# Patient Record
Sex: MALE | Race: ASIAN | Employment: OTHER | URBAN - METROPOLITAN AREA
[De-identification: names, ages, dates, MRNs, and addresses within clinical notes are randomized per-mention and may not be internally consistent; named-entity substitution may affect disease eponyms.]

---

## 2018-01-09 ENCOUNTER — TRANSCRIBE ORDERS (OUTPATIENT)
Dept: ADMINISTRATIVE | Facility: HOSPITAL | Age: 68
End: 2018-01-09

## 2018-01-09 DIAGNOSIS — E13.42 DIABETIC POLYNEUROPATHY ASSOCIATED WITH OTHER SPECIFIED DIABETES MELLITUS (HCC): Primary | ICD-10-CM

## 2018-01-11 NOTE — RESULT NOTES
Message   #1  Please call the patient with the results of his lower extremity arterial Doppler test       #2  It shows a narrowing of the artery behind his right knee  #3  I recommend that he see Dr Beatrice Gomez, of the vascular service, in consultation for further evaluation and management  #4  Please give him Dr Shmuel Salinas contact information  I put the consult in his chart  Verified Results  VAS LOWER LIMB ARTERIAL DUPLEX, COMPLETE BILATERAL/GRAFTS [id number] 01:12PM Sabrina Harvey     Test Name Result Flag Reference   VAS LOWER LIMB ARTERIAL DUPLEX, COMPLETE BILATERAL/GRAFTS (Report)     [facility] REPORT   CLINICAL:   Indications: PVD, Unspecified [I73 9]  Pt  complaining of plantar numbness and tingling  Denies claudication  Operative History   B/L CATARACT SURGERY   Risk Factors: The patient has history of HTN and diabetes (diabetes)  Clinical   Right Pressure: 195/ mm Hg, Left Pressure: 187/ mm Hg  FINDINGS:      Segment        Right         Left                        Impression PSV EDV PSV EDV    Common Femoral Artery       138  9 138  14    Prox Profunda           110  0 101  0    Prox SFA              104  0  96  10    Mid SFA               94  0  89  0    Dist SFA              68  0  81  0    Proximal Pop      >75%    318  10 105  0    Distal Pop             59  0  83  0    Dist Post Tibial          54  0  99  0    Dist  Ant  Tibial          37  0  56  0             CONCLUSION:   Impression:   RIGHT LOWER LIMB:   >75% stenosis identified in the proximal popliteal artery  Ankle/Brachial index: 1 13 (Normal Range)   Metatarsal pressure of 197mmHg   Great toe pressure of 153mmHg, within the healing range   LEFT LOWER LIMB:   Diffuse disease throughout the femoral and popliteal arteries without evidence   of a focal stenosis     Ankle/Brachial index: 1 15 (Normal Range)   Metatarsal pressure of 201mmHg   Great toe pressure of 168mmHg, within the healing range SIGNATURE:   Electronically Signed by: Denys Pham DO RPVI on 2016-01-15 01:12:09 PM       Plan  PAD (peripheral artery disease)    · 1 - Bryan RIOS, Maggie Valladares  (Vascular Surgery) Physician Referral  Consult  Status: Active   Requested for: 51TLM2268  Care Summary provided  : Yes

## 2018-01-11 NOTE — MISCELLANEOUS
Message   Recorded as Task   Date: 08/15/2016 09:22 AM, Created By: Holger Gill   Task Name: Follow Up   Assigned To: [facility] henrik clinical,Team   Regarding Patient: Timothy Killian, Status: Active   Comment:    Holger Gill - 15 Aug 2016 9:22 AM     TASK CREATED  Caller: Self; General Medical Question; [phone] (Home)  TC from pt today at 9:14 asking for Dr Reagan Cook to call him back b/c he would like to s/w him and it's very important! Pt stated he sent a message to Dr Reagan Cook last wk and again yest via the pt portal and and he hasn't gotten a reply back  He would like us to know that he has tried using this pt portal, he has s/w IT dept and said there must be something wrong on the receiving end b/c he hasn't gotten a reply back  Pt request Dr Reagan Cook call him at [phone]  [facility] 15 Aug 2016 10:05 AM     TASK REPLIED TO: Previously Assigned To ALDO fischer,Team  pt was instructed not to use the portal and to contact the nurse's line for any issues  please find out what is going on with him   Prisca Mobley - 15 Aug 2016 10:28 AM     TASK EDITED  Already being addressed in another task  Active Problems    1  Atherosclerosis of aorta (440 0) (I70 0)   2  Benign essential hypertension (401 1) (I10)   3  Chronic pain (338 29) (G89 29)   4  Degenerative lumbar spinal stenosis (724 02) (M48 06)   5  Diabetes mellitus with mild nonproliferative diabetic retinopathy (250 50,362 04)   (E11 329)   6  Diabetes mellitus with neurological manifestation (250 60) (E11 49)   7  Diabetes mellitus with neurological manifestations, uncontrolled (250 62)   (E11 49,E11 65)   8  Diabetic neuropathy associated with type 2 diabetes mellitus (250 60,357 2) (E11 40)   9  Diabetic retinopathy (250 50,362 01) (E11 319)   10  Hyperlipidemia (272 4) (E78 5)   11  Laboratory examination ordered as part of a routine general medical examination    (V72 62) (Z00 00)   12   Long term use of drug (V58 69) (Z79 899)   13  Lumbar disc herniation with radiculopathy (722 10) (M51 16)   14  Lumbar radiculopathy (724 4) (M54 16)   15  Microalbuminuria (791 0) (R80 9)   16  Need for prophylactic vaccination and inoculation against influenza (V04 81) (Z23)   17  Nocturia (788 43) (R35 1)   18  Numbness (782 0) (R20 0)   19  PAD (peripheral artery disease) (443 9) (I73 9)   20  Palpitations (785 1) (R00 2)   21  Peripheral neuropathy (356 9) (G62 9)   22  Screening for genitourinary condition (V81 6) (Z13 89)   23  Screening for lipoid disorders (V77 91) (Z13 220)   24  Special screening examination for neoplasm of prostate (V76 44) (Z12 5)   25  Special screening examination for neoplasm of prostate (V76 44) (Z12 5)   26  Staggering gait (781 2) (R26 0)   27  Subcutaneous nodule (782 2) (R22 9)   28  Thoracic Aortic Ectasia (447 71)   29  Uncontrolled type 2 diabetes mellitus (250 02) (E11 65)   30  Vitamin D deficiency (268 9) (E55 9)    Current Meds   1  Alpha-Lipoic Acid 600 MG Oral Capsule; TAKE 1 CAPSULE DAILY; Therapy: 54GJW8529 to Recorded   2  Aspirin 81 MG TABS; TAKE 1 TABLET DAILY; Therapy: 75DNT1643 to  Requested for: 35Aaj5309 Recorded   3  Cholest Off Complete TABS; TAKE AS DIRECTED PER PACKAGE INSTRUCTIONS; Therapy: (Edwige Peters) to Recorded   4  Gabapentin 300 MG Oral Capsule; TAKE 1 CAPSULE AT BEDTIME NIGHTLY; Therapy: 65GPB3993 to (Evaluate:44Col5660)  Requested for: 47BZI0746; Last   YW:83KHQ7482 Ordered   5  GlipiZIDE-MetFORMIN HCl - 5-500 MG Oral Tablet; TAKE 2 TABLETS BY MOUTH TWICE   A DAY WITH MEALS  HOLD IF NOT EATING; Therapy: 11BGH4691 to (Evaluate:03Ucs6888)  Requested for: 70YPD6286; Last   Rx:10Yup9732 Ordered   6  Losartan Potassium-HCTZ 100-25 MG Oral Tablet (Hyzaar); TAKE ONE TABLET BY   MOUTH ONCE DAILY; Therapy: 13YLG7758 to (Candis Stover)  Requested for: 06TGD1875; Last   Rx:13Nov2015 Ordered   7   Metanx 3-90 314-2-35 MG Oral Capsule; 1 CAPSULE TWICE DAILY; Therapy: 17JSZ9727 to (Evaluate:48Uul6400); Last Rx:34Zcj1634 Ordered   8  Metanx 3-90 314-2-35 MG Oral Capsule; TAKE 1 CAPSULE TWICE DAILY; Therapy: (Recorded:07Jun2015) to Recorded   9  Metoprolol Tartrate 25 MG Oral Tablet; take one tablet by mouth with a 50 mg tablet twice   daily; Therapy: 63XNE8305 to ([employer])  Requested for: 48ENB0349; Last   Rx:25Mzz4382; Status: ACTIVE - Renewal Denied Ordered   10  Metoprolol Tartrate 50 MG Oral Tablet; TAKE ONE TABLET BY MOUTH TWICE    DAILY-TAKE WITH 25 MG TABLET FOR A TOTAL OF 75 MG; Therapy: 43JYF7954 to ([employer])  Requested for: 82NGJ6419; Last    Rx:99Exq2498; Status: ACTIVE - Renewal Denied Ordered   11  OneTouch Delica Lancets 85H Miscellaneous; Therapy: 20EJO3182 to Recorded   12  OneTouch Ultra Mini w/Device Kit; Therapy: 87VRK5171 to Recorded    Allergies    1  Penicillins    2  No Known Environmental Allergies   3   No Known Food Allergies    Signatures   Electronically signed by : Tania Philip, ; Aug 15 2016 10:28AM EST                       (Author)

## 2018-01-12 NOTE — MISCELLANEOUS
Message   Recorded as Task   Date: 08/16/2016 04:07 PM, Created By: Valentina Butterfield   Task Name: Miscellaneous   Assigned To: Pily Moss   Regarding Patient: Mary Powers, Status: Active   CommentRolanda Peres - 16 Aug 2016 4:07 PM     TASK CREATED  Certified letter mailed to patient today to notify patient if any further abusive calls made to anyone in the network, this will result in discharge from entire network  Any further abusive contact from patient, please contact Sydney Darwin Pagan - 96 Aug 2016 8:53 PM     TASK REPLIED TO: Previously Assigned To Jozef Pagan md aware        Active Problems    1  Atherosclerosis of aorta (440 0) (I70 0)   2  Benign essential hypertension (401 1) (I10)   3  Chronic pain (338 29) (G89 29)   4  Degenerative lumbar spinal stenosis (724 02) (M48 06)   5  Diabetes mellitus with mild nonproliferative diabetic retinopathy (250 50,362 04)   (E11 329)   6  Diabetes mellitus with neurological manifestation (250 60) (E11 49)   7  Diabetes mellitus with neurological manifestations, uncontrolled (250 62)   (E11 49,E11 65)   8  Diabetic neuropathy associated with type 2 diabetes mellitus (250 60,357 2) (E11 40)   9  Diabetic retinopathy (250 50,362 01) (E11 319)   10  Hyperlipidemia (272 4) (E78 5)   11  Laboratory examination ordered as part of a routine general medical examination    (V72 62) (Z00 00)   12  Long term use of drug (V58 69) (Z79 899)   13  Lumbar disc herniation with radiculopathy (722 10) (M51 16)   14  Lumbar radiculopathy (724 4) (M54 16)   15  Microalbuminuria (791 0) (R80 9)   16  Need for prophylactic vaccination and inoculation against influenza (V04 81) (Z23)   17  Nocturia (788 43) (R35 1)   18  Numbness (782 0) (R20 0)   19  PAD (peripheral artery disease) (443 9) (I73 9)   20  Palpitations (785 1) (R00 2)   21  Peripheral neuropathy (356 9) (G62 9)   22  Screening for genitourinary condition (V81 6) (Z13 89)   23   Screening for lipoid disorders (V77 91) (Z13 220)   24  Special screening examination for neoplasm of prostate (V76 44) (Z12 5)   25  Special screening examination for neoplasm of prostate (V76 44) (Z12 5)   26  Staggering gait (781 2) (R26 0)   27  Subcutaneous nodule (782 2) (R22 9)   28  Thoracic Aortic Ectasia (447 71)   29  Uncontrolled type 2 diabetes mellitus (250 02) (E11 65)   30  Vitamin D deficiency (268 9) (E55 9)    Current Meds   1  Alpha-Lipoic Acid 600 MG Oral Capsule; TAKE 1 CAPSULE DAILY; Therapy: 91KWI1219 to Recorded   2  Aspirin 81 MG TABS; TAKE 1 TABLET DAILY; Therapy: 38MIM7306 to  Requested for: 74Jkv3119 Recorded   3  Cholest Off Complete TABS; TAKE AS DIRECTED PER PACKAGE INSTRUCTIONS; Therapy: (Brian Robin) to Recorded   4  Gabapentin 300 MG Oral Capsule; TAKE 1 CAPSULE AT BEDTIME NIGHTLY; Therapy: 70IZH9251 to (Evaluate:42Ita5833)  Requested for: 26DAR9377; Last   XS:39VVW0425 Ordered   5  Losartan Potassium-HCTZ 100-25 MG Oral Tablet (Hyzaar); TAKE ONE TABLET BY   MOUTH ONCE DAILY; Therapy: 92HXD3386 to (Bradford Arvizu)  Requested for: 45KCA3905; Last   Rx:13Nov2015 Ordered   6  Metanx 3-90 314-2-35 MG Oral Capsule; 1 CAPSULE TWICE DAILY; Therapy: 48LRX5329 to (Evaluate:55Kqi6040); Last Rx:69Wil6315 Ordered   7  Metanx 3-90 314-2-35 MG Oral Capsule; TAKE 1 CAPSULE TWICE DAILY; Therapy: (Recorded:07Jun2015) to Recorded   8  Metoprolol Tartrate 25 MG Oral Tablet; take one tablet by mouth with a 50 mg tablet twice   daily; Therapy: 10NIK3167 to (Nina Stevens)  Requested for: 20LRK0664; Last   Rx:25Lwx8474; Status: ACTIVE - Renewal Denied Ordered   9  Metoprolol Tartrate 50 MG Oral Tablet; TAKE ONE TABLET BY MOUTH TWICE   DAILY-TAKE WITH 25 MG TABLET FOR A TOTAL OF 75 MG; Therapy: 02PTS5406 to (Nina Stevens)  Requested for: 93TNX6690; Last   Rx:87Orx9092; Status: ACTIVE - Renewal Denied Ordered   10  OneTouch Delica Lancets 46S Miscellaneous;     Therapy: 32GER0028 to Recorded   11  OneTouch Ultra Mini w/Device Kit; Therapy: 20CWT4932 to Recorded    Allergies    1  Penicillins    2  No Known Environmental Allergies   3  No Known Food Allergies    Signatures   Electronically signed by :  Dessa Leventhal, ; Aug 17 2016  8:09AM EST                       (Author)

## 2018-01-12 NOTE — MISCELLANEOUS
Message   Recorded as Task   Date: 07/19/2016 10:46 AM, Created By: Wilfrido Pires   Task Name: Follow Up   Assigned To: [facility] clerical,Team   Regarding Patient: Ubaldo Carlson, Status: In Progress   Joe Saldaña - 19 Jul 2016 10:46 AM     TASK CREATED  pt is s/p B/L L5 TFESI on 07/14/16 by Augustine Reyna - 21 Jul 2016 7:44 AM     TASK EDITED  No f/u scheduled   Parris Boudreaux - 21 Jul 2016 11:39 AM     TASK EDITED  Spoke with pt's wife who reports the pt has not noticed any relief from pain but feels stronger in legs  Advised wife to tell pt to give steroid more time and that I would f/u with him again next week  Wilfrid Sherwood - 21 Jul 2016 11:40 AM     TASK REPLIED TO: Previously Assigned To Wilfrid Sherwood md aware    please have him schedule POVS with me in 4 weeks   Parris Boudreaux - 21 Jul 2016 11:51 AM     TASK REASSIGNED: Previously Assigned To ALDO Soto - 21 Jul 2016 2:29 PM     TASK IN Melisa - 21 Jul 2016 2:33 PM     TASK EDITED  Pt is scheduled for 8/10/16 at 3:15        Active Problems    1  Atherosclerosis of aorta (440 0) (I70 0)   2  Benign essential hypertension (401 1) (I10)   3  Chronic pain (338 29) (G89 29)   4  Degenerative lumbar spinal stenosis (724 02) (M48 06)   5  Diabetes mellitus with mild nonproliferative diabetic retinopathy (250 50,362 04)   (E11 329)   6  Diabetes mellitus with neurological manifestation (250 60) (E11 49)   7  Diabetes mellitus with neurological manifestations, uncontrolled (250 62)   (E11 49,E11 65)   8  Diabetic neuropathy associated with type 2 diabetes mellitus (250 60,357 2) (E11 40)   9  Diabetic retinopathy (250 50,362 01) (E11 319)   10  Hyperlipidemia (272 4) (E78 5)   11  Laboratory examination ordered as part of a routine general medical examination    (V72 62) (Z00 00)   12  Long term use of drug (V58 69) (Z79 899)   13   Lumbar disc herniation with radiculopathy (722 10) (M51 16) 14  Lumbar radiculopathy (724 4) (M54 16)   15  Microalbuminuria (791 0) (R80 9)   16  Need for prophylactic vaccination and inoculation against influenza (V04 81) (Z23)   17  Nocturia (788 43) (R35 1)   18  Numbness (782 0) (R20 0)   19  PAD (peripheral artery disease) (443 9) (I73 9)   20  Palpitations (785 1) (R00 2)   21  Peripheral neuropathy (356 9) (G62 9)   22  Screening for genitourinary condition (V81 6) (Z13 89)   23  Screening for lipoid disorders (V77 91) (Z13 220)   24  Special screening examination for neoplasm of prostate (V76 44) (Z12 5)   25  Special screening examination for neoplasm of prostate (V76 44) (Z12 5)   26  Staggering gait (781 2) (R26 0)   27  Subcutaneous nodule (782 2) (R22 9)   28  Thoracic Aortic Ectasia (447 71)   29  Uncontrolled type 2 diabetes mellitus (250 02) (E11 65)   30  Vitamin D deficiency (268 9) (E55 9)    Current Meds   1  Alpha-Lipoic Acid 600 MG Oral Capsule; TAKE 1 CAPSULE DAILY; Therapy: 59NFQ0065 to Recorded   2  Aspirin 81 MG TABS; TAKE 1 TABLET DAILY; Therapy: 00LGJ8599 to  Requested for: 04Lxb3469 Recorded   3  Cholest Off Complete TABS; TAKE AS DIRECTED PER PACKAGE INSTRUCTIONS; Therapy: ([facility]) to Recorded   4  Gabapentin 300 MG Oral Capsule; TAKE 1 CAPSULE AT BEDTIME NIGHTLY; Therapy: 23HOT2838 to (Evaluate:89Gkt2213)  Requested for: 80VSF1133; Last   GA:69HXE5428 Ordered   5  GlipiZIDE-MetFORMIN HCl - 5-500 MG Oral Tablet; TAKE 2 TABLETS BY MOUTH TWICE   A DAY WITH MEALS  HOLD IF NOT EATING; Therapy: 13KBL4705 to (Evaluate:31Llg4650)  Requested for: 07DKU2212; Last   Rx:41Ouj0183 Ordered   6  Losartan Potassium-HCTZ 100-25 MG Oral Tablet (Hyzaar); TAKE ONE TABLET BY   MOUTH ONCE DAILY; Therapy: 13ALW9715 to (Dima Garcia)  Requested for: 91WBV8150; Last   Rx:13Nov2015 Ordered   7  Metanx 3-90 314-2-35 MG Oral Capsule; 1 CAPSULE TWICE DAILY; Therapy: 93YXC1152 to (Evaluate:42Pve5873); Last Rx:02Yrc0585 Ordered   8   Metanx 3-90 314-2-35 MG Oral Capsule; TAKE 1 CAPSULE TWICE DAILY; Therapy: (Recorded:37Uio0776) to Recorded   9  Metoprolol Tartrate 25 MG Oral Tablet; take one tablet by mouth with a 50 mg tablet twice   daily; Therapy: 06JKB2750 to (Para Allis)  Requested for: 11BTQ9430; Last   Rx:90Fwd5467; Status: ACTIVE - Renewal Denied Ordered   10  Metoprolol Tartrate 50 MG Oral Tablet; TAKE ONE TABLET BY MOUTH TWICE    DAILY-TAKE WITH 25 MG TABLET FOR A TOTAL OF 75 MG; Therapy: 23TOX2058 to (Para Allis)  Requested for: 44LRH2557; Last    Rx:18Dtb3660; Status: ACTIVE - Renewal Denied Ordered   11  OneTouch Delica Lancets 67V Miscellaneous; Therapy: 36YTL4026 to Recorded   12  OneTouch Ultra Mini w/Device Kit; Therapy: 38NCN1667 to Recorded    Allergies    1  Penicillins    2  No Known Environmental Allergies   3  No Known Food Allergies    Signatures   Electronically signed by :  Elza Farah, ; Jul 21 2016  2:33PM EST                       (Author)

## 2018-01-13 NOTE — MISCELLANEOUS
Message   Recorded as Task   Date: 08/23/2016 01:04 PM, Created By: RAYSHAWN GONSALEZXPGGGX   Task Name: Follow Up   Assigned To: Renard Gaviria   Regarding Patient: Asael Miguel, Status: Active   CommentDaivd Hark - 23 Aug 2016 1:04 PM     TASK CREATED  Pt aware that you will be transitioning out of office effective October 1, 2016  Pt declined to schedule one final appt with you prior to your departure  Pt states "I will use my endocrinologist as my PCP "  Pt requesting to have records transferred to endocrinologist   Pt will be in to sign authorization form by the end of the week  Renard Gaviria - 19 Aug 2016 1:11 PM     TASK EDITED  CTN        Signatures   Electronically signed by :  Fabien Nayak MD; Aug 23 2016  1:11PM EST                       (Author)

## 2018-01-14 NOTE — MISCELLANEOUS
Message   Recorded as Task   Date: 08/16/2016 12:08 AM, Created By: Bernadine Nye   Task Name: Go to Med   Assigned To: Bernadine Nye   Regarding Patient: Elisa Trejo, Status: Complete   CommentErenest Calico - 16 Aug 2016 12:08 AM     TASK CREATED  Alison,    #1  Please call the patient  #2  As he is following with an endocrinologist, Dr Nicolette Lindsey, I recommend that he call Dr Julissa Fairbanks office to refill this medication  Kelsie Tatum - 16 Aug 2016 5:11 PM     TASK EDITED                 Per E21 request, called patient to discuss prior message, patient did acknowledge that he see's Dr Nicolette Lindsey then hung up during the conversation  Stephanie Tu - 16 Aug 2016 5:11 PM     TASK COMPLETED        Active Problems    1  Atherosclerosis of aorta (440 0) (I70 0)   2  Benign essential hypertension (401 1) (I10)   3  Chronic pain (338 29) (G89 29)   4  Degenerative lumbar spinal stenosis (724 02) (M48 06)   5  Diabetes mellitus with mild nonproliferative diabetic retinopathy (250 50,362 04)   (E11 329)   6  Diabetes mellitus with neurological manifestation (250 60) (E11 49)   7  Diabetes mellitus with neurological manifestations, uncontrolled (250 62)   (E11 49,E11 65)   8  Diabetic neuropathy associated with type 2 diabetes mellitus (250 60,357 2) (E11 40)   9  Diabetic retinopathy (250 50,362 01) (E11 319)   10  Hyperlipidemia (272 4) (E78 5)   11  Laboratory examination ordered as part of a routine general medical examination    (V72 62) (Z00 00)   12  Long term use of drug (V58 69) (Z79 899)   13  Lumbar disc herniation with radiculopathy (722 10) (M51 16)   14  Lumbar radiculopathy (724 4) (M54 16)   15  Microalbuminuria (791 0) (R80 9)   16  Need for prophylactic vaccination and inoculation against influenza (V04 81) (Z23)   17  Nocturia (788 43) (R35 1)   18  Numbness (782 0) (R20 0)   19  PAD (peripheral artery disease) (443 9) (I73 9)   20  Palpitations (785 1) (R00 2)   21   Peripheral neuropathy (356 9) (G62 9)   22  Screening for genitourinary condition (V81 6) (Z13 89)   23  Screening for lipoid disorders (V77 91) (Z13 220)   24  Special screening examination for neoplasm of prostate (V76 44) (Z12 5)   25  Special screening examination for neoplasm of prostate (V76 44) (Z12 5)   26  Staggering gait (781 2) (R26 0)   27  Subcutaneous nodule (782 2) (R22 9)   28  Thoracic Aortic Ectasia (447 71)   29  Uncontrolled type 2 diabetes mellitus (250 02) (E11 65)   30  Vitamin D deficiency (268 9) (E55 9)    Current Meds   1  Alpha-Lipoic Acid 600 MG Oral Capsule; TAKE 1 CAPSULE DAILY; Therapy: 45YGE0778 to Recorded   2  Aspirin 81 MG TABS; TAKE 1 TABLET DAILY; Therapy: 04TRT3917 to  Requested for: 40Ybt0109 Recorded   3  Cholest Off Complete TABS; TAKE AS DIRECTED PER PACKAGE INSTRUCTIONS; Therapy: (Radha Rodriguez to Recorded   4  Gabapentin 300 MG Oral Capsule; TAKE 1 CAPSULE AT BEDTIME NIGHTLY; Therapy: 95IJZ3241 to (Evaluate:66Quz0275)  Requested for: 49DLE8402; Last   BB:36MZQ8626 Ordered   5  Losartan Potassium-HCTZ 100-25 MG Oral Tablet (Hyzaar); TAKE ONE TABLET BY   MOUTH ONCE DAILY; Therapy: 26XBB0334 to (Marilyn Kp)  Requested for: 13BMV6833; Last   Rx:13Nov2015 Ordered   6  Metanx 3-90 314-2-35 MG Oral Capsule; 1 CAPSULE TWICE DAILY; Therapy: 28HWN4371 to (Evaluate:00Hbu2060); Last Rx:79Smv2610 Ordered   7  Metanx 3-90 314-2-35 MG Oral Capsule; TAKE 1 CAPSULE TWICE DAILY; Therapy: (Recorded:07Jun2015) to Recorded   8  Metoprolol Tartrate 25 MG Oral Tablet; take one tablet by mouth with a 50 mg tablet twice   daily; Therapy: 36PLX5987 to (Marilyn Kp)  Requested for: 01ACK1371; Last   Rx:31Gyy7883; Status: ACTIVE - Renewal Denied Ordered   9  Metoprolol Tartrate 50 MG Oral Tablet; TAKE ONE TABLET BY MOUTH TWICE   DAILY-TAKE WITH 25 MG TABLET FOR A TOTAL OF 75 MG;    Therapy: 62EUJ4144 to (Marilyn Kp)  Requested for: 67WBV1014; Last   Rx:41Men7973; Status: ACTIVE - Renewal Denied Ordered   10  OneTouch Delica Lancets 21Y Miscellaneous; Therapy: 28SNB1810 to Recorded   11  OneTouch Ultra Mini w/Device Kit; Therapy: 65AHO9032 to Recorded    Allergies    1  Penicillins    2  No Known Environmental Allergies   3  No Known Food Allergies    Signatures   Electronically signed by :  Fareed Grijalva MD; Aug 17 2016  9:52AM EST

## 2018-01-15 NOTE — MISCELLANEOUS
Message   Date: 10 Aug 2016 4:35 PM EST, Recorded By: Iva Castillo   Pt had called IT multiple times trying to send a message to Dr Maximus Stewart via the pt portal  Pt upset with IT that they were not able to help him after multiple phone calls  Pt did call back today and IT was able to assist pt to get message to us today via the pt portal    I contacted pt to advise him that we did receive his message that he needed to cancel his appt today and asked him if he would like to reschedule at this time? Pt advised me that he would not be able to reschedule at this time  I advised him to call our office to reschedule  Pt states "I have no interest in calling your office, I would like to make the appt on the pt portal  [facility] has an excellent portal " Pt apologized for using our competition  Pt asking why we can't help him? I advised that I am not very familiar with the system and will have to look into it  Pt asking why I would need to look into it, stating that he will be awaiting a response via the pt portal and when he gets it he will make the appt via the portal at that time  I did discuss with Cy Barnes (), she advised me that due to the nature of our practice pts are not able to make appts via the pt portal    I did respond back to the pt via the pt portal, as per the conversation I had with Cy Barnes  Active Problems    1  Atherosclerosis of aorta (440 0) (I70 0)   2  Benign essential hypertension (401 1) (I10)   3  Chronic pain (338 29) (G89 29)   4  Degenerative lumbar spinal stenosis (724 02) (M48 06)   5  Diabetes mellitus with mild nonproliferative diabetic retinopathy (250 50,362 04)   (E11 329)   6  Diabetes mellitus with neurological manifestation (250 60) (E11 49)   7  Diabetes mellitus with neurological manifestations, uncontrolled (250 62)   (E11 49,E11 65)   8  Diabetic neuropathy associated with type 2 diabetes mellitus (250 60,357 2) (E11 40)   9   Diabetic retinopathy (250 50,362 01) (E11 319)   10  Hyperlipidemia (272 4) (E78 5)   11  Laboratory examination ordered as part of a routine general medical examination    (V72 62) (Z00 00)   12  Long term use of drug (V58 69) (Z79 899)   13  Lumbar disc herniation with radiculopathy (722 10) (M51 16)   14  Lumbar radiculopathy (724 4) (M54 16)   15  Microalbuminuria (791 0) (R80 9)   16  Need for prophylactic vaccination and inoculation against influenza (V04 81) (Z23)   17  Nocturia (788 43) (R35 1)   18  Numbness (782 0) (R20 0)   19  PAD (peripheral artery disease) (443 9) (I73 9)   20  Palpitations (785 1) (R00 2)   21  Peripheral neuropathy (356 9) (G62 9)   22  Screening for genitourinary condition (V81 6) (Z13 89)   23  Screening for lipoid disorders (V77 91) (Z13 220)   24  Special screening examination for neoplasm of prostate (V76 44) (Z12 5)   25  Special screening examination for neoplasm of prostate (V76 44) (Z12 5)   26  Staggering gait (781 2) (R26 0)   27  Subcutaneous nodule (782 2) (R22 9)   28  Thoracic Aortic Ectasia (447 71)   29  Uncontrolled type 2 diabetes mellitus (250 02) (E11 65)   30  Vitamin D deficiency (268 9) (E55 9)    Current Meds   1  Alpha-Lipoic Acid 600 MG Oral Capsule; TAKE 1 CAPSULE DAILY; Therapy: 49QFL4912 to Recorded   2  Aspirin 81 MG TABS; TAKE 1 TABLET DAILY; Therapy: 10UWU6358 to  Requested for: 73Ayd6380 Recorded   3  Cholest Off Complete TABS; TAKE AS DIRECTED PER PACKAGE INSTRUCTIONS; Therapy: (Loni Horta) to Recorded   4  Gabapentin 300 MG Oral Capsule; TAKE 1 CAPSULE AT BEDTIME NIGHTLY; Therapy: 52MWL6549 to (Evaluate:64Iic5317)  Requested for: 61UWN2659; Last   EA:32RST9208 Ordered   5  GlipiZIDE-MetFORMIN HCl - 5-500 MG Oral Tablet; TAKE 2 TABLETS BY MOUTH TWICE   A DAY WITH MEALS  HOLD IF NOT EATING; Therapy: 33BBL4494 to (Evaluate:33Rri6941)  Requested for: 83MZS3471; Last   Rx:01Rda3652 Ordered   6   Losartan Potassium-HCTZ 100-25 MG Oral Tablet (Hyzaar); TAKE ONE TABLET BY   MOUTH ONCE DAILY; Therapy: 99QKZ7046 to (Sona Kraus)  Requested for: 71ZTL0286; Last   Rx:13Nov2015 Ordered   7  Metanx 3-90 314-2-35 MG Oral Capsule; 1 CAPSULE TWICE DAILY; Therapy: 37GTZ5052 to (Evaluate:78Xuk6018); Last Rx:58Dps4314 Ordered   8  Metanx 3-90 314-2-35 MG Oral Capsule; TAKE 1 CAPSULE TWICE DAILY; Therapy: (Recorded:07Jun2015) to Recorded   9  Metoprolol Tartrate 25 MG Oral Tablet; take one tablet by mouth with a 50 mg tablet twice   daily; Therapy: 23ICB7275 to (Jose Morillomsted)  Requested for: 20LPS8962; Last   Rx:31Sps7265; Status: ACTIVE - Renewal Denied Ordered   10  Metoprolol Tartrate 50 MG Oral Tablet; TAKE ONE TABLET BY MOUTH TWICE    DAILY-TAKE WITH 25 MG TABLET FOR A TOTAL OF 75 MG; Therapy: 64ICY7001 to (Sona Kraus)  Requested for: 59QEM8842; Last    Rx:53Xmb0879; Status: ACTIVE - Renewal Denied Ordered   11  OneTouch Delica Lancets 06C Miscellaneous; Therapy: 34XXK9595 to Recorded   12  OneTouch Ultra Mini w/Device Kit; Therapy: 11JQJ3801 to Recorded    Allergies    1  Penicillins    2  No Known Environmental Allergies   3  No Known Food Allergies    Signatures   Electronically signed by :  Valarie Madera, ; Aug 10 2016  4:49PM EST                       (Author)

## 2018-01-16 NOTE — MISCELLANEOUS
Message   Recorded as Task   Date: 08/14/2016 06:42 PM, Created By: Yadira Beltre   Task Name: Patient Secure Message   Assigned To: Giovanan Baez   Regarding Patient: Mary Powers, Status: Active   Comment:    Portal,Patient - 14 Aug 2016 6:42 PM     communication  Dr Muna Morin,    I do not have any relief from epedorel shot neither in legs nor in feet  Please let me know what is the uses of shot and how to proceed  Please tell your staff to add your message id in the portal, so that we can communicate using portal     DILIP Del Rosario Prow - 15 Aug 2016 10:27 AM     TASK EDITED  Left vm on pt's home number asking to c/b and provide details of his issues to the nurse as Dr Muna Morin has advised him not to use the pt portal but to call and s/w the nurse directly with any issues  C/B number provided  Prisca Mobley - 15 Aug 2016 10:57 AM     TASK IN PROGRESS   Prisca Mobley - 17 Aug 2016 7:36 AM     TASK EDITED   Anjelica Limon - 17 Aug 2016 12:11 PM     TASK EDITED  Per Dr Muna Morin I do not need to call pt back a 2nd time  Active Problems    1  Atherosclerosis of aorta (440 0) (I70 0)   2  Benign essential hypertension (401 1) (I10)   3  Chronic pain (338 29) (G89 29)   4  Degenerative lumbar spinal stenosis (724 02) (M48 06)   5  Diabetes mellitus with mild nonproliferative diabetic retinopathy (250 50,362 04)   (E11 329)   6  Diabetes mellitus with neurological manifestation (250 60) (E11 49)   7  Diabetes mellitus with neurological manifestations, uncontrolled (250 62)   (E11 49,E11 65)   8  Diabetic neuropathy associated with type 2 diabetes mellitus (250 60,357 2) (E11 40)   9  Diabetic retinopathy (250 50,362 01) (E11 319)   10  Hyperlipidemia (272 4) (E78 5)   11  Laboratory examination ordered as part of a routine general medical examination    (V72 62) (Z00 00)   12  Long term use of drug (V58 69) (Z79 899)   13   Lumbar disc herniation with radiculopathy (722 10) (M51 16)   14  Lumbar radiculopathy (724 4) (M54 16)   15  Microalbuminuria (791 0) (R80 9)   16  Need for prophylactic vaccination and inoculation against influenza (V04 81) (Z23)   17  Nocturia (788 43) (R35 1)   18  Numbness (782 0) (R20 0)   19  PAD (peripheral artery disease) (443 9) (I73 9)   20  Palpitations (785 1) (R00 2)   21  Peripheral neuropathy (356 9) (G62 9)   22  Screening for genitourinary condition (V81 6) (Z13 89)   23  Screening for lipoid disorders (V77 91) (Z13 220)   24  Special screening examination for neoplasm of prostate (V76 44) (Z12 5)   25  Special screening examination for neoplasm of prostate (V76 44) (Z12 5)   26  Staggering gait (781 2) (R26 0)   27  Subcutaneous nodule (782 2) (R22 9)   28  Thoracic Aortic Ectasia (447 71)   29  Uncontrolled type 2 diabetes mellitus (250 02) (E11 65)   30  Vitamin D deficiency (268 9) (E55 9)    Current Meds   1  Alpha-Lipoic Acid 600 MG Oral Capsule; TAKE 1 CAPSULE DAILY; Therapy: 97ETO1439 to Recorded   2  Aspirin 81 MG TABS; TAKE 1 TABLET DAILY; Therapy: 95BVH9403 to  Requested for: 99Ash1517 Recorded   3  Cholest Off Complete TABS; TAKE AS DIRECTED PER PACKAGE INSTRUCTIONS; Therapy: (Rochelle Tong) to Recorded   4  Gabapentin 300 MG Oral Capsule; TAKE 1 CAPSULE AT BEDTIME NIGHTLY; Therapy: 80BLD2482 to (Evaluate:79Aly6235)  Requested for: 85LML4340; Last   IX:61QFQ9392 Ordered   5  GlipiZIDE-MetFORMIN HCl - 5-500 MG Oral Tablet; TAKE 2 TABLETS BY MOUTH TWICE   A DAY WITH MEALS  HOLD IF NOT EATING; Therapy: 66NQF3362 to (Evaluate:60Kte2278)  Requested for: 47Lcr2684; Last   Rx:64Hea7255; Status: ACTIVE - Renewal Voided Ordered   6  Losartan Potassium-HCTZ 100-25 MG Oral Tablet (Hyzaar); TAKE ONE TABLET BY   MOUTH ONCE DAILY; Therapy: 22PRZ0557 to (Jenniffer Goodpasture)  Requested for: 95GTR3189; Last   Rx:13Nov2015 Ordered   7  Metanx 3-90 314-2-35 MG Oral Capsule; 1 CAPSULE TWICE DAILY;    Therapy: 64Jrr0875 to (Evaluate:94Fmq3534); Last Rx:45Pai3054 Ordered   8  Metanx 3-90 314-2-35 MG Oral Capsule; TAKE 1 CAPSULE TWICE DAILY; Therapy: (Recorded:07Jun2015) to Recorded   9  Metoprolol Tartrate 25 MG Oral Tablet; take one tablet by mouth with a 50 mg tablet twice   daily; Therapy: 15NHO0074 to (Rose Marie Stage)  Requested for: 40XGQ7080; Last   Rx:61Svn8622; Status: ACTIVE - Renewal Denied Ordered   10  Metoprolol Tartrate 50 MG Oral Tablet; TAKE ONE TABLET BY MOUTH TWICE    DAILY-TAKE WITH 25 MG TABLET FOR A TOTAL OF 75 MG; Therapy: 67YYC9163 to (Rose Marie Stage)  Requested for: 26TII1628; Last    Rx:66Dbc1549; Status: ACTIVE - Renewal Denied Ordered   11  OneTouch Delica Lancets 90Z Miscellaneous; Therapy: 11IOY1620 to Recorded   12  OneTouch Ultra Mini w/Device Kit; Therapy: 01QLG8768 to Recorded    Allergies    1  Penicillins    2  No Known Environmental Allergies   3   No Known Food Allergies    Signatures   Electronically signed by : Jordan Chavez, ; Aug 17 2016 12:12PM EST                       (Author)

## 2018-01-17 ENCOUNTER — HOSPITAL ENCOUNTER (OUTPATIENT)
Dept: NON INVASIVE DIAGNOSTICS | Facility: CLINIC | Age: 68
Discharge: HOME | End: 2018-01-17
Payer: COMMERCIAL

## 2018-01-17 ENCOUNTER — GENERIC CONVERSION - ENCOUNTER (OUTPATIENT)
Dept: OTHER | Facility: OTHER | Age: 68
End: 2018-01-17

## 2018-01-17 DIAGNOSIS — E13.42 DIABETIC POLYNEUROPATHY ASSOCIATED WITH OTHER SPECIFIED DIABETES MELLITUS (HCC): ICD-10-CM

## 2018-01-17 PROCEDURE — 93923 UPR/LXTR ART STDY 3+ LVLS: CPT

## 2018-01-17 PROCEDURE — 93925 LOWER EXTREMITY STUDY: CPT

## 2018-01-17 NOTE — RESULT NOTES
Message   Recorded as Task   Date: 07/08/2016 12:31 PM, Created By: Narinder Dias   Task Name: Miscellaneous   Assigned To: Phillip Poe clinical,Team   Regarding Patient: Shree Holman, Status: Active   Comment:    Pretty Bennett - 08 Jul 2016 12:31 PM     TASK CREATED  Caller: Self; General Medical Question; [phone] (Home)  Received a transfered call from  Rue Shay Al Isha and s/w the pt  The pt was the verbally irrate  the pt  states he has attempted to call us numerous times  I did clarify with him that I did receive his message this AM, but d/t an emergent situation was unable to call him back immediately  He stated that was no excuse  He had results that FQ had to be notified of  I clarified that it was lab results that his pcp had ordered  The patient was frustrated that he could not email the results to [address]  He requested FQ's personal email, which I stated I did not have  He stated he tried to access the portal and that He could not get access  He became verbally abusive at this point ranting about how he was going to seek another provider, I then referred him to my manager Gilberto and then transferred the call to her  Muriel spoke with pt and informed him that the lab results must be sent over to [address] by mail or fax from his PCP's office  Pt Thea Dempsey, pt states that he wiill no longer be utilizing DR VELAZQUEZ as his pain management pcp     Fortino Jean - 10 Jul 2016 9:45 PM     TASK REPLIED TO: Previously Assigned To ALDO Kim md aware        Signatures   Electronically signed by : Bay Narvaez, ; Jul 11 2016 11:37AM EST                       (Author)

## 2018-01-25 ENCOUNTER — OFFICE VISIT (OUTPATIENT)
Dept: VASCULAR SURGERY | Facility: CLINIC | Age: 68
End: 2018-01-25
Payer: COMMERCIAL

## 2018-01-25 VITALS
SYSTOLIC BLOOD PRESSURE: 136 MMHG | DIASTOLIC BLOOD PRESSURE: 64 MMHG | HEIGHT: 68 IN | BODY MASS INDEX: 20.31 KG/M2 | WEIGHT: 134 LBS | RESPIRATION RATE: 16 BRPM | TEMPERATURE: 98.5 F | HEART RATE: 74 BPM

## 2018-01-25 DIAGNOSIS — E11.49 DIABETES MELLITUS TYPE 2 WITH NEUROLOGICAL MANIFESTATIONS (HCC): ICD-10-CM

## 2018-01-25 DIAGNOSIS — I70.238 ATHEROSCLEROSIS OF NATIVE ARTERIES OF RIGHT LEG WITH ULCERATION OF OTHER PART OF LOWER RIGHT LEG: Primary | ICD-10-CM

## 2018-01-25 PROCEDURE — 99213 OFFICE O/P EST LOW 20 MIN: CPT | Performed by: NURSE PRACTITIONER

## 2018-01-25 RX ORDER — LINAGLIPTIN 5 MG/1
TABLET, FILM COATED ORAL
COMMUNITY
Start: 2018-01-24 | End: 2018-02-21 | Stop reason: HOSPADM

## 2018-01-25 RX ORDER — PREGABALIN 75 MG/1
75 CAPSULE ORAL
COMMUNITY
Start: 2016-09-27

## 2018-01-25 RX ORDER — PERPHENAZINE 16 MG
TABLET ORAL
COMMUNITY
Start: 2013-08-13

## 2018-01-25 RX ORDER — GABAPENTIN 300 MG/1
600 CAPSULE ORAL
COMMUNITY
Start: 2017-09-05 | End: 2018-02-21 | Stop reason: HOSPADM

## 2018-01-25 RX ORDER — BLOOD-GLUCOSE METER
KIT MISCELLANEOUS
COMMUNITY
Start: 2015-05-15

## 2018-01-25 RX ORDER — GLIPIZIDE AND METFORMIN HCL 5; 500 MG/1; MG/1
2 TABLET, FILM COATED ORAL
COMMUNITY
Start: 2018-01-05 | End: 2018-02-21 | Stop reason: HOSPADM

## 2018-01-25 RX ORDER — METOPROLOL TARTRATE 50 MG/1
50 TABLET, FILM COATED ORAL EVERY 12 HOURS SCHEDULED
COMMUNITY
Start: 2017-07-12 | End: 2018-02-21 | Stop reason: HOSPADM

## 2018-01-25 RX ORDER — LOSARTAN POTASSIUM AND HYDROCHLOROTHIAZIDE 25; 100 MG/1; MG/1
1 TABLET ORAL DAILY
COMMUNITY
Start: 2012-05-17 | End: 2018-02-21 | Stop reason: HOSPADM

## 2018-01-25 NOTE — PROGRESS NOTES
Assessment/Plan:  74 y/o M with DM and HTN:    1  Atherosclerosis right lower extremity  Dry gangrene right hallux and ulcer to fourth toe, nonhealing x4-5 weeks  LEAD reviewed- greater than 75% popliteal stenosis, calcified tibial disease, GTP below healing at 40 mmHg  Discussed treatment with possible arteriogram, risks vs benefits discussed at length  Patient would like to discuss further with Dr Merlyn Larry prior to proceeding  Subjective:      Patient ID: Nettie Spencer is a 75 y o  male with diabetes and HTN  74 y/o M with diabetes and HTN, seen for evaluation of right foot wound  Patient is seen in office with wife  He has dry ulcer/gangrene to the right hallux and fourth toe  Ulcers present x4-5 weeks  Denies any claudication symptoms, no rest pain  Reports occasional burning to plantar aspect left foot  He takes Lyrica for neuropathy  He had LEAD done 1/17/18  He is in the process of completing a course of antibiotics, by podiatry, for infection  Denies fever/chills  The following portions of the patient's history were reviewed and updated as appropriate: allergies, current medications, past family history, past medical history, past social history, past surgical history and problem list     Review of Systems   Constitutional: Negative  HENT: Negative  Eyes: Negative  Respiratory: Negative  Cardiovascular: Negative  Gastrointestinal: Negative  Endocrine: Negative  Genitourinary: Negative  Musculoskeletal: Negative  Skin: Positive for wound  Allergic/Immunologic: Negative  Neurological:        Burning to bottom of left foot   Hematological: Negative  Psychiatric/Behavioral: Negative  Objective:     Physical Exam   Constitutional: He is oriented to person, place, and time  He appears well-developed  HENT:   Head: Normocephalic and atraumatic  Eyes: EOM are normal    Neck: Neck supple     Cardiovascular: Normal rate, regular rhythm and normal heart sounds  Distal Pulse Exam:  Femoral:  Right: 2+  Left 2+  Popliteal:  Right: nonpalpable  Left: 2+  DP: Right: nonpalpable Left:  1+  PT:  Right: nonpalpable  Left:  2+   Pulmonary/Chest: Effort normal    Abdominal: Soft  He exhibits no distension  There is no tenderness  Musculoskeletal: Normal range of motion  Neurological: He is alert and oriented to person, place, and time  Skin:   Dry gangrene tip of right hallux with discoloration to toe, ulceration to right 4th toe, no drainage, malodor, or signs of infection

## 2018-01-26 ENCOUNTER — TELEPHONE (OUTPATIENT)
Dept: VASCULAR SURGERY | Facility: CLINIC | Age: 68
End: 2018-01-26

## 2018-01-26 NOTE — TELEPHONE ENCOUNTER
Patient asked to speak to dr Ana Lilia Lane and I said he was not available, I said I was a nurse and he asked how long surgery would be and I said these questions dr Ana Lilia Lane would answer at the appointment  He wanted to know when date of surgery would be and I said I could not answer that and he told me I was useless and why did I call   He then hung up on me

## 2018-02-01 ENCOUNTER — PREP FOR PROCEDURE (OUTPATIENT)
Dept: VASCULAR SURGERY | Facility: CLINIC | Age: 68
End: 2018-02-01

## 2018-02-01 DIAGNOSIS — I70.235 ATHEROSCLEROSIS OF NATIVE ARTERIES OF RIGHT LEG WITH ULCERATION OF OTHER PART OF FOOT (HCC): Primary | ICD-10-CM

## 2018-02-01 DIAGNOSIS — L08.9 FOOT INFECTION: Primary | ICD-10-CM

## 2018-02-01 RX ORDER — CEPHALEXIN 500 MG/1
500 CAPSULE ORAL EVERY 12 HOURS SCHEDULED
Qty: 28 CAPSULE | Refills: 0 | Status: SHIPPED | OUTPATIENT
Start: 2018-02-01 | End: 2018-02-21 | Stop reason: HOSPADM

## 2018-02-02 PROBLEM — I70.235 ATHEROSCLEROSIS OF NATIVE ARTERIES OF RIGHT LEG WITH ULCERATION OF OTHER PART OF FOOT (HCC): Status: ACTIVE | Noted: 2018-02-02

## 2018-02-05 ENCOUNTER — APPOINTMENT (EMERGENCY)
Dept: RADIOLOGY | Facility: HOSPITAL | Age: 68
DRG: 853 | End: 2018-02-05
Payer: COMMERCIAL

## 2018-02-05 ENCOUNTER — TELEPHONE (OUTPATIENT)
Dept: VASCULAR SURGERY | Facility: CLINIC | Age: 68
End: 2018-02-05

## 2018-02-05 ENCOUNTER — HOSPITAL ENCOUNTER (INPATIENT)
Facility: HOSPITAL | Age: 68
LOS: 16 days | Discharge: NURSING, REHAB OR LONG-TERM CARE | DRG: 853 | End: 2018-02-21
Attending: EMERGENCY MEDICINE | Admitting: EMERGENCY MEDICINE
Payer: COMMERCIAL

## 2018-02-05 DIAGNOSIS — I96 GANGRENE OF TOE OF RIGHT FOOT (HCC): ICD-10-CM

## 2018-02-05 DIAGNOSIS — I70.238 ATHEROSCLEROSIS OF NATIVE ARTERIES OF RIGHT LEG WITH ULCERATION OF OTHER PART OF LOWER RIGHT LEG: ICD-10-CM

## 2018-02-05 DIAGNOSIS — I25.10 CORONARY ARTERY DISEASE INVOLVING NATIVE CORONARY ARTERY OF NATIVE HEART WITHOUT ANGINA PECTORIS: ICD-10-CM

## 2018-02-05 DIAGNOSIS — I73.9 PVD (PERIPHERAL VASCULAR DISEASE) (HCC): ICD-10-CM

## 2018-02-05 DIAGNOSIS — I70.209 DIABETES TYPE 2 WITH ATHEROSCLEROSIS OF ARTERIES OF EXTREMITIES (HCC): Chronic | ICD-10-CM

## 2018-02-05 DIAGNOSIS — R79.89 ELEVATED BRAIN NATRIURETIC PEPTIDE (BNP) LEVEL: ICD-10-CM

## 2018-02-05 DIAGNOSIS — L03.115 CELLULITIS OF RIGHT FOOT: ICD-10-CM

## 2018-02-05 DIAGNOSIS — E11.51 DIABETES TYPE 2 WITH ATHEROSCLEROSIS OF ARTERIES OF EXTREMITIES (HCC): Chronic | ICD-10-CM

## 2018-02-05 DIAGNOSIS — E87.1 HYPONATREMIA: ICD-10-CM

## 2018-02-05 DIAGNOSIS — I96 GANGRENOUS TOE (HCC): Primary | ICD-10-CM

## 2018-02-05 DIAGNOSIS — I70.235 ATHEROSCLEROSIS OF NATIVE ARTERIES OF RIGHT LEG WITH ULCERATION OF OTHER PART OF FOOT (HCC): ICD-10-CM

## 2018-02-05 DIAGNOSIS — R41.0 DELIRIUM: ICD-10-CM

## 2018-02-05 DIAGNOSIS — R93.0 ABNORMAL CT OF THE HEAD: ICD-10-CM

## 2018-02-05 LAB
ALBUMIN SERPL BCP-MCNC: 2.8 G/DL (ref 3.5–5)
ALP SERPL-CCNC: 131 U/L (ref 46–116)
ALT SERPL W P-5'-P-CCNC: 31 U/L (ref 12–78)
ANION GAP SERPL CALCULATED.3IONS-SCNC: 7 MMOL/L (ref 4–13)
APTT PPP: 38 SECONDS (ref 23–35)
AST SERPL W P-5'-P-CCNC: 76 U/L (ref 5–45)
BASOPHILS # BLD AUTO: 0.02 THOUSANDS/ΜL (ref 0–0.1)
BASOPHILS NFR BLD AUTO: 0 % (ref 0–1)
BILIRUB SERPL-MCNC: 0.82 MG/DL (ref 0.2–1)
BUN SERPL-MCNC: 27 MG/DL (ref 5–25)
CALCIUM SERPL-MCNC: 9.2 MG/DL (ref 8.3–10.1)
CHLORIDE SERPL-SCNC: 90 MMOL/L (ref 100–108)
CO2 SERPL-SCNC: 28 MMOL/L (ref 21–32)
CREAT SERPL-MCNC: 1.01 MG/DL (ref 0.6–1.3)
CRP SERPL HS-MCNC: >90 MG/L
EOSINOPHIL # BLD AUTO: 0 THOUSAND/ΜL (ref 0–0.61)
EOSINOPHIL NFR BLD AUTO: 0 % (ref 0–6)
ERYTHROCYTE [DISTWIDTH] IN BLOOD BY AUTOMATED COUNT: 12.4 % (ref 11.6–15.1)
ERYTHROCYTE [SEDIMENTATION RATE] IN BLOOD: 100 MM/HOUR (ref 0–10)
EST. AVERAGE GLUCOSE BLD GHB EST-MCNC: 232 MG/DL
GFR SERPL CREATININE-BSD FRML MDRD: 76 ML/MIN/1.73SQ M
GLUCOSE SERPL-MCNC: 252 MG/DL (ref 65–140)
GLUCOSE SERPL-MCNC: 282 MG/DL (ref 65–140)
HBA1C MFR BLD: 9.7 % (ref 4.2–6.3)
HCT VFR BLD AUTO: 34.8 % (ref 36.5–49.3)
HGB BLD-MCNC: 12.2 G/DL (ref 12–17)
INR PPP: 1.3 (ref 0.86–1.16)
LACTATE SERPL-SCNC: 1.7 MMOL/L (ref 0.5–2)
LYMPHOCYTES # BLD AUTO: 1.51 THOUSANDS/ΜL (ref 0.6–4.47)
LYMPHOCYTES NFR BLD AUTO: 7 % (ref 14–44)
MCH RBC QN AUTO: 31.2 PG (ref 26.8–34.3)
MCHC RBC AUTO-ENTMCNC: 35.1 G/DL (ref 31.4–37.4)
MCV RBC AUTO: 89 FL (ref 82–98)
MONOCYTES # BLD AUTO: 1.41 THOUSAND/ΜL (ref 0.17–1.22)
MONOCYTES NFR BLD AUTO: 6 % (ref 4–12)
NEUTROPHILS # BLD AUTO: 19.96 THOUSANDS/ΜL (ref 1.85–7.62)
NEUTS SEG NFR BLD AUTO: 87 % (ref 43–75)
NRBC BLD AUTO-RTO: 0 /100 WBCS
OSMOLALITY UR/SERPL-RTO: 288 MMOL/KG (ref 282–298)
PLATELET # BLD AUTO: 293 THOUSANDS/UL (ref 149–390)
PMV BLD AUTO: 10.5 FL (ref 8.9–12.7)
POTASSIUM SERPL-SCNC: 3.5 MMOL/L (ref 3.5–5.3)
PROT SERPL-MCNC: 8.1 G/DL (ref 6.4–8.2)
PROTHROMBIN TIME: 16.3 SECONDS (ref 12.1–14.4)
RBC # BLD AUTO: 3.91 MILLION/UL (ref 3.88–5.62)
SODIUM SERPL-SCNC: 125 MMOL/L (ref 136–145)
WBC # BLD AUTO: 23.06 THOUSAND/UL (ref 4.31–10.16)

## 2018-02-05 PROCEDURE — 82948 REAGENT STRIP/BLOOD GLUCOSE: CPT

## 2018-02-05 PROCEDURE — 80053 COMPREHEN METABOLIC PANEL: CPT | Performed by: EMERGENCY MEDICINE

## 2018-02-05 PROCEDURE — 85730 THROMBOPLASTIN TIME PARTIAL: CPT | Performed by: EMERGENCY MEDICINE

## 2018-02-05 PROCEDURE — 90471 IMMUNIZATION ADMIN: CPT

## 2018-02-05 PROCEDURE — 96361 HYDRATE IV INFUSION ADD-ON: CPT

## 2018-02-05 PROCEDURE — 83605 ASSAY OF LACTIC ACID: CPT | Performed by: EMERGENCY MEDICINE

## 2018-02-05 PROCEDURE — 90715 TDAP VACCINE 7 YRS/> IM: CPT | Performed by: EMERGENCY MEDICINE

## 2018-02-05 PROCEDURE — 36415 COLL VENOUS BLD VENIPUNCTURE: CPT | Performed by: EMERGENCY MEDICINE

## 2018-02-05 PROCEDURE — 87185 SC STD ENZYME DETCJ PER NZM: CPT | Performed by: STUDENT IN AN ORGANIZED HEALTH CARE EDUCATION/TRAINING PROGRAM

## 2018-02-05 PROCEDURE — 85025 COMPLETE CBC W/AUTO DIFF WBC: CPT | Performed by: EMERGENCY MEDICINE

## 2018-02-05 PROCEDURE — 85652 RBC SED RATE AUTOMATED: CPT | Performed by: EMERGENCY MEDICINE

## 2018-02-05 PROCEDURE — 86141 C-REACTIVE PROTEIN HS: CPT | Performed by: EMERGENCY MEDICINE

## 2018-02-05 PROCEDURE — 99223 1ST HOSP IP/OBS HIGH 75: CPT | Performed by: INTERNAL MEDICINE

## 2018-02-05 PROCEDURE — 99285 EMERGENCY DEPT VISIT HI MDM: CPT

## 2018-02-05 PROCEDURE — 96365 THER/PROPH/DIAG IV INF INIT: CPT

## 2018-02-05 PROCEDURE — 87147 CULTURE TYPE IMMUNOLOGIC: CPT | Performed by: NURSE PRACTITIONER

## 2018-02-05 PROCEDURE — 87075 CULTR BACTERIA EXCEPT BLOOD: CPT | Performed by: STUDENT IN AN ORGANIZED HEALTH CARE EDUCATION/TRAINING PROGRAM

## 2018-02-05 PROCEDURE — 83930 ASSAY OF BLOOD OSMOLALITY: CPT | Performed by: INTERNAL MEDICINE

## 2018-02-05 PROCEDURE — 73630 X-RAY EXAM OF FOOT: CPT

## 2018-02-05 PROCEDURE — 85610 PROTHROMBIN TIME: CPT | Performed by: EMERGENCY MEDICINE

## 2018-02-05 PROCEDURE — 87186 SC STD MICRODIL/AGAR DIL: CPT | Performed by: NURSE PRACTITIONER

## 2018-02-05 PROCEDURE — 83036 HEMOGLOBIN GLYCOSYLATED A1C: CPT | Performed by: INTERNAL MEDICINE

## 2018-02-05 PROCEDURE — 87205 SMEAR GRAM STAIN: CPT | Performed by: NURSE PRACTITIONER

## 2018-02-05 PROCEDURE — 87040 BLOOD CULTURE FOR BACTERIA: CPT | Performed by: EMERGENCY MEDICINE

## 2018-02-05 PROCEDURE — 87070 CULTURE OTHR SPECIMN AEROBIC: CPT | Performed by: NURSE PRACTITIONER

## 2018-02-05 RX ORDER — IBUPROFEN 400 MG/1
400 TABLET ORAL ONCE
Status: DISCONTINUED | OUTPATIENT
Start: 2018-02-05 | End: 2018-02-06

## 2018-02-05 RX ORDER — METRONIDAZOLE 500 MG/1
500 TABLET ORAL ONCE
Status: COMPLETED | OUTPATIENT
Start: 2018-02-05 | End: 2018-02-05

## 2018-02-05 RX ORDER — GABAPENTIN 300 MG/1
600 CAPSULE ORAL
Status: DISCONTINUED | OUTPATIENT
Start: 2018-02-05 | End: 2018-02-13

## 2018-02-05 RX ORDER — VANCOMYCIN HYDROCHLORIDE 1 G/200ML
15 INJECTION, SOLUTION INTRAVENOUS EVERY 12 HOURS
Status: DISCONTINUED | OUTPATIENT
Start: 2018-02-05 | End: 2018-02-05

## 2018-02-05 RX ORDER — PREGABALIN 75 MG/1
75 CAPSULE ORAL
Status: DISCONTINUED | OUTPATIENT
Start: 2018-02-05 | End: 2018-02-13

## 2018-02-05 RX ORDER — ASPIRIN 81 MG/1
81 TABLET, CHEWABLE ORAL DAILY
Status: DISCONTINUED | OUTPATIENT
Start: 2018-02-06 | End: 2018-02-08

## 2018-02-05 RX ORDER — SODIUM CHLORIDE 9 MG/ML
125 INJECTION, SOLUTION INTRAVENOUS CONTINUOUS
Status: DISCONTINUED | OUTPATIENT
Start: 2018-02-05 | End: 2018-02-07

## 2018-02-05 RX ADMIN — SODIUM CHLORIDE 1000 ML: 0.9 INJECTION, SOLUTION INTRAVENOUS at 17:28

## 2018-02-05 RX ADMIN — METRONIDAZOLE 500 MG: 500 TABLET ORAL at 17:49

## 2018-02-05 RX ADMIN — TETANUS TOXOID, REDUCED DIPHTHERIA TOXOID AND ACELLULAR PERTUSSIS VACCINE, ADSORBED 0.5 ML: 5; 2.5; 8; 8; 2.5 SUSPENSION INTRAMUSCULAR at 19:12

## 2018-02-05 RX ADMIN — CEFAZOLIN SODIUM 2000 MG: 2 SOLUTION INTRAVENOUS at 18:00

## 2018-02-05 NOTE — TELEPHONE ENCOUNTER
Pt's wife called and said that pt's foot is worsening  It is red, swollen, the skin is started to break open and she can see some drainage  They wanted to see Dr Marlee Cerrato today  Dr Marlee Cerrato does not have office hours today  I did s/w Dr Marlee Cerrato and he said the pt should go to [facility] to be evaluated  Pt's wife aware  They will go today

## 2018-02-05 NOTE — ASSESSMENT & PLAN NOTE
· Pt's wife called PMD today stating pt's foot is worsening, erythema and swelling, with skin breakdown and drainage  Advised to go to ED  · Follows with Dr Elizabeth Nieves, vascular surgery  Seen 1/25/18 for RLE atherosclerosis, dry gangrene R hallux and ulcer to fourth toe, nonhealing x4-5 weeks  LEAD done 1/17/18; greater than 75% popliteal stenosis, calcified tibial disease,  GTP below healing at 40mmHg  · Scheduled for arteriogram RLE and possible PTA/stent 02/09/2018    · IV antibiotics  · Given tetanus vaccine, Ancef and Flagyl in ED  · Vascular surgery consult  · Podiatry consult  · Local wound care

## 2018-02-05 NOTE — ED PROVIDER NOTES
History  Chief Complaint   Patient presents with    Foot Ulcer     Pt presents with diabetic ulcers on right foot and increased pain and swelling     HPI  76 yo male with hx of DM presenting to ER for evaluation of foot pain  Patient states for the past 3-4 weeks, patient has been seeing podiatry about his gangrenous right great toe  Over the past 3 days, patient states he has had increased pain and swelling of his foot as well as his toes  He states he does have numbness of his feet and toes, which is not new  He recently had a arterial duplex which did not show significant lower extremity arterial occlusive disease of the RLE, however the WICHO was 0 78, he has calcified tibial arteries, and pressure of the metatarsal was 77 and Great toe was 40 mmHg, which is below healing range  He denies any fevers and chills, no pain above his ankle, no abdominal pain, no chest pain, no SOB  He has history of DM which he says is under control and HTN  Patient and wife are clearly upset about the podiatry service, and are waiting for the Ateriogram from vascular surgery to confirm if his toe will survive  Patent is currently on keflex, which he has been on for a few weeks  Prior to Admission Medications   Prescriptions Last Dose Informant Patient Reported? Taking?    Alpha-Lipoic Acid 600 MG CAPS 2/5/2018 at Unknown time  Yes Yes   Sig: Take by mouth   Blood Glucose Monitoring Suppl (ONE TOUCH ULTRA MINI) w/Device KIT 2/5/2018 at Unknown time  Yes Yes   Sig: by Does not apply route   TRADJENTA 5 MG TABS 2/5/2018 at Unknown time  Yes Yes   aspirin 81 MG tablet 2/5/2018 at Unknown time  Yes Yes   Sig: Take 81 mg by mouth   cephalexin (KEFLEX) 500 mg capsule 2/5/2018 at Unknown time  No Yes   Sig: Take 1 capsule (500 mg total) by mouth every 12 (twelve) hours for 14 days   gabapentin (NEURONTIN) 300 mg capsule 2/5/2018 at Unknown time  Yes Yes   Sig: Take 600 mg by mouth daily at bedtime     glipiZIDE-metFORMIN (METAGLIP) 5-500 MG per tablet 2/5/2018 at Unknown time  Yes Yes   Sig: Take 2 tablets by mouth 2 (two) times a day before meals     glucose blood (ACCU-CHEK ACTIVE STRIPS) test strip 2/5/2018 at Unknown time  Yes Yes   Sig: Testing twice daily   E11 65   losartan-hydrochlorothiazide (HYZAAR) 100-25 MG per tablet 2/5/2018 at Unknown time  Yes Yes   Sig: Take 1 tablet by mouth daily   metoprolol tartrate (LOPRESSOR) 25 mg tablet 2/5/2018 at Unknown time  Yes Yes   Sig: Take 25 mg by mouth every 12 (twelve) hours     metoprolol tartrate (LOPRESSOR) 50 mg tablet 2/5/2018 at Unknown time  Yes Yes   Sig: Take 50 mg by mouth every 12 (twelve) hours     pregabalin (LYRICA) 75 mg capsule 2/5/2018 at Unknown time  Yes Yes   Sig: Take 75 mg by mouth daily at bedtime        Facility-Administered Medications: None       Past Medical History:   Diagnosis Date    Diabetes mellitus ([facility] )     Diabetic neuropathy associated with type 2 diabetes mellitus ([place] [address] )     DM (diabetes mellitus), type 2 with peripheral vascular complications (HCC)     Gangrene of toe of right foot ([facility] )     Hypertension     PVD (peripheral vascular disease) ([facility] )        Past Surgical History:   Procedure Laterality Date    EYE SURGERY      cataract- bilateral    NO PAST SURGERIES         Family History   Problem Relation Age of Onset    Diabetes Mother     No Known Problems Father      I have reviewed and agree with the history as documented  Social History   Substance Use Topics    Smoking status: Never Smoker    Smokeless tobacco: Never Used    Alcohol use No        Review of Systems    Constitutional: Negative for appetite change, chills and fever  HENT: Negative for congestion, rhinorrhea and sore throat  Eyes: Negative for photophobia, pain and visual disturbance  Respiratory: Negative for cough, chest tightness and shortness of breath  Cardiovascular: Negative for chest pain, palpitations and leg swelling     Gastrointestinal: Negative for abdominal pain, diarrhea, nausea and vomiting  Genitourinary: Negative for dysuria, flank pain and hematuria  Musculoskeletal: Negative for back pain, neck pain and neck stiffness  Skin: Negative for color change, rash  Positive for wound  Neurological: Negative for dizziness, numbness and headaches  All other systems reviewed and are negative  Physical Exam  ED Triage Vitals [02/05/18 1346]   Temperature Pulse Respirations Blood Pressure SpO2   98 5 °F (36 9 °C) (!) 106 18 150/80 99 %      Temp Source Heart Rate Source Patient Position - Orthostatic VS BP Location FiO2 (%)   Oral Monitor Sitting Right arm --      Pain Score       9           Orthostatic Vital Signs  Vitals:    02/05/18 1346 02/05/18 1801 02/05/18 1942   BP: 150/80 159/70 156/71   Pulse: (!) 106 103 102   Patient Position - Orthostatic VS: Sitting Lying        Physical Exam  /71   Pulse 102   Temp 98 9 °F (37 2 °C) (Oral)   Resp 18   Ht 5' 8" (1 727 m)   Wt 60 5 kg (133 lb 4 8 oz)   SpO2 99%   BMI 20 27 kg/m²     General Appearance:  Alert, cooperative, no distress   Head:  Normocephalic, without obvious abnormality, atraumatic   Eyes:  PERRL, conjunctiva/corneas clear, EOM's intact   Ears:  Normal TM's and external ear canals, both ears   Nose: Nares normal, septum midline, mucosa normal, no drainage or sinus tenderness   Throat: Lips, mucosa, and tongue normal; teeth and gums normal   Neck: Supple, symmetrical, trachea midline, no adenopathy   Back:   Symmetric, no curvature, ROM normal, no CVA tenderness   Lungs:   Clear to auscultation bilaterally, respirations unlabored   Chest Wall:  No tenderness or deformity   Heart:  Regular rate and rhythm, S1, S2 normal, no murmur, rub or gallop   Abdomen:   Soft, non-tender, bowel sounds active all four quadrants           Extremities: Right forefoot with erythema, blanchable, swelling and pain with palpation    Great toe with dry appearing gangrene, some open sores at the base of the great toe  No ulcers seen on plantar aspect of the foot  Unable to palpate pulses  Sensation intact to pin prick to all toes except for great toe       Pulses: 2+ and symmetric   Skin: Skin color, texture, turgor normal, no rashes or lesions       Neurologic:      Psychiatric:  Moves all extremities, sensation and strength in tact in all extremities    Normal mood and affect       ED Medications  Medications   ibuprofen (MOTRIN) tablet 400 mg (400 mg Oral Not Given 2/5/18 2106)   gabapentin (NEURONTIN) capsule 600 mg (600 mg Oral Not Given 2/5/18 2113)   losartan potassium-hydrochlorothiazide (HYZAAR 100/25) combo dose (not administered)   metoprolol tartrate (LOPRESSOR) tablet 75 mg (75 mg Oral Not Given 2/5/18 2248)   pregabalin (LYRICA) capsule 75 mg (75 mg Oral Not Given 2/5/18 2130)   aspirin chewable tablet 81 mg (not administered)   insulin lispro (HumaLOG) 100 units/mL subcutaneous injection 1-5 Units (not administered)   ceFAZolin (ANCEF) IVPB (premix) 2,000 mg (not administered)   metroNIDAZOLE (FLAGYL) IVPB (premix) 500 mg (not administered)   sodium chloride 0 9 % infusion (not administered)   sodium chloride 0 9 % bolus 1,000 mL (0 mL Intravenous Stopped 2/5/18 1835)   ceFAZolin (ANCEF) IVPB (premix) 2,000 mg (0 mg Intravenous Stopped 2/5/18 1835)   metroNIDAZOLE (FLAGYL) tablet 500 mg (500 mg Oral Given 2/5/18 1749)   tetanus-diphtheria-acellular pertussis (BOOSTRIX) IM injection 0 5 mL (0 5 mL Intramuscular Given 2/5/18 1912)       Diagnostic Studies  Results Reviewed     Procedure Component Value Units Date/Time    Sedimentation rate, automated [[id number]]  (Abnormal) Collected:  02/05/18 1714    Lab Status:  Final result Specimen:  Blood from Arm, Right Updated:  02/05/18 1835     Sed Rate 100 (H) mm/hour     Osmolality, urine [[id number]]     Lab Status:  No result Specimen:  Urine     Sodium, urine, random [[id number]]     Lab Status:  No result Specimen:  Urine     POCT urinalysis dipstick [[id number]]     Lab Status:  No result Specimen:  Urine     Lactic acid, plasma [[id number]]  (Normal) Collected:  02/05/18 1753    Lab Status:  Final result Specimen:  Blood from Arm, Left Updated:  02/05/18 1828     LACTIC ACID 1 7 mmol/L     Narrative:         Result may be elevated if tourniquet was used during collection  Comprehensive metabolic panel [[id number]]  (Abnormal) Collected:  02/05/18 1714    Lab Status:  Final result Specimen:  Blood from Arm, Right Updated:  02/05/18 1809     Sodium 125 (L) mmol/L      Potassium 3 5 mmol/L      Chloride 90 (L) mmol/L      CO2 28 mmol/L      Anion Gap 7 mmol/L      BUN 27 (H) mg/dL      Creatinine 1 01 mg/dL      Glucose 282 (H) mg/dL      Calcium 9 2 mg/dL      AST 76 (H) U/L      ALT 31 U/L      Alkaline Phosphatase 131 (H) U/L      Total Protein 8 1 g/dL      Albumin 2 8 (L) g/dL      Total Bilirubin 0 82 mg/dL      eGFR 76 ml/min/1 73sq m     Narrative:         National Kidney Disease Education Program recommendations are as follows:  GFR calculation is accurate only with a steady state creatinine  Chronic Kidney disease less than 60 ml/min/1 73 sq  meters  Kidney failure less than 15 ml/min/1 73 sq  meters  High sensitivity CRP [[id number]]  (Normal) Collected:  02/05/18 1714    Lab Status:  Final result Specimen:  Blood from Arm, Right Updated:  02/05/18 1809     CRP, High Sensitivity >90 00 mg/L     Narrative:               HsCRP Level       Relative Risk           <1 0 mg/L          Low           1 0 to 3 0 mg/L    Average           >3 0 mg/L          High      Protime-INR [[id number]]  (Abnormal) Collected:  02/05/18 1714    Lab Status:  Final result Specimen:  Blood from Arm, Right Updated:  02/05/18 1744     Protime 16 3 (H) seconds      INR 1 30 (H)    APTT [[id number]]  (Abnormal) Collected:  02/05/18 1714    Lab Status:  Final result Specimen:  Blood from Arm, Right Updated:  02/05/18 1744     PTT 38 (H) seconds     Narrative:          Therapeutic Heparin Range = 60-90 seconds    Blood culture #1 [[id number]] Collected:  02/05/18 1721    Lab Status: In process Specimen:  Blood from Arm, Right Updated:  02/05/18 1731    CBC and differential [[id number]]  (Abnormal) Collected:  02/05/18 1714    Lab Status:  Final result Specimen:  Blood from Arm, Right Updated:  02/05/18 1724     WBC 23 06 (H) Thousand/uL      RBC 3 91 Million/uL      Hemoglobin 12 2 g/dL      Hematocrit 34 8 (L) %      MCV 89 fL      MCH 31 2 pg      MCHC 35 1 g/dL      RDW 12 4 %      MPV 10 5 fL      Platelets 783 Thousands/uL      nRBC 0 /100 WBCs      Neutrophils Relative 87 (H) %      Lymphocytes Relative 7 (L) %      Monocytes Relative 6 %      Eosinophils Relative 0 %      Basophils Relative 0 %      Neutrophils Absolute 19 96 (H) Thousands/µL      Lymphocytes Absolute 1 51 Thousands/µL      Monocytes Absolute 1 41 (H) Thousand/µL      Eosinophils Absolute 0 00 Thousand/µL      Basophils Absolute 0 02 Thousands/µL     Blood culture #2 [[id number]] Collected:  02/05/18 1714    Lab Status: In process Specimen:  Blood from Arm, Right Updated:  02/05/18 1720                 XR foot 3+ views RIGHT   Final Result by Jazz Gibbs MD (02/05 2012)      There is permeation noted within the 1st distal phalanx with associated erosive changes and foci of air in the soft tissue compatible with osteomyelitis      The study was marked in EPIC for immediate notification  Workstation performed: [id number]         IR consult    (Results Pending)         Procedures  Procedures      Phone Consults  ED Phone Contact    ED Course  ED Course as of Feb 05 2340   Mon Feb 05, 2018 1719 Patient and wife were very upset  I had told the patient and the wife that there is a possibility that he may lose his toe  The toe was obvious gangrenous and does not appear salvageable    They would upset about this statement because they were told that the vascular surgeon would be able to fix his blood flow and salvage his toe  I apologize to the patient and his wife for making that statement, however I told him I did never made the conclusion that he was going to lose his toe  64 I spoke with Podiatry, they stated that they will see patient, but recommended admission to medicine  They suggested Ancef IV and flagyl PO, instead of Vanc and zosyn  [address] resident was at bedside and tried to evaluate patient  The patient did not allow the podiatry resident to examine the foot  He then stated that he did not want podiatry involved in his care  He refused any evaluation by podiatrist   I will try to find SLIM for update on this  Identification of Seniors at [address] [place] [address] Most Recent Value   (ISAR) Identification of Seniors at Risk   Before the illness or injury that brought you to the Emergency, did you need someone to help you on a regular basis? 0 Filed at: 02/05/2018 1348   In the last 24 hours, have you needed more help than usual?  0 Filed at: 02/05/2018 1348   Have you been hospitalized for one or more nights during the past 6 months? 0 Filed at: 02/05/2018 1348   In general, do you see well?  0 Filed at: 02/05/2018 1348   In general, do you have serious problems with your memory? 0 Filed at: 02/05/2018 1348   Do you take more than three different medications every day? 1 Filed at: 02/05/2018 1348   ISAR Score  1 Filed at: 02/05/2018 1348            MDM   Patient with infection of his right leg, appears to be cellulitis, cannot rule out osteomyeleitis  Patient also with gangrenous great toe  Will check CBC, ESR, CRP, CMP, blood cultures  Will check Xray of right foot  Will start IV antibiotics, fluids and consult podiatry  Patient will need admission for IV antibitoics, as well as podiatry and vascular consult      CritCare Time    Disposition  Final diagnoses:   Gangrenous toe ([address] )   Cellulitis of right foot   Hyponatremia     Time reflects when diagnosis was documented in both MDM as applicable and the Disposition within this note     Time User Action Codes Description Comment    2/5/2018  6:23 PM Donnia Poster Add [I96] Gangrenous toe ([facility] )     2/5/2018  6:23 PM Donnia Poster Add [K06 154] Cellulitis of right foot     2/5/2018  6:23 PM Donnia Poster Add [E87 1] Hyponatremia     2/5/2018  7:35 PM Martin Plum Modify [Q67 083] Cellulitis of right foot     2/5/2018  7:35 PM Martin Lyon Add [I70 238] 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 ([facility] )     2/5/2018  7:35 PM Martin Plum Modify [K60 415] Cellulitis of right foot     2/5/2018  7:35 PM Martin Lyon Modify [I70 238] 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 ([facility] )     2/5/2018  7:35 PM Martin Plum Modify [F15 947] Cellulitis of right foot     2/5/2018  7:36 PM Martin Plum Modify [V13 326] Cellulitis of right foot       ED Disposition     ED Disposition Condition Comment    Admit  Case was discussed with CLIFFORD and the patient's admission status was agreed to be Admission Status: inpatient status to the service of Dr Natalee Foley    Follow-up Information    None       Current Discharge Medication List      CONTINUE these medications which have NOT CHANGED    Details   Alpha-Lipoic Acid 600 MG CAPS Take by mouth      aspirin 81 MG tablet Take 81 mg by mouth      Blood Glucose Monitoring Suppl (ONE TOUCH ULTRA MINI) w/Device KIT by Does not apply route      cephalexin (KEFLEX) 500 mg capsule Take 1 capsule (500 mg total) by mouth every 12 (twelve) hours for 14 days  Qty: 28 capsule, Refills: 0    Associated Diagnoses:  Foot infection      gabapentin (NEURONTIN) 300 mg capsule Take 600 mg by mouth daily at bedtime        glipiZIDE-metFORMIN (METAGLIP) 5-500 MG per tablet Take 2 tablets by mouth 2 (two) times a day before meals        glucose blood (ACCU-CHEK ACTIVE STRIPS) test strip Testing twice daily   E11 65      losartan-hydrochlorothiazide (HYZAAR) 100-25 MG per tablet Take 1 tablet by mouth daily      !! metoprolol tartrate (LOPRESSOR) 25 mg tablet Take 25 mg by mouth every 12 (twelve) hours        !! metoprolol tartrate (LOPRESSOR) 50 mg tablet Take 50 mg by mouth every 12 (twelve) hours        pregabalin (LYRICA) 75 mg capsule Take 75 mg by mouth daily at bedtime        TRADJENTA 5 MG TABS        ! ! - Potential duplicate medications found  Please discuss with provider  No discharge procedures on file  ED Provider  Attending physically available and evaluated [place]  I managed the patient along with the ED Attending      Electronically Signed by         Zainab Becerra MD  02/05/18 8215

## 2018-02-05 NOTE — ED ATTENDING ATTESTATION
Jonathan Giraldo MD, saw and evaluated the patient  I have discussed the patient with the resident/non-physician practitioner and agree with the resident's/non-physician practitioner's findings, Plan of Care, and MDM as documented in the resident's/non-physician practitioner's note, except where noted  All available labs and Radiology studies were reviewed  At this point I agree with the current assessment done in the Emergency Department  I have conducted an independent evaluation of this patient a history and physical is as follows:  69-year-old male here with gangrenous toe and foot pain  Patient has been having problems with the great toe on his right foot for the last 4 weeks  He has been seeing Podiatry, and has been on antibiotics  The patient has had necrotic changes to the toe for 4 weeks, but over the last 3 days, has developed redness to the whole foot as well as pain  The patient has neuropathy in his feet, and this is not new  The patient denies fevers, chills, nausea, vomiting, or general malaise  He has been compliant with his Keflex  The patient has had vascular studies in his legs and is awaiting an arteriogram with vascular surgery  His review of systems otherwise negative in 12 systems were reviewed  On exam the patient has a necrotic right great toe with erythema and swelling to the remainder of his toes as well as his forefoot  The patient has edema and tenderness  There is no subcutaneous air  The remainder of his exam is unremarkable  Impression:  Gangrenous toe/foot  Will plan to give IV antibiotics, x-ray, admit to the hospital, anticipate need for amputation        Critical Care Time  CritCare Time    Procedures

## 2018-02-06 ENCOUNTER — ANESTHESIA (INPATIENT)
Dept: RADIOLOGY | Facility: HOSPITAL | Age: 68
DRG: 853 | End: 2018-02-06
Payer: COMMERCIAL

## 2018-02-06 ENCOUNTER — ANESTHESIA EVENT (INPATIENT)
Dept: PERIOP | Facility: HOSPITAL | Age: 68
DRG: 853 | End: 2018-02-06
Payer: COMMERCIAL

## 2018-02-06 ENCOUNTER — APPOINTMENT (INPATIENT)
Dept: RADIOLOGY | Facility: HOSPITAL | Age: 68
DRG: 853 | End: 2018-02-06
Payer: COMMERCIAL

## 2018-02-06 ENCOUNTER — ANESTHESIA (INPATIENT)
Dept: PERIOP | Facility: HOSPITAL | Age: 68
DRG: 853 | End: 2018-02-06
Payer: COMMERCIAL

## 2018-02-06 ENCOUNTER — ANESTHESIA EVENT (INPATIENT)
Dept: RADIOLOGY | Facility: HOSPITAL | Age: 68
DRG: 853 | End: 2018-02-06
Payer: COMMERCIAL

## 2018-02-06 PROBLEM — I96 GANGRENOUS TOE (HCC): Status: ACTIVE | Noted: 2018-02-05

## 2018-02-06 LAB
ALBUMIN SERPL BCP-MCNC: 2.2 G/DL (ref 3.5–5)
ALP SERPL-CCNC: 110 U/L (ref 46–116)
ALT SERPL W P-5'-P-CCNC: 27 U/L (ref 12–78)
ANION GAP SERPL CALCULATED.3IONS-SCNC: 6 MMOL/L (ref 4–13)
ANION GAP SERPL CALCULATED.3IONS-SCNC: 8 MMOL/L (ref 4–13)
AST SERPL W P-5'-P-CCNC: 76 U/L (ref 5–45)
BASOPHILS # BLD AUTO: 0.02 THOUSANDS/ΜL (ref 0–0.1)
BASOPHILS # BLD AUTO: 0.03 THOUSANDS/ΜL (ref 0–0.1)
BASOPHILS NFR BLD AUTO: 0 % (ref 0–1)
BASOPHILS NFR BLD AUTO: 0 % (ref 0–1)
BILIRUB SERPL-MCNC: 0.79 MG/DL (ref 0.2–1)
BUN SERPL-MCNC: 19 MG/DL (ref 5–25)
BUN SERPL-MCNC: 19 MG/DL (ref 5–25)
CALCIUM SERPL-MCNC: 8.1 MG/DL (ref 8.3–10.1)
CALCIUM SERPL-MCNC: 8.4 MG/DL (ref 8.3–10.1)
CHLORIDE SERPL-SCNC: 101 MMOL/L (ref 100–108)
CHLORIDE SERPL-SCNC: 95 MMOL/L (ref 100–108)
CO2 SERPL-SCNC: 28 MMOL/L (ref 21–32)
CO2 SERPL-SCNC: 28 MMOL/L (ref 21–32)
CREAT SERPL-MCNC: 0.84 MG/DL (ref 0.6–1.3)
CREAT SERPL-MCNC: 0.9 MG/DL (ref 0.6–1.3)
EOSINOPHIL # BLD AUTO: 0 THOUSAND/ΜL (ref 0–0.61)
EOSINOPHIL # BLD AUTO: 0.01 THOUSAND/ΜL (ref 0–0.61)
EOSINOPHIL NFR BLD AUTO: 0 % (ref 0–6)
EOSINOPHIL NFR BLD AUTO: 0 % (ref 0–6)
ERYTHROCYTE [DISTWIDTH] IN BLOOD BY AUTOMATED COUNT: 12.2 % (ref 11.6–15.1)
ERYTHROCYTE [DISTWIDTH] IN BLOOD BY AUTOMATED COUNT: 12.6 % (ref 11.6–15.1)
GFR SERPL CREATININE-BSD FRML MDRD: 87 ML/MIN/1.73SQ M
GFR SERPL CREATININE-BSD FRML MDRD: 90 ML/MIN/1.73SQ M
GLUCOSE SERPL-MCNC: 163 MG/DL (ref 65–140)
GLUCOSE SERPL-MCNC: 174 MG/DL (ref 65–140)
GLUCOSE SERPL-MCNC: 196 MG/DL (ref 65–140)
GLUCOSE SERPL-MCNC: 204 MG/DL (ref 65–140)
GLUCOSE SERPL-MCNC: 207 MG/DL (ref 65–140)
GLUCOSE SERPL-MCNC: 218 MG/DL (ref 65–140)
HCT VFR BLD AUTO: 31.1 % (ref 36.5–49.3)
HCT VFR BLD AUTO: 32.3 % (ref 36.5–49.3)
HGB BLD-MCNC: 10.9 G/DL (ref 12–17)
HGB BLD-MCNC: 11.1 G/DL (ref 12–17)
LACTATE SERPL-SCNC: 1.6 MMOL/L (ref 0.5–2)
LYMPHOCYTES # BLD AUTO: 1.3 THOUSANDS/ΜL (ref 0.6–4.47)
LYMPHOCYTES # BLD AUTO: 1.33 THOUSANDS/ΜL (ref 0.6–4.47)
LYMPHOCYTES NFR BLD AUTO: 7 % (ref 14–44)
LYMPHOCYTES NFR BLD AUTO: 7 % (ref 14–44)
MCH RBC QN AUTO: 30.6 PG (ref 26.8–34.3)
MCH RBC QN AUTO: 31.2 PG (ref 26.8–34.3)
MCHC RBC AUTO-ENTMCNC: 34.4 G/DL (ref 31.4–37.4)
MCHC RBC AUTO-ENTMCNC: 35 G/DL (ref 31.4–37.4)
MCV RBC AUTO: 87 FL (ref 82–98)
MCV RBC AUTO: 91 FL (ref 82–98)
MONOCYTES # BLD AUTO: 0.91 THOUSAND/ΜL (ref 0.17–1.22)
MONOCYTES # BLD AUTO: 1.09 THOUSAND/ΜL (ref 0.17–1.22)
MONOCYTES NFR BLD AUTO: 5 % (ref 4–12)
MONOCYTES NFR BLD AUTO: 5 % (ref 4–12)
NEUTROPHILS # BLD AUTO: 16.51 THOUSANDS/ΜL (ref 1.85–7.62)
NEUTROPHILS # BLD AUTO: 18.04 THOUSANDS/ΜL (ref 1.85–7.62)
NEUTS SEG NFR BLD AUTO: 88 % (ref 43–75)
NEUTS SEG NFR BLD AUTO: 88 % (ref 43–75)
NRBC BLD AUTO-RTO: 0 /100 WBCS
NRBC BLD AUTO-RTO: 0 /100 WBCS
PLATELET # BLD AUTO: 282 THOUSANDS/UL (ref 149–390)
PLATELET # BLD AUTO: 306 THOUSANDS/UL (ref 149–390)
PMV BLD AUTO: 10.1 FL (ref 8.9–12.7)
PMV BLD AUTO: 10.6 FL (ref 8.9–12.7)
POTASSIUM SERPL-SCNC: 3.1 MMOL/L (ref 3.5–5.3)
POTASSIUM SERPL-SCNC: 3.8 MMOL/L (ref 3.5–5.3)
PROT SERPL-MCNC: 7.1 G/DL (ref 6.4–8.2)
RBC # BLD AUTO: 3.56 MILLION/UL (ref 3.88–5.62)
RBC # BLD AUTO: 3.56 MILLION/UL (ref 3.88–5.62)
SODIUM SERPL-SCNC: 131 MMOL/L (ref 136–145)
SODIUM SERPL-SCNC: 135 MMOL/L (ref 136–145)
WBC # BLD AUTO: 18.86 THOUSAND/UL (ref 4.31–10.16)
WBC # BLD AUTO: 20.61 THOUSAND/UL (ref 4.31–10.16)

## 2018-02-06 PROCEDURE — 87147 CULTURE TYPE IMMUNOLOGIC: CPT | Performed by: PODIATRIST

## 2018-02-06 PROCEDURE — C1769 GUIDE WIRE: HCPCS

## 2018-02-06 PROCEDURE — 99222 1ST HOSP IP/OBS MODERATE 55: CPT | Performed by: INTERNAL MEDICINE

## 2018-02-06 PROCEDURE — 0JDQ0ZZ EXTRACTION OF RIGHT FOOT SUBCUTANEOUS TISSUE AND FASCIA, OPEN APPROACH: ICD-10-PCS | Performed by: PODIATRIST

## 2018-02-06 PROCEDURE — 80048 BASIC METABOLIC PNL TOTAL CA: CPT | Performed by: NURSE PRACTITIONER

## 2018-02-06 PROCEDURE — 75710 ARTERY X-RAYS ARM/LEG: CPT | Performed by: RADIOLOGY

## 2018-02-06 PROCEDURE — 87102 FUNGUS ISOLATION CULTURE: CPT | Performed by: PODIATRIST

## 2018-02-06 PROCEDURE — 83605 ASSAY OF LACTIC ACID: CPT | Performed by: NURSE ANESTHETIST, CERTIFIED REGISTERED

## 2018-02-06 PROCEDURE — 99232 SBSQ HOSP IP/OBS MODERATE 35: CPT | Performed by: HOSPITALIST

## 2018-02-06 PROCEDURE — 87185 SC STD ENZYME DETCJ PER NZM: CPT | Performed by: PODIATRIST

## 2018-02-06 PROCEDURE — 87186 SC STD MICRODIL/AGAR DIL: CPT | Performed by: PODIATRIST

## 2018-02-06 PROCEDURE — 99152 MOD SED SAME PHYS/QHP 5/>YRS: CPT | Performed by: RADIOLOGY

## 2018-02-06 PROCEDURE — 0Y6P0Z0 DETACHMENT AT RIGHT 1ST TOE, COMPLETE, OPEN APPROACH: ICD-10-PCS | Performed by: PODIATRIST

## 2018-02-06 PROCEDURE — 99232 SBSQ HOSP IP/OBS MODERATE 35: CPT | Performed by: SURGERY

## 2018-02-06 PROCEDURE — 37228 PR REVASCULARIZE TIBIAL/PERON ARTERY,ANGIOPLASTY INITIAL: CPT | Performed by: RADIOLOGY

## 2018-02-06 PROCEDURE — 37225 PR REVSC OPN/PRQ FEM/POP W/ATHRC/ANGIOP SM VSL: CPT | Performed by: RADIOLOGY

## 2018-02-06 PROCEDURE — 87070 CULTURE OTHR SPECIMN AEROBIC: CPT | Performed by: PODIATRIST

## 2018-02-06 PROCEDURE — 87176 TISSUE HOMOGENIZATION CULTR: CPT | Performed by: PODIATRIST

## 2018-02-06 PROCEDURE — 82948 REAGENT STRIP/BLOOD GLUCOSE: CPT

## 2018-02-06 PROCEDURE — 85025 COMPLETE CBC W/AUTO DIFF WBC: CPT | Performed by: NURSE PRACTITIONER

## 2018-02-06 PROCEDURE — 85025 COMPLETE CBC W/AUTO DIFF WBC: CPT | Performed by: NURSE ANESTHETIST, CERTIFIED REGISTERED

## 2018-02-06 PROCEDURE — C2623 CATH, TRANSLUMIN, DRUG-COAT: HCPCS

## 2018-02-06 PROCEDURE — 88311 DECALCIFY TISSUE: CPT | Performed by: PODIATRIST

## 2018-02-06 PROCEDURE — 99153 MOD SED SAME PHYS/QHP EA: CPT

## 2018-02-06 PROCEDURE — 99152 MOD SED SAME PHYS/QHP 5/>YRS: CPT

## 2018-02-06 PROCEDURE — B41DYZZ FLUOROSCOPY OF AORTA AND BILATERAL LOWER EXTREMITY ARTERIES USING OTHER CONTRAST: ICD-10-PCS | Performed by: PODIATRIST

## 2018-02-06 PROCEDURE — 73630 X-RAY EXAM OF FOOT: CPT

## 2018-02-06 PROCEDURE — 75710 ARTERY X-RAYS ARM/LEG: CPT

## 2018-02-06 PROCEDURE — 88305 TISSUE EXAM BY PATHOLOGIST: CPT | Performed by: PODIATRIST

## 2018-02-06 PROCEDURE — C1884 EMBOLIZATION PROTECT SYST: HCPCS

## 2018-02-06 PROCEDURE — 88311 DECALCIFY TISSUE: CPT | Performed by: PATHOLOGY

## 2018-02-06 PROCEDURE — 75625 CONTRAST EXAM ABDOMINL AORTA: CPT | Performed by: RADIOLOGY

## 2018-02-06 PROCEDURE — 87206 SMEAR FLUORESCENT/ACID STAI: CPT | Performed by: PODIATRIST

## 2018-02-06 PROCEDURE — 87205 SMEAR GRAM STAIN: CPT | Performed by: PODIATRIST

## 2018-02-06 PROCEDURE — 71045 X-RAY EXAM CHEST 1 VIEW: CPT

## 2018-02-06 PROCEDURE — 88305 TISSUE EXAM BY PATHOLOGIST: CPT | Performed by: PATHOLOGY

## 2018-02-06 PROCEDURE — C1725 CATH, TRANSLUMIN NON-LASER: HCPCS

## 2018-02-06 PROCEDURE — 37225 HB FEM/POPL REVAS W/ATHER: CPT

## 2018-02-06 PROCEDURE — 87075 CULTR BACTERIA EXCEPT BLOOD: CPT | Performed by: PODIATRIST

## 2018-02-06 PROCEDURE — 75625 CONTRAST EXAM ABDOMINL AORTA: CPT

## 2018-02-06 PROCEDURE — 80053 COMPREHEN METABOLIC PANEL: CPT | Performed by: NURSE ANESTHETIST, CERTIFIED REGISTERED

## 2018-02-06 PROCEDURE — 87116 MYCOBACTERIA CULTURE: CPT | Performed by: PODIATRIST

## 2018-02-06 PROCEDURE — 87040 BLOOD CULTURE FOR BACTERIA: CPT | Performed by: PODIATRIST

## 2018-02-06 PROCEDURE — C1724 CATH, TRANS ATHEREC,ROTATION: HCPCS

## 2018-02-06 PROCEDURE — C1887 CATHETER, GUIDING: HCPCS

## 2018-02-06 RX ORDER — POTASSIUM CHLORIDE 20 MEQ/1
40 TABLET, EXTENDED RELEASE ORAL ONCE
Status: COMPLETED | OUTPATIENT
Start: 2018-02-06 | End: 2018-02-06

## 2018-02-06 RX ORDER — LIDOCAINE HYDROCHLORIDE 10 MG/ML
INJECTION, SOLUTION EPIDURAL; INFILTRATION; INTRACAUDAL; PERINEURAL AS NEEDED
Status: DISCONTINUED | OUTPATIENT
Start: 2018-02-06 | End: 2018-02-06 | Stop reason: HOSPADM

## 2018-02-06 RX ORDER — BUPIVACAINE HYDROCHLORIDE 5 MG/ML
INJECTION, SOLUTION PERINEURAL AS NEEDED
Status: DISCONTINUED | OUTPATIENT
Start: 2018-02-06 | End: 2018-02-06 | Stop reason: HOSPADM

## 2018-02-06 RX ORDER — FENTANYL CITRATE 50 UG/ML
INJECTION, SOLUTION INTRAMUSCULAR; INTRAVENOUS CODE/TRAUMA/SEDATION MEDICATION
Status: COMPLETED | OUTPATIENT
Start: 2018-02-06 | End: 2018-02-06

## 2018-02-06 RX ORDER — PROPOFOL 10 MG/ML
INJECTION, EMULSION INTRAVENOUS AS NEEDED
Status: DISCONTINUED | OUTPATIENT
Start: 2018-02-06 | End: 2018-02-06 | Stop reason: SURG

## 2018-02-06 RX ORDER — ONDANSETRON 2 MG/ML
4 INJECTION INTRAMUSCULAR; INTRAVENOUS ONCE AS NEEDED
Status: DISCONTINUED | OUTPATIENT
Start: 2018-02-06 | End: 2018-02-06 | Stop reason: HOSPADM

## 2018-02-06 RX ORDER — HEPARIN SODIUM 1000 [USP'U]/ML
INJECTION, SOLUTION INTRAVENOUS; SUBCUTANEOUS CODE/TRAUMA/SEDATION MEDICATION
Status: COMPLETED | OUTPATIENT
Start: 2018-02-06 | End: 2018-02-06

## 2018-02-06 RX ORDER — VANCOMYCIN HYDROCHLORIDE 1 G/200ML
15 INJECTION, SOLUTION INTRAVENOUS EVERY 12 HOURS
Status: DISCONTINUED | OUTPATIENT
Start: 2018-02-06 | End: 2018-02-09

## 2018-02-06 RX ORDER — LORAZEPAM 2 MG/ML
1 INJECTION INTRAMUSCULAR ONCE
Status: COMPLETED | OUTPATIENT
Start: 2018-02-06 | End: 2018-02-06

## 2018-02-06 RX ORDER — LIDOCAINE HYDROCHLORIDE 10 MG/ML
INJECTION, SOLUTION INFILTRATION; PERINEURAL AS NEEDED
Status: DISCONTINUED | OUTPATIENT
Start: 2018-02-06 | End: 2018-02-06 | Stop reason: SURG

## 2018-02-06 RX ORDER — FENTANYL CITRATE/PF 50 MCG/ML
25 SYRINGE (ML) INJECTION
Status: DISCONTINUED | OUTPATIENT
Start: 2018-02-06 | End: 2018-02-06 | Stop reason: HOSPADM

## 2018-02-06 RX ORDER — LORAZEPAM 2 MG/ML
0.5 INJECTION INTRAMUSCULAR ONCE
Status: COMPLETED | OUTPATIENT
Start: 2018-02-06 | End: 2018-02-06

## 2018-02-06 RX ORDER — ONDANSETRON 2 MG/ML
INJECTION INTRAMUSCULAR; INTRAVENOUS AS NEEDED
Status: DISCONTINUED | OUTPATIENT
Start: 2018-02-06 | End: 2018-02-06 | Stop reason: SURG

## 2018-02-06 RX ORDER — MIDAZOLAM HYDROCHLORIDE 1 MG/ML
INJECTION INTRAMUSCULAR; INTRAVENOUS CODE/TRAUMA/SEDATION MEDICATION
Status: COMPLETED | OUTPATIENT
Start: 2018-02-06 | End: 2018-02-06

## 2018-02-06 RX ORDER — SODIUM CHLORIDE 9 MG/ML
INJECTION, SOLUTION INTRAVENOUS AS NEEDED
Status: DISCONTINUED | OUTPATIENT
Start: 2018-02-06 | End: 2018-02-06 | Stop reason: HOSPADM

## 2018-02-06 RX ORDER — SODIUM CHLORIDE 9 MG/ML
75 INJECTION, SOLUTION INTRAVENOUS CONTINUOUS
Status: DISCONTINUED | OUTPATIENT
Start: 2018-02-06 | End: 2018-02-07

## 2018-02-06 RX ORDER — ACETAMINOPHEN 650 MG/1
325 SUPPOSITORY RECTAL EVERY 4 HOURS PRN
Status: DISCONTINUED | OUTPATIENT
Start: 2018-02-06 | End: 2018-02-07

## 2018-02-06 RX ORDER — METRONIDAZOLE 500 MG/1
500 TABLET ORAL EVERY 8 HOURS SCHEDULED
Status: DISCONTINUED | OUTPATIENT
Start: 2018-02-06 | End: 2018-02-07

## 2018-02-06 RX ADMIN — PROPOFOL 200 MG: 10 INJECTION, EMULSION INTRAVENOUS at 17:28

## 2018-02-06 RX ADMIN — SODIUM CHLORIDE 125 ML/HR: 0.9 INJECTION, SOLUTION INTRAVENOUS at 17:05

## 2018-02-06 RX ADMIN — SODIUM CHLORIDE: 0.9 INJECTION, SOLUTION INTRAVENOUS at 17:26

## 2018-02-06 RX ADMIN — PROPOFOL 20 MG: 10 INJECTION, EMULSION INTRAVENOUS at 16:05

## 2018-02-06 RX ADMIN — HEPARIN SODIUM 5000 UNITS: 1000 INJECTION INTRAVENOUS; SUBCUTANEOUS at 15:44

## 2018-02-06 RX ADMIN — PROPOFOL 20 MG: 10 INJECTION, EMULSION INTRAVENOUS at 16:02

## 2018-02-06 RX ADMIN — FENTANYL CITRATE 25 MCG: 50 INJECTION, SOLUTION INTRAMUSCULAR; INTRAVENOUS at 15:12

## 2018-02-06 RX ADMIN — FENTANYL CITRATE 25 MCG: 50 INJECTION, SOLUTION INTRAMUSCULAR; INTRAVENOUS at 15:04

## 2018-02-06 RX ADMIN — PROPOFOL 20 MG: 10 INJECTION, EMULSION INTRAVENOUS at 15:47

## 2018-02-06 RX ADMIN — PROPOFOL 50 MG: 10 INJECTION, EMULSION INTRAVENOUS at 17:40

## 2018-02-06 RX ADMIN — LORAZEPAM 0.5 MG: 2 INJECTION INTRAMUSCULAR; INTRAVENOUS at 16:55

## 2018-02-06 RX ADMIN — INSULIN LISPRO 1 UNITS: 100 INJECTION, SOLUTION INTRAVENOUS; SUBCUTANEOUS at 11:46

## 2018-02-06 RX ADMIN — VANCOMYCIN HYDROCHLORIDE 1000 MG: 1 INJECTION, SOLUTION INTRAVENOUS at 13:38

## 2018-02-06 RX ADMIN — SODIUM CHLORIDE: 0.9 INJECTION, SOLUTION INTRAVENOUS at 15:48

## 2018-02-06 RX ADMIN — PROPOFOL 30 MG: 10 INJECTION, EMULSION INTRAVENOUS at 15:44

## 2018-02-06 RX ADMIN — LORAZEPAM 1 MG: 2 INJECTION INTRAMUSCULAR; INTRAVENOUS at 17:20

## 2018-02-06 RX ADMIN — PROPOFOL 20 MG: 10 INJECTION, EMULSION INTRAVENOUS at 16:10

## 2018-02-06 RX ADMIN — ASPIRIN 81 MG 81 MG: 81 TABLET ORAL at 10:16

## 2018-02-06 RX ADMIN — POTASSIUM CHLORIDE 40 MEQ: 1500 TABLET, EXTENDED RELEASE ORAL at 10:16

## 2018-02-06 RX ADMIN — MIDAZOLAM 0.25 MG: 1 INJECTION INTRAMUSCULAR; INTRAVENOUS at 15:09

## 2018-02-06 RX ADMIN — MIDAZOLAM 0.5 MG: 1 INJECTION INTRAMUSCULAR; INTRAVENOUS at 15:00

## 2018-02-06 RX ADMIN — PROPOFOL 20 MG: 10 INJECTION, EMULSION INTRAVENOUS at 16:00

## 2018-02-06 RX ADMIN — FENTANYL CITRATE 25 MCG: 50 INJECTION, SOLUTION INTRAMUSCULAR; INTRAVENOUS at 15:00

## 2018-02-06 RX ADMIN — IODIXANOL 106 ML: 320 INJECTION, SOLUTION INTRAVASCULAR at 16:41

## 2018-02-06 RX ADMIN — METOPROLOL TARTRATE 75 MG: 50 TABLET ORAL at 10:16

## 2018-02-06 RX ADMIN — CEFAZOLIN SODIUM 2000 MG: 2 SOLUTION INTRAVENOUS at 01:19

## 2018-02-06 RX ADMIN — SODIUM CHLORIDE 125 ML/HR: 0.9 INJECTION, SOLUTION INTRAVENOUS at 01:20

## 2018-02-06 RX ADMIN — PROPOFOL 20 MG: 10 INJECTION, EMULSION INTRAVENOUS at 15:56

## 2018-02-06 RX ADMIN — LIDOCAINE HYDROCHLORIDE 50 MG: 10 INJECTION, SOLUTION INFILTRATION; PERINEURAL at 17:28

## 2018-02-06 RX ADMIN — CEFAZOLIN SODIUM 2000 MG: 2 SOLUTION INTRAVENOUS at 11:38

## 2018-02-06 RX ADMIN — SODIUM CHLORIDE 125 ML/HR: 0.9 INJECTION, SOLUTION INTRAVENOUS at 10:00

## 2018-02-06 RX ADMIN — METRONIDAZOLE 500 MG: 500 INJECTION, SOLUTION INTRAVENOUS at 10:16

## 2018-02-06 RX ADMIN — METRONIDAZOLE 500 MG: 500 INJECTION, SOLUTION INTRAVENOUS at 02:12

## 2018-02-06 RX ADMIN — LORAZEPAM 0.5 MG: 2 INJECTION INTRAMUSCULAR; INTRAVENOUS at 16:46

## 2018-02-06 RX ADMIN — PROPOFOL 20 MG: 10 INJECTION, EMULSION INTRAVENOUS at 16:07

## 2018-02-06 RX ADMIN — PROPOFOL 20 MG: 10 INJECTION, EMULSION INTRAVENOUS at 15:50

## 2018-02-06 RX ADMIN — ONDANSETRON 4 MG: 2 INJECTION INTRAMUSCULAR; INTRAVENOUS at 17:48

## 2018-02-06 NOTE — PROGRESS NOTES
Patient states that he does not want his insulin  States that he had a bad prior experience with insulin so he will not take it  Patient is open to take oral diabetic medications such as metformin

## 2018-02-06 NOTE — PERIOPERATIVE NURSING NOTE
1725- Anesthesia ,surgeon here to take pt aware of statis, total of 2 mg's Ativan given as per Dr Myah Martinez, pt steve vizcarra   Conscent signed earlier for surgery per surgeon with witnesses

## 2018-02-06 NOTE — ASSESSMENT & PLAN NOTE
· Secondary to diabetic toe infection  · Continue IV Ancef and Flagyl    Id consult placed by Podiatry, follow-up recommendation

## 2018-02-06 NOTE — CONSULTS
Consultation - General Surgery   Lionel Elliott 75 y o  male MRN: [id number]  Unit/Bed#: -01 Encounter: [id number]    Assessment/Plan     Assessment:  74 yo M with wet gangrene of the hallux of the right foot  WBC is 23, xray of the foot shows possible osteomyelitis in the great toe  LEADS do show >75% popliteal stenosis, but it's also very likely his microvascular disease from DM is also heavily contributing to his current condition  Plan:  - IR consult for A gram- should be able to obtain before scheduled Friday slot now that patient is inpatient  - NPO, IVF  - Patient agreed to have podiatry on board with some convincing, he will likely need some sort of debridement / amputation?  - Antibiotics per primary- currently Ancef/Flagyl, but suggest antibiotic with pseudomonas coverage given history of diabetes  - Glucose control per primary  - PRN pain control    History of Present Illness     HPI:  Lionel Elliott is a 75 y o  male who presents with great toe gangrene  He states that it was a dry gangrene for the past 4-5 weeks and started as a great toe and 4th toe ulcer  The 4th toe ulcer has nearly healed however the great toe ulceration and discoloration has spread to involve the entire toe and become wet in appearance with foul odor  The pain has been increasing in the past week but yesterday it was unbearable so he came to the ED today  He denies claudication symptoms and notes only burning pain and now throbbing to his foot  The patient reports that he had initially been seeing a podiatrist (not in [address] [place] [address]?) who had him on antibiotics (Keflex) however eventually referred him to vascular surgery  The patient is very upset with the podiatrist and feels that he caused a delay in his care  He saw vascular on 1/25 and had actually been scheduled for an A-gram on Friday 2/9   However, he felt the problem had become too bad to wait  His LEADS done 1/17 show Right >75% pop stenosis, WICHO 0 78, MTP 77, GTP 40  Left WICHO 1 11, , GTP 60  His most recent HbA1c was 9 7  Inpatient consult to Vascular Surgery     Date/Time 2/5/2018 10:26 PM     Performed by  Louisa Tejeda     Authorized by Alonso Menard              Review of Systems as per HPI    Historical Information   Past Medical History:   Diagnosis Date    Diabetes mellitus ([facility]     Diabetic neuropathy associated with type 2 diabetes mellitus ([facility]     DM (diabetes mellitus), type 2 with peripheral vascular complications ([facility] [address] [facility]     Gangrene of toe of right foot ([facility] [address] [facility]     Hypertension     PVD (peripheral vascular disease) ([facility] [address] [facility]      Past Surgical History:   Procedure Laterality Date    EYE SURGERY      cataract- bilateral    NO PAST SURGERIES       Social History   History   Alcohol Use No     History   Drug Use No     History   Smoking Status    Never Smoker   Smokeless Tobacco    Never Used     Family History:   Family History   Problem Relation Age of Onset    Diabetes Mother     No Known Problems Father        Meds/Allergies   all current active meds have been reviewed  Allergies   Allergen Reactions    Penicillins      Pt stated that he had a reaction to it a long time ago    Childhood history       Objective   First Vitals:   Blood Pressure: 150/80 (02/05/18 1346)  Pulse: (!) 106 (02/05/18 1346)  Temperature: 98 5 °F (36 9 °C) (02/05/18 1346)  Temp Source: Oral (02/05/18 1346)  Respirations: 18 (02/05/18 1346)  Height: 5' 8" (172 7 cm) (02/05/18 1942)  Weight - Scale: 59 kg (130 lb) (02/05/18 1346)  SpO2: 99 % (02/05/18 1346)    Current Vitals:   Blood Pressure: 156/71 (02/05/18 1942)  Pulse: 102 (02/05/18 1942)  Temperature: 98 9 °F (37 2 °C) (02/05/18 1942)  Temp Source: Oral (02/05/18 1942)  Respirations: 18 (02/05/18 1942)  Height: 5' 8" (172 7 cm) (02/05/18 1942)  Weight - Scale: 60 5 kg (133 lb 4 8 oz) (02/05/18 1942)  SpO2: 99 % (02/05/18 1942)      Intake/Output Summary (Last 24 hours) at 02/05/18 2223  Last data filed at 02/05/18 1835   Gross per 24 hour   Intake             1000 ml   Output                0 ml   Net             1000 ml       Invasive Devices     Peripheral Intravenous Line            Peripheral IV 02/05/18 Right Antecubital less than 1 day                Physical Exam  Physical Exam:   Gen: A&O, argumentative  Cardio: RRR  Lungs: CTAB  Abd: Soft, non distended, non tender  Ext: Right toe wet gangrene with black necrosis to great toe  Superficial ulceration to lateral 4th toe  Vasc:  R: Fem 2+  Pop non palp  DP monophasic dopp, PT biphasic dopp  L: Fem 2+, pop 2+  DP 1+, PT 2+                    Lab Results:   CBC:   Lab Results   Component Value Date    WBC 23 06 (H) 02/05/2018    HGB 12 2 02/05/2018    HCT 34 8 (L) 02/05/2018    MCV 89 02/05/2018     02/05/2018    MCH 31 2 02/05/2018    MCHC 35 1 02/05/2018    RDW 12 4 02/05/2018    MPV 10 5 02/05/2018    NRBC 0 02/05/2018   , CMP:   Lab Results   Component Value Date     (L) 02/05/2018    K 3 5 02/05/2018    CL 90 (L) 02/05/2018    CO2 28 02/05/2018    ANIONGAP 7 02/05/2018    BUN 27 (H) 02/05/2018    CREATININE 1 01 02/05/2018    GLUCOSE 282 (H) 02/05/2018    CALCIUM 9 2 02/05/2018    AST 76 (H) 02/05/2018    ALT 31 02/05/2018    ALKPHOS 131 (H) 02/05/2018    PROT 8 1 02/05/2018    BILITOT 0 82 02/05/2018    EGFR 76 02/05/2018   , Coagulation:   Lab Results   Component Value Date    INR 1 30 (H) 02/05/2018     Imaging: I have personally reviewed pertinent reports  EKG, Pathology, and Other Studies: I have personally reviewed pertinent reports  Counseling / Coordination of Care  Total floor / unit time spent today 45 minutes  Greater than 50% of total time was spent with the patient and / or family counseling and / or coordination of care

## 2018-02-06 NOTE — ANESTHESIA POSTPROCEDURE EVALUATION
Post-Op Assessment Note      CV Status:  Stable    Mental Status:  Alert    Hydration Status:  Stable    PONV Controlled:  None    Airway Patency:  Patent    Post Op Vitals Reviewed: Yes          Staff: AnesthesiologistSUNDAY           BP   120/60   Temp   98   Pulse 100   Resp   12   SpO2   98

## 2018-02-06 NOTE — ANESTHESIA PREPROCEDURE EVALUATION
Review of Systems/Medical History  Patient summary reviewed  Chart reviewed      Cardiovascular  Exercise tolerance: poor,  Hypertension controlled,    Pulmonary       GI/Hepatic       Chronic kidney disease ,        Endo/Other  Diabetes poorly controlled type 2 ,      GYN       Hematology   Musculoskeletal       Neurology    Diabetic neuropathy,    Psychology           Physical Exam    Airway    Mallampati score: I  TM Distance: >3 FB  Neck ROM: full     Dental       Cardiovascular      Pulmonary      Other Findings        Anesthesia Plan  ASA Score- 3     Anesthesia Type- general with ASA Monitors  Additional Monitors:   Airway Plan: LMA  Plan Factors-    Induction- intravenous  Postoperative Plan-     Informed Consent- Anesthetic plan and risks discussed with patient  I personally reviewed this patient with the CRNA  Discussed and agreed on the Anesthesia Plan with the CRNA  Dory Gabriel

## 2018-02-06 NOTE — BRIEF OP NOTE (RAD/CATH)
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 ([address] )    5  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)      Post-op Diagnosis:   1  Gangrenous toe ([address] )    2  Cellulitis of right foot    3  Hyponatremia    4  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 ([address] )    5  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 [facility])        Surgeon:   Christine Bedoya MD  Assistants:     No qualified resident was available, Resident is only observing    Estimated Blood Loss: Minimal  Findings: High grade right popliteal artery stenosis treated with atherectomy and STEW PTA  High grade distal right PT artery treated with PTA      Specimens: none    Complications:  none    Anesthesia: Conscious sedation, Local and MAC    Christine Bedoya MD     Date: 2018  Time: 4:25 PM

## 2018-02-06 NOTE — PERIOPERATIVE NURSING NOTE
4745- Pt arrived directly from IR, pt combative, taking O2  Off, pulling at IV, kerlix placed, floor called to assess pt's pre-proceduremental statis, informed not confused, but combative and refusing care

## 2018-02-06 NOTE — ANESTHESIA POSTPROCEDURE EVALUATION
Post-Op Assessment Note      CV Status:  Stable    Mental Status:  Alert and awake    Hydration Status:  Euvolemic    PONV Controlled:  Controlled    Airway Patency:  Patent    Post Op Vitals Reviewed: Yes          Staff: CRNA       Comments: report to RN          BP (!) 94/45 (02/06/18 1757)    Temp      Pulse 73 (02/06/18 1757)   Resp 22 (02/06/18 1757)    SpO2 98 % (02/06/18 1757)

## 2018-02-06 NOTE — ASSESSMENT & PLAN NOTE
· Significant peripheral arterial disease in the right leg, popliteal stenosis  · Vascular surgery on board, ordered IR consult for A gram

## 2018-02-06 NOTE — ASSESSMENT & PLAN NOTE
· Management as above  · Patient is very angry with Podiatry stating this all began when his toenail was cut too short  States he was treated for 3 weeks by podiatry, then given Keflex and after 5 weeks Podiatry stated there is nothing they can do and advised him to see vascular

## 2018-02-06 NOTE — ASSESSMENT & PLAN NOTE
Severe PAD with Right foot wet gangrene/OM in setting of uncontrolled DM type 2 with hyperglycemia and HA1c 9 7  Right Popliteal Stenosis     --Plan for Arteriogram today  --Pt now agreeable to Podiatry eval and has been explained that surgical intervention at a minimum of toe amputation is necessary for limb salavage  D/W podiatry and plan for OR as add-on today    --Continue antibiotics  --ID evaluation

## 2018-02-06 NOTE — CONSULTS
Podiatry paged from emergency room regarding patient who was initially said to have dry gangrene with cellulitis  Advised to start on Ancef/Flagyl and that podiatry would go see the patient  However upon trying to evaluate patient, patient refused podiatric care as he was unhappy with his previous experience with his podiatrist   Patient refused care from entire podiatry team even though offered to be seen by different attending  Provider left emergency room without evaluating patient as patient was adamant in seeing only a vascular surgeon  Podiatry was paged again later in the evening for consult placed for this patient  Upon calling nurse to see if patient is willing to be seen by Podiatry, nurse responded that he talked to wife who wanted podiatry to come in and see patient  However, when provider entered room to evaluate patient, patient was still adamant in refusing podiatric care  He firmly believes that the source of his foot problems is purely from poor blood flow  Tried to discuss with patient that although poor perfusion has a significant role in his wound/infection, source control is also extremely important at this time  Vascular resident was also present in the room who tried to convince patient  After discussing at length importance of multiple treatment teams being involved, patient states that he would like to talk to Dr Christina Boyer personally tomorrow morning and if Dr Christina Boyer recommends having podiatry on board, only then will he agree to podiatric care  Although patient is nontoxic in appearance, x-rays of right foot show changes compatible osteomyelitis as well as soft tissue emphysema and distal aspect of hallux  His white count was 23 06, CRP greater than 90,   He is also an uncontrolled diabetic with A1c of 9 7    His lab values are extremely concerning and discussed with patient that without podiatric intervention, he may get extremely sick/septic and that he is at high risk of losing his limb possibly even life  Patient did not seem convinced and is adamant that he wants to speak with Dr Julio Cesar Fox first   Even after discussing multiple times importance of treating the infection, patient continues to deny podiatric care  Patient was even reluctant in letting podiatry apply dressing  Patient refused decompression of the toe  Applied Betadine wet to dry dressing to foot and obtained an aerobic/anaerobic cultures  Will place Infectious Disease consult in meantime and try to see patient tomorrow  --  Was unable to perform full physical exam as pt did not allow it, but noted mottingling/wet gangrenous changed to right hallux and medial forefoot  There is a fissure noted in the 1st interspace with epidermolysis  Eschar noted to right 4th toe  No pathology appreciated on left foot  Nonpalpable but dopplerable pedal pulses on right foot  Palpable left PT per vasc

## 2018-02-06 NOTE — ASSESSMENT & PLAN NOTE
· Better than before, questionable SIADH    Serum was osmolality normal  · Continue to monitor with IV fluids while NPO

## 2018-02-06 NOTE — ASSESSMENT & PLAN NOTE
· Home oral hypoglycemics on hold  · Patient reluctant about you taking insulin, had some reaction 20 years ago    After discussing with the wife, if agreeable about trying insulin  · Continue ISS, endocrine consult ordered by vascular surgery

## 2018-02-06 NOTE — PERIOPERATIVE NURSING NOTE
Upon arrival to PACU, oral airway in place and pt unresponsive  Dr Jorge Espinal called to come to bedside  RR 30's and tachypneic

## 2018-02-06 NOTE — CASE MANAGEMENT
Initial Clinical Review    Admission: Date/Time/Statement: 2/5/18 @ 1837     Orders Placed This Encounter   Procedures    Inpatient Admission (expected length of stay for this patient is greater than two midnights)     Standing Status:   Standing     Number of Occurrences:   1     Order Specific Question:   Admitting Physician     Answer:   Trinidad García [[id number]]     Order Specific Question:   Level of Care     Answer:   Med Surg [[id number]]     Order Specific Question:   Bed Type     Answer:   Josr [4]     Order Specific Question:   Estimated length of stay     Answer:   More than 2 Midnights     Order Specific Question:   Certification     Answer:   I certify that inpatient services are medically necessary for this patient for a duration of greater than two midnights  See H&P and MD Progress Notes for additional information about the patient's course of treatment  ED: Date/Time/Mode of Arrival:   ED Arrival Information     Expected Arrival Acuity Means of Arrival Escorted By Service Admission Type    - 2/5/2018 13:42 Urgent Walk-In Family Member General Medicine Urgent    Arrival Complaint    FOOT PAIN          Chief Complaint:   Chief Complaint   Patient presents with    Foot Ulcer     Pt presents with diabetic ulcers on right foot and increased pain and swelling       History of Illness: 75 y o  male who presents with complaints of increased pain, gangrene, swelling, erythema, skin breakdown and bloody drainage of right foot  PMH: DM2, atherosclerosis RLE, HTN, polyneuropathy, microalbuminuria  Patient is very angry and refuses to speak to me and provide any history, refused physical examination  Information provided by spouse  She called vascular this morning complaining of above symptoms and was advised to go to ED    Right foot very edematous and erythematous, wet gangrene 1st and 2nd digit, skin maceration over 1st and 2nd digit with small amount of serosanguineous drainage, no purulent drainage, diminished pulses  Given tetanus vaccine, Ancef and Flagyl in ED  Podiatry consult requested; pt refused podiatry intervention  He is angry stating the reason this began is because Podiatry cut his toenails too short  He was given Keflex as outpatient on 2 separate occasions 1st by Podiatry then by vascular  Follows with Dr Dary Allen, vascular surgery  Seen 1/25/18 for RLE atherosclerosis, dry gangrene R hallux and ulcer to fourth toe, nonhealing x4-5 weeks  LEAD done 1/17/18; greater than 75% popliteal stenosis, calcified tibial disease,  GTP below healing at 40mmHg  Scheduled for arteriogram RLE and possible PTA/stent 02/09/2018  Vascular consult ordered    Continue IV Ancef and Flagyl        ED Vital Signs:   ED Triage Vitals [02/05/18 1346]   Temperature Pulse Respirations Blood Pressure SpO2   98 5 °F (36 9 °C) (!) 106 18 150/80 99 %      Temp Source Heart Rate Source Patient Position - Orthostatic VS BP Location FiO2 (%)   Oral Monitor Sitting Right arm --      Pain Score       9        Wt Readings from Last 1 Encounters:   02/05/18 60 5 kg (133 lb 4 8 oz)       Vital Signs (abnormal):   Date/Time  Temp  Pulse  Resp  BP  SpO2  O2 Device  Patient Position - Orthostatic VS   02/06/18 0723  98 4 °F (36 9 °C)  96  18  139/62  95 %  None (Room air)  Lying   02/06/18 0005  99 9 °F (37 7 °C)  84  20  139/67  97 %  --  --   02/05/18 1942  98 9 °F (37 2 °C)  102  18  156/71  99 %  None (Room air)  --   02/05/18 1801  --  103  18  159/70  98 %  None (Room air)  Lying       Abnormal Labs:    02/05/18 1714    Sed Rate 0 - 10 mm/hour 100       02/05/18 1714     WBC 4 31 - 10 16 Thousand/uL 23 06     RBC 3 88 - 5 62 Million/uL 3 91    Hemoglobin 12 0 - 17 0 g/dL 12 2    Hematocrit 36 5 - 49 3 % 34 8        02/06/18 0508    WBC 4 31 - 10 16 Thousand/uL 18 86     RBC 3 88 - 5 62 Million/uL 3 56     Hemoglobin 12 0 - 17 0 g/dL 10 9     Hematocrit 36 5 - 49 3 % 31 1        02/05/18 1714    Sodium 136 - 145 mmol/L 125 BUN 5 - 25 mg/dL 27       AST 5 - 45 U/L 76       Alkaline Phosphatase 46 - 116 U/L 131     Total Protein 6 4 - 8 2 g/dL 8 1    Albumin 3 5 - 5 0 g/dL 2 8        02/05/18 2059    POC Glucose 65 - 140 mg/dl 252         Diagnostic Test Results: Xray Right Foot -    There is permeation noted within the 1st distal phalanx with associated erosive changes and foci of air in the soft tissue compatible with osteomyelitis       ED Treatment:   Medication Administration from 02/05/2018 1341 to 02/05/2018 1931       Date/Time Order Dose Route Action     02/05/2018 1728 sodium chloride 0 9 % bolus 1,000 mL 1,000 mL Intravenous New Bag     02/05/2018 1800 ceFAZolin (ANCEF) IVPB (premix) 2,000 mg 2,000 mg Intravenous New Bag     02/05/2018 1749 metroNIDAZOLE (FLAGYL) tablet 500 mg 500 mg Oral Given     02/05/2018 1912 tetanus-diphtheria-acellular pertussis (BOOSTRIX) IM injection 0 5 mL 0 5 mL Intramuscular Given          Past Medical/Surgical History:    Active Ambulatory Problems     Diagnosis Date Noted    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 ([facility] ) 01/25/2018    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 of foot ([place] [address] ) 02/02/2018    PVD (peripheral vascular disease) ([place] [address] )      Resolved Ambulatory Problems     Diagnosis Date Noted    No Resolved Ambulatory Problems     Past Medical History:   Diagnosis Date    Diabetes mellitus ([place] [address] )     Diabetic neuropathy associated with type 2 diabetes mellitus ([place] [address] )     DM (diabetes mellitus), type 2 with peripheral vascular complications (HCC)     Gangrene of toe of right foot ([facility] )     Hypertension     PVD (peripheral vascular disease) ([place] [address] )        Admitting Diagnosis: Foot pain [M79 673]  Hyponatremia [E87 1]  Gangrenous toe (HCC) [I96]  Cellulitis of right foot [L03 115]    Age/Sex: 75 y o  male    Assessment/Plan:   * Cellulitis of right foot   Assessment & Plan     · Afebrile; leukocytosis WBC 23, sed rate 100, CRP >90  · Was on Keflex p o  as outpatient prescribed 2 times, first by Podiatry, then by vascular  · Continue IV Ancef and flagyl  · Right foot x-ray pending           Gangrene of toe of right foot ([facility] )   Assessment & Plan     · Management as above  · Patient is very angry with Podiatry stating this all began when his toenail was cut too short  States he was treated for 3 weeks by podiatry, then given Keflex and after 5 weeks Podiatry stated there is nothing they can do and advised him to see vascular           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 of foot ([facility] )   Assessment & Plan     · Pt's wife called PMD today stating pt's foot is worsening, erythema and swelling, with skin breakdown and drainage  Advised to go to ED  · Follows with Dr Marlee Cerrato, vascular surgery  Seen 1/25/18 for RLE atherosclerosis, dry gangrene R hallux and ulcer to fourth toe, nonhealing x4-5 weeks  LEAD done 1/17/18; greater than 75% popliteal stenosis, calcified tibial disease,  GTP below healing at 40mmHg  · Scheduled for arteriogram RLE and possible PTA/stent 02/09/2018    · IV antibiotics  · Given tetanus vaccine, Ancef and Flagyl in ED  · Vascular surgery consult  · Podiatry consult  · Local wound care          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)   Assessment & Plan     · On glipizide-metformin 5-500 mg 2 tabs b i d  and Tradjenta 5 mg daily  · Seen by Endocrinology at [facility] on 1/24/18: noted to be noncompliant, did not report results of blood glucoses as advised and stopped taking Tradjenta  · Hold oral hypoglycemic during acute illness  · Insulin sliding scale          Diabetic neuropathy associated with type 2 diabetes mellitus (HCC)   Assessment & Plan     · On Neurontin 300 mg t i d , continue          Hypertension   Assessment & Plan     · On Lopressor 75 mg b i d , continue                 Hyponatremia   Assessment & Plan     · BMP monitoring  · Received 1 L NS in ED              VTE Prophylaxis: Enoxaparin (Lovenox)  / reason for no mechanical VTE prophylaxis Low moderate risk and treat with subcu anticoagulated   Code Status:  Full code  POLST: POLST is not applicable to this patient  Discussion with family:  Spouse at bedside     Anticipated Length of Stay:  Patient will be admitted on an Inpatient basis with an anticipated length of stay of  greater than 2 midnights  Justification for Hospital Stay:  Cellulitis, gangrene and atherosclerosis of right leg requires IV antibiotics and possible arteriogram       Admission Orders:  NPO; Sips with meds  IR Abdominal Angiography  Bld culture x2  Vascular Surgery cons  Podiatry cons  Infectious Disease cons  Endocrinology cons    Scheduled Meds:   Current Facility-Administered Medications:  aspirin 81 mg Oral Daily   cefazolin 2,000 mg Intravenous Q8H   gabapentin 600 mg Oral HS   insulin lispro 1-5 Units Subcutaneous TID AC   metoprolol tartrate 75 mg Oral Q12H VIVIENNE   metroNIDAZOLE 500 mg Intravenous Q8H   pregabalin 75 mg Oral HS   sodium chloride 125 mL/hr Intravenous Continuous     Continuous Infusions:   sodium chloride 125 mL/hr Last Rate: 125 mL/hr (02/06/18 0120)     PRN Meds:     ----------------------------------------------------------------------------------------------------    2/5 General Surgery cons:  Assessment:  76 yo M with wet gangrene of the hallux of the right foot  WBC is 23, xray of the foot shows possible osteomyelitis in the great toe   LEADS do show >75% popliteal stenosis, but it's also very likely his microvascular disease from DM is also heavily contributing to his current condition      Plan:  - IR consult for A gram- should be able to obtain before scheduled Friday slot now that patient is inpatient  - NPO, IVF  - Patient agreed to have podiatry on board with some convincing, he will likely need some sort of debridement / amputation?  - Antibiotics per primary- currently Ancef/Flagyl, but suggest antibiotic with pseudomonas coverage given history of diabetes  - Glucose control per primary  - PRN pain control

## 2018-02-06 NOTE — PROGRESS NOTES
Progress Note - Billie Gentile 1950, 75 y o  male MRN: [id number]    Unit/Bed#: -01 Encounter: [id number]    Primary Care Provider: Michael Robin MD   Date and time admitted to hospital: 2/5/2018  4:18 PM        Gangrene of toe of right foot ([facility] [address] )   Assessment & Plan    · Gangrenous changes of the right hallux with adjacent 2nd toe involvement  · X-ray suspicious of osteomyelitis of the right 1st toe  · Patient been very upset with Podiatry as outpatient, has been denying to see Podiatry  Has already had discussion with vascular surgery today about the risks of not having any intervention by Podiatry  Hence at the time of my evaluation the wife did not want me to further discuss about that  · Follow-up podiatry recommendation and get to re-evaluate him        Hyponatremia   Assessment & Plan    · Better than before, questionable SIADH  Serum was osmolality normal  · Continue to monitor with IV fluids while NPO        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)   Assessment & Plan    · Home oral hypoglycemics on hold  · Patient reluctant about you taking insulin, had some reaction 20 years ago  After discussing with the wife, if agreeable about trying insulin  · Continue ISS, endocrine consult ordered by vascular surgery        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 of foot ([facility] )   Assessment & Plan    · Significant peripheral arterial disease in the right leg, popliteal stenosis  · Vascular surgery on board, ordered IR consult for A gram        * Cellulitis of right foot   Assessment & Plan    · Secondary to diabetic toe infection  · Continue IV Ancef and Flagyl    Id consult placed by Podiatry, follow-up recommendation        Diabetic neuropathy associated with type 2 diabetes mellitus ([facility] )   Assessment & Plan    · Continue Lyrica        Hypertension   Assessment & Plan    · Continue metoprolol 75 Twice a day          [facility] Internal Medicine Progress Note  Patient: Nettie Spencer 75 y o  male   MRN: [id number]  PCP: Melanie Figueredo MD  Unit/Bed#: MS Rivera1-78 Encounter: [id number]  Date Of Visit: 18    Assessment:    Principal Problem:    Cellulitis of right foot  Active Problems:    Gangrene of toe of right foot ([facility] )    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 of foot ([facility] )    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)    Hyponatremia    Hypertension    Diabetic neuropathy associated with type 2 diabetes mellitus ([facility] )        VTE Pharmacologic Prophylaxis:   Pharmacologic: Pharmacologic VTE Prophylaxis contraindicated due to On hold for any intervention  Mechanical VTE Prophylaxis in Place: Yes    Patient Centered Rounds: I have performed bedside rounds with nursing staff today  Discussions with Specialists or Other Care Team Provider:  Vascular surgery    Education and Discussions with Family / Patient:  Patient and wife    Time Spent for Care: 45 minutes  More than 50% of total time spent on counseling and coordination of care as described above  Current Length of Stay: 1 day(s)    Current Patient Status: Inpatient   Certification Statement: The patient will continue to require additional inpatient hospital stay due to for above    Discharge Plan / Estimated Discharge Date: based on course    Code Status: Level 1 - Full Code      Subjective:   Patient appears upset, initially did not want me to be examined him  He is just upset about different people coming and seeing him  Wife did not want me to further discuss about podiatry evaluation as they just did that with vascular surgery    Objective:     Vitals:   Temp (24hrs), Av 9 °F (37 2 °C), Min:98 4 °F (36 9 °C), Max:99 9 °F (37 7 °C)    HR:  [] 96  Resp:  [18-20] 18  BP: (139-159)/(62-80) 139/62  SpO2:  [95 %-99 %] 95 %  Body mass index is 20 27 kg/m²       Input and Output Summary (last 24 hours): Intake/Output Summary (Last 24 hours) at 02/06/18 1038  Last data filed at 02/06/18 0723   Gross per 24 hour   Intake             1000 ml   Output                0 ml   Net             1000 ml       Physical Exam:     Physical Exam   Constitutional: He is oriented to person, place, and time  He appears well-developed and well-nourished  HENT:   Head: Normocephalic and atraumatic  Eyes: Conjunctivae are normal  No scleral icterus  Cardiovascular: Normal rate, regular rhythm and normal heart sounds  Exam reveals no gallop and no friction rub  No murmur heard  Pulmonary/Chest: Effort normal and breath sounds normal  No respiratory distress  He has no wheezes  He has no rales  Abdominal: Soft  He exhibits no distension  There is no tenderness  Musculoskeletal:   Right foot dressing intact   Neurological: He is alert and oriented to person, place, and time  Additional Data:     Labs:      Results from last 7 days  Lab Units 02/06/18  0508   WBC Thousand/uL 18 86*   HEMOGLOBIN g/dL 10 9*   HEMATOCRIT % 31 1*   PLATELETS Thousands/uL 306   NEUTROS PCT % 88*   LYMPHS PCT % 7*   MONOS PCT % 5   EOS PCT % 0       Results from last 7 days  Lab Units 02/06/18  0508 02/05/18  1714   SODIUM mmol/L 131* 125*   POTASSIUM mmol/L 3 1* 3 5   CHLORIDE mmol/L 95* 90*   CO2 mmol/L 28 28   BUN mg/dL 19 27*   CREATININE mg/dL 0 90 1 01   CALCIUM mg/dL 8 4 9 2   TOTAL PROTEIN g/dL  --  8 1   BILIRUBIN TOTAL mg/dL  --  0 82   ALK PHOS U/L  --  131*   ALT U/L  --  31   AST U/L  --  76*   GLUCOSE RANDOM mg/dL 218* 282*       Results from last 7 days  Lab Units 02/05/18  1714   INR  1 30*       * I Have Reviewed All Lab Data Listed Above  * Additional Pertinent Lab Tests Reviewed:  All Labs Within Last 24 Hours Reviewed    Imaging:    Imaging Reports Reviewed Today Include:   Imaging Personally Reviewed by Myself Includes:      Recent Cultures (last 7 days):           Last 24 Hours Medication List:     Current Facility-Administered Medications:  aspirin 81 mg Oral Daily Janell Enriquez MD    cefazolin 2,000 mg Intravenous Q8H Janell Enriquez MD Last Rate: 2,000 mg (02/06/18 0119)   gabapentin 600 mg Oral HS Janell Enriquez MD    insulin lispro 1-5 Units Subcutaneous TID AC Janell Enriquez MD    metoprolol tartrate 75 mg Oral Q12H [address] Janell Enriquez MD    metroNIDAZOLE 500 mg Intravenous Bre Washington MD Last Rate: 500 mg (02/06/18 1016)   pregabalin 75 mg Oral HS Janell Enriquez MD    sodium chloride 125 mL/hr Intravenous Continuous Vanessa Rubin MD Last Rate: 125 mL/hr (02/06/18 0120)        Today, Patient Was Seen By: Fozia Alarcon MD    ** Please Note: This note has been constructed using a voice recognition system   **

## 2018-02-06 NOTE — CONSULTS
Addendum:  Discussed with Dr Davis Mandel from [address] [place] [address]  Patient is scheduled to undergo open right 1st toe amputation later today, after arteriogram       Consultation - Infectious Disease   Dawn Breaux 75 y o  male MRN: [id number]  Unit/Bed#: -01 Encounter: [id number]      IMPRESSION & RECOMMENDATIONS:   Impression/Recommendations: This is a 75 y o  male, with poorly controlled DM and PVD, with chronic right foot ulceration, admitted yesterday with gangrene of right 1st toe and distal foot  Patient has low-grade fever and leukocytosis but is clinically and systemically well, without sepsis or systemic toxicity  1   Sepsis, POA, presented with fever and leukocytosis  Source of sepsis is most likely right foot infection  Fortunately, patient remains nontoxic and hemodynamically stable, without hypotension  Admission blood cultures are negative so far  Antibiotic plan as in below  Monitor hemodynamics  Follow-up on pending blood cultures  2  Right 1st toe in distal foot gangrene  This had been mostly dry but may be progressed into wet gangrene  Given lack of recent hospitalization, patient probably has low risk of resistant pathogens  However, given progression to wet gangrene, I will broaden his antibiotic regimen for now, pending culture results  Patient will need at least toe amputation, once vascular status is optimized  Change antibiotic to vancomycin/cefepime/Flagyl  Serial foot exams  Follow-up on pending wound culture  Recommend at least right 1st toe amputation, once vascular status is optimized    3  Right 1st toe osteomyelitis  This is quite definitive foot x-ray  Clinical picture is also suggestive  As stated above, patient will most likely need toe amputation, perhaps even more proximal amputation  Antibiotic as in above  Recommend right 1st toe amputation at minimum, as in above  4   PVD    Plan for arteriogram with possible intervention noted   Follow-up on arteriogram resolved  5   DM with hyperglycemia on admission  Patient appears noncompliant with diabetic medication  This clearly contributes to infection development  Blood sugar management per Medicine Service  6   PCN allergy on allergy list   Patient does not recall reaction  He states that this was when he was a young child  He is tolerating cephalosporin well  Discussed with patient and his wife in detail regarding the above plan  Thank you for this consultation  We will follow along with you  HISTORY OF PRESENT ILLNESS:  Reason for Consult:  Right foot gangrene  HPI: Chuy Funk is a 75 y o  male, with underlying DM and PVD, apparently has had chronic ulceration of his right foot, followed by Podiatry and vascular surgery as an outpatient  Patient has an arteriogram scheduled for later this week  He came to the ER yesterday with worsening swelling, redness and gangrenous changes over the ulcer  On presentation, patient had leukocytosis and low-grade fever  He was admitted  Cefazolin/Flagyl was started  We are asked to evaluate the patient  Patient's noncompliance with care and evaluation noted  He is now agreeable to podiatry evaluation  At present, he has minimal pain in his toe  He denies any chills at home  REVIEW OF SYSTEMS:  A complete 12 point system-based review of systems is otherwise negative  PAST MEDICAL HISTORY:  Past Medical History:   Diagnosis Date    Diabetes mellitus ([facility] [address] )     Diabetic neuropathy associated with type 2 diabetes mellitus ([address] )     DM (diabetes mellitus), type 2 with peripheral vascular complications (HCC)     Gangrene of toe of right foot ([address] )     Hypertension     PVD (peripheral vascular disease) ([address] )      Past Surgical History:   Procedure Laterality Date    EYE SURGERY      cataract- bilateral    NO PAST SURGERIES       Problem list reviewed      FAMILY HISTORY:  Non-contributory    SOCIAL HISTORY:  History   Alcohol Use No     History   Drug Use No     History   Smoking Status    Never Smoker   Smokeless Tobacco    Never Used       ALLERGIES:  Allergies   Allergen Reactions    Penicillins      Pt stated that he had a reaction to it a long time ago  Childhood history       MEDICATIONS:  All current active medications have been reviewed  Patient is currently on cefazolin/Flagyl  PHYSICAL EXAM:  Vitals:  HR:  [] 96  Resp:  [18-20] 18  BP: (139-159)/(62-80) 139/62  SpO2:  [95 %-99 %] 95 %  Temp (24hrs), Av 9 °F (37 2 °C), Min:98 4 °F (36 9 °C), Max:99 9 °F (37 7 °C)  Current: Temperature: 98 4 °F (36 9 °C)     Physical Exam:  General:  Well-nourished, well-developed, in no acute distress  Awake, alert and oriented x 3  Eyes:  Conjunctive clear with no hemorrhages or effusions  Oropharynx:  No ulcers, no lesions, pharynx benign, no tonsillitis  Neck:  Supple, no lymphadenopathy, no mass, nontender  Lungs:  Expansion symmetric, no rales, no wheezing, no accessory muscle use  Cardiac:  Mildly tachycardic with regular rhythm, normal S1, normal S2, no murmurs  Abdomen:  Soft, nondistended, non-tender, no HSM  Extremities:  No leg edema  Right foot with dressing in place  Right 1st toe gangrene visible through dressing  Photo of foot from Podiatry evaluation reviewed  No erythema/tenderness of lower leg  No palpable pulse in right foot  Skin:  No rashes, no ulcers  Neurological:  Moves all four extremities spontaneously, sensation grossly intact    LABS, IMAGING, & OTHER STUDIES:  Lab Results:  I have personally reviewed pertinent labs      Results from last 7 days  Lab Units 18  0508 18  1714   SODIUM mmol/L 131* 125*   POTASSIUM mmol/L 3 1* 3 5   CHLORIDE mmol/L 95* 90*   CO2 mmol/L 28 28   ANION GAP mmol/L 8 7   BUN mg/dL 19 27*   CREATININE mg/dL 0 90 1 01   EGFR ml/min/1 73sq m 87 76   GLUCOSE RANDOM mg/dL 218* 282*   CALCIUM mg/dL 8 4 9 2   AST U/L  --  76*   ALT U/L --  31   ALK PHOS U/L  --  131*   TOTAL PROTEIN g/dL  --  8 1   BILIRUBIN TOTAL mg/dL  --  0 82       Results from last 7 days  Lab Units 02/06/18  0508 02/05/18  1714   WBC Thousand/uL 18 86* 23 06*   HEMOGLOBIN g/dL 10 9* 12 2   PLATELETS Thousands/uL 306 293           Imaging Studies:   I have personally reviewed pertinent imaging study reports and images in PACS  Right foot x-ray reviewed personally  There are erosive changes of the right 1st distal phalanx, consistent with osteomyelitis  EKG, Pathology, and Other Studies:   I have personally reviewed pertinent reports

## 2018-02-06 NOTE — ASSESSMENT & PLAN NOTE
· Gangrenous changes of the right hallux with adjacent 2nd toe involvement  · X-ray suspicious of osteomyelitis of the right 1st toe  · Patient been very upset with Podiatry as outpatient, has been denying to see Podiatry  Has already had discussion with vascular surgery today about the risks of not having any intervention by Podiatry    Hence at the time of my evaluation the wife did not want me to further discuss about that  · Follow-up podiatry recommendation and get to re-evaluate him

## 2018-02-06 NOTE — PROGRESS NOTES
Vascular Surgery    Progress Note - Dmitry Pounds 1950, 75 y o  male MRN: [id number]    Unit/Bed#: -Pia Encounter: [id number]    Primary Care Provider: Sheldon Mcgregor MD   Date and time admitted to hospital: 2/5/2018  4:18 PM        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 of foot ([address] )   Assessment & Plan    Severe PAD with Right foot wet gangrene/OM in setting of uncontrolled DM type 2 with hyperglycemia and HA1c 9 7  Right Popliteal Stenosis     --Plan for Arteriogram today  --Pt now agreeable to Podiatry eval and has been explained that surgical intervention at a minimum of toe amputation is necessary for limb salavage  D/W podiatry and plan for OR as add-on today  --Continue antibiotics  --ID evaluation                Subjective:  Pt C/O Right foot pain and chills overnight  Denies fever  Vitals:  /62 (BP Location: Right arm)   Pulse 96   Temp 98 4 °F (36 9 °C) (Oral)   Resp 18   Ht 5' 8" (1 727 m)   Wt 60 5 kg (133 lb 4 8 oz)   SpO2 95%   BMI 20 27 kg/m²     I/Os:  I/O last 3 completed shifts: In: 1000 [IV Piggyback:1000]  Out: -   No intake/output data recorded  Lab Results and Cultures:   Lab Results   Component Value Date    WBC 18 86 (H) 02/06/2018    HGB 10 9 (L) 02/06/2018    HCT 31 1 (L) 02/06/2018    MCV 87 02/06/2018     02/06/2018     Lab Results   Component Value Date    GLUCOSE 218 (H) 02/06/2018    CALCIUM 8 4 02/06/2018     (L) 02/06/2018    K 3 1 (L) 02/06/2018    CO2 28 02/06/2018    CL 95 (L) 02/06/2018    BUN 19 02/06/2018    CREATININE 0 90 02/06/2018     Lab Results   Component Value Date    INR 1 30 (H) 02/05/2018    PROTIME 16 3 (H) 02/05/2018        Blood Culture: No results found for: BLOODCX,   Urinalysis: No results found for: Nickola Pulliam, SPECGRAV, PHUR, LEUKOCYTESUR, NITRITE, PROTEINUA, GLUCOSEU, KETONESU, BILIRUBINUR, BLOODU,   Urine Culture: No results found for: URINECX,   Wound Culure:  No results found for: WOUNDCULT    Medications:  Current Facility-Administered Medications   Medication Dose Route Frequency    aspirin chewable tablet 81 mg  81 mg Oral Daily    ceFAZolin (ANCEF) IVPB (premix) 2,000 mg  2,000 mg Intravenous Q8H    gabapentin (NEURONTIN) capsule 600 mg  600 mg Oral HS    insulin lispro (HumaLOG) 100 units/mL subcutaneous injection 1-5 Units  1-5 Units Subcutaneous TID AC    metoprolol tartrate (LOPRESSOR) tablet 75 mg  75 mg Oral Q12H VIVIENNE    metroNIDAZOLE (FLAGYL) IVPB (premix) 500 mg  500 mg Intravenous Q8H    potassium chloride (K-DUR,KLOR-CON) CR tablet 40 mEq  40 mEq Oral Once    pregabalin (LYRICA) capsule 75 mg  75 mg Oral HS    sodium chloride 0 9 % infusion  125 mL/hr Intravenous Continuous       Physical Exam:    General appearance: alert and oriented, in no acute distress  Lungs: clear to auscultation bilaterally  Heart: regular rate and rhythm, S1, S2 normal, no murmur, click, rub or gallop  Abdomen: soft, non-tender; bowel sounds normal; no masses,  no organomegaly  Extremities: Right hallux gangrene and edematous extending to dorsal and plantar foot also involving 2nd toe  +Erythema  M/S intact      Pulse exam:  DP: Right: doppler signal Left: doppler signal  PT: Right: doppler signal Left: 1+      Saurabh Finn PA-C  2/6/2018

## 2018-02-06 NOTE — ASSESSMENT & PLAN NOTE
· Afebrile; leukocytosis WBC 23, sed rate 100, CRP >90  · Was on Keflex p o  as outpatient prescribed 2 times, first by Podiatry, then by vascular  · Continue IV Ancef and flagyl  · Right foot x-ray pending

## 2018-02-06 NOTE — PERIOPERATIVE NURSING NOTE
Anesthesia at bedside   Dr Gerry Nelson here to examine pt and consulting critical care for post op management

## 2018-02-06 NOTE — ASSESSMENT & PLAN NOTE
· On glipizide-metformin 5-500 mg 2 tabs b i d  and Tradjenta 5 mg daily  · Seen by Endocrinology at [facility] on 1/24/18: noted to be noncompliant, did not report results of blood glucoses as advised and stopped taking Tradjenta  · Hold oral hypoglycemic during acute illness  · Insulin sliding scale

## 2018-02-06 NOTE — PLAN OF CARE
Problem: Potential for Falls  Goal: Patient will remain free of falls  INTERVENTIONS:  - Assess patient frequently for physical needs  -  Identify cognitive and physical deficits and behaviors that affect risk of falls  -  [place] fall precautions as indicated by assessment   - Educate patient/family on patient safety including physical limitations  - Instruct patient to call for assistance with activity based on assessment  - Modify environment to reduce risk of injury  - Consider OT/PT consult to assist with strengthening/mobility   Outcome: Progressing      Problem: Nutrition/Hydration-ADULT  Goal: Nutrient/Hydration intake appropriate for improving, restoring or maintaining nutritional needs  Monitor and assess patient's nutrition/hydration status for malnutrition (ex- brittle hair, bruises, dry skin, pale skin and conjunctiva, muscle wasting, smooth red tongue, and disorientation)  Collaborate with interdisciplinary team and initiate plan and interventions as ordered  Monitor patient's weight and dietary intake as ordered or per policy  Utilize nutrition screening tool and intervene per policy  Determine patient's food preferences and provide high-protein, high-caloric foods as appropriate       INTERVENTIONS:  - Monitor oral intake, urinary output, labs, and treatment plans  - Assess nutrition and hydration status and recommend course of action  - Evaluate amount of meals eaten  - Assist patient with eating if necessary   - Allow adequate time for meals  - Recommend/ encourage appropriate diets, oral nutritional supplements, and vitamin/mineral supplements  - Order, calculate, and assess calorie counts as needed  - Recommend, monitor, and adjust tube feedings and TPN/PPN based on assessed needs  - Assess need for intravenous fluids  - Provide specific nutrition/hydration education as appropriate  - Include patient/family/caregiver in decisions related to nutrition   Outcome: Progressing

## 2018-02-06 NOTE — OP NOTE
OPERATIVE REPORT  PATIENT NAME: Audrey Rodriguez    :  1950  MRN: [id number]  Pt Location: BE OR ROOM 07    SURGERY DATE: 2018    Surgeon(s) and Role:     * Marcelo Mcbride DPM - Primary    Preop Diagnosis:  Gangrenous toe ([facility]) Shayy Nieto    Post-Op Diagnosis Codes:     * Gangrenous toe ([facility]) Shayy Nieto    Procedure(s) (LRB):  INCISION AND DRAINAGE (I&D) EXTREMITY, right great toe amputation (Right)    Specimen(s):  * No specimens in log *    Estimated Blood Loss:   Minimal    Drains: none       Anesthesia Type:   General/LMA    Operative Indications:  Gangrenous toe ([facility]) [I96]  Gas gangrene    Operative Findings:  Extensive myonecrosis of the right great toe extending into the dorsal aspect of the foot  The 1st interspace was also affected  There was purulence tracking to the proximal half of the EHL tendon  There was purulence tracking over to the 3rd ray dorsally  The 2nd digit has severe necrosis and is unlikely to survive  No tracking proximal beyond the central metatarsal area into the rearfoot  Complications:   None    Procedure and Technique:  Under mild sedation the patient was brought in the operative room placed on his hospital bed in a supine position  Following IV sedation and LMA was placed  Time-out was then performed  Local anesthetic was infiltrated in a [place] block fashion about the right foot  The foot was then scrubbed prepped and draped in the usual aseptic manner  Attention was taken directed to the right forefoot where a foul-smelling gangrenous right great toe was noted  The entire toe and metatarsophalangeal joint area was boggy with crepitus  A 10 blade was used to make a circumferential guillotine amputation of the great toe at the metatarsophalangeal joint  The toe was passed from the operating field  Inspection toe found it severely necrosed  The necrosis involve the entire digit  The foot was then evaluated    There was heavily mild necrosis and purulence tracking dorsally along the extensor tendon as well as laterally over the 2nd and 3rd interspace  Further debridement continued in order to decompress these areas of purulence  The tracking any extensor tendon seem to only go half way proximal up the metatarsal shaft  It did not appear to enter into the rearfoot  The lateral abscess tracked into the 1st interspace and dorsally to the 3rd distal metatarsal area  All of this area was debrided  The final wound measured 9 x 7 x 2 cm  There is exposed 1st and 2nd metatarsal   There are exposed extensor tendons  There was no proximal purulence able to be expressed from the foot following this debridement  The foot was then rinse with 3000 mL of normal sterile saline using pulse lavage  The foot was then packed with 1 inch Betadine soaked packing  The entire 15 feet was used  It was then dressed with ABD pads Kerlix and an Ace bandage  There is extensive soft tissue and osseous damage to the forefoot  Although the acute infection appears to be decompressed the patient will need a secondary procedure  This will of surely involve a transmetatarsal amputation at minimum  The foot will be monitored closely over the next 2-3 days for planned surgical procedure later this week  As with many limb salvage procedures, we contemplate the possibility of performing further stages to this procedure  Procedures may include debridements, delayed closure, plastic surgery techniques, or more proximal amputations  This procedure may be considered part of a multi-staged limb salvage treatment plan        I was present for the entire procedure    Patient Disposition:  PACU  and hemodynamically stable    SIGNATURE: Marisela Werner DPM  DATE: February 6, 2018  TIME: 5:53 PM

## 2018-02-06 NOTE — PROGRESS NOTES
Consult - Podiatry   Adriana Shah 75 y o  male MRN: [id number]  Unit/Bed#: -01 Encounter: [id number]    Assessment/Plan     Assessment:  1  Wet gangrene of right hallux and medial forefoot with cellulitis  2  Eschars noted on right 4th toe, and lateral midfoot  3  PAD, per vasc  4  DM with neuropathy, A1c 9 7, per endo    Plan:  - After lengthy discussion, patient consented to right hallux amputation and I&D  He will need at most a second procedure later this week once infection is decompressed  I will take deep cutlures in the OR as he would not allow me to open the toe at bedside  The toe is necrotic, boggy and infected  - discussed at length with pt, pt's family (daughter, dr Rosendo Gordon) over the phone for 45 minutes  -discuss that this will be a part of a multi-staged limb salvage treatment plan, which will entail at least 2 surgeries with one being today's surgery with hallux amputation and washout, and another entailing hopeful closure  Discussed the possibility of performing further stages to this procedure which may include but not limited to further debridements, delayed closure, plastic surgery techniques, or more proximal amputations  - discussed at length goals of surgery, where our final goal is to have infection source control and a functional foot ultimately  - pt to go for agram today  - podiatry to take pt to OR for right foot hallux amputation and washout and removal of nonviable tissue and bone  - consent was signed by attending and patient and placed in chart after benefits, risks and alternatives were discussed with patient  No guarantees given  - LEADs from 1/17 reviewed: R wicho 0 78, MTP 77, GTP 40  Left WICHO 1 11 , GTP60    History of Present Illness     HPI:  Adriana Shah is a 75 y o  male who presents with wet gangrene of right foot  Pt had refused treatment by podiatry yesterday but now is agreeable   Pt states the wound started approximately 5 weeks ago when a podiatrist cut his nails too short  Pt states that the wound has gotten worse the last 2 weeks with it rapidly deteriorating the last 3 days  Pt is also followed by Dr Manjula Meneses and was scheduled for       Denies n/v/f/c/sob  Consults  Review of Systems   Constitutional: Negative  HENT: Negative  Eyes: Negative  Respiratory: Negative  Cardiovascular: Negative  Gastrointestinal: Negative  Musculoskeletal: Negative  Skin: wound   Neurological: Negative  Psych: negative         Historical Information   Past Medical History:   Diagnosis Date    Diabetes mellitus ([facility] )     Diabetic neuropathy associated with type 2 diabetes mellitus ([place] [address] )     DM (diabetes mellitus), type 2 with peripheral vascular complications ([facility])     Gangrene of toe of right foot ([facility] )     Hypertension     PVD (peripheral vascular disease) ([facility])      Past Surgical History:   Procedure Laterality Date    EYE SURGERY      cataract- bilateral    NO PAST SURGERIES       Social History   History   Alcohol Use No     History   Drug Use No     History   Smoking Status    Never Smoker   Smokeless Tobacco    Never Used     Family History:   Family History   Problem Relation Age of Onset    Diabetes Mother     No Known Problems Father        Meds/Allergies   Prescriptions Prior to Admission   Medication    Alpha-Lipoic Acid 600 MG CAPS    aspirin 81 MG tablet    Blood Glucose Monitoring Suppl (ONE TOUCH ULTRA MINI) w/Device KIT    cephalexin (KEFLEX) 500 mg capsule    gabapentin (NEURONTIN) 300 mg capsule    glipiZIDE-metFORMIN (METAGLIP) 5-500 MG per tablet    glucose blood (ACCU-CHEK ACTIVE STRIPS) test strip    losartan-hydrochlorothiazide (HYZAAR) 100-25 MG per tablet    metoprolol tartrate (LOPRESSOR) 25 mg tablet    metoprolol tartrate (LOPRESSOR) 50 mg tablet    pregabalin (LYRICA) 75 mg capsule    TRADJENTA 5 MG TABS     Allergies   Allergen Reactions    Penicillins      Pt stated that he had a reaction to it a long time ago  Childhood history       Objective   First Vitals:   Blood Pressure: 150/80 (02/05/18 1346)  Pulse: (!) 106 (02/05/18 1346)  Temperature: 98 5 °F (36 9 °C) (02/05/18 1346)  Temp Source: Oral (02/05/18 1346)  Respirations: 18 (02/05/18 1346)  Height: 5' 8" (172 7 cm) (02/05/18 1942)  Weight - Scale: 59 kg (130 lb) (02/05/18 1346)  SpO2: 99 % (02/05/18 1346)    Current Vitals:   Blood Pressure: 139/62 (02/06/18 0723)  Pulse: 96 (02/06/18 0723)  Temperature: 98 4 °F (36 9 °C) (02/06/18 0723)  Temp Source: Oral (02/06/18 0723)  Respirations: 18 (02/06/18 0723)  Height: 5' 8" (172 7 cm) (02/05/18 1942)  Weight - Scale: 60 5 kg (133 lb 4 8 oz) (02/05/18 1942)  SpO2: 95 % (02/06/18 0723)        /62 (BP Location: Right arm)   Pulse 96   Temp 98 4 °F (36 9 °C) (Oral)   Resp 18   Ht 5' 8" (1 727 m)   Wt 60 5 kg (133 lb 4 8 oz)   SpO2 95%   BMI 20 27 kg/m²      General Appearance:    Alert, cooperative, no distress   Head:    Normocephalic, without obvious abnormality, atraumatic   Eyes:    PERRL, conjunctiva/corneas clear, EOM's intact        Nose:   Moist mucous membranes   Neck:   Supple, symmetrical, trachea midline   Back:     Symmetric   Lungs:     Respirations unlabored   Heart:    Regular rate and rhythm, S1 and S2 normal, no murmur, rub   or gallop   Abdomen:     Soft, non-tender   Extremities:   Moderate pain to right foot  Gross MSK function intact as able to lift legs freely during dressing change and able to wiggle toes  Pulses:   Nonpalpable but dopplerable pedal pulses on right foot  Palpable left Pt, nonpalpable left DP  Skin:  mottingling/wet gangrenous changes to right hallux and medial forefoot  There is a fissure noted in the 1st interspace with epidermolysis  Eschar noted to right 4th toe and lateral midfoot  No pathology appreciated on left foot  Neurologic:   Gross sensation is intact  Protective sensation is diminished             Lab Results:   Admission on 02/05/2018 Component Date Value    WBC 02/05/2018 23 06*    RBC 02/05/2018 3 91     Hemoglobin 02/05/2018 12 2     Hematocrit 02/05/2018 34 8*    MCV 02/05/2018 89     MCH 02/05/2018 31 2     MCHC 02/05/2018 35 1     RDW 02/05/2018 12 4     MPV 02/05/2018 10 5     Platelets 27/71/4428 293     nRBC 02/05/2018 0     Neutrophils Relative 02/05/2018 87*    Lymphocytes Relative 02/05/2018 7*    Monocytes Relative 02/05/2018 6     Eosinophils Relative 02/05/2018 0     Basophils Relative 02/05/2018 0     Neutrophils Absolute 02/05/2018 19 96*    Lymphocytes Absolute 02/05/2018 1 51     Monocytes Absolute 02/05/2018 1 41*    Eosinophils Absolute 02/05/2018 0 00     Basophils Absolute 02/05/2018 0 02     Sodium 02/05/2018 125*    Potassium 02/05/2018 3 5     Chloride 02/05/2018 90*    CO2 02/05/2018 28     Anion Gap 02/05/2018 7     BUN 02/05/2018 27*    Creatinine 02/05/2018 1 01     Glucose 02/05/2018 282*    Calcium 02/05/2018 9 2     AST 02/05/2018 76*    ALT 02/05/2018 31     Alkaline Phosphatase 02/05/2018 131*    Total Protein 02/05/2018 8 1     Albumin 02/05/2018 2 8*    Total Bilirubin 02/05/2018 0 82     eGFR 02/05/2018 76     Sed Rate 02/05/2018 100*    CRP, High Sensitivity 02/05/2018 >90 00     Protime 02/05/2018 16 3*    INR 02/05/2018 1 30*    PTT 02/05/2018 38*    LACTIC ACID 02/05/2018 1 7     Osmolality Serum 02/05/2018 288     Hemoglobin A1C 02/05/2018 9 7*    EAG 02/05/2018 232     POC Glucose 02/05/2018 252*    Sodium 02/06/2018 131*    Potassium 02/06/2018 3 1*    Chloride 02/06/2018 95*    CO2 02/06/2018 28     Anion Gap 02/06/2018 8     BUN 02/06/2018 19     Creatinine 02/06/2018 0 90     Glucose 02/06/2018 218*    Calcium 02/06/2018 8 4     eGFR 02/06/2018 87     WBC 02/06/2018 18 86*    RBC 02/06/2018 3 56*    Hemoglobin 02/06/2018 10 9*    Hematocrit 02/06/2018 31 1*    MCV 02/06/2018 87     MCH 02/06/2018 30 6     MCHC 02/06/2018 35 0     RDW 02/06/2018 12 2     MPV 02/06/2018 10 6     Platelets 08/99/8938 306     nRBC 02/06/2018 0     Neutrophils Relative 02/06/2018 88*    Lymphocytes Relative 02/06/2018 7*    Monocytes Relative 02/06/2018 5     Eosinophils Relative 02/06/2018 0     Basophils Relative 02/06/2018 0     Neutrophils Absolute 02/06/2018 16 51*    Lymphocytes Absolute 02/06/2018 1 30     Monocytes Absolute 02/06/2018 0 91     Eosinophils Absolute 02/06/2018 0 01     Basophils Absolute 02/06/2018 0 03     POC Glucose 02/06/2018 207*                   Invalid input(s): LABAEARO            Imaging: I have personally reviewed pertinent films in PACS  EKG, Pathology, and Other Studies: I have personally reviewed pertinent reports        Code Status: Level 1 - Full Code  Advance Directive and Living Will:      Power of :    POLST:

## 2018-02-06 NOTE — H&P
H&P- Narciso Franky 1950, 75 y o  male MRN: [id number]    Unit/Bed#: -Pia Encounter: [id number]    Primary Care Provider: Fran Mullen MD   Date and time admitted to hospital: 2/5/2018  4:18 PM        * Cellulitis of right foot   Assessment & Plan    · Afebrile; leukocytosis WBC 23, sed rate 100, CRP >90  · Was on Keflex p o  as outpatient prescribed 2 times, first by Podiatry, then by vascular  · Continue IV Ancef and flagyl  · Right foot x-ray pending         G[facility] of toe of right foot ([facility] )   Assessment & Plan    · Management as above  · Patient is very angry with Podiatry stating this all began when his toenail was cut too short  States he was treated for 3 weeks by podiatry, then given Keflex and after 5 weeks Podiatry stated there is nothing they can do and advised him to see vascular  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 of foot ([facility] )   Assessment & Plan    · Pt's wife called PMD today stating pt's foot is worsening, erythema and swelling, with skin breakdown and drainage  Advised to go to ED  · Follows with Dr Yo Barajas, vascular surgery  Seen 1/25/18 for RLE atherosclerosis, dry gangrene R hallux and ulcer to fourth toe, nonhealing x4-5 weeks  LEAD done 1/17/18; greater than 75% popliteal stenosis, calcified tibial disease,  GTP below healing at 40mmHg  · Scheduled for arteriogram RLE and possible PTA/stent 02/09/2018    · IV antibiotics  · Given tetanus vaccine, Ancef and Flagyl in ED  · Vascular surgery consult  · Podiatry consult  · Local wound care        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)   Assessment & Plan    · On glipizide-metformin 5-500 mg 2 tabs b i d  and Tradjenta 5 mg daily  · Seen by Endocrinology at [facility] on 1/24/18: noted to be noncompliant, did not report results of blood glucoses as advised and stopped taking Tradjenta  · Hold oral hypoglycemic during acute illness  · Insulin sliding scale        Diabetic neuropathy associated with type 2 diabetes mellitus (HCC)   Assessment & Plan    · On Neurontin 300 mg t i d , continue        Hypertension   Assessment & Plan    · On Lopressor 75 mg b i d , continue          Hyponatremia   Assessment & Plan    · BMP monitoring  · Received 1 L NS in ED              VTE Prophylaxis: Enoxaparin (Lovenox)  / reason for no mechanical VTE prophylaxis Low moderate risk and treat with subcu anticoagulated   Code Status:  Full code  POLST: POLST is not applicable to this patient  Discussion with family:  Spouse at bedside    Anticipated Length of Stay:  Patient will be admitted on an Inpatient basis with an anticipated length of stay of  greater than 2 midnights  Justification for Hospital Stay:  Cellulitis, gangrene and atherosclerosis of right leg requires IV antibiotics and possible arteriogram    Total Time for Visit, including Counseling / Coordination of Care: 1 hour  Greater than 50% of this total time spent on direct patient counseling and coordination of care  Chief Complaint:   Worsening pain, gangrene, swelling, erythema, skin breakdown and bloody drainage right foot    History of Present Illness:    Lionel Elliott is a 75 y o  male who presents with complaints of increased pain, gangrene, swelling, erythema, skin breakdown and bloody drainage of right foot  PMH: DM2, atherosclerosis RLE, HTN, polyneuropathy, microalbuminuria  Patient is very angry and refuses to speak to me and provide any history, refused physical examination  Information provided by spouse  She called vascular this morning complaining of above symptoms and was advised to go to ED  Right foot very edematous and erythematous, wet gangrene 1st and 2nd digit, skin maceration over 1st and 2nd digit with small amount of serosanguineous drainage, no purulent drainage, diminished pulses  Given tetanus vaccine, Ancef and Flagyl in ED   Podiatry consult requested; pt refused podiatry intervention  He is angry stating the reason this began is because Podiatry cut his toenails too short  He was given Keflex as outpatient on 2 separate occasions 1st by Podiatry then by vascular  Follows with Dr Adams, vascular surgery  Seen 1/25/18 for RLE atherosclerosis, dry gangrene R hallux and ulcer to fourth toe, nonhealing x4-5 weeks  LEAD done 1/17/18; greater than 75% popliteal stenosis, calcified tibial disease,  GTP below healing at 40mmHg  Scheduled for arteriogram RLE and possible PTA/stent 02/09/2018  Vascular consult ordered  Continue IV Ancef and Flagyl  Review of Systems   Reason unable to perform ROS: Patient irate and provided little history, history obtained from spouse  Constitutional: Negative  HENT: Negative  Eyes: Negative  Respiratory: Negative  Cardiovascular: Negative  Gastrointestinal: Negative  Musculoskeletal: Negative  Skin:        Worsening Foot pain, erythema, swelling, bloody drainage, and gangrene   Neurological: Negative  Psychiatric/Behavioral: Positive for agitation  Past Medical and Surgical History:     Past Medical History:   Diagnosis Date    [facility] )     Diabetic neuropathy associated with type 2 diabetes mellitus ([facility] )     DM (diabetes mellitus), type 2 with peripheral vascular complications (HCC)     Gangrene of toe of right foot ([facility] )     Hypertension     PVD (peripheral vascular disease) ([facility] )        Past Surgical History:   Procedure Laterality Date    EYE SURGERY      cataract- bilateral    NO PAST SURGERIES         Meds/Allergies:    Prior to Admission medications    Medication Sig Start Date End Date Taking?  Authorizing Provider   Alpha-Lipoic Acid 600 MG CAPS Take by mouth 8/13/13  Yes Historical Provider, MD   aspirin 81 MG tablet Take 81 mg by mouth   Yes Historical Provider, MD   Blood Glucose Monitoring Suppl (ONE TOUCH ULTRA MINI) w/Device KIT by Does not apply route 5/15/15  Yes Historical Provider, MD   cephalexin (KEFLEX) 500 mg capsule Take 1 capsule (500 mg total) by mouth every 12 (twelve) hours for 14 days 2/1/18 2/15/18 Yes Levy Gandara MD   gabapentin (NEURONTIN) 300 mg capsule Take 600 mg by mouth daily at bedtime   9/5/17 9/5/18 Yes Historical Provider, MD   glipiZIDE-metFORMIN (METAGLIP) 5-500 MG per tablet Take 2 tablets by mouth 2 (two) times a day before meals   1/5/18  Yes Historical Provider, MD   glucose blood (ACCU-CHEK ACTIVE STRIPS) test strip Testing twice daily   E11 65 11/13/17  Yes Historical Provider, MD   losartan-hydrochlorothiazide (HYZAAR) 100-25 MG per tablet Take 1 tablet by mouth daily 5/17/12  Yes Historical Provider, MD   metoprolol tartrate (LOPRESSOR) 25 mg tablet Take 25 mg by mouth every 12 (twelve) hours   7/12/17 7/12/18 Yes Historical Provider, MD   metoprolol tartrate (LOPRESSOR) 50 mg tablet Take 50 mg by mouth every 12 (twelve) hours   7/12/17  Yes Historical Provider, MD   pregabalin (LYRICA) 75 mg capsule Take 75 mg by mouth daily at bedtime   9/27/16  Yes Historical Provider, MD   TRADJENTA 5 MG TABS  1/24/18  Yes Historical Provider, MD     I have reviewed home medications with patient personally  Allergies: Allergies   Allergen Reactions    Penicillins      Pt stated that he had a reaction to it a long time ago    Childhood history       Social History:     Marital Status: /Civil Union   Occupation:  Unknown  Patient Pre-hospital Living Situation:  Lives with spouse  Patient Pre-hospital Level of Mobility:  Ambulates  Patient Pre-hospital Diet Restrictions:  Does not follow a diet  Substance Use History:   History   Alcohol Use No     History   Smoking Status    Never Smoker   Smokeless Tobacco    Never Used     History   Drug Use No       Family History:    Family History   Problem Relation Age of Onset    Diabetes Mother     No Known Problems Father        Physical Exam:     Vitals:   Blood Pressure: 156/71 (02/05/18 1942)  Pulse: 102 (02/05/18 1942)  Temperature: 98 9 °F (37 2 °C) (02/05/18 1942)  Temp Source: Oral (02/05/18 1942)  Respirations: 18 (02/05/18 1942)  Height: 5' 8" (172 7 cm) (02/05/18 1942)  Weight - Scale: 60 5 kg (133 lb 4 8 oz) (02/05/18 1942)  SpO2: 99 % (02/05/18 1942)    Physical Exam   Constitutional: He appears well-developed and well-nourished  No distress  HENT:   Head: Normocephalic and atraumatic  Eyes: Conjunctivae and EOM are normal  Right eye exhibits no discharge  Left eye exhibits no discharge  Neck: Neck supple  No JVD present  Cardiovascular:   Refused physical exam   Pulmonary/Chest:   Refused   Abdominal:   Refused   Skin: Skin is warm  No rash noted  There is erythema  No pallor  Right foot very edematous and erythematous, wet gangrene 1st and 2nd digit, skin maceration over 1st and 2nd digit with small amount of serosanguineous drainage, no purulent drainage, diminished pulses  Psychiatric:   Irritable and angry         Additional Data:     Lab Results: I have personally reviewed pertinent reports  Results from last 7 days  Lab Units 02/05/18  1714   WBC Thousand/uL 23 06*   HEMOGLOBIN g/dL 12 2   HEMATOCRIT % 34 8*   PLATELETS Thousands/uL 293   NEUTROS PCT % 87*   LYMPHS PCT % 7*   MONOS PCT % 6   EOS PCT % 0       Results from last 7 days  Lab Units 02/05/18  1714   SODIUM mmol/L 125*   POTASSIUM mmol/L 3 5   CHLORIDE mmol/L 90*   CO2 mmol/L 28   BUN mg/dL 27*   CREATININE mg/dL 1 01   CALCIUM mg/dL 9 2   TOTAL PROTEIN g/dL 8 1   BILIRUBIN TOTAL mg/dL 0 82   ALK PHOS U/L 131*   ALT U/L 31   AST U/L 76*   GLUCOSE RANDOM mg/dL 282*       Results from last 7 days  Lab Units 02/05/18  1714   INR  1 30*       Imaging: I have personally reviewed pertinent reports        XR foot 3+ views RIGHT    (Results Pending)       EKG, Pathology, and Other Studies Reviewed on Admission:   · EKG: N/A; right foot x-ray results pending    Allscripts / Epic Records Reviewed: Yes     ** Please Note: This note has been constructed using a voice recognition system   **

## 2018-02-06 NOTE — PROGRESS NOTES
Vascular sx and podiatry residents at bedside  Patient refusing care from podiatry resident  Podiatry educated patient on infection and risks of not receiving care  Patient demonstrates verbal understanding  Patient states he will only take advice from Dr Marlee Cerrato  Attempted to explain to patient that it was necessary for both teams to be involved in his care  Patient stated he would see podiatry if advised by Dr Marlee Cerrato  Vascular sx resident attempted to reassure patient that podiatry team necessary for his care  Patient still adamant that he would not see podiatry unless advised by Dr Marlee Cerrato  Patient did allow podiatry to dress wound on foot with betadine and gauze

## 2018-02-07 PROBLEM — E87.2 METABOLIC ACIDOSIS: Status: ACTIVE | Noted: 2018-02-07

## 2018-02-07 PROBLEM — A41.9 SEPSIS (HCC): Status: ACTIVE | Noted: 2018-02-07

## 2018-02-07 PROBLEM — G93.41 ACUTE METABOLIC ENCEPHALOPATHY: Status: ACTIVE | Noted: 2018-02-07

## 2018-02-07 PROBLEM — I96 GANGRENOUS TOE (HCC): Status: RESOLVED | Noted: 2018-02-05 | Resolved: 2018-02-07

## 2018-02-07 LAB
ACETONE SERPL-MCNC: ABNORMAL MG/DL
ALBUMIN SERPL BCP-MCNC: 2.2 G/DL (ref 3.5–5)
ALP SERPL-CCNC: 122 U/L (ref 46–116)
ALT SERPL W P-5'-P-CCNC: 26 U/L (ref 12–78)
ANION GAP SERPL CALCULATED.3IONS-SCNC: 12 MMOL/L (ref 4–13)
ANION GAP SERPL CALCULATED.3IONS-SCNC: 14 MMOL/L (ref 4–13)
ANION GAP SERPL CALCULATED.3IONS-SCNC: 16 MMOL/L (ref 4–13)
ANISOCYTOSIS BLD QL SMEAR: PRESENT
AST SERPL W P-5'-P-CCNC: 79 U/L (ref 5–45)
BASE EXCESS BLDA CALC-SCNC: -14 MMOL/L (ref -2–3)
BASOPHILS # BLD MANUAL: 0 THOUSAND/UL (ref 0–0.1)
BASOPHILS NFR MAR MANUAL: 0 % (ref 0–1)
BILIRUB DIRECT SERPL-MCNC: 0.19 MG/DL (ref 0–0.2)
BILIRUB SERPL-MCNC: 0.84 MG/DL (ref 0.2–1)
BUN SERPL-MCNC: 22 MG/DL (ref 5–25)
BUN SERPL-MCNC: 25 MG/DL (ref 5–25)
BUN SERPL-MCNC: 27 MG/DL (ref 5–25)
BURR CELLS BLD QL SMEAR: PRESENT
CA-I BLD-SCNC: 1.15 MMOL/L (ref 1.12–1.32)
CALCIUM SERPL-MCNC: 7.4 MG/DL (ref 8.3–10.1)
CALCIUM SERPL-MCNC: 8.1 MG/DL (ref 8.3–10.1)
CALCIUM SERPL-MCNC: 8.1 MG/DL (ref 8.3–10.1)
CHLORIDE SERPL-SCNC: 102 MMOL/L (ref 100–108)
CHLORIDE SERPL-SCNC: 103 MMOL/L (ref 100–108)
CHLORIDE SERPL-SCNC: 108 MMOL/L (ref 100–108)
CO2 SERPL-SCNC: 18 MMOL/L (ref 21–32)
CO2 SERPL-SCNC: 18 MMOL/L (ref 21–32)
CO2 SERPL-SCNC: 21 MMOL/L (ref 21–32)
CREAT SERPL-MCNC: 0.81 MG/DL (ref 0.6–1.3)
CREAT SERPL-MCNC: 0.95 MG/DL (ref 0.6–1.3)
CREAT SERPL-MCNC: 0.96 MG/DL (ref 0.6–1.3)
DS:DELIVERY SYSTEM: 1
EOSINOPHIL # BLD MANUAL: 0 THOUSAND/UL (ref 0–0.4)
EOSINOPHIL NFR BLD MANUAL: 0 % (ref 0–6)
ERYTHROCYTE [DISTWIDTH] IN BLOOD BY AUTOMATED COUNT: 12.8 % (ref 11.6–15.1)
ERYTHROCYTE [DISTWIDTH] IN BLOOD BY AUTOMATED COUNT: 13 % (ref 11.6–15.1)
FIO2 GAS DIL.REBREATH: 100 L
GFR SERPL CREATININE-BSD FRML MDRD: 81 ML/MIN/1.73SQ M
GFR SERPL CREATININE-BSD FRML MDRD: 82 ML/MIN/1.73SQ M
GFR SERPL CREATININE-BSD FRML MDRD: 91 ML/MIN/1.73SQ M
GLUCOSE SERPL-MCNC: 193 MG/DL (ref 65–140)
GLUCOSE SERPL-MCNC: 198 MG/DL (ref 65–140)
GLUCOSE SERPL-MCNC: 201 MG/DL (ref 65–140)
GLUCOSE SERPL-MCNC: 202 MG/DL (ref 65–140)
GLUCOSE SERPL-MCNC: 222 MG/DL (ref 65–140)
GLUCOSE SERPL-MCNC: 232 MG/DL (ref 65–140)
GLUCOSE SERPL-MCNC: 262 MG/DL (ref 65–140)
GLUCOSE SERPL-MCNC: 281 MG/DL (ref 65–140)
GLUCOSE SERPL-MCNC: 291 MG/DL (ref 65–140)
GLUCOSE SERPL-MCNC: 326 MG/DL (ref 65–140)
GLUCOSE SERPL-MCNC: 329 MG/DL (ref 65–140)
GLUCOSE SERPL-MCNC: 341 MG/DL (ref 65–140)
GLUCOSE SERPL-MCNC: 354 MG/DL (ref 65–140)
HCO3 BLDA-SCNC: 13.9 MMOL/L (ref 22–28)
HCT VFR BLD AUTO: 34 % (ref 36.5–49.3)
HCT VFR BLD AUTO: 34.2 % (ref 36.5–49.3)
HCT VFR BLD CALC: 34 % (ref 36.5–49.3)
HGB BLD-MCNC: 11.2 G/DL (ref 12–17)
HGB BLD-MCNC: 11.6 G/DL (ref 12–17)
HGB BLDA-MCNC: 11.6 G/DL (ref 12–17)
LACTATE SERPL-SCNC: 3 MMOL/L (ref 0.5–2)
LACTATE SERPL-SCNC: 3.3 MMOL/L (ref 0.5–2)
LACTATE SERPL-SCNC: 3.3 MMOL/L (ref 0.5–2)
LACTATE SERPL-SCNC: 5.1 MMOL/L (ref 0.5–2)
LYMPHOCYTES # BLD AUTO: 1.37 THOUSAND/UL (ref 0.6–4.47)
LYMPHOCYTES # BLD AUTO: 5 % (ref 14–44)
MAGNESIUM SERPL-MCNC: 2.4 MG/DL (ref 1.6–2.6)
MAGNESIUM SERPL-MCNC: 2.5 MG/DL (ref 1.6–2.6)
MCH RBC QN AUTO: 30.4 PG (ref 26.8–34.3)
MCH RBC QN AUTO: 30.8 PG (ref 26.8–34.3)
MCHC RBC AUTO-ENTMCNC: 32.7 G/DL (ref 31.4–37.4)
MCHC RBC AUTO-ENTMCNC: 34.1 G/DL (ref 31.4–37.4)
MCV RBC AUTO: 90 FL (ref 82–98)
MCV RBC AUTO: 93 FL (ref 82–98)
MONOCYTES # BLD AUTO: 0.82 THOUSAND/UL (ref 0–1.22)
MONOCYTES NFR BLD: 3 % (ref 4–12)
NEUTROPHILS # BLD MANUAL: 24.92 THOUSAND/UL (ref 1.85–7.62)
NEUTS BAND NFR BLD MANUAL: 1 % (ref 0–8)
NEUTS SEG NFR BLD AUTO: 90 % (ref 43–75)
NRBC BLD AUTO-RTO: 0 /100 WBCS
OSMOLALITY UR: 631 MMOL/KG
PCO2 BLD: 15 MMOL/L (ref 21–32)
PCO2 BLD: 33.7 MM HG (ref 36–44)
PH BLD: 7.22 [PH] (ref 7.35–7.45)
PHOSPHATE SERPL-MCNC: 1.8 MG/DL (ref 2.3–4.1)
PHOSPHATE SERPL-MCNC: 4 MG/DL (ref 2.3–4.1)
PLATELET # BLD AUTO: 299 THOUSANDS/UL (ref 149–390)
PLATELET # BLD AUTO: 364 THOUSANDS/UL (ref 149–390)
PLATELET BLD QL SMEAR: ADEQUATE
PMV BLD AUTO: 10.3 FL (ref 8.9–12.7)
PMV BLD AUTO: 10.8 FL (ref 8.9–12.7)
PO2 BLD: 84 MM HG (ref 75–129)
POIKILOCYTOSIS BLD QL SMEAR: PRESENT
POTASSIUM BLD-SCNC: 4.8 MMOL/L (ref 3.5–5.3)
POTASSIUM SERPL-SCNC: 3.2 MMOL/L (ref 3.5–5.3)
POTASSIUM SERPL-SCNC: 3.7 MMOL/L (ref 3.5–5.3)
POTASSIUM SERPL-SCNC: 4 MMOL/L (ref 3.5–5.3)
PROT SERPL-MCNC: 7.6 G/DL (ref 6.4–8.2)
RBC # BLD AUTO: 3.69 MILLION/UL (ref 3.88–5.62)
RBC # BLD AUTO: 3.77 MILLION/UL (ref 3.88–5.62)
RBC MORPH BLD: PRESENT
SAO2 % BLD FROM PO2: 94 % (ref 95–98)
SODIUM 24H UR-SCNC: 26 MOL/L
SODIUM BLD-SCNC: 133 MMOL/L (ref 136–145)
SODIUM SERPL-SCNC: 136 MMOL/L (ref 136–145)
SODIUM SERPL-SCNC: 138 MMOL/L (ref 136–145)
SODIUM SERPL-SCNC: 138 MMOL/L (ref 136–145)
SPECIMEN SOURCE: ABNORMAL
VARIANT LYMPHS # BLD AUTO: 1 %
WBC # BLD AUTO: 23.74 THOUSAND/UL (ref 4.31–10.16)
WBC # BLD AUTO: 27.38 THOUSAND/UL (ref 4.31–10.16)
WBC TOXIC VACUOLES BLD QL SMEAR: PRESENT

## 2018-02-07 PROCEDURE — 82009 KETONE BODYS QUAL: CPT | Performed by: INTERNAL MEDICINE

## 2018-02-07 PROCEDURE — 92610 EVALUATE SWALLOWING FUNCTION: CPT

## 2018-02-07 PROCEDURE — 83605 ASSAY OF LACTIC ACID: CPT | Performed by: INTERNAL MEDICINE

## 2018-02-07 PROCEDURE — 94640 AIRWAY INHALATION TREATMENT: CPT

## 2018-02-07 PROCEDURE — 84295 ASSAY OF SERUM SODIUM: CPT

## 2018-02-07 PROCEDURE — 80048 BASIC METABOLIC PNL TOTAL CA: CPT | Performed by: PHYSICIAN ASSISTANT

## 2018-02-07 PROCEDURE — 85027 COMPLETE CBC AUTOMATED: CPT | Performed by: INTERNAL MEDICINE

## 2018-02-07 PROCEDURE — 80048 BASIC METABOLIC PNL TOTAL CA: CPT | Performed by: INTERNAL MEDICINE

## 2018-02-07 PROCEDURE — 85027 COMPLETE CBC AUTOMATED: CPT | Performed by: PHYSICIAN ASSISTANT

## 2018-02-07 PROCEDURE — 82947 ASSAY GLUCOSE BLOOD QUANT: CPT

## 2018-02-07 PROCEDURE — 99232 SBSQ HOSP IP/OBS MODERATE 35: CPT | Performed by: INTERNAL MEDICINE

## 2018-02-07 PROCEDURE — 80076 HEPATIC FUNCTION PANEL: CPT | Performed by: INTERNAL MEDICINE

## 2018-02-07 PROCEDURE — 82803 BLOOD GASES ANY COMBINATION: CPT

## 2018-02-07 PROCEDURE — 82330 ASSAY OF CALCIUM: CPT

## 2018-02-07 PROCEDURE — 82948 REAGENT STRIP/BLOOD GLUCOSE: CPT

## 2018-02-07 PROCEDURE — 83735 ASSAY OF MAGNESIUM: CPT | Performed by: INTERNAL MEDICINE

## 2018-02-07 PROCEDURE — 99233 SBSQ HOSP IP/OBS HIGH 50: CPT | Performed by: INTERNAL MEDICINE

## 2018-02-07 PROCEDURE — 84100 ASSAY OF PHOSPHORUS: CPT | Performed by: INTERNAL MEDICINE

## 2018-02-07 PROCEDURE — 85007 BL SMEAR W/DIFF WBC COUNT: CPT | Performed by: INTERNAL MEDICINE

## 2018-02-07 PROCEDURE — 84132 ASSAY OF SERUM POTASSIUM: CPT

## 2018-02-07 PROCEDURE — 84300 ASSAY OF URINE SODIUM: CPT | Performed by: EMERGENCY MEDICINE

## 2018-02-07 PROCEDURE — 83935 ASSAY OF URINE OSMOLALITY: CPT | Performed by: EMERGENCY MEDICINE

## 2018-02-07 PROCEDURE — 85014 HEMATOCRIT: CPT

## 2018-02-07 PROCEDURE — 99223 1ST HOSP IP/OBS HIGH 75: CPT | Performed by: INTERNAL MEDICINE

## 2018-02-07 PROCEDURE — 99232 SBSQ HOSP IP/OBS MODERATE 35: CPT | Performed by: SURGERY

## 2018-02-07 RX ORDER — SODIUM CHLORIDE 9 MG/ML
500 INJECTION, SOLUTION INTRAVENOUS CONTINUOUS
Status: DISCONTINUED | OUTPATIENT
Start: 2018-02-07 | End: 2018-02-08

## 2018-02-07 RX ORDER — LEVALBUTEROL 1.25 MG/.5ML
1.25 SOLUTION, CONCENTRATE RESPIRATORY (INHALATION)
Status: DISCONTINUED | OUTPATIENT
Start: 2018-02-07 | End: 2018-02-08

## 2018-02-07 RX ORDER — SODIUM CHLORIDE FOR INHALATION 0.9 %
3 VIAL, NEBULIZER (ML) INHALATION EVERY 4 HOURS PRN
Status: DISCONTINUED | OUTPATIENT
Start: 2018-02-07 | End: 2018-02-08

## 2018-02-07 RX ORDER — LEVALBUTEROL 1.25 MG/.5ML
1.25 SOLUTION, CONCENTRATE RESPIRATORY (INHALATION) EVERY 4 HOURS PRN
Status: DISCONTINUED | OUTPATIENT
Start: 2018-02-07 | End: 2018-02-08

## 2018-02-07 RX ORDER — SODIUM CHLORIDE 9 MG/ML
1000 INJECTION, SOLUTION INTRAVENOUS CONTINUOUS
Status: DISPENSED | OUTPATIENT
Start: 2018-02-07 | End: 2018-02-07

## 2018-02-07 RX ORDER — OLANZAPINE 5 MG/1
2.5 TABLET, ORALLY DISINTEGRATING ORAL EVERY 6 HOURS PRN
Status: DISCONTINUED | OUTPATIENT
Start: 2018-02-07 | End: 2018-02-08

## 2018-02-07 RX ORDER — DEXTROSE AND SODIUM CHLORIDE 5; .9 G/100ML; G/100ML
250 INJECTION, SOLUTION INTRAVENOUS CONTINUOUS
Status: DISCONTINUED | OUTPATIENT
Start: 2018-02-07 | End: 2018-02-08

## 2018-02-07 RX ORDER — SODIUM CHLORIDE 9 MG/ML
250 INJECTION, SOLUTION INTRAVENOUS CONTINUOUS
Status: DISCONTINUED | OUTPATIENT
Start: 2018-02-07 | End: 2018-02-08

## 2018-02-07 RX ORDER — SODIUM CHLORIDE FOR INHALATION 0.9 %
3 VIAL, NEBULIZER (ML) INHALATION
Status: DISCONTINUED | OUTPATIENT
Start: 2018-02-07 | End: 2018-02-08

## 2018-02-07 RX ORDER — ACETAMINOPHEN 325 MG/1
325 TABLET ORAL EVERY 6 HOURS PRN
Status: DISCONTINUED | OUTPATIENT
Start: 2018-02-07 | End: 2018-02-08

## 2018-02-07 RX ORDER — FLUMAZENIL 0.1 MG/ML
0.5 INJECTION, SOLUTION INTRAVENOUS ONCE
Status: DISCONTINUED | OUTPATIENT
Start: 2018-02-07 | End: 2018-02-07

## 2018-02-07 RX ORDER — POTASSIUM CHLORIDE 14.9 MG/ML
20 INJECTION INTRAVENOUS
Status: COMPLETED | OUTPATIENT
Start: 2018-02-07 | End: 2018-02-08

## 2018-02-07 RX ORDER — HEPARIN SODIUM 5000 [USP'U]/ML
5000 INJECTION, SOLUTION INTRAVENOUS; SUBCUTANEOUS EVERY 8 HOURS SCHEDULED
Status: DISCONTINUED | OUTPATIENT
Start: 2018-02-07 | End: 2018-02-08

## 2018-02-07 RX ADMIN — SODIUM CHLORIDE 7 UNITS/HR: 9 INJECTION, SOLUTION INTRAVENOUS at 11:40

## 2018-02-07 RX ADMIN — INSULIN LISPRO 3 UNITS: 100 INJECTION, SOLUTION INTRAVENOUS; SUBCUTANEOUS at 08:42

## 2018-02-07 RX ADMIN — DEXTROSE AND SODIUM CHLORIDE 250 ML/HR: 5; .9 INJECTION, SOLUTION INTRAVENOUS at 17:25

## 2018-02-07 RX ADMIN — SODIUM CHLORIDE 125 ML/HR: 0.9 INJECTION, SOLUTION INTRAVENOUS at 05:20

## 2018-02-07 RX ADMIN — LEVALBUTEROL HYDROCHLORIDE 1.25 MG: 1.25 SOLUTION, CONCENTRATE RESPIRATORY (INHALATION) at 21:28

## 2018-02-07 RX ADMIN — CEFEPIME HYDROCHLORIDE 2000 MG: 2 INJECTION, POWDER, FOR SOLUTION INTRAVENOUS at 01:51

## 2018-02-07 RX ADMIN — ASPIRIN 81 MG 81 MG: 81 TABLET ORAL at 08:42

## 2018-02-07 RX ADMIN — GABAPENTIN 600 MG: 300 CAPSULE ORAL at 21:43

## 2018-02-07 RX ADMIN — PREGABALIN 75 MG: 75 CAPSULE ORAL at 21:43

## 2018-02-07 RX ADMIN — ISODIUM CHLORIDE 3 ML: 0.03 SOLUTION RESPIRATORY (INHALATION) at 21:28

## 2018-02-07 RX ADMIN — POTASSIUM CHLORIDE 20 MEQ: 200 INJECTION, SOLUTION INTRAVENOUS at 19:55

## 2018-02-07 RX ADMIN — SODIUM CHLORIDE 1000 ML/HR: 0.9 INJECTION, SOLUTION INTRAVENOUS at 11:40

## 2018-02-07 RX ADMIN — CEFEPIME HYDROCHLORIDE 2000 MG: 2 INJECTION, POWDER, FOR SOLUTION INTRAVENOUS at 14:11

## 2018-02-07 RX ADMIN — SODIUM CHLORIDE 500 ML/HR: 0.9 INJECTION, SOLUTION INTRAVENOUS at 14:11

## 2018-02-07 RX ADMIN — METRONIDAZOLE 500 MG: 500 INJECTION, SOLUTION INTRAVENOUS at 17:26

## 2018-02-07 RX ADMIN — METRONIDAZOLE 500 MG: 500 INJECTION, SOLUTION INTRAVENOUS at 12:27

## 2018-02-07 RX ADMIN — METOPROLOL TARTRATE 75 MG: 50 TABLET ORAL at 08:42

## 2018-02-07 RX ADMIN — POTASSIUM CHLORIDE 20 MEQ: 200 INJECTION, SOLUTION INTRAVENOUS at 22:36

## 2018-02-07 RX ADMIN — VANCOMYCIN HYDROCHLORIDE 1000 MG: 1 INJECTION, SOLUTION INTRAVENOUS at 15:19

## 2018-02-07 RX ADMIN — DEXTROSE AND SODIUM CHLORIDE 250 ML/HR: 5; .9 INJECTION, SOLUTION INTRAVENOUS at 21:54

## 2018-02-07 RX ADMIN — HEPARIN SODIUM 5000 UNITS: 5000 INJECTION, SOLUTION INTRAVENOUS; SUBCUTANEOUS at 21:43

## 2018-02-07 RX ADMIN — HEPARIN SODIUM 5000 UNITS: 5000 INJECTION, SOLUTION INTRAVENOUS; SUBCUTANEOUS at 16:02

## 2018-02-07 RX ADMIN — VANCOMYCIN HYDROCHLORIDE 1000 MG: 1 INJECTION, SOLUTION INTRAVENOUS at 01:51

## 2018-02-07 NOTE — PERIOPERATIVE NURSING NOTE
Condition stable now  Pt more alert and responsive  Pt OK to be D/C from the PACU and go to Level 2 stepdown unit

## 2018-02-07 NOTE — ASSESSMENT & PLAN NOTE
- patient had a rapid response early this morning and secondary to altered mental status and hypotension  - this was thought to be secondary to patient severe sepsis as well as metabolic acidosis as a combination of hypoperfusion as well as possible developing DKA also, patient was found to respond well to flumazenil 0 5 mg total as he received Versed as well as Ativan yesterday during the angiographic procedure  - at this time, I would suggest to avoid benzodiazepines in this patient used Zyprexa p r n  if severely agitated and combative with care  - consult geriatrician  - as per wife, patient does have some degree of underlying dementia and today he seems more restless than usual  - for now will keep the patient on 4 point restraint as he has been disruptive to medical treatment

## 2018-02-07 NOTE — ANESTHESIA PREPROCEDURE EVALUATION
Review of Systems/Medical History  Patient summary reviewed  Chart reviewed      Cardiovascular  Hypertension poorly controlled, Cardiac stents     Pulmonary       GI/Hepatic            Endo/Other  Diabetes well controlled ,      GYN       Hematology   Musculoskeletal       Neurology   Psychology         Anesthesia team called urgently to IR because patient was combative and trying to get off the IR table in spite of heavy sedation  We were asked to sedate the patient  Physical Exam    Airway    Mallampati score: I  TM Distance: <3 FB  Neck ROM: limited     Dental       Cardiovascular      Pulmonary      Other Findings      Anesthesia Plan  ASA Score- 4     Anesthesia Type- IV sedation with anesthesia with ASA Monitors  Additional Monitors:   Airway Plan:         Plan Factors-    Induction- intravenous  Postoperative Plan-     Informed Consent-   I personally reviewed this patient with the CRNA  Discussed and agreed on the Anesthesia Plan with the CRNA  Jewel Grey

## 2018-02-07 NOTE — PLAN OF CARE
Problem: SLP ADULT - SWALLOWING, IMPAIRED  Goal: Initial SLP swallow eval performed  Outcome: Completed Date Met: 02/07/18    Goal: Advance to least restrictive diet without signs or symptoms of aspiration for planned discharge setting  See evaluation for individualized goals    Patient will tolerate the safest and least restrictive diet without overt s/sx aspiration x100%      Outcome: Progressing

## 2018-02-07 NOTE — PROGRESS NOTES
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    Plan:  1  The acute infection was decompressed last night however the patient will need a secondary procedure on his foot  At minimum he will require a transmetatarsal amputation  I was in discussion with this with the patient's wife when the patient became unresponsive and the code blue was called  After approximately 20-30 minutes of monitoring with oxygen the patient awoke  He is somewhat disoriented  Patient received 2 doses of flumazenil and awoke  There were no noted changes on his EKG during the incident  2   I am planning a transmetatarsal amputation on Friday  Patient and his wife are in agreement  I did not sign the consent as with the rapid response I felt we could discuss the surgery again at a later point before Friday  They did both verbally state that they would be agreeable to the procedure  There is further soft tissue loss on the dorsal aspect of his foot from the infection  This may need a split-thickness skin graft which can be done at the same time as the amputation  3   I discussed this case both with Dr Katharina Elaine of vascular and Dr Merlin Almanzar of Infectious Disease  Continue broad-spectrum antibiotics  Deep cultures were taken in the OR yesterday  We will continue to follow closely  Subjective/Objective   Chief Complaint:   Chief Complaint   Patient presents with    Foot Ulcer     Pt presents with diabetic ulcers on right foot and increased pain and swelling       Subjective: 75 y o  y/o male seen and evaluated at bedside  No acute events overnight  Patient did have some unresponsiveness post surgery however that resolved in PACU with no further intervention necessary  He is awake and alert and speaking clearly with me at bedside    His wife is at bedside as well       Blood pressure 109/60, pulse 82, temperature 98 9 °F (37 2 °C), temperature source Oral, resp  rate 22, height 5' 8" (1 727 m), weight 60 5 kg (133 lb 4 8 oz), SpO2 95 %  ,Body mass index is 20 27 kg/m²  Lab Results   Component Value Date    WBC 23 74 (H) 02/07/2018    HGB 11 6 (L) 02/07/2018    HCT 34 0 (L) 02/07/2018    MCV 90 02/07/2018     02/07/2018     Lab Results   Component Value Date    GLUCOSE 341 (H) 02/07/2018    CALCIUM 8 1 (L) 02/07/2018     02/07/2018    K 3 7 02/07/2018    CO2 21 02/07/2018     02/07/2018    BUN 22 02/07/2018    CREATININE 0 81 02/07/2018         Invasive Devices     Peripheral Intravenous Line            Peripheral IV 02/05/18 Right Antecubital 1 day    Peripheral IV 02/07/18 Left Antecubital less than 1 day                Physical Exam:   General: alert, cooperative and no distress  Lungs: Normal respiratory effort, LCTABL  Heart: regular rate and rhythm   Abdomen: soft, non-tender  Extremity:  Stable postop changes to the right foot  There is some continued demarcation of the dorsal soft tissue however no active drainage or malodor  The acute infection appears decompressed  The 2nd toe is dusky  Lab, Imaging and other studies:     Imaging: I have personally reviewed pertinent films in PACS  EKG, Pathology, and Other Studies: I have personally reviewed pertinent reports  Portions of the record may have been created with voice recognition software  Occasional wrong word or "sound a like" substitutions may have occurred due to the inherent limitations of voice recognition software  Read the chart carefully and recognize, using context, where substitutions have occurred

## 2018-02-07 NOTE — ASSESSMENT & PLAN NOTE
· Patient is status post right lower extremity arteriogram as follow:  · High-grade right above-knee popliteal artery stenosis and moderate right below knee popliteal artery stenosis treated with atherectomy and drug-eluting balloon angioplasty  · High-grade right posterior tibial artery stenosis treated with balloon angioplasty  · No other interventions planned

## 2018-02-07 NOTE — SOCIAL WORK
CM met with patient and his wife this afternoon to discuss DC planning needs  Patient and his wife live together in a 2 story home with 2 ERIC via the garage  IPTA  Wife transports  No DME  Preferred pharmacy is [facility] on [address]  No hx of VNA or STR  No living will or POA  No hx of MH or D+A treatment  Patient educated on the importance of understanding their medical course and encouraged to ask questions of the medical team  [address] discussed  MEDS program introduced  Patient's spouse available to assist around the home  Emergency Contact: Wale Waite (Spouse) [phone]    CM continues to follow for anticipated TMA on Friday and any DC needs that may arise

## 2018-02-07 NOTE — PROGRESS NOTES
Progress Note - Infectious Disease   Lionel Elliott 75 y o  male MRN: [id number]  Unit/Bed#: [facility] 603-01 Encounter: [id number]      Impression/Recommendations:  1  Sepsis, POA, presented with fever and leukocytosis  Source of sepsis is most likely right foot infection  Fortunately, patient remains nontoxic and hemodynamically stable, without hypotension  Admission blood cultures are negative so far  Patient is clinically stable  Temperature is still low-grade  WBC still elevated  Antibiotic plan as in below  Monitor hemodynamics  Follow-up on pending blood cultures      2  Right 1st toe in distal foot gangrene  This had been mostly dry but may be progressed into wet gangrene  Patient is status post decompressive open 1st toe amputation  This is helpful in controlling active infection  However, given persistent distal foot necrosis, patient will most likely need TMA  Continue vancomycin/cefepime/Flagyl  Serial foot exams  Follow-up on pending wound culture  Podiatry plan for TMA later this week noted      3  Right 1st toe osteomyelitis  This is quite definitive foot x-ray  Clinical picture is also suggestive  Patient is status post open right 1st toe amputation  Antibiotic as in above      4  PVD  Patient is status post arteriogram with successful intervention  Circulatory status is now optimized  Vascular surgery following      5   DM with hyperglycemia on admission  Patient appears noncompliant with diabetic medication  This clearly contributes to infection development  Blood sugar management per Medicine Service      6  PCN allergy on allergy list   Patient does not recall reaction  He states that this was when he was a young child  He is tolerating cephalosporin well      Discussed with patient and his wife in detail regarding the above plan  Patient is somewhat reluctant for further amputation but understands the necessity of this and agrees    Discussed with   Rock Taveras from [facility]  Antibiotics:  Vancomycin/cefepime/Flagyl  POD # 1    Subjective:  Patient was seen earlier this morning  Patient is status post arteriogram with atherectomy and PTA yesterday  He is status post right 1st toe open amputation last night  He has some pain at surgical site, controlled  Temperature low-grade  No chills  He is tolerating antibiotics well  No nausea, vomiting or diarrhea  No dizziness or hearing loss  Objective:  Vitals:  HR:  [] 82  Resp:  [16-41] 22  BP: ()/(45-86) 109/60  SpO2:  [77 %-99 %] 95 %  Temp (24hrs), Av 2 °F (37 3 °C), Min:97 8 °F (36 6 °C), Max:100 6 °F (38 1 °C)  Current: Temperature: 98 9 °F (37 2 °C)    Physical Exam:     General: Awake, alert, cooperative, no distress  Lungs: Expansion symmetric, no rales, no wheezing, respirations unlabored  Heart[de-identified]  Regular rate and rhythm, S1 and S2 normal, no murmur  Abdomen: Soft, nondistended, non-tender, bowel sounds active all four quadrants,        no masses, no organomegaly  Extremities: Right foot with dressing in place  Dressing is dry  Mild tenderness  No erythema in right ankle or lower leg      Skin:  No rash  Invasive Devices     Peripheral Intravenous Line            Peripheral IV 18 Right Antecubital 1 day    Peripheral IV 18 Left Antecubital less than 1 day                Labs studies:   I have personally reviewed pertinent labs      Results from last 7 days  Lab Units 18  1002 18  0949 18  0656 18  1829  18  1714   SODIUM mmol/L 136  --  138 135*  < > 125*   POTASSIUM mmol/L 4 0  --  3 7 3 8  < > 3 5   CHLORIDE mmol/L 102  --  103 101  < > 90*   CO2 mmol/L 18*  --  21 28  < > 28   ANION GAP mmol/L 16*  --  14* 6  < > 7   BUN mg/dL 25  --  22 19  < > 27*   CREATININE mg/dL 0 95  --  0 81 0 84  < > 1 01   EGFR ml/min/1 73sq m 82  --  91 90  < > 76   GLUCOSE RANDOM mg/dL 326*  --  262* 163*  < > 282*   GLUCOSE, ISTAT mg/dl  --  341*  --   --   --   --    CALCIUM mg/dL 8 1*  --  8 1* 8 1*  < > 9 2   AST U/L 79*  --   --  76*  --  76*   ALT U/L 26  --   --  27  --  31   ALK PHOS U/L 122*  --   --  110  --  131*   TOTAL PROTEIN g/dL 7 6  --   --  7 1  --  8 1   BILIRUBIN TOTAL mg/dL 0 84  --   --  0 79  --  0 82   < > = values in this interval not displayed  Results from last 7 days  Lab Units 02/07/18  1002 02/07/18  0949 02/07/18  0656 02/06/18  1829   WBC Thousand/uL 27 38*  --  23 74* 20 61*   HEMOGLOBIN g/dL 11 2*  --  11 6* 11 1*   I STAT HEMOGLOBIN g/dl  --  11 6*  --   --    PLATELETS Thousands/uL 364  --  299 282       Results from last 7 days  Lab Units 02/06/18  1756 02/05/18  2314 02/05/18  1721 02/05/18  1714   BLOOD CULTURE   --   --  No Growth at 24 hrs  No Growth at 24 hrs  GRAM STAIN RESULT  No polys seen  1+ Gram positive cocci in clusters No polys seen  3+ Gram positive cocci in pairs  2+ Gram positive cocci in clusters  2+ Gram negative rods  --   --        Imaging Studies:   I have personally reviewed pertinent imaging study reports and images in PACS  EKG, Pathology, and Other Studies:   I have personally reviewed pertinent reports

## 2018-02-07 NOTE — PROGRESS NOTES
Progress Note - Rapid Response   Shane Fry 75 y o  male MRN: [id number]  Unit/Bed#: [facility] 603-01 Encounter: [id number]      Time Called: 0930  Room #: 136  OXTCVDV Time: 0935  Event End: 4685  Primary reason for call: Acute change in mental status    CHIEF COMPLAINT (Situation):   Patient was found to be unresponsive, approximately 30min downtime since last known to be AAOx4    ASSESSMENT  Severe Sepsis  Metabolic Acidosis   Hyperglycemia  HTN  Dm2  PAD  Dry gangrene of right foot     PLAN:    0 4mg Flumazenil, became more alert   Fluid resuscitation, continue broad spectrum antibiotics  Continuous pulse ox, Q1hr vitals, supplemental O2 by nasal cannula   Continue monitoring on med surg       Interventions:  Airway/Breathing:  O2 Mask/Nasal  Circulation: IV Fluid Bolus  Other Treatments: 0 4mg Flumazenil      HPI Statement (Background):   Shane Fry is a 75y o  year old male w/ PMHx Dm2, hypertension, PVD, and diabetic neuropathy who presented yesterday with non-healing foot wound  Initially diagnosed with right foot cellulitis and started on Keflex as outpatient, had progressive foot pain, erythema, and edema  Admitted to medicine for arteriogram and further management  Had arteriogram with artherectomy and STEW PTA on 2/6 and right great toe amputation with general anesthesia  There was noted to be purulent tracking to the central metatarsal in the rearfoot  The patient was AAOx4 and interacting with Podiatry at 0900 this morning and was found to be unresponsive for nursing staff at 0930  The patient was placed on non-rebreather and given a total of 0 4mg Flumazenil  ABG revealed pH 7 22 and bicarb of 14  Patient's mental status improved and his vital signs were stable  Patient with likely severe sepsis with lactic acidosis   Patient is being treated with broad spectrum abx, adequate fluid resuscitation, he will remain on the medical service on the floor for further management with probable TMA for source control with podiatry on Friday       Historical Information   Past Medical History:   Diagnosis Date    Diabetes mellitus ([facility] )     Diabetic neuropathy associated with type 2 diabetes mellitus ([place] [address] )     DM (diabetes mellitus), type 2 with peripheral vascular complications (HCC)     Gangrene of toe of right foot ([facility] )     Hypertension     PVD (peripheral vascular disease) (HCC)      Past Surgical History:   Procedure Laterality Date    EYE SURGERY      cataract- bilateral    INCISION AND DRAINAGE OF WOUND Right 2/6/2018    Procedure: INCISION AND DRAINAGE (I&D) EXTREMITY, right great toe amputation;  Surgeon: Gwen Soares DPM;  Location: [facility];  Service: Podiatry    NO PAST SURGERIES       Social History   History   Alcohol Use No     History   Drug Use No     History   Smoking Status    Never Smoker   Smokeless Tobacco    Never Used     Family History:   Family History   Problem Relation Age of Onset    Diabetes Mother     No Known Problems Father        Meds/Allergies   all current active meds have been reviewed, current meds:   Current Facility-Administered Medications   Medication Dose Route Frequency    acetaminophen (TYLENOL) rectal suppository 325 mg  325 mg Rectal Q4H PRN    aspirin chewable tablet 81 mg  81 mg Oral Daily    cefepime (MAXIPIME) 2,000 mg in dextrose 5 % 50 mL IVPB  2,000 mg Intravenous Q12H    dextrose 5 % and sodium chloride 0 9 % infusion  250 mL/hr Intravenous Continuous    gabapentin (NEURONTIN) capsule 600 mg  600 mg Oral HS    insulin regular (HumuLIN R,NovoLIN R) 1 Units/mL in sodium chloride 0 9 % 100 mL infusion  0 1-30 Units/hr Intravenous Continuous    metoprolol tartrate (LOPRESSOR) tablet 75 mg  75 mg Oral Q12H [address]    metroNIDAZOLE (FLAGYL) IVPB (premix) 500 mg  500 mg Intravenous Q8H    pregabalin (LYRICA) capsule 75 mg  75 mg Oral HS    sodium chloride 0 9 % infusion  1,000 mL/hr Intravenous Continuous    Followed by    sodium chloride 0 9 % infusion 500 mL/hr Intravenous Continuous    Followed by   Gianfranco Meng sodium chloride 0 9 % infusion  250 mL/hr Intravenous Continuous    vancomycin (VANCOCIN) IVPB (premix) 1,000 mg  15 mg/kg Intravenous Q12H    and PTA meds:   Prior to Admission Medications   Prescriptions Last Dose Informant Patient Reported? Taking? Alpha-Lipoic Acid 600 MG CAPS 2/5/2018 at Unknown time  Yes Yes   Sig: Take by mouth   Blood Glucose Monitoring Suppl (ONE TOUCH ULTRA MINI) w/Device KIT 2/5/2018 at Unknown time  Yes Yes   Sig: by Does not apply route   TRADJENTA 5 MG TABS 2/5/2018 at Unknown time  Yes Yes   aspirin 81 MG tablet 2/5/2018 at Unknown time  Yes Yes   Sig: Take 81 mg by mouth   cephalexin (KEFLEX) 500 mg capsule 2/5/2018 at Unknown time  No Yes   Sig: Take 1 capsule (500 mg total) by mouth every 12 (twelve) hours for 14 days   gabapentin (NEURONTIN) 300 mg capsule 2/5/2018 at Unknown time  Yes Yes   Sig: Take 600 mg by mouth daily at bedtime     glipiZIDE-metFORMIN (METAGLIP) 5-500 MG per tablet 2/5/2018 at Unknown time  Yes Yes   Sig: Take 2 tablets by mouth 2 (two) times a day before meals     glucose blood (ACCU-CHEK ACTIVE STRIPS) test strip 2/5/2018 at Unknown time  Yes Yes   Sig: Testing twice daily   E11 65   losartan-hydrochlorothiazide (HYZAAR) 100-25 MG per tablet 2/5/2018 at Unknown time  Yes Yes   Sig: Take 1 tablet by mouth daily   metoprolol tartrate (LOPRESSOR) 25 mg tablet 2/5/2018 at Unknown time  Yes Yes   Sig: Take 25 mg by mouth every 12 (twelve) hours     metoprolol tartrate (LOPRESSOR) 50 mg tablet 2/5/2018 at Unknown time  Yes Yes   Sig: Take 50 mg by mouth every 12 (twelve) hours     pregabalin (LYRICA) 75 mg capsule 2/5/2018 at Unknown time  Yes Yes   Sig: Take 75 mg by mouth daily at bedtime        Facility-Administered Medications: None     Allergies   Allergen Reactions    Penicillins      Pt stated that he had a reaction to it a long time ago    Childhood history       Review of Systems:  Unassessable 2/2 change in mental status     Vital Signs:  Vitals:    02/07/18 0945 02/07/18 0948 02/07/18 0953 02/07/18 1001   BP: (!) 65/48 136/78 119/76 109/60   BP Location:       Pulse: (!) 116 89 85 82   Resp:       Temp:       TempSrc:       SpO2:   97% 95%   Weight:       Height:             Physical Exam:  Constitutional: Thin, appears older than stated age   Neuro/Psych: Awake, alert, and oriented to person, place, and time  GCS 15  Moves all four extremities  Strength 5/5 globally  No focal deficits  Cooperative  Mood and affect appropriate to the situation  HENT: Normocephalic, atraumatic  No nasal drainage  Moist mucous membranes  No extudates  Eyes: PEERL, EOM-intact, sclera antiicteric, conjunctiva pink  Neck: Normal range of motion  Supple  No JVD  No tracheal deviation  Cardiovascular: S1, S2  Regular rate and rhythm  No murrmurs, rubs, or gallops  +2 dorsalis pedis, posterior tibial, and radial pulses  Pulmonary: Lungs are clear to auscultation bilaterally  No wheezing, ronchi, or crackles  Strong cough  No secretions  Gastrointestinal: Soft, non-distended, non-tender  No guarding, rebound, or peritoneal signs  Positive bowel sounds in all four quadrants  Genitourinary: Normal external inspection  No CVA tenderness  Clear, yellow urine  Piedra: no  Renal Replacement: no  Type:Not on dialysis  MSK/Extremities: Full active range of motion x4  No peripheral edema, weak DP/PT pulses bilaterally, right great toe amputated  Right foot erythematous and edematous  Skin: Warm, clean, dry, intact  No jaundice  No rashes, erythema, or ecchymosis  No breakdown  I/O       02/05 0701 - 02/06 0700 02/06 0701 - 02/07 0700 02/07 0701 - 02/08 0700    P  O   0 100    I V  (mL/kg)  3222 9 (53 3)     IV Piggyback 1000 250     Total Intake(mL/kg) 1000 (16 5) 3472 9 (57 4) 100 (1 7)    Urine (mL/kg/hr)   600 (2 4)    Total Output     600    Net +1000 +3472 9 -500           Unmeasured Urine Occurrence  1 x 1 x Invasive Devices     Peripheral Intravenous Line            Peripheral IV 02/05/18 Right Antecubital 1 day    Peripheral IV 02/07/18 Left Antecubital less than 1 day                DIAGNOSTIC DATA:    Lab Results:   I have personally reviewed pertinent lab results  , CBC:   Lab Results   Component Value Date    WBC 27 38 (H) 02/07/2018    HGB 11 2 (L) 02/07/2018    HCT 34 2 (L) 02/07/2018    MCV 93 02/07/2018     02/07/2018    MCH 30 4 02/07/2018    MCHC 32 7 02/07/2018    RDW 13 0 02/07/2018    MPV 10 8 02/07/2018    NRBC 0 02/07/2018   , CMP:   Lab Results   Component Value Date     02/07/2018    K 4 0 02/07/2018     02/07/2018    CO2 18 (L) 02/07/2018    ANIONGAP 16 (H) 02/07/2018    BUN 25 02/07/2018    CREATININE 0 95 02/07/2018    GLUCOSE 326 (H) 02/07/2018    GLUCOSE 341 (H) 02/07/2018    CALCIUM 8 1 (L) 02/07/2018    AST 79 (H) 02/07/2018    ALT 26 02/07/2018    ALKPHOS 122 (H) 02/07/2018    PROT 7 6 02/07/2018    BILITOT 0 84 02/07/2018    EGFR 82 02/07/2018   , ABG: No results found for: PHART, LXF7DGX, PO2ART, GQR5RJC, D3NUFDQA, BEART, SOURCE    Imaging Studies: I have personally reviewed pertinent reports  and I have personally reviewed pertinent films in PACS    EKG, Pathology, and Other Studies: I have personally reviewed pertinent reports  and I have personally reviewed pertinent films in PACS        OUTCOME:   Stayed in room   Code Status: Level 1 - Full Code  Advance Directive and Living Will:     Power of :    Counseling / Coordination of Care  Total Critical Care time spent 30 minutes excluding procedures, teaching and family updates

## 2018-02-07 NOTE — SOCIAL WORK
MCG Guide Used for Initial Round: Cellulitis RRG  Optimal GLOS: 2   Hospital Day: 2 days  DC Readiness:   Discharge Readiness  Return to top of Cellulitis RRG - ISC  · Discharge readiness is indicated by patient meeting Recovery Milestones, including ALL of the following:  ? Hemodynamic stability  ? Skin exam stable or improved  ? Mental status at baseline  ? Fever absent or improved  ? Antibiotic treatment needs appropriate for next level of care  ? Pain absent or manageable at next level of care  ? Ambulatory  ? Oral hydration, medications,[J] and diet  ?  Discharge plans and education understood    Identified Barriers:   Discussion Date (Time): 02/07/18 with

## 2018-02-07 NOTE — ASSESSMENT & PLAN NOTE
- make likely related to lactic acidosis with component of acute anemia possibly related to development of DKA during of acute illness  - anion gap elevated at 14  - bicarbonates reduced at 16  - at this point, started patient on insulin drip and IV fluids accordingly as per protocol  - IV fluid provided to try to improve perfusion and lactate clearance  - BMP, magnesium and phosphorus every 4 hours while on insulin drips at this time not planning to start patient on bicarbonate supplement as acidosis might correct with IV fluids, reduction of lactate, correction of the glycemia  - discussed with Endocrinology  - monitor closely

## 2018-02-07 NOTE — ASSESSMENT & PLAN NOTE
· Patient is status 1st toe amputation with Podiatry with current open wound and clinical osteomyelitis    For TMA amputation later this week

## 2018-02-07 NOTE — SPEECH THERAPY NOTE
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 (HCC)    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 of foot ([address]    PVD (peripheral vascular disease) ([address] )    Hypertension    Gangrene of toe of right foot ([address] )    Diabetic neuropathy associated with type 2 diabetes mellitus ([address] )    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 ([address] )    Cellulitis of right foot    Hyponatremia    Sepsis ([address] )    Metabolic acidosis    Acute metabolic encephalopathy       Past Medical History  Past Medical History:   Diagnosis Date    Diabetes mellitus ([address] )     Diabetic neuropathy associated with type 2 diabetes mellitus ([address] )     DM (diabetes mellitus), type 2 with peripheral vascular complications (HCC)     Gangrene of toe of right foot ([address] )     Hypertension     PVD (peripheral vascular disease) ([address] )        Past Surgical History  Past Surgical History:   Procedure Laterality Date    EYE SURGERY      cataract- bilateral    INCISION AND DRAINAGE OF WOUND Right 2/6/2018    Procedure: INCISION AND DRAINAGE (I&D) EXTREMITY, right great toe amputation;  Surgeon: Tiffanie Mishra DPM;  Location: [facility];  Service: Podiatry    NO PAST SURGERIES           Current Medical Status  Pt is a 75 y o  male who presented to [facility] with gangrenous R foot, sepsis, and toxic metabolic encephalopathy  ST was consulted 2/2 nursing observation of coughing with liquid intake (d/w RN Patito Rivera)  Pt's wife Lissette Lares present for session and provided history as pt was initially refusing to interact with SLP   Lissette Lares reports that the pt normally eats soft food (lightly toasted bread or meat cut into small pieces representing the firmest textures he can eat) and drinks thin liquids, although he also frequently coughs with thin liquids at home, particularly in the morning when eating his cold cereal for breakfast     At the time of this evaluation, pt was A+A, denied acute complaints but only intermittently cooperative with session otherwise, frequently stating "That's it" or "That's enough" and gesturing for the SLP to leave  Imaging Studies:  CXR 2/6 Multifocal airspace disease suggestive of multifocal pneumonia  Swallow Information   Current Risks for Dysphagia & Aspiration: AMS and reported hx s/sx dysphagia per wife     Current Symptoms/Concerns: coughing with po    Current Diet: regular diet and thin liquids      Baseline Diet: soft foods (roughly equivalent to Dysphagia 2) and thin liquids      Baseline Assessment   Behavior/Cognition: alert, refused to respond to orientation questions    Speech/Language Status: able to participate in conversation and able to follow commands inconsistently (frequently refuses commands/questions)    Patient Positioning: upright in bed       Swallow Mechanism Exam   Facial: symmetrical  Labial: mildly decreased strength  Lingual: ROM WFL, pt refused to allow assessment of strength  Velum: unable to visualize  Mandible: unable to test 2/2 limited command following  Dentition: adequate  Vocal quality:clear/adequate   Volitional Cough: refused to perform  Swallow Mechanics: unable to elicit dry swallow 2/2 pt refusal      Consistencies Assessed and Performance   Consistencies Administered: thin liquids, nectar thick and puree  Specific materials administered included ice water via straw, nectar-thick apple juice via straw, and vanilla pudding  Pt refused offered soft solids and regular solids  Oral Stage: mild  Mildly prolonged oral manipulation with puree  Pharyngeal Stage: severe  Markedly delayed hyolaryngeal elevation with all textures  +immediate wet cough with thin liquids   Multiple swallows with nectar-thick liquids and puree  No s/sx aspiration with puree  Esophageal Concerns: none reported    Strategies and Efficacy: SLP fed all trials  D/w pt's wife who is interested in further evaluation and in strategies for safer PO intake for when pt returns home  Summary   Limited evaluation 2/2 pt refusal  However, pt p/w mild oral and severe pharyngeal dysphagia chiefly characterized by prolonged bolus manipulation with puree and by delayed hyolaryngeal elevation with all assessed textures  +s/sx aspiration observed with thin liquids  Risk for Aspiration: Moderate to High    Recommendations: puree/level 1 diet and nectar thick liquids, 1:1 feed with all PO    Recommended Form of Meds: crushed with puree     Aspiration precautions and compensatory swallowing strategies: upright posture, only feed when fully alert, slow rate of feeding and small bites/sips    Results Reviewed with: patient, RN and family     Dysphagia Goals: Patient will tolerate the safest and least restrictive diet without overt s/sx aspiration x100%    Frequency of treatment:  3-4x/wk    Speech Therapy Prognosis   Prognosis: fair    Prognosis Considerations: cognitive status, medically fragile status and therapeutic potential      Plan  Recommend initiation Dysphagia 1/Puree diet and Terry-Thick Liquids  Meds crushed  1:1 feed with all PO  ST will continue to follow for dysphagia tx and pt/family education

## 2018-02-07 NOTE — ADDENDUM NOTE
Addendum  created 02/06/18 1935 by Ralf Armijo MD    Anesthesia Review and Sign - Ready for Procedure

## 2018-02-07 NOTE — PERIOPERATIVE NURSING NOTE
Dr Audi Ladd and [facility] resident at bedside to see pt  Spoke with Dr Opal Echevarria regarding plan for pt  Will observe pt for a little and re-evaluate  Dr Vo Back here to see pt as well and spoke with Dr Opal Echevarria  Pt remains on FM 6L and O2 sats adequate  Pt still lethargic and not following commands

## 2018-02-07 NOTE — PLAN OF CARE
Problem: DISCHARGE PLANNING - CARE MANAGEMENT  Goal: Discharge to post-acute care or home with appropriate resources  INTERVENTIONS:  - Conduct assessment to determine patient/family and health care team treatment goals, and need for post-acute services based on payer coverage, community resources, and patient preferences, and barriers to discharge  - Address psychosocial, clinical, and financial barriers to discharge as identified in assessment in conjunction with the patient/family and health care team  - Arrange appropriate level of post-acute services according to patient's   needs and preference and payer coverage in collaboration with the physician and health care team  - Communicate with and update the patient/family, physician, and health care team regarding progress on the discharge plan  - Arrange appropriate transportation to post-acute venues  - Anticipated TMA on Friday  CM continues to follow for DC needs post procedure     Outcome: Progressing

## 2018-02-07 NOTE — PROGRESS NOTES
Progress Note - Adriana Shah 1950, 75 y o  male MRN: [id number]    Unit/Bed#: [facility] 603-01 Encounter: [id number]    Primary Care Provider: Maile Babinski, MD   Date and time admitted to hospital: 2/5/2018  4:18 PM        Acute metabolic encephalopathy   Assessment & Plan    - patient had a rapid response early this morning and secondary to altered mental status and hypotension  - this was thought to be secondary to patient severe sepsis as well as metabolic acidosis as a combination of hypoperfusion as well as possible developing DKA also, patient was found to respond well to flumazenil 0 5 mg total as he received Versed as well as Ativan yesterday during the angiographic procedure  - at this time, I would suggest to avoid benzodiazepines in this patient used Zyprexa p r n  if severely agitated and combative with care  - consult geriatrician  - as per wife, patient does have some degree of underlying dementia and today he seems more restless than usual  - for now will keep the patient on 4 point restraint as he has been disruptive to medical treatment        Metabolic acidosis   Assessment & Plan    - make likely related to lactic acidosis with component of acute anemia possibly related to development of DKA during of acute illness  - anion gap elevated at 14  - bicarbonates reduced at 16  - at this point, started patient on insulin drip and IV fluids accordingly as per protocol  - IV fluid provided to try to improve perfusion and lactate clearance  - BMP, magnesium and phosphorus every 4 hours while on insulin drips at this time not planning to start patient on bicarbonate supplement as acidosis might correct with IV fluids, reduction of lactate, correction of the glycemia  - discussed with Endocrinology  - monitor closely        Hyponatremia   Assessment & Plan    · Resolved with IV fluids        Cellulitis of right foot   Assessment & Plan    · Antibiotics as below        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 ([facility] )   Assessment & Plan    · Please refer to plan for metabolic acidosis        Diabetic neuropathy associated with type 2 diabetes mellitus ([facility] )   Assessment & Plan    · Chronically on gabapentin and Lyrica        Gangrene of toe of right foot ([facility] )   Assessment & Plan    · Patient is status 1st toe amputation with Podiatry with current open wound and clinical osteomyelitis    For TMA amputation later this week        Hypertension   Assessment & Plan    · Discontinue Lopressor for today given patient borderline hypotension this morning        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 of foot ([facility] )   Assessment & Plan    · Patient is status post right lower extremity arteriogram as follow:  · High-grade right above-knee popliteal artery stenosis and moderate right below knee popliteal artery stenosis treated with atherectomy and drug-eluting balloon angioplasty  · High-grade right posterior tibial artery stenosis treated with balloon angioplasty  · No other interventions planned        * Sepsis ([facility] )   Assessment & Plan    - severe sepsis likely evolving since the admission presenting this morning with hypotension, lactate above 5 acute encephalopathy  - patient found to have severe metabolic acidosis  - blood pressure systolic in the low 51O with map below 65 improved with 2 in-house L bolus of normal saline  - at this time, patient has refused for lactate to be repeated, nurse to try after giving patient Zyprexa  - monitor clinically  - discussed with infectious disease, continue with vancomycin, cefepime, Flagyl and expedite for TMA amputation later on this week now that the vascular status is optimized  - continue with level of care as step-down level 2 today  - WBC remains severely elevated above 27  - discussed with his wife, given the idea of severe tear of acute illness and poor prognosis if surgery is not accomplished          VTE Pharmacologic Prophylaxis:  Yes  Pharmacologic: Heparin  Mechanical VTE Prophylaxis in Place: Yes    Patient Centered Rounds: I have performed bedside rounds with nursing staff today  Discussions with Specialists or Other Care Team Provider:  Endocrinology, Infectious Disease, Critical Care, Podiatry    Education and Discussions with Family / Patient:  Patient's wife    Time Spent for Care: 45 minutes  More than 50% of total time spent on counseling and coordination of care as described above  Current Length of Stay: 2 day(s)    Current Patient Status: Inpatient   Certification Statement: The patient will continue to require additional inpatient hospital stay due to Refer to above    Discharge Plan: To be determined    Code Status: Level 1 - Full Code      Subjective:   Patient is pleasantly confused  Although he denies any clear complains at this time    Objective:     Vitals:   Temp (24hrs), Av 2 °F (37 3 °C), Min:97 8 °F (36 6 °C), Max:100 6 °F (38 1 °C)    HR:  [] 82  Resp:  [16-41] 22  BP: ()/(45-86) 109/60  SpO2:  [77 %-99 %] 95 %  Body mass index is 20 27 kg/m²  Input and Output Summary (last 24 hours): Intake/Output Summary (Last 24 hours) at 18 1533  Last data filed at 18 1413   Gross per 24 hour   Intake          3322 91 ml   Output              700 ml   Net          2622 91 ml       Physical Exam:     Physical Exam   Constitutional: He appears well-developed  Cardiovascular: Normal rate, regular rhythm and normal heart sounds  Exam reveals no friction rub  No murmur heard  Pulmonary/Chest: Effort normal  No respiratory distress  He has no wheezes  He has no rales  Abdominal: Soft  He exhibits no distension  There is no tenderness  There is no rebound  Musculoskeletal: He exhibits no edema  Right foot dressed and clean   Neurological: He is alert  Confused   Skin: Skin is warm  Psychiatric: He has a normal mood and affect         Additional Data:     Labs:      Results from last 7 days  Lab Units 02/07/18  1002  02/06/18  1829   WBC Thousand/uL 27 38*  < > 20 61*   HEMOGLOBIN g/dL 11 2*  < > 11 1*   I STAT HEMOGLOBIN   --   < >  --    HEMATOCRIT % 34 2*  < > 32 3*   PLATELETS Thousands/uL 364  < > 282   NEUTROS PCT %  --   --  88*   LYMPHS PCT %  --   --  7*   LYMPHO PCT % 5*  --   --    MONOS PCT %  --   --  5   MONO PCT MAN % 3*  --   --    EOS PCT %  --   --  0   EOSINO PCT MANUAL % 0  --   --    < > = values in this interval not displayed  Results from last 7 days  Lab Units 02/07/18  1002   SODIUM mmol/L 136   POTASSIUM mmol/L 4 0   CHLORIDE mmol/L 102   CO2 mmol/L 18*   BUN mg/dL 25   CREATININE mg/dL 0 95   CALCIUM mg/dL 8 1*   TOTAL PROTEIN g/dL 7 6   BILIRUBIN TOTAL mg/dL 0 84   ALK PHOS U/L 122*   ALT U/L 26   AST U/L 79*   GLUCOSE RANDOM mg/dL 326*       Results from last 7 days  Lab Units 02/05/18  1714   INR  1 30*       * I Have Reviewed All Lab Data Listed Above  * Additional Pertinent Lab Tests Reviewed: All Labs Within Last 24 Hours Reviewed    Imaging:    Imaging Reports Reviewed Today Include:  None  Imaging Personally Reviewed by Myself Includes:  None    Recent Cultures (last 7 days):       Results from last 7 days  Lab Units 02/06/18  1756 02/05/18  2314 02/05/18  1721 02/05/18  1714   BLOOD CULTURE   --   --  No Growth at 24 hrs  No Growth at 24 hrs     GRAM STAIN RESULT  No polys seen  1+ Gram positive cocci in clusters No polys seen  3+ Gram positive cocci in pairs  2+ Gram positive cocci in clusters  2+ Gram negative rods  --   --    WOUND CULTURE   --  4+ Growth of Staphylococcus aureus*  --   --        Last 24 Hours Medication List:     Current Facility-Administered Medications:  acetaminophen 325 mg Rectal Q4H PRN Roman Mai PA-C    aspirin 81 mg Oral Daily Rock Crane MD    cefepime 2,000 mg Intravenous Q12H Lynette Means MD Last Rate: 2,000 mg (02/07/18 1411)   dextrose 5 % and sodium chloride 0 9 % 250 mL/hr Intravenous Continuous Roger Holbrook MD    gabapentin 600 mg Oral HS Handy Maxwell MD    heparin (porcine) 5,000 Units Subcutaneous formerly [facility] Roger Holbrook MD    insulin regular (HumuLIN R,NovoLIN R) infusion 0 1-30 Units/hr Intravenous Continuous Roger Holbrook MD Last Rate: 5 Units/hr (02/07/18 1523)   metroNIDAZOLE 500 mg Intravenous Q8H Roger Holbrook MD Last Rate: 500 mg (02/07/18 1227)   OLANZapine 2 5 mg Oral Q6H PRN Roger Holbrook MD    pregabalin 75 mg Oral HS Handy Maxwell MD    sodium chloride 500 mL/hr Intravenous Continuous Roger Holbrook MD Last Rate: 500 mL/hr (02/07/18 1411)   Followed by        sodium chloride 250 mL/hr Intravenous Continuous Roger Holbrook MD    vancomycin 15 mg/kg Intravenous Q12H Tiburcio Virk MD Last Rate: 1,000 mg (02/07/18 1519)        Today, Patient Was Seen By: Roger Holbrook MD    ** Please Note: Dragon 360 Dictation voice to text software may have been used in the creation of this document   **

## 2018-02-07 NOTE — ASSESSMENT & PLAN NOTE
- severe sepsis likely evolving since the admission presenting this morning with hypotension, lactate above 5 acute encephalopathy  - patient found to have severe metabolic acidosis  - blood pressure systolic in the low 97D with map below 65 improved with 2 in-house L bolus of normal saline  - at this time, patient has refused for lactate to be repeated, nurse to try after giving patient Zyprexa  - monitor clinically  - discussed with infectious disease, continue with vancomycin, cefepime, Flagyl and expedite for TMA amputation later on this week now that the vascular status is optimized  - continue with level of care as step-down level 2 today  - WBC remains severely elevated above 27  - discussed with his wife, given the idea of severe tear of acute illness and poor prognosis if surgery is not accomplished

## 2018-02-07 NOTE — PROGRESS NOTES
Progress Note - Vascular Surgery   Zita Dey 75 y o  male MRN: [id number]  Unit/Bed#: [facility] 603-01 Encounter: [id number]    Assessment:  75 M with PAD and right great toe wet gangrene s/p RLE angiogram, atherectomy, and PTA of popliteal stenosis and distal PT, s/p right great toe guillotine amputation, I+D of right foot    Plan:  Check repeat LEADs post intervention  Local wound care per podiatry, possible TMA  Groin site c/d/i    Subjective/Objective     Subjective: No acute events  Sleepy this morning  Otherwise not complaining of pain  States he is urinating normally  Objective:    Blood pressure 165/69, pulse 105, temperature 98 9 °F (37 2 °C), temperature source Oral, resp  rate 22, height 5' 8" (1 727 m), weight 60 5 kg (133 lb 4 8 oz), SpO2 97 %  ,Body mass index is 20 27 kg/m²  Intake/Output Summary (Last 24 hours) at 02/07/18 0809  Last data filed at 02/07/18 7155   Gross per 24 hour   Intake             1850 ml   Output              300 ml   Net             1550 ml       Invasive Devices     Peripheral Intravenous Line            Peripheral IV 02/05/18 Right Antecubital 1 day                Physical Exam:   General: NAD, AAOx3  CV: RRR +S1/S2  Chest: breath sounds bilaterally  Abdomen: soft, NT ND  Extremities: RLE dressing c/d/i, left groin puncture site soft, c/d/i          Results from last 7 days  Lab Units 02/07/18  0656 02/06/18  1829 02/06/18  0508   WBC Thousand/uL 23 74* 20 61* 18 86*   HEMOGLOBIN g/dL 11 6* 11 1* 10 9*   HEMATOCRIT % 34 0* 32 3* 31 1*   PLATELETS Thousands/uL 299 282 306       Results from last 7 days  Lab Units 02/07/18  0656 02/06/18  1829 02/06/18  0508   SODIUM mmol/L 138 135* 131*   POTASSIUM mmol/L 3 7 3 8 3 1*   CHLORIDE mmol/L 103 101 95*   CO2 mmol/L 21 28 28   BUN mg/dL 22 19 19   CREATININE mg/dL 0 81 0 84 0 90   GLUCOSE RANDOM mg/dL 262* 163* 218*   CALCIUM mg/dL 8 1* 8 1* 8 4       Results from last 7 days  Lab Units 02/05/18  1714   INR  1 30*   PTT seconds 38*

## 2018-02-08 ENCOUNTER — ANESTHESIA EVENT (OUTPATIENT)
Dept: PERIOP | Facility: HOSPITAL | Age: 68
End: 2018-02-08

## 2018-02-08 ENCOUNTER — APPOINTMENT (INPATIENT)
Dept: RADIOLOGY | Facility: HOSPITAL | Age: 68
DRG: 853 | End: 2018-02-08
Payer: COMMERCIAL

## 2018-02-08 ENCOUNTER — APPOINTMENT (INPATIENT)
Dept: NON INVASIVE DIAGNOSTICS | Facility: HOSPITAL | Age: 68
DRG: 853 | End: 2018-02-08
Payer: COMMERCIAL

## 2018-02-08 PROBLEM — R79.89 ELEVATED BRAIN NATRIURETIC PEPTIDE (BNP) LEVEL: Status: ACTIVE | Noted: 2018-02-08

## 2018-02-08 PROBLEM — E87.1 HYPONATREMIA: Status: RESOLVED | Noted: 2018-02-05 | Resolved: 2018-02-08

## 2018-02-08 PROBLEM — R74.01 TRANSAMINITIS: Status: ACTIVE | Noted: 2018-02-08

## 2018-02-08 PROBLEM — I70.201 POPLITEAL ARTERY STENOSIS, RIGHT (HCC): Status: ACTIVE | Noted: 2018-02-08

## 2018-02-08 PROBLEM — J96.00 ACUTE RESPIRATORY FAILURE (HCC): Status: ACTIVE | Noted: 2018-02-08

## 2018-02-08 PROBLEM — D68.9 COAGULOPATHY (HCC): Status: ACTIVE | Noted: 2018-02-08

## 2018-02-08 PROBLEM — R93.0 ABNORMAL CT OF THE HEAD: Status: ACTIVE | Noted: 2018-02-08

## 2018-02-08 PROBLEM — E83.39 HYPOPHOSPHATEMIA: Status: ACTIVE | Noted: 2018-02-08

## 2018-02-08 PROBLEM — IMO0002 GREAT TOE AMPUTATION STATUS, RIGHT: Status: ACTIVE | Noted: 2018-02-08

## 2018-02-08 LAB
ALBUMIN SERPL BCP-MCNC: 2.1 G/DL (ref 3.5–5)
ALP SERPL-CCNC: 134 U/L (ref 46–116)
ALT SERPL W P-5'-P-CCNC: 160 U/L (ref 12–78)
AMMONIA PLAS-SCNC: 27 UMOL/L (ref 11–35)
ANION GAP SERPL CALCULATED.3IONS-SCNC: 7 MMOL/L (ref 4–13)
ANISOCYTOSIS BLD QL SMEAR: PRESENT
APTT PPP: 37 SECONDS (ref 23–35)
AST SERPL W P-5'-P-CCNC: 438 U/L (ref 5–45)
BASE EXCESS BLDA CALC-SCNC: -6 MMOL/L (ref -2–3)
BASOPHILS # BLD MANUAL: 0 THOUSAND/UL (ref 0–0.1)
BASOPHILS NFR MAR MANUAL: 0 % (ref 0–1)
BILIRUB SERPL-MCNC: 0.5 MG/DL (ref 0.2–1)
BUN SERPL-MCNC: 27 MG/DL (ref 5–25)
BURR CELLS BLD QL SMEAR: PRESENT
CA-I BLD-SCNC: 1.14 MMOL/L (ref 1.12–1.32)
CALCIUM SERPL-MCNC: 7.7 MG/DL (ref 8.3–10.1)
CHLORIDE SERPL-SCNC: 110 MMOL/L (ref 100–108)
CO2 SERPL-SCNC: 23 MMOL/L (ref 21–32)
CREAT SERPL-MCNC: 0.78 MG/DL (ref 0.6–1.3)
EOSINOPHIL # BLD MANUAL: 0 THOUSAND/UL (ref 0–0.4)
EOSINOPHIL NFR BLD MANUAL: 0 % (ref 0–6)
ERYTHROCYTE [DISTWIDTH] IN BLOOD BY AUTOMATED COUNT: 13 % (ref 11.6–15.1)
GFR SERPL CREATININE-BSD FRML MDRD: 93 ML/MIN/1.73SQ M
GLUCOSE SERPL-MCNC: 121 MG/DL (ref 65–140)
GLUCOSE SERPL-MCNC: 126 MG/DL (ref 65–140)
GLUCOSE SERPL-MCNC: 139 MG/DL (ref 65–140)
GLUCOSE SERPL-MCNC: 178 MG/DL (ref 65–140)
GLUCOSE SERPL-MCNC: 199 MG/DL (ref 65–140)
GLUCOSE SERPL-MCNC: 200 MG/DL (ref 65–140)
GLUCOSE SERPL-MCNC: 203 MG/DL (ref 65–140)
GLUCOSE SERPL-MCNC: 205 MG/DL (ref 65–140)
GLUCOSE SERPL-MCNC: 213 MG/DL (ref 65–140)
GLUCOSE SERPL-MCNC: 213 MG/DL (ref 65–140)
GLUCOSE SERPL-MCNC: 218 MG/DL (ref 65–140)
GLUCOSE SERPL-MCNC: 229 MG/DL (ref 65–140)
GLUCOSE SERPL-MCNC: 235 MG/DL (ref 65–140)
GLUCOSE SERPL-MCNC: 80 MG/DL (ref 65–140)
GLUCOSE SERPL-MCNC: 94 MG/DL (ref 65–140)
HAV IGM SER QL: NORMAL
HBV CORE IGM SER QL: NORMAL
HBV SURFACE AG SER QL: NORMAL
HCO3 BLDA-SCNC: 19.9 MMOL/L (ref 24–30)
HCT VFR BLD AUTO: 31.9 % (ref 36.5–49.3)
HCT VFR BLD CALC: 27 % (ref 36.5–49.3)
HCV AB SER QL: NORMAL
HGB BLD-MCNC: 10.9 G/DL (ref 12–17)
HGB BLDA-MCNC: 9.2 G/DL (ref 12–17)
INR PPP: 1.64 (ref 0.86–1.16)
LACTATE SERPL-SCNC: 2.2 MMOL/L (ref 0.5–2)
LACTATE SERPL-SCNC: 2.3 MMOL/L (ref 0.5–2)
LACTATE SERPL-SCNC: 2.8 MMOL/L (ref 0.5–2)
LACTATE SERPL-SCNC: 3.1 MMOL/L (ref 0.5–2)
LYMPHOCYTES # BLD AUTO: 0.52 THOUSAND/UL (ref 0.6–4.47)
LYMPHOCYTES # BLD AUTO: 2 % (ref 14–44)
MAGNESIUM SERPL-MCNC: 2.2 MG/DL (ref 1.6–2.6)
MCH RBC QN AUTO: 31.1 PG (ref 26.8–34.3)
MCHC RBC AUTO-ENTMCNC: 34.2 G/DL (ref 31.4–37.4)
MCV RBC AUTO: 91 FL (ref 82–98)
MONOCYTES # BLD AUTO: 0.77 THOUSAND/UL (ref 0–1.22)
MONOCYTES NFR BLD: 3 % (ref 4–12)
NEUTROPHILS # BLD MANUAL: 24.5 THOUSAND/UL (ref 1.85–7.62)
NEUTS SEG NFR BLD AUTO: 95 % (ref 43–75)
NRBC BLD AUTO-RTO: 0 /100 WBCS
NT-PROBNP SERPL-MCNC: ABNORMAL PG/ML
PCO2 BLD: 21 MMOL/L (ref 21–32)
PCO2 BLD: 35.5 MM HG (ref 42–50)
PH BLD: 7.36 [PH] (ref 7.3–7.4)
PHOSPHATE SERPL-MCNC: 1.1 MG/DL (ref 2.3–4.1)
PLATELET # BLD AUTO: 298 THOUSANDS/UL (ref 149–390)
PLATELET BLD QL SMEAR: ADEQUATE
PMV BLD AUTO: 10.4 FL (ref 8.9–12.7)
PO2 BLD: 79 MM HG (ref 35–45)
POIKILOCYTOSIS BLD QL SMEAR: PRESENT
POTASSIUM BLD-SCNC: 3.3 MMOL/L (ref 3.5–5.3)
POTASSIUM SERPL-SCNC: 3.7 MMOL/L (ref 3.5–5.3)
PROT SERPL-MCNC: 7.2 G/DL (ref 6.4–8.2)
PROTHROMBIN TIME: 19.5 SECONDS (ref 12.1–14.4)
RBC # BLD AUTO: 3.51 MILLION/UL (ref 3.88–5.62)
RBC MORPH BLD: PRESENT
SAO2 % BLD FROM PO2: 95 % (ref 95–98)
SODIUM BLD-SCNC: 142 MMOL/L (ref 136–145)
SODIUM SERPL-SCNC: 140 MMOL/L (ref 136–145)
SPECIMEN SOURCE: ABNORMAL
TOTAL CELLS COUNTED SPEC: 100
TROPONIN I SERPL-MCNC: 5.15 NG/ML
TROPONIN I SERPL-MCNC: 5.45 NG/ML
TROPONIN I SERPL-MCNC: 5.47 NG/ML
TROPONIN I SERPL-MCNC: 5.55 NG/ML
VANCOMYCIN TROUGH SERPL-MCNC: 19.1 UG/ML (ref 10–20)
WBC # BLD AUTO: 25.79 THOUSAND/UL (ref 4.31–10.16)

## 2018-02-08 PROCEDURE — 94660 CPAP INITIATION&MGMT: CPT

## 2018-02-08 PROCEDURE — 84484 ASSAY OF TROPONIN QUANT: CPT | Performed by: NURSE PRACTITIONER

## 2018-02-08 PROCEDURE — 82140 ASSAY OF AMMONIA: CPT | Performed by: PHYSICIAN ASSISTANT

## 2018-02-08 PROCEDURE — 85014 HEMATOCRIT: CPT

## 2018-02-08 PROCEDURE — 83605 ASSAY OF LACTIC ACID: CPT | Performed by: NURSE PRACTITIONER

## 2018-02-08 PROCEDURE — 80202 ASSAY OF VANCOMYCIN: CPT | Performed by: NURSE PRACTITIONER

## 2018-02-08 PROCEDURE — 85007 BL SMEAR W/DIFF WBC COUNT: CPT | Performed by: PHYSICIAN ASSISTANT

## 2018-02-08 PROCEDURE — 99233 SBSQ HOSP IP/OBS HIGH 50: CPT | Performed by: INTERNAL MEDICINE

## 2018-02-08 PROCEDURE — 85730 THROMBOPLASTIN TIME PARTIAL: CPT | Performed by: NURSE PRACTITIONER

## 2018-02-08 PROCEDURE — 85610 PROTHROMBIN TIME: CPT | Performed by: NURSE PRACTITIONER

## 2018-02-08 PROCEDURE — 80074 ACUTE HEPATITIS PANEL: CPT | Performed by: EMERGENCY MEDICINE

## 2018-02-08 PROCEDURE — 83605 ASSAY OF LACTIC ACID: CPT | Performed by: PHYSICIAN ASSISTANT

## 2018-02-08 PROCEDURE — 82947 ASSAY GLUCOSE BLOOD QUANT: CPT

## 2018-02-08 PROCEDURE — 83735 ASSAY OF MAGNESIUM: CPT | Performed by: EMERGENCY MEDICINE

## 2018-02-08 PROCEDURE — 82948 REAGENT STRIP/BLOOD GLUCOSE: CPT

## 2018-02-08 PROCEDURE — 80053 COMPREHEN METABOLIC PANEL: CPT | Performed by: PHYSICIAN ASSISTANT

## 2018-02-08 PROCEDURE — 82803 BLOOD GASES ANY COMBINATION: CPT

## 2018-02-08 PROCEDURE — 84100 ASSAY OF PHOSPHORUS: CPT | Performed by: EMERGENCY MEDICINE

## 2018-02-08 PROCEDURE — 84132 ASSAY OF SERUM POTASSIUM: CPT

## 2018-02-08 PROCEDURE — 70450 CT HEAD/BRAIN W/O DYE: CPT

## 2018-02-08 PROCEDURE — 36600 WITHDRAWAL OF ARTERIAL BLOOD: CPT

## 2018-02-08 PROCEDURE — 93005 ELECTROCARDIOGRAM TRACING: CPT | Performed by: NURSE PRACTITIONER

## 2018-02-08 PROCEDURE — 82330 ASSAY OF CALCIUM: CPT

## 2018-02-08 PROCEDURE — 84295 ASSAY OF SERUM SODIUM: CPT

## 2018-02-08 PROCEDURE — 99222 1ST HOSP IP/OBS MODERATE 55: CPT | Performed by: PHYSICIAN ASSISTANT

## 2018-02-08 PROCEDURE — 93306 TTE W/DOPPLER COMPLETE: CPT | Performed by: INTERNAL MEDICINE

## 2018-02-08 PROCEDURE — 94760 N-INVAS EAR/PLS OXIMETRY 1: CPT

## 2018-02-08 PROCEDURE — 94640 AIRWAY INHALATION TREATMENT: CPT

## 2018-02-08 PROCEDURE — 83880 ASSAY OF NATRIURETIC PEPTIDE: CPT | Performed by: EMERGENCY MEDICINE

## 2018-02-08 PROCEDURE — 93306 TTE W/DOPPLER COMPLETE: CPT

## 2018-02-08 PROCEDURE — 99291 CRITICAL CARE FIRST HOUR: CPT | Performed by: EMERGENCY MEDICINE

## 2018-02-08 PROCEDURE — 85027 COMPLETE CBC AUTOMATED: CPT | Performed by: PHYSICIAN ASSISTANT

## 2018-02-08 PROCEDURE — 71045 X-RAY EXAM CHEST 1 VIEW: CPT

## 2018-02-08 RX ORDER — FENTANYL CITRATE 50 UG/ML
25 INJECTION, SOLUTION INTRAMUSCULAR; INTRAVENOUS EVERY 2 HOUR PRN
Status: DISCONTINUED | OUTPATIENT
Start: 2018-02-08 | End: 2018-02-13

## 2018-02-08 RX ORDER — HYDRALAZINE HYDROCHLORIDE 20 MG/ML
10 INJECTION INTRAMUSCULAR; INTRAVENOUS EVERY 6 HOURS PRN
Status: DISCONTINUED | OUTPATIENT
Start: 2018-02-08 | End: 2018-02-21 | Stop reason: HOSPADM

## 2018-02-08 RX ORDER — HEPARIN SODIUM 5000 [USP'U]/ML
5000 INJECTION, SOLUTION INTRAVENOUS; SUBCUTANEOUS EVERY 8 HOURS SCHEDULED
Status: COMPLETED | OUTPATIENT
Start: 2018-02-08 | End: 2018-02-08

## 2018-02-08 RX ORDER — CHLORHEXIDINE GLUCONATE 0.12 MG/ML
15 RINSE ORAL EVERY 12 HOURS SCHEDULED
Status: DISCONTINUED | OUTPATIENT
Start: 2018-02-08 | End: 2018-02-08

## 2018-02-08 RX ORDER — NALOXONE HYDROCHLORIDE 0.4 MG/ML
INJECTION, SOLUTION INTRAMUSCULAR; INTRAVENOUS; SUBCUTANEOUS
Status: COMPLETED
Start: 2018-02-08 | End: 2018-02-08

## 2018-02-08 RX ORDER — ALBUTEROL SULFATE 2.5 MG/3ML
2.5 SOLUTION RESPIRATORY (INHALATION) EVERY 4 HOURS PRN
Status: DISCONTINUED | OUTPATIENT
Start: 2018-02-08 | End: 2018-02-10

## 2018-02-08 RX ORDER — FUROSEMIDE 10 MG/ML
20 INJECTION INTRAMUSCULAR; INTRAVENOUS ONCE
Status: COMPLETED | OUTPATIENT
Start: 2018-02-08 | End: 2018-02-08

## 2018-02-08 RX ORDER — ACETAMINOPHEN 325 MG/1
650 TABLET ORAL EVERY 6 HOURS PRN
Status: DISCONTINUED | OUTPATIENT
Start: 2018-02-08 | End: 2018-02-13

## 2018-02-08 RX ORDER — HEPARIN SODIUM 5000 [USP'U]/ML
5000 INJECTION, SOLUTION INTRAVENOUS; SUBCUTANEOUS EVERY 8 HOURS SCHEDULED
Status: DISCONTINUED | OUTPATIENT
Start: 2018-02-09 | End: 2018-02-09

## 2018-02-08 RX ORDER — OXYCODONE HYDROCHLORIDE 5 MG/1
5 TABLET ORAL EVERY 4 HOURS PRN
Status: DISCONTINUED | OUTPATIENT
Start: 2018-02-08 | End: 2018-02-21 | Stop reason: HOSPADM

## 2018-02-08 RX ORDER — SODIUM CHLORIDE FOR INHALATION 0.9 %
3 VIAL, NEBULIZER (ML) INHALATION
Status: DISCONTINUED | OUTPATIENT
Start: 2018-02-08 | End: 2018-02-08

## 2018-02-08 RX ORDER — HEPARIN SODIUM 5000 [USP'U]/ML
5000 INJECTION, SOLUTION INTRAVENOUS; SUBCUTANEOUS EVERY 8 HOURS SCHEDULED
Status: DISCONTINUED | OUTPATIENT
Start: 2018-02-08 | End: 2018-02-08

## 2018-02-08 RX ADMIN — ACETAMINOPHEN 650 MG: 325 TABLET, FILM COATED ORAL at 19:17

## 2018-02-08 RX ADMIN — METRONIDAZOLE 500 MG: 500 INJECTION, SOLUTION INTRAVENOUS at 14:24

## 2018-02-08 RX ADMIN — POTASSIUM PHOSPHATE, MONOBASIC AND POTASSIUM PHOSPHATE, DIBASIC 30 MMOL: 224; 236 INJECTION, SOLUTION INTRAVENOUS at 05:32

## 2018-02-08 RX ADMIN — METRONIDAZOLE 500 MG: 500 INJECTION, SOLUTION INTRAVENOUS at 05:39

## 2018-02-08 RX ADMIN — VANCOMYCIN HYDROCHLORIDE 1000 MG: 1 INJECTION, SOLUTION INTRAVENOUS at 01:15

## 2018-02-08 RX ADMIN — LEVALBUTEROL HYDROCHLORIDE 1.25 MG: 1.25 SOLUTION, CONCENTRATE RESPIRATORY (INHALATION) at 01:53

## 2018-02-08 RX ADMIN — CEFEPIME HYDROCHLORIDE 2000 MG: 2 INJECTION, POWDER, FOR SOLUTION INTRAVENOUS at 01:15

## 2018-02-08 RX ADMIN — HEPARIN SODIUM 5000 UNITS: 5000 INJECTION, SOLUTION INTRAVENOUS; SUBCUTANEOUS at 18:37

## 2018-02-08 RX ADMIN — GABAPENTIN 600 MG: 300 CAPSULE ORAL at 22:12

## 2018-02-08 RX ADMIN — LEVALBUTEROL HYDROCHLORIDE 1.25 MG: 1.25 SOLUTION, CONCENTRATE RESPIRATORY (INHALATION) at 20:47

## 2018-02-08 RX ADMIN — PREGABALIN 75 MG: 75 CAPSULE ORAL at 22:13

## 2018-02-08 RX ADMIN — ISODIUM CHLORIDE 3 ML: 0.03 SOLUTION RESPIRATORY (INHALATION) at 01:53

## 2018-02-08 RX ADMIN — FUROSEMIDE 20 MG: 10 INJECTION, SOLUTION INTRAMUSCULAR; INTRAVENOUS at 06:31

## 2018-02-08 RX ADMIN — CEFEPIME HYDROCHLORIDE 2000 MG: 2 INJECTION, POWDER, FOR SOLUTION INTRAVENOUS at 13:34

## 2018-02-08 RX ADMIN — NALOXONE HYDROCHLORIDE 0.4 MG: 0.4 INJECTION, SOLUTION INTRAMUSCULAR; INTRAVENOUS; SUBCUTANEOUS at 02:46

## 2018-02-08 RX ADMIN — HEPARIN SODIUM 5000 UNITS: 5000 INJECTION, SOLUTION INTRAVENOUS; SUBCUTANEOUS at 22:13

## 2018-02-08 RX ADMIN — VANCOMYCIN HYDROCHLORIDE 1000 MG: 1 INJECTION, SOLUTION INTRAVENOUS at 15:19

## 2018-02-08 RX ADMIN — METRONIDAZOLE 500 MG: 500 INJECTION, SOLUTION INTRAVENOUS at 22:13

## 2018-02-08 RX ADMIN — ISODIUM CHLORIDE 3 ML: 0.03 SOLUTION RESPIRATORY (INHALATION) at 20:48

## 2018-02-08 NOTE — PROGRESS NOTES
Patient coughed while taking oral medications with water  Wife states he always does this  Dr Sherrill Starr ordered a speech/swallow evaluation for him  General Keyes with speech was notified and I discussed with her the reason for requesting the evaluation

## 2018-02-08 NOTE — SPEECH THERAPY NOTE
Speech Language/Pathology    Speech/Language Pathology Progress Note    Patient Name: Lucio Calloway  [id number] Date: 2/8/2018       Chart reviewed  Pt s/p RR and transfer to MICU  Now NPO and on BiPAP  Please re-order speech therapy when appropriate

## 2018-02-08 NOTE — RESPIRATORY THERAPY NOTE
RT Ventilator Management Note  Nettie Spencer 75 y o  male MRN: [id number]  [facility]: [id number]      Daily Screen     No data found  Physical Exam:   Assessment Type: During-treatment  General Appearance: Alert, Awake  Respiratory Pattern: Tachypneic  Chest Assessment: Chest expansion symmetrical  Bilateral Breath Sounds: Expiratory wheezes      Resp Comments: (P) Patient arrived to micu on bipap  Currently awake/alert and tolerating well  Slightly tachypneic at this time while RN placing IV  Will continue to monitor

## 2018-02-08 NOTE — PLAN OF CARE
Nutrition/Hydration-ADULT     Nutrient/Hydration intake appropriate for improving, restoring or maintaining nutritional needs Not Progressing          DISCHARGE PLANNING - CARE MANAGEMENT     Discharge to post-acute care or home with appropriate resources Progressing        Potential for Falls     Patient will remain free of falls Progressing        Prexisting or High Potential for Compromised Skin Integrity     Skin integrity is maintained or improved Progressing

## 2018-02-08 NOTE — ANESTHESIA PREPROCEDURE EVALUATION
Review of Systems/Medical History  Patient summary reviewed  Chart reviewed      Cardiovascular  Hypertension ,   Comment: PVD/Right Popliteal artery stenosis,  Pulmonary       GI/Hepatic            Endo/Other  Diabetes (with neuropathy and peripheral vascular complications) type 2 ,      GYN       Hematology   Musculoskeletal    Comment: Cellulitis right foot/Gangrene toe right foot      Neurology      Comment: Hx dementia Psychology                Anesthesia Plan  ASA Score- 3     Anesthesia Type- general with ASA Monitors  Additional Monitors:   Airway Plan: LMA  Plan Factors-    Induction- intravenous  Postoperative Plan-     Informed Consent-   I personally reviewed this patient with the CRNA  Discussed and agreed on the Anesthesia Plan with the CRNA  Rossy Niño

## 2018-02-08 NOTE — RAPID RESPONSE
Progress Note - Rapid Response   Wanda Craig 75 y o  male MRN: [id number]    Time Called ( Time): 3:00  Room#: 36  Arrival Time ( Time): 2:35  Event End Time ( Time): 3:00    Primary reason for call: Acute change in mental status  Interventions:Given Narcan  Airway/Breathing:  No intervention (on BiPaP)  Circulation: N/A  Other Treatments: N/A       Assessment:   Pt is a 75y o  year old male with a PMH of dementia who presents to [facility] with cellulitis of right Foot and osteomyelitis/gangrene of right first toe and ahd a RR called 2/2 to altered mental status  Pt was given Narcan and responded quiickly, which is strange given the fact that he no narcotics in his system since 02/06 at 15:00  Normal Kidney/Liver function  At this time, no clear etiology but seizures also can't be ruled out  Plan:   · S/p Narcan  · CMP, LA, Mg/Phos, Ammonia  · CT Head w/o contrast  · CXR  · Transfer to MICU  · Family Called and Informed  · [facility] attending informed  · Consider neurology consult with EEG in the AM       HPI/Chief Complaint (Background/Situation):   Wanda Craig is a 75y o  year old male with a PMH of dementia who presents to [facility] with cellulitis of right Foot and osteomyelitis/gangrene of right first toe  Of note, patient had a rapid response early yesterday morning secondary to altered mental status and hypotension that was thought to be secondary to severe  Sepsis and metabolic acidosis  He responded well to flumazenil 0 5 mg, so altered mental status yesterday could have been exacerbated by Ativan/versed during previous angiogram procedure  When we arrived, patient had BiPaP 14/5 on and was unable to communicate  BG at the time measured in the 200's  RR was 40 but he was satting well  VBG was unremarkable showed a PCO2 of 35's and PO2 in the 70's  No recent narcotic given, per chart review and per nurse  He was given Narcan and very quickly (and strangely) became more responsive   Transferred to MICU, per Dr Bell Flores      Historical Information   Past Medical History:   Diagnosis Date    Diabetes mellitus ([facility] )     Diabetic neuropathy associated with type 2 diabetes mellitus ([place] [address] )     DM (diabetes mellitus), type 2 with peripheral vascular complications (HCC)     Gangrene of toe of right foot ([facility] )     Hypertension     PVD (peripheral vascular disease) ([place] [address] )      Past Surgical History:   Procedure Laterality Date    EYE SURGERY      cataract- bilateral    INCISION AND DRAINAGE OF WOUND Right 2/6/2018    Procedure: INCISION AND DRAINAGE (I&D) EXTREMITY, right great toe amputation;  Surgeon: Artie Stephen DPM;  Location:  MAIN OR;  Service: Podiatry    NO PAST SURGERIES       Social History   History   Alcohol Use No     History   Drug Use No     History   Smoking Status    Never Smoker   Smokeless Tobacco    Never Used     Family History: non-contributory    Meds/Allergies     Current Facility-Administered Medications:  acetaminophen 325 mg Oral Q6H PRN Waleska Bucio PA-C    aspirin 81 mg Oral Daily Lucius Henry MD    cefepime 2,000 mg Intravenous Q12H Nikunj Wilson MD Last Rate: 2,000 mg (02/08/18 0115)   dextrose 5 % and sodium chloride 0 9 % 250 mL/hr Intravenous Continuous Carlie Lux MD Last Rate: 250 mL/hr (02/07/18 2154)   gabapentin 600 mg Oral HS Lucius Henry MD    heparin (porcine) 5,000 Units Subcutaneous Q8H [address] Carlie Lux MD    insulin regular (HumuLIN R,NovoLIN R) infusion 0 3-21 Units/hr Intravenous Titrated Bertram Dickerson MD Last Rate: 5 Units/hr (02/08/18 0244)   levalbuterol 1 25 mg Nebulization TID Taylor Johns MD    levalbuterol 1 25 mg Nebulization Q4H PRN Taylor Johns MD    metroNIDAZOLE 500 mg Intravenous Q8H Carlie Lux MD Last Rate: 500 mg (02/07/18 1726)   OLANZapine 2 5 mg Oral Q6H PRN Carlie Lux MD    pregabalin 75 mg Oral HS Lucius Henry MD    sodium chloride 3 mL Nebulization TID Taylor MCCLAIN MD Nat    sodium chloride 3 mL Nebulization Q4H PRN Taylor Johns MD    vancomycin 15 mg/kg Intravenous Q12H Satnam Moyer MD Last Rate: 1,000 mg (02/08/18 0115)         dextrose 5 % and sodium chloride 0 9 % 250 mL/hr Last Rate: 250 mL/hr (02/07/18 2154)   insulin regular (HumuLIN R,NovoLIN R) infusion 0 3-21 Units/hr Last Rate: 5 Units/hr (02/08/18 0244)       Allergies   Allergen Reactions    Penicillins      Pt stated that he had a reaction to it a long time ago  Childhood history       ROS: Negative except see HPI    Physical Exam:  Cor:RRR, no m/g/r  Abd:soft, NT/ND, normal BS  Neuro:Not following commands  Somnolent  However, normal neuro exam (no motor deficits and followed commands) after Narcan given  Intake/Output Summary (Last 24 hours) at 02/08/18 0308  Last data filed at 02/07/18 2154   Gross per 24 hour   Intake          3555 41 ml   Output             1150 ml   Net          2405 41 ml       Respiratory    Lab Data (Last 4 hours)    None         O2/Vent Data (Last 4 hours)      02/08 0215          Non-Invasive Ventilation Mode BiPAP                 Invasive Devices     Peripheral Intravenous Line            Peripheral IV 02/07/18 Left Antecubital less than 1 day    Peripheral IV 02/08/18 Right Arm less than 1 day                DIAGNOSTIC DATA:    Lab: I have personally reviewed pertinent lab results     CBC:     Results from last 7 days  Lab Units 02/07/18  1002   WBC Thousand/uL 27 38*   HEMOGLOBIN g/dL 11 2*   HEMATOCRIT % 34 2*   PLATELETS Thousands/uL 364     CMP:     Results from last 7 days  Lab Units 02/07/18  1501 02/07/18  1002 02/07/18  0949 02/07/18  0656 02/06/18  1829  02/05/18  1714   SODIUM mmol/L 138 136  --  138 135*  < > 125*   POTASSIUM mmol/L 3 2* 4 0  --  3 7 3 8  < > 3 5   CHLORIDE mmol/L 108 102  --  103 101  < > 90*   CO2 mmol/L 18* 18*  --  21 28  < > 28   BUN mg/dL 27* 25  --  22 19  < > 27*   CREATININE mg/dL 0 96 0 95  --  0 81 0 84  < > 1 01   CALCIUM mg/dL 7 4* 8 1*  --  8 1* 8 1*  < > 9 2   TOTAL PROTEIN g/dL  --  7 6  --   --  7 1  --  8 1   BILIRUBIN TOTAL mg/dL  --  0 84  --   --  0 79  --  0 82   ALK PHOS U/L  --  122*  --   --  110  --  131*   ALT U/L  --  26  --   --  27  --  31   AST U/L  --  79*  --   --  76*  --  76*   GLUCOSE RANDOM mg/dL 201* 326*  --  262* 163*  < > 282*   GLUCOSE, ISTAT mg/dl  --   --  341*  --   --   --   --    < > = values in this interval not displayed    PT/INR:   No results found for: PT, INR,   Magnesium: No results found for: MAG,   Phosphorous:   Lab Results   Component Value Date    PHOS 1 8 (L) 02/07/2018       Microbiology:  Lab Results   Component Value Date    BLOODCX No Growth at 24 hrs  02/06/2018    BLOODCX No Growth at 48 hrs  02/05/2018    BLOODCX No Growth at 48 hrs  02/05/2018    WOUNDCULT 4+ Growth of Staphylococcus aureus (A) 02/05/2018         OUTCOME:   Transferred to Critical Care Unit  Family notified of transfer: yes  Code Status: Level 1 - Full Code

## 2018-02-08 NOTE — PROGRESS NOTES
02/08/18 1100   Plan of Care   Comments   consult team in progress   Assessment Completed by: Unit visit

## 2018-02-08 NOTE — PROGRESS NOTES
Progress Note - Critical Care   Boneta Au 75 y o  male MRN: [id number]  [facility] En[facility]: [id number]    Attending Physician: Nicol Li, DO      ______________________________________________________________________  Assessment and Plan:   Principal Problem:    Sepsis [facility])  Active Problems:    Gangrene of toe of right foot ([address] )    Great toe amputation status, right ([facility] )    Popliteal artery stenosis, right (HCC)    Elevated brain natriuretic peptide (BNP) level    Acute congestive heart failure (HCC)    Moderate mitral regurgitation    Acute respiratory failure (HCC)    Abnormal CT of the head    Acute metabolic encephalopathy    Meningioma (HCC)    Transaminitis    Hypokalemia    Hypophosphatemia    Hypertension    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)    Coagulopathy (HCC)  Resolved Problems:    Hyponatremia    Gangrenous toe (HCC)    Metabolic acidosis      Neuro:  Encephalopathy toxic metabolic improved  Ammonia 27  CTH menigioma-continue serial neuro checks, avoid sedative medications/benzos    Abnormal CT of the head/meningioma per neurosurgery-outpt MRI, ok for anticoagulation    Neuropathy-home Lyrica/neurontin, prn fentanyl/oxy (0doses)  Mild dementia-baseline agitation    CV:  Acute valvular CHF/[place]levated [place] of 523[address]  ECHO EF 55% no RWMA 2+ MR- chest x-ray remains volume overloaded, Lasix 20 mg IV BID, goal neg 1-2L, monitor I/O, daily weights, resume bb/antihtn     NSTEMI type 2 in setting of sepsis/CHF--ekg no acute changes, echo no RWMA, resume BASA, f/u cards consult    Moderate MR    History of hypertension-resume home Lopressor/hyzaar post OR, p r n  hydralazine for SBP greater than 180    Pulm:   Acute respiratory failure secondary to CHF-Bipap PRN, diurese, respiratory protocol, oxygen for saturations greater than 92%,    GI:   Transaminitis likely 2/2 sepsis   Acute hep panel neg-abdominal exam benign, CMP for the a m , normal RV fx    : No issues    F/E/N:  Hypokalemia/phos-replete    Persistent lactic acidosis likely 2/2 transaminitis--f/u am lactate    ID:   Sepsis present on admission secondary to right great toe osteo/gangrene  Blood cultures no growth, wound/tissue culture Staph aureus, fungal anaerobic/AFB pending-id following, continue cefepime/Flagyl/Vanco D4, narrow per ID is a recs, plan for TMA with Podiatry 1230 for source control    Heme:  Coagulopathy 2/2 transamintis--monitor    Endo:   DM 2 a1c9 7 blood sugars 121-154 -insulin drip 30U in last 24 hrs     Msk/Skin:   Postop day 3 from a right great toe amputation-podiatry following, antibiotics as above, will speak to Podiatry this morning given 2nd toe dusky/necrosis plan was for right TMA tomorrow, neurovascular checks q 1 hour posterior tib by Doppler    Postop day 3 right popliteal artery/posterior tibial artery angioplasty/stent-vascular surgery following, neurovascular checks q 1 hour, positive Dopplers bilateral    Disposition:   Cont diurese, or today with podiatry for TMA pending cards cx,     Code Status: Level 1 - Full Code    Counseling / Coordination of Care  Total time spent today 33 minutes  Greater than 50% of total time was spent with the patient and / or family counseling and / or coordination of care  A description of the counseling / coordination of care: plan of care  ______________________________________________________________________    Chief Complaint:   Doesn't understand why he is here  Stating he needs his stent  Asking for water and off monitors    24 Hour Events:   Echo ef 55% 2+MR given lasix 20mg IV with 2 5L out neg 1 350  Trop peak 5 55, cards consult pending   bipap 4 hrs overnight for tachypnea    ______________________________________________________________________    Physical Exam:   Physical Exam   Constitutional: He is oriented to person, place, and time  No distress  HENT:   Head: Normocephalic and atraumatic     Mouth/Throat: Oropharynx is clear and moist    Eyes: Pupils are equal, round, and reactive to light  No scleral icterus  Neck: Neck supple  No JVD present  Cardiovascular: Normal rate, regular rhythm, normal heart sounds and intact distal pulses  Exam reveals no gallop, no distant heart sounds and no friction rub  No murmur heard  Pulses:       Radial pulses are 2+ on the right side, and 2+ on the left side  Right posterior tip by Doppler, left DP by Doppler   Pulmonary/Chest: Effort normal  No accessory muscle usage  No tachypnea  No respiratory distress  He has no decreased breath sounds  He has no wheezes  He has rales in the right middle field, the right lower field, the left middle field and the left lower field  Abdominal: Soft  Bowel sounds are normal  He exhibits no distension  There is no tenderness  Musculoskeletal: Normal range of motion  He exhibits no edema  Neurological: He is alert and oriented to person, place, and time  No cranial nerve deficit  Skin: Skin is warm and dry  No rash noted  No erythema  No pallor  Right foot with Ace wrap  Second metatarsal black          ______________________________________________________________________  Vitals:    18 2100 18 0000 18 0400 18 0600   BP:  143/75 126/74    BP Location:  Right arm Right arm    Pulse: 88 90 96    Resp: (!) 24 (!) 31 (!) 33    Temp:  98 °F (36 7 °C) 98 1 °F (36 7 °C)    TempSrc:  Oral Oral    SpO2: 100% 100% 98%    Weight:    62 9 kg (138 lb 10 7 oz)   Height:           Temperature:   Temp (24hrs), Av 9 °F (36 6 °C), Min:97 4 °F (36 3 °C), Max:98 2 °F (36 8 °C)    Current Temperature: 98 1 °F (36 7 °C)  Weights:   IBW: 68 4 kg    Body mass index is 21 08 kg/m²    Weight (last 2 days)     Date/Time   Weight    18 0600  62 9 (138 67)    18 0600  65 6 (144 62)            Hemodynamic Monitoring:  N/A     Non-Invasive/Invasive Ventilation Settings:  Respiratory    Lab Data (Last 4 hours)    None O2/Vent Data (Last 4 hours)    None              No results found for: PHART, YMQ8PPG, PO2ART, JQT8GNQ, B9ONMSTP, BEART, SOURCE  SpO2: SpO2: 98 %  Intake and Outputs:  I/O       02/06 0701 - 02/07 0700 02/07 0701 - 02/08 0700    P  O  0 220    I V  (mL/kg) 3222 9 (53 3) 2120 8 (35 1)    IV Piggyback 250     Total Intake(mL/kg) 3472 9 (57 4) 2340 8 (38 7)    Urine (mL/kg/hr)  1150 (0 8)    Stool  0 (0)    Total Output   1150    Net +3472 9 +1190 8          Unmeasured Urine Occurrence 1 x 1 x    Unmeasured Stool Occurrence  3 x          Nutrition:        Diet Orders            Start     Ordered    02/09/18 0001  Diet NPO; Sips with meds  Diet effective midnight     Comments:  Pt will be going to OR on 2/9/18 @ 1230PM    Question Answer Comment   Diet Type NPO    NPO Except: Sips with meds    RD to adjust diet per protocol? Yes        02/08/18 1345          Labs:     Results from last 7 days  Lab Units 02/09/18  0614 02/08/18  0418 02/08/18  0246 02/07/18  1002  02/06/18  1829 02/06/18  0508   WBC Thousand/uL 12 71* 25 79*  --  27 38*  < > 20 61* 18 86*   HEMOGLOBIN g/dL 10 2* 10 9*  --  11 2*  < > 11 1* 10 9*   I STAT HEMOGLOBIN g/dl  --   --  9 2*  --   < >  --   --    HEMATOCRIT % 29 6* 31 9*  --  34 2*  < > 32 3* 31 1*   PLATELETS Thousands/uL 323 298  --  364  < > 282 306   NEUTROS PCT % 89*  --   --   --   --  88* 88*   MONOS PCT % 2*  --   --   --   --  5 5   MONO PCT MAN %  --  3*  --  3*  --   --   --    < > = values in this interval not displayed      Results from last 7 days  Lab Units 02/09/18  0614 02/08/18  0418 02/08/18  0246 02/07/18  1501 02/07/18  1002   SODIUM mmol/L 141 140  --  138 136   POTASSIUM mmol/L 3 4* 3 7  --  3 2* 4 0   CHLORIDE mmol/L 107 110*  --  108 102   CO2 mmol/L 25 23  --  18* 18*   BUN mg/dL 22 27*  --  27* 25   CREATININE mg/dL 0 66 0 78  --  0 96 0 95   CALCIUM mg/dL 7 8* 7 7*  --  7 4* 8 1*   TOTAL PROTEIN g/dL 6 4 7 2  --   --  7 6   BILIRUBIN TOTAL mg/dL 0 82 0 50  --   -- 0  84   ALK PHOS U/L 107 134*  --   --  122*   ALT U/L 125* 160*  --   --  26   AST U/L 183* 438*  --   --  79*   GLUCOSE RANDOM mg/dL 126 178*  --  201* 326*   GLUCOSE, ISTAT mg/dl  --   --  203*  --   --        Results from last 7 days  Lab Units 02/09/18  0614 02/08/18  0418 02/07/18  1501   MAGNESIUM mg/dL 2 1 2 2 2 4     Lab Results   Component Value Date    PHOS 1 5 (L) 02/09/2018    PHOS 1 1 (L) 02/08/2018    PHOS 1 8 (L) 02/07/2018        Results from last 7 days  Lab Units 02/09/18  0614 02/08/18  0755 02/05/18  1714   INR  1 85* 1 64* 1 30*   PTT seconds  --  37* 38*       0  Lab Value Date/Time   TROPONINI 4 94 (H) 02/09/2018 0614   TROPONINI 5 55 (H) 02/08/2018 1649   TROPONINI 5 47 (H) 02/08/2018 1333   TROPONINI 5 15 (H) 02/08/2018 1048   TROPONINI 5 45 (H) 02/08/2018 0755       Results from last 7 days  Lab Units 02/08/18  1333 02/08/18  1048 02/08/18  0756   LACTIC ACID mmol/L 2 8* 2 3* 2 2*     ABG:No results found for: PHART, RQO9QJN, PO2ART, UZG2QSU, Y8NGOWXM, BEART, SOURCE  Imaging:  Chest x-ray with moderate [facility] have personally reviewed pertinent reports  and I have personally reviewed pertinent films in PACS  EKG:  Tele reviewed  Micro:  Lab Results   Component Value Date    BLOODCX No Growth at 48 hrs  02/06/2018    BLOODCX No Growth at 72 hrs  02/05/2018    BLOODCX No Growth at 72 hrs  02/05/2018    WOUNDCULT 4+ Growth of Staphylococcus aureus (A) 02/05/2018     Allergies: Allergies   Allergen Reactions    Penicillins      Pt stated that he had a reaction to it a long time ago    Childhood history     Medications:   Scheduled Meds:    Current Facility-Administered Medications:  acetaminophen 650 mg Oral Q6H PRN ARMANDO Arevalo    albuterol 2 5 mg Nebulization Q4H PRN Ethelda Ridgeley Trager, DO    aspirin 81 mg Oral Daily ARMANDO Schmidt    cefepime 2,000 mg Intravenous Q12H Gretta Bennett MD Last Rate: Stopped (02/09/18 0217)   fentanyl citrate (PF) 25 mcg Intravenous Q2H PRN Vonna Mallet, CRNP    furosemide 20 mg Intravenous BID (diuretic) Vonna Mallet, CRNP    gabapentin 600 mg Oral HS Wanda Danielson MD    heparin (porcine) 5,000 Units Subcutaneous [facility] ARMANDO Schmidt    hydrALAZINE 10 mg Intravenous Q6H PRN Vonna Mallet, CRNP    insulin regular (HumuLIN R,NovoLIN R) infusion 0 3-21 Units/hr Intravenous Titrated Rhoda Evans MD Last Rate: 1 Units/hr (02/09/18 0400)   metroNIDAZOLE 500 mg Intravenous Q8H Albert Hooper MD Last Rate: 500 mg (02/09/18 5001)   oxyCODONE 5 mg Oral Q4H PRN Vonna Mallet, CRNP    potassium chloride 20 mEq Intravenous Once Vonna Mallet, CRNP Last Rate: 20 mEq (02/09/18 0742)   potassium chloride 20 mEq Intravenous Once Vonna Mallet, CRNP    potassium chloride 20 mEq Intravenous Once Vonna Mallet, CRNP    potassium phosphate 30 mmol Intravenous Once Vonna Mallet, CRNP    pregabalin 75 mg Oral HS Wanda Danielson MD    vancomycin 15 mg/kg Intravenous Q12H Sven Story MD Last Rate: Stopped (02/09/18 0400)     Continuous Infusions:    insulin regular (HumuLIN R,NovoLIN R) infusion 0 3-21 Units/hr Last Rate: 1 Units/hr (02/09/18 0400)     PRN Meds:    acetaminophen 650 mg Q6H PRN   albuterol 2 5 mg Q4H PRN   fentanyl citrate (PF) 25 mcg Q2H PRN   hydrALAZINE 10 mg Q6H PRN   oxyCODONE 5 mg Q4H PRN     VTE Pharmacologic Prophylaxis: Heparin  VTE Mechanical Prophylaxis: sequential compression device  Invasive lines and devices: Invasive Devices     Peripheral Intravenous Line            Peripheral IV 02/07/18 Left Antecubital 1 day    Peripheral IV 02/08/18 Left Forearm 1 day    Peripheral IV 02/08/18 Right Arm 1 day                     Portions of the record may have been created with voice recognition software  Occasional wrong word or "sound a like" substitutions may have occurred due to the inherent limitations of voice recognition software    Read the chart carefully and recognize, using context, where substitutions have occurred      ARMANDO Chopra

## 2018-02-08 NOTE — PROGRESS NOTES
Progress Note - Podiatry  Genevieve Alert 75 y o  male MRN: [id number]  [facility]: [id number]    Assessment:  1  Wet gangrene of right hallux and medial forefoot with cellulitis  2  Eschars noted on right 4th toe, and lateral midfoot  3  PAD, per vasc  4  DM with neuropathy, A1c 9 7, per endo  5  Altered mental status    Plan:  - patient seen and evaluated at bedside, while echo was being performed  Daughter at bedside  -patient transferred to ICU as patient coded again this morning  - podiatry planning for transmetatarsal amputation tomorrow 02/09/2018 however will follow up with clearance per medical team  -dressing change today; 2nd is completely dusky and 3rd and 4th digits are showing progressive ischemic changes  Proximal eschar is also oted be worsened compared to yesterday's clinical picture, however foot looks stable  Would still recommend amputation sooner than later for source control  -flushed wound with saline, dressed toes with Betadine wet-to-dry dressing, applied Adaptic over wound and dressed entire foot with DSD and Ace  -case discussed w attending    Subjective/Objective   Chief Complaint:   Chief Complaint   Patient presents with    Foot Ulcer     Pt presents with diabetic ulcers on right foot and increased pain and swelling       Subjective: 75 y o  male was seen and evaluated at bedside  Patient in ICU, getting echo  Daughter at bedside  Blood pressure 165/84, pulse 95, temperature 97 9 °F (36 6 °C), temperature source Axillary, resp  rate (!) 35, height 5' 8" (1 727 m), weight 65 6 kg (144 lb 10 oz), SpO2 98 %  ,Body mass index is 21 99 kg/m²      Invasive Devices     Peripheral Intravenous Line            Peripheral IV 02/07/18 Left Antecubital 1 day    Peripheral IV 02/08/18 Left Forearm less than 1 day    Peripheral IV 02/08/18 Right Arm less than 1 day                Physical Exam:   General: Alert, cooperative and no distress  Lungs: Non labored breathing  Heart: Positive S1, S2  Abdomen: Soft, non-tender  Extremity:  Gross motor function and neurovascular status at baseline  No calf tenderness bilaterally  The right 2nd is completely dusky and 3rd and 4th digits are showing progressive ischemic changes  Proximal eschar is also noted be worsened compared to yesterday's clinical picture  Bone, tendon, muscle exposed  Lab, Imaging and other studies:   I have personally reviewed pertinent lab results  , CBC:   Lab Results   Component Value Date    WBC 25 79 (H) 02/08/2018    HGB 10 9 (L) 02/08/2018    HCT 31 9 (L) 02/08/2018    MCV 91 02/08/2018     02/08/2018    MCH 31 1 02/08/2018    MCHC 34 2 02/08/2018    RDW 13 0 02/08/2018    MPV 10 4 02/08/2018    NRBC 0 02/08/2018   , CMP:   Lab Results   Component Value Date     02/08/2018    K 3 7 02/08/2018     (H) 02/08/2018    CO2 23 02/08/2018    ANIONGAP 7 02/08/2018    BUN 27 (H) 02/08/2018    CREATININE 0 78 02/08/2018    GLUCOSE 178 (H) 02/08/2018    GLUCOSE 203 (H) 02/08/2018    CALCIUM 7 7 (L) 02/08/2018     (H) 02/08/2018     (H) 02/08/2018    ALKPHOS 134 (H) 02/08/2018    PROT 7 2 02/08/2018    BILITOT 0 50 02/08/2018    EGFR 93 02/08/2018       Imaging: I have personally reviewed pertinent films in PACS  EKG, Pathology, and Other Studies: I have personally reviewed pertinent reports    VTE Pharmacologic Prophylaxis: on hold

## 2018-02-08 NOTE — PROGRESS NOTES
At approxamately 0150am found pt with increased respirations of 40 and audible wheeze noted  Called respiratory and they gave a breathing treatment and applied bi-pap  Called to [facility] and aware and will evaluate pt  Returned to room and found pt unresponsive  Rapid response called

## 2018-02-08 NOTE — PROGRESS NOTES
Paged [facility],  Order set for insulin gtt does not specify parameters for change that are accurate to this patient and do not include algorithms to follow  Order for Q2 blood sugar checks are different than insulin gtt orders which are to change the gtt Q1  Waiting for call back from [facility]

## 2018-02-08 NOTE — CONSULTS
Consultation - Neurosurgery   Laura Patrick 75 y o  male MRN: [id number]  [facility]: [id number]  Consult completed on 2/8/18 at       Consults    Assessment/Plan     Assessment:  1  Abnormality on CT head - likely meningioma  2  Sepsis  3  Acute metabolic encephalopathy  4  Acute respiratory failure   5  Gangrene of right foot  6  Diabetic neuropathy associated with type 2 diabetes  7  Cellulitis of right foot  8  Lactic acidosis    Plan:  · Exam: patient resting comfortably on bipap, easily woken but patient agitated  Follows commands briskly, oriented x3  SUERO with limited testing in RLE secondary to infection  Able to add  No PD, casillas or left clonus  Finger to nose intact  · During examination bipap was removed and patient placed on nasal cannula without signs of respiratory distress during examination  · Imaging: reviewed personally and with attendings on 2/8:  · CT head wo: demonstrating hyperdensity right parafalcine in frontal lobe, likely represents meningioma  · STAT CT head if decline in GCS >2 pt/1h  · Recommend non-urgent MRI  · Discussed with patient and family - patient states he refuses an MRI  · PT/OT from a neurosurgical standpoint may mobilize  · Recommend decreasing neuro checks to Q2h to allow for sleep  · Medical management per primary team  · Intermittent low grade temps, tachycardia, tachypnea  · Lactic 2 8  · Troponin 5 47  · WBC 25 79  · Podiatry following - plan to take to OR tomorrow around 12:30 for TMA  · Infectious disease following - continue on vancomycin, cefepime and flagyl, continue to trend vital signs and WBC  · Blood cultures on 2/5 and 2/6- no growth at 48h and 24h  · Wound cultures on 2/5 and 2/6 +Staph Aureus   · No urgent neurosurgical intervention at this time  Follow up with the neurosurgical group outpatient after getting MRI       History of Present Illness   HPI: Laura Patrick is a 75y o  year old male with PMH including diabetes with associated neuropathy, hypertension, peripheral vascular disease who presents with complaint of worsening right foot pain with change in coloring  Daughter present at bed side who gave history  She states that patient had a history of ulcers present for the past few months which he put off  She states a few weeks ago the foot turned a dusky color  He was seen at a podiatrist office at the time  She state the pain increased, he presented to the ED for evaluation  He was found to have sepsis and gangrene to the big toe which was then amputated approximately 2 days ago  Daughter states over the past few days it has been crazy, he's had codes called on him for not responding  Neurosurgery was consulted for an hyperdensity found on a CT scan  Review of Systems   Constitutional: Positive for activity change  Negative for fever  HENT: Negative for tinnitus and trouble swallowing  Eyes: Negative for photophobia and visual disturbance  Respiratory: Negative for shortness of breath  Cardiovascular: Negative for chest pain  Gastrointestinal: Negative for abdominal pain, constipation, diarrhea, nausea and vomiting  Genitourinary: Negative for dysuria, frequency and urgency  Musculoskeletal: Positive for arthralgias (right foot) and joint swelling  Negative for back pain and neck pain  Skin: Positive for color change and wound  Negative for rash  Allergic/Immunologic: Negative for environmental allergies and food allergies  Neurological: Negative for dizziness, weakness, numbness and headaches  Hematological: Does not bruise/bleed easily  Psychiatric/Behavioral: Positive for confusion         Historical Information   Past Medical History:   Diagnosis Date    Diabetes mellitus ([facility] )     Diabetic neuropathy associated with type 2 diabetes mellitus ([facility] )     DM (diabetes mellitus), type 2 with peripheral vascular complications (HCC)     [facility]     Hypertension     PVD (peripheral vascular disease) ([facility] )      Past Surgical History:   Procedure Laterality Date    EYE SURGERY      cataract- bilateral    INCISION AND DRAINAGE OF WOUND Right 2/6/2018    Procedure: INCISION AND DRAINAGE (I&D) EXTREMITY, right great toe amputation;  Surgeon: Verl Soulier, DPM;  Location: [facility];  Service: Podiatry    NO PAST SURGERIES       History   Alcohol Use No     History   Drug Use No     History   Smoking Status    Never Smoker   Smokeless Tobacco    Never Used     Family History   Problem Relation Age of Onset    Diabetes Mother     No Known Problems Father        Meds/Allergies   all current active meds have been reviewed and current meds:   Current Facility-Administered Medications   Medication Dose Route Frequency    cefepime (MAXIPIME) 2,000 mg in dextrose 5 % 50 mL IVPB  2,000 mg Intravenous Q12H    gabapentin (NEURONTIN) capsule 600 mg  600 mg Oral HS    hydrALAZINE (APRESOLINE) injection 10 mg  10 mg Intravenous Q6H PRN    insulin regular (HumuLIN R,NovoLIN R) 1 Units/mL in sodium chloride 0 9 % 100 mL infusion  0 3-21 Units/hr Intravenous Titrated    levalbuterol (XOPENEX) inhalation solution 1 25 mg  1 25 mg Nebulization Q4H PRN    metroNIDAZOLE (FLAGYL) IVPB (premix) 500 mg  500 mg Intravenous Q8H    pregabalin (LYRICA) capsule 75 mg  75 mg Oral HS    vancomycin (VANCOCIN) IVPB (premix) 1,000 mg  15 mg/kg Intravenous Q12H     Allergies   Allergen Reactions    Penicillins      Pt stated that he had a reaction to it a long time ago  Childhood history       Objective   I/O       02/06 0701 - 02/07 0700 02/07 0701 - 02/08 0700 02/08 0701 - 02/09 0700    P  O  0 220     I V  (mL/kg) 3222 9 (53 3) 2120 8 (32 3) 16 (0 2)    IV Piggyback 250  410    Total Intake(mL/kg) 3472 9 (57 4) 2340 8 (35 7) 426 (6 5)    Urine (mL/kg/hr)  1600 (1) 1864 (3)    Stool  0 (0)     Total Output   1600 1864    Net +3472 9 +740 8 -1438           Unmeasured Urine Occurrence 1 x 1 x Unmeasured Stool Occurrence  3 x           Physical Exam   Constitutional: He is oriented to person, place, and time  He is sleeping  He is easily aroused  He has a sickly appearance  HENT:   Head: Normocephalic and atraumatic  Eyes: EOM are normal  Pupils are equal, round, and reactive to light  Cardiovascular: Normal rate  Pulmonary/Chest: Effort normal  No respiratory distress  Abdominal: Soft  There is no tenderness  There is no rebound  Neurological: He is oriented to person, place, and time and easily aroused  He has a normal Finger-Nose-Finger Test    Reflex Scores:       Bicep reflexes are 1+ on the right side and 1+ on the left side  Brachioradialis reflexes are 1+ on the right side and 1+ on the left side  Patellar reflexes are 1+ on the right side and 1+ on the left side  Achilles reflexes are Right achilles reflex grade: not tested  and 1+ on the left side  Skin: Skin is warm  +right lower extremity in dressing  +right anterior shin skin is shiny, slightly warm to the touch, slightly erythematous  Psychiatric: His speech is normal and behavior is normal  Thought content normal      Neurologic Exam     Mental Status   Oriented to person, place, and time  Attention: decreased  Concentration: decreased  Speech: speech is normal   Level of consciousness: alert  Knowledge: good  Able to perform simple calculations  Able to repeat  Normal comprehension  Cranial Nerves     CN II   Visual fields full to confrontation  CN III, IV, VI   Pupils are equal, round, and reactive to light  Extraocular motions are normal    Right pupil: Size: 3 mm  Shape: regular  Reactivity: brisk  Left pupil: Size: 3 mm  Shape: regular  Reactivity: brisk  CN III: no CN III palsy  CN VI: no CN VI palsy  Nystagmus: none   Diplopia: none  Ophthalmoparesis: none  Upgaze: normal  Downgaze: normal  Conjugate gaze: present    CN V   Facial sensation intact       CN VII   Facial expression full, symmetric  CN VIII   Hearing: intact    CN XII   Tongue deviation: none    Motor Exam   Muscle bulk: decreased  Overall muscle tone: normal  Right arm pronator drift: absent  Left arm pronator drift: absent  BUE: 5/5 throughout    RLE:  HF 3/5  KF/KE: 5/5  DF/PF - not tested secondary to infection    LLE:   HF: 3/5  KF/KE: 5/5  DF/PF: 5/5      Sensory Exam   Light touch normal      Gait, Coordination, and Reflexes     Coordination   Finger to nose coordination: normal    Tremor   Resting tremor: absent    Reflexes   Right brachioradialis: 1+  Left brachioradialis: 1+  Right biceps: 1+  Left biceps: 1+  Right patellar: 1+  Left patellar: 1+  Right achilles reflex grade: not tested  Left achilles: 1+  Right Rojas: absent  Left Rojas: absent  Right ankle clonus present: not tested  Left ankle clonus: absent      Vitals:Blood pressure 157/80, pulse (!) 106, temperature (!) 97 4 °F (36 3 °C), temperature source Oral, resp  rate (!) 42, height 5' 8" (1 727 m), weight 65 6 kg (144 lb 10 oz), SpO2 99 %  ,Body mass index is 21 99 kg/m²  Lab Results:     Results from last 7 days  Lab Units 02/08/18  0418 02/08/18  0246 02/07/18  1002  02/07/18  0656 02/06/18  1829 02/06/18  0508 02/05/18  1714   WBC Thousand/uL 25 79*  --  27 38*  --  23 74* 20 61* 18 86* 23 06*   HEMOGLOBIN g/dL 10 9*  --  11 2*  --  11 6* 11 1* 10 9* 12 2   I STAT HEMOGLOBIN g/dl  --  9 2*  --   < >  --   --   --   --    HEMATOCRIT % 31 9*  --  34 2*  --  34 0* 32 3* 31 1* 34 8*   PLATELETS Thousands/uL 298  --  364  --  299 282 306 293   NEUTROS PCT %  --   --   --   --   --  88* 88* 87*   MONOS PCT %  --   --   --   --   --  5 5 6   MONO PCT MAN % 3*  --  3*  --   --   --   --   --    < > = values in this interval not displayed      Results from last 7 days  Lab Units 02/08/18  0418 02/08/18  0246 02/07/18  1501 02/07/18  1002  02/06/18  1829   SODIUM mmol/L 140  --  138 136  < > 135*   POTASSIUM mmol/L 3 7  --  3 2* 4 0  < > 3 8 CHLORIDE mmol/L 110*  --  108 102  < > 101   CO2 mmol/L 23  --  18* 18*  < > 28   BUN mg/dL 27*  --  27* 25  < > 19   CREATININE mg/dL 0 78  --  0 96 0 95  < > 0 84   CALCIUM mg/dL 7 7*  --  7 4* 8 1*  < > 8 1*   TOTAL PROTEIN g/dL 7 2  --   --  7 6  --  7 1   BILIRUBIN TOTAL mg/dL 0 50  --   --  0 84  --  0 79   ALK PHOS U/L 134*  --   --  122*  --  110   ALT U/L 160*  --   --  26  --  27   AST U/L 438*  --   --  79*  --  76*   GLUCOSE RANDOM mg/dL 178*  --  201* 326*  < > 163*   GLUCOSE, ISTAT mg/dl  --  203*  --   --   < >  --    < > = values in this interval not displayed  Results from last 7 days  Lab Units 02/08/18  0418 02/07/18  1501 02/07/18  1002   MAGNESIUM mg/dL 2 2 2 4 2 5       Results from last 7 days  Lab Units 02/08/18  0418 02/07/18  1501 02/07/18  1002   PHOSPHORUS mg/dL 1 1* 1 8* 4 0       Results from last 7 days  Lab Units 02/08/18  0755 02/05/18  1714   INR  1 64* 1 30*   PTT seconds 37* 38*     No results found for: TROPONINT  ABG:No results found for: PHART, PNV1WSV, PO2ART, ZDI9IVV, F0RIKKUS, BEART, SOURCE    Imaging Studies: I have personally reviewed pertinent reports  and I have personally reviewed pertinent films in PACS     CT head wo contrast   Final Result      Small hyperdensity in the parafalcine right frontal lobe with surrounding mild serpiginous hyperdensity which likely reflects parenchymal contusion and associated subarachnoid hemorrhage  No significant mass effect or midline shift  If there is no    history of trauma, further evaluation with MRI may be obtained to evaluate for underlying lesion  I personally discussed this study with Dr Monie Kumar on 2/8/2018 at 3:50 AM with read back verification  Workstation performed: [id number]         XR chest portable   Final Result      Increased opacity in the mid to lower lung fields bilaterally which may represent pulmonary edema versus pneumonia    Obscuration of the left hemidiaphragm may be due to pleural effusion and/or consolidation  Workstation performed: [id number]         XR foot right 3+ views   Final Result      Expected postoperative change status post 1st digit amputation  Workstation performed: [id number]         XR chest portable   Final Result      Multifocal airspace disease suggestive of multifocal pneumonia  The study was marked in [facility] for immediate notification  Workstation performed: [id number]         IR abdominal angiography   Final Result   Impression:       High-grade right above-knee popliteal artery stenosis and moderate right below knee popliteal artery stenosis treated with atherectomy and drug-eluting balloon angioplasty      High-grade right posterior tibial artery stenosis treated with balloon angioplasty  Workstation performed: [id number]         XR foot 3+ views RIGHT   Final Result      There is permeation noted within the 1st distal phalanx with associated erosive changes and foci of air in the soft tissue compatible with osteomyelitis      The study was marked in [employer] for immediate notification  Workstation performed: [id number]         XR chest portable ICU    (Results Pending)   MRI brain w wo contrast    (Results Pending)       EKG, Pathology, and Other Studies: I have personally reviewed pertinent reports  and I have personally reviewed pertinent films in PACS    VTE Prophylaxis: Sequential compression device (Venodyne)     Code Status: Level 1 - Full Code  Advance Directive and Living Will:      Power of :    POLST:      Counseling / Coordination of Care  I spent 30 minutes with the patient

## 2018-02-08 NOTE — CONSULTS
Consultation - Geriatrics   Nettie Spencer 75 y o  male MRN: [id number]  Unit/Bed#: [facility] 603-01 Encounter: [id number]    Patient transferred to MICU; consult cancelled as MICU managing; reviewed with resident and AP  Medications reviewed; patient on both Lyrica and gabapentin, would recommend choosing one or the other  Please call with any questions or if an official consult is again requested on this patient; we would be happy to evaluate       Consults  Review of Systems                Physical Exam

## 2018-02-08 NOTE — PROGRESS NOTES
Spoke with Dr Hafsa Stratton regarding plan of care with [facility] results  Non urgent outpt MRI  Ok to resume anticoagulation

## 2018-02-08 NOTE — PROGRESS NOTES
[facility] 75 [facility]  [facility] MRN: [id number]  [facility]: [id number]    Assessment: A 76 y/o male with     Plan:       Neuro:     1 ) Altered mental status, unclear etiology at this time     - pt responded to narcan with improvement in mental status however had not received narcotics in over 24 hours     - possibly related to increased work of breathing, although ABG was within normal limits     - pt otherwise remains neurologically intact     - will continue with close monitoring      - will consider EEG if symptoms persist    2 ) Abnormal CT Head, concern for parenchymal contusion vs SAH     - no reported trauma at this time     - pt neurologically in tact at this time     - neuro checks hourly     - will obtain MRI brain for further evaluation      - will consult neurosurgery for further recommendations     - will hold all anticoagulation at this time    3 ) Diabetic neuropathy     - continue with gabapentin and lyrica                 CV:     1 ) Peripheral arterial disease, predominantly right lower extremity      - recent right lower extremity arteriogram with high grade right popliteal artery stenosis s/p atherectomy and STEW PTA, posterior tibial artery stenosis s/p balloon angioplasty     - no other interventions planned at this time    2 ) History of HTN     - pt's home dose of lopressor currently held, will continue to monitor and restart when BP stable                 Lun ) Multifocal pneumonia, noted on CXR on      - repeat CXR today reveals worsening opacities and possible infiltrate     - continue with antibiotics at this time    2 ) Acute respiratory failure, now requiring BiPAP     - likely secondary to PNA     - ABG within normal limits     - trailed pt off BiPAP, however he became lightheaded and tachypneic    Pt placed back on BiPAP     - will check BNP for cardiac component                   GI:      1 ) Transaminitis, unclear etiology possibly sepsis related vs hepatitis vs congestion     - new when compared to prior labs     - total bilirubin within normal limits     - doubt obstructive pathology     - will check hepatitis panel     - will check RUQ US for further evaluation                 FEN:     1 ) NPO for now, until respiratory status improves  Pt was on dysphagia diet prior to requiring BiPAP    2 ) Will replete potassium and phosphorous based on earlier labs, will also recheck labs now    3 ) Hyponatremia, present on admission     - resolved with IVF                 : No acute issues, will continue to monitor UOP                 ID:     1 ) Sepsis, likely combination of gangrene to right first toe and PNA     - leukocytosis trending down this morning     - pt was hypothermic upon arrival to the MICU     - BCx NGTD x 2     - appreciated ID's recommendations     - will continue vanco/cefepime/flagyl                 Heme:     1 ) Leukocytosis, likely secondary to sepsis     - will continue to trend    2 ) Anemia, stable since admission                 Endo:     1 ) Uncontrolled type 2 DM     - pt currently on insulin gtt per endocrinology recommendations     - given severe hypophosphatemia, will hold insulin gtt until repletion is complete     - will cover with SSI during that time                            Msk/Skin:     1 ) Gangrene of right great toe with surrounding cellulitis s/p first toe guillotine amputation POD #2     - podiatry following, plan for likely transmetatarsal amputation on Friday     - continue with antibiotics as listed above                 Disposition: continue SD1 under critical care management     Code Status: Discussion with patient, wife and daughters  Pt remains full code, however would not want to proceed to Trach/PEG if he decompensated      Chief Complaint: Altered mental status    HPI/24hr events:    A 50-year-old male who was transferred to the ICU after a period of unresponsiveness while on the medical floor  Patient's nurse reported that he became tachypneic, and was placed on BiPAP however shortly after he became unresponsive and a rapid response was called  Upon arrival to the room, patient remained unresponsive however maintain a respiratory rate in the mid 20s on BiPAP  Patient did not respond to painful stimuli at that time  Patient was administered Narcan, and shortly after regained his mental status  Patient did appear confused however nursing reports that has been baseline for him  Patient was able to move his bilateral upper and lower extremities equally at that time  Patient denied having any pain however did not recall the event  Under review of records, patient was admitted on 2/5 for increasing pain and skin discoloration to his right great and second toe  Patient was found to have wet gangrene with surrounding cellulitis and osteomyelitis of the right great toe  Patient was started on Ancef and Flagyl at that time  On 2/6 the patient was evaluated by Infectious Disease, his antibiotics were broadened to vancomycin, cefepime and Flagyl  Patient also underwent a right lower extremity arteriogram with intervention and right hallux guillotine amputation by Podiatry  On the morning of 2/7 patient had a period of unresponsiveness, that improved after a dose of flumazenil  Patient was also found to be hypotensive with a metabolic acidosis at that time, which were believed to be contributing factors to his mental status  Pt is currently being followed by podiatry, vascular, endocrinology and infectious disease  Physical Exam:   Physical Exam   Constitutional: He is oriented to person, place, and time  He appears well-developed and well-nourished  He appears distressed ( increased work of breathing)  HENT:   Head: Normocephalic and atraumatic  Eyes: Pupils are equal, round, and reactive to light  Neck: Normal range of motion  Neck supple  No tracheal deviation present  Cardiovascular: Normal rate, regular rhythm and normal heart sounds  Exam reveals no gallop and no friction rub  No murmur heard  Pulmonary/Chest: He is in respiratory distress ( increased work of breathing)  He has no wheezes  He has rhonchi ( course breath sounds bilaterally)  Abdominal: Soft  Bowel sounds are normal  He exhibits no distension  There is no tenderness  There is no rebound and no guarding  Musculoskeletal: Normal range of motion  Right foot: There is tenderness ( Recent amputation of right hallux  Purplish-black discoloration of second toe on right foot, faint discoloration of fourth and fifth toes)  Neurological: He is alert and oriented to person, place, and time  He displays normal reflexes  No cranial nerve deficit  He exhibits normal muscle tone  Skin: Skin is warm and dry  Psychiatric: He has a normal mood and affect  Nursing note and vitals reviewed  Vitals:    18 0426 18 0432 18 0433 18 0452   BP: 166/80 168/81  161/80   BP Location:       Pulse: 102 102  98   Resp: (!) 43 (!) 40  (!) 37   Temp:  (!) 96 7 °F (35 9 °C)     TempSrc:  Rectal     SpO2: 91% 91% 95% 98%   Weight:       Height:                 Temperature:   Temp (24hrs), Av 1 °F (36 7 °C), Min:96 7 °F (35 9 °C), Max:99 3 °F (37 4 °C)    Current: Temperature: (!) 96 7 °F (35 9 °C)    Weights:   IBW: 68 4 kg    Body mass index is 20 27 kg/m²  Weight (last 2 days)     None          Hemodynamic Monitoring:  N/A     Non-Invasive/Invasive Ventilation Settings:  Respiratory    Lab Data (Last 4 hours)    None         O2/Vent Data (Last 4 hours)       021 0433      Non-Invasive Ventilation Mode BiPAP BiPAP BiPAP               No results found for: PHART, JFU9LVW, PO2ART, PKA0GGW, N6HABOQL, BEART, SOURCE  SpO2: SpO2: 98 %    Intake and Outputs:  I/O       701 -  0700 701 -  07    P  O  0 220    I V  (mL/kg) 3222 9 (53 3) 2120 8 (35  1)    IV Piggyback 250     Total Intake(mL/kg) 3472 9 (57 4) 2340 8 (38 7)    Urine (mL/kg/hr)  1150 (0 8)    Stool  0 (0)    Total Output   1150    Net +3472 9 +1190 8          Unmeasured Urine Occurrence 1 x 1 x    Unmeasured Stool Occurrence  3 x        Nutrition:        Diet Orders            Start     Ordered    02/08/18 0359  Diet NPO  Diet effective now     Question Answer Comment   Diet Type NPO    RD to adjust diet per protocol? Yes        02/08/18 0401    02/06/18 1532  Room Service  Once     Question:  Type of Service  Answer:  Room Service-Appropriate    02/06/18 1531          Labs:     Results from last 7 days  Lab Units 02/08/18  0418 02/08/18  0246 02/07/18  1002  02/07/18  0656 02/06/18  1829 02/06/18  0508 02/05/18  1714   WBC Thousand/uL 25 79*  --  27 38*  --  23 74* 20 61* 18 86* 23 06*   HEMOGLOBIN g/dL 10 9*  --  11 2*  --  11 6* 11 1* 10 9* 12 2   I STAT HEMOGLOBIN g/dl  --  9 2*  --   < >  --   --   --   --    HEMATOCRIT % 31 9*  --  34 2*  --  34 0* 32 3* 31 1* 34 8*   PLATELETS Thousands/uL 298  --  364  --  299 282 306 293   NEUTROS PCT %  --   --   --   --   --  88* 88* 87*   MONOS PCT %  --   --   --   --   --  5 5 6   MONO PCT MAN %  --   --  3*  --   --   --   --   --    < > = values in this interval not displayed     Results from last 7 days  Lab Units 02/08/18  0418 02/08/18  0246 02/07/18  1501 02/07/18  1002  02/06/18  1829   SODIUM mmol/L 140  --  138 136  < > 135*   POTASSIUM mmol/L 3 7  --  3 2* 4 0  < > 3 8   CHLORIDE mmol/L 110*  --  108 102  < > 101   CO2 mmol/L 23  --  18* 18*  < > 28   BUN mg/dL 27*  --  27* 25  < > 19   CREATININE mg/dL 0 78  --  0 96 0 95  < > 0 84   CALCIUM mg/dL 7 7*  --  7 4* 8 1*  < > 8 1*   TOTAL PROTEIN g/dL 7 2  --   --  7 6  --  7 1   BILIRUBIN TOTAL mg/dL 0 50  --   --  0 84  --  0 79   ALK PHOS U/L 134*  --   --  122*  --  110   ALT U/L 160*  --   --  26  --  27   AST U/L 438*  --   --  79*  --  76*   GLUCOSE RANDOM mg/dL 178*  --  201* 326* < > 163*   GLUCOSE, ISTAT mg/dl  --  203*  --   --   < >  --    < > = values in this interval not displayed  Results from last 7 days  Lab Units 02/08/18  0418 02/07/18  1501 02/07/18  1002   MAGNESIUM mg/dL 2 2 2 4 2 5       Results from last 7 days  Lab Units 02/08/18  0418 02/07/18  1501 02/07/18  1002   PHOSPHORUS mg/dL 1 1* 1 8* 4 0        Results from last 7 days  Lab Units 02/05/18  1714   INR  1 30*   PTT seconds 38*       Results from last 7 days  Lab Units 02/08/18  0418   LACTIC ACID mmol/L 3 1*     No results found for: TROPONINI    Imaging:  I have personally reviewed pertinent films in PACS  CT Head (2/8)  Small hyperdensity in the parafalcine right frontal lobe with surrounding mild serpiginous hyperdensity which likely reflects parenchymal contusion and associated subarachnoid hemorrhage   No significant mass effect or midline shift   If there is no history of trauma, further evaluation with MRI may be obtained to evaluate for underlying lesion  CXR (2/8)  Increased opacity in the mid to lower lung fields bilaterally which may represent pulmonary edema versus pneumonia  Obscuration of the left hemidiaphragm may be due to pleural effusion and/or consolidation  Micro:  Lab Results   Component Value Date    BLOODCX No Growth at 24 hrs  02/06/2018    BLOODCX No Growth at 48 hrs  02/05/2018    BLOODCX No Growth at 48 hrs  02/05/2018    WOUNDCULT 4+ Growth of Staphylococcus aureus (A) 02/05/2018       Allergies: Allergies   Allergen Reactions    Penicillins      Pt stated that he had a reaction to it a long time ago    Childhood history       Medications:   Scheduled Meds:    Current Facility-Administered Medications:  [MAR Hold] acetaminophen 325 mg Oral Q6H PRN Waleska Bucio PA-C    cefepime 2,000 mg Intravenous Q12H Nikunj Wilson MD Last Rate: Stopped (02/08/18 0423)   chlorhexidine 15 mL Swish & Spit Q12H [place], DO    dextrose 5 % and sodium chloride 0 9 % 250 mL/hr Intravenous Continuous Jacquelin Davila MD Last Rate: 250 mL/hr (02/07/18 2154)   gabapentin 600 mg Oral HS Jose Maxwell MD    insulin regular (HumuLIN R,NovoLIN R) infusion 0 3-21 Units/hr Intravenous Titrated Mayra Hansen MD Last Rate: Stopped (02/08/18 0458)   [MAR Hold] levalbuterol 1 25 mg Nebulization TID Taylor Johns MD    [MAR Hold] levalbuterol 1 25 mg Nebulization Q4H PRN Taylor Johns MD    [MAR Hold] metroNIDAZOLE 500 mg Intravenous Q8H Jacquelin Davila MD Last Rate: 500 mg (02/07/18 1726)   potassium phosphate 30 mmol Intravenous Once 9032 Anmol Garcia,     pregabalin 75 mg Oral HS Jose Maxwell MD    [MAR Hold] sodium chloride 3 mL Nebulization TID Herlinda Apley, MD    [MAR Hold] sodium chloride 3 mL Nebulization Q4H PRN Taylor Johns MD    vancomycin 15 mg/kg Intravenous Q12H Vinayak Tate MD Last Rate: Stopped (02/08/18 0423)     Continuous Infusions:    dextrose 5 % and sodium chloride 0 9 % 250 mL/hr Last Rate: 250 mL/hr (02/07/18 2154)   insulin regular (HumuLIN R,NovoLIN R) infusion 0 3-21 Units/hr Last Rate: Stopped (02/08/18 0458)     PRN Meds:    [MAR Hold] acetaminophen 325 mg Q6H PRN   [MAR Hold] levalbuterol 1 25 mg Q4H PRN   [MAR Hold] sodium chloride 3 mL Q4H PRN       VTE Pharmacologic Prophylaxis: Reason for no pharmacologic prophylaxis abnormal CTH with concern for [facility]  VTE Mechanical Prophylaxis: sequential compression device    Invasive lines and devices: Invasive Devices     Peripheral Intravenous Line            Peripheral IV 02/07/18 Left Antecubital less than 1 day    Peripheral IV 02/08/18 Right Arm less than 1 day                      Code Status: Level 1 - Full Code     Portions of the record may have been created with voice recognition software  Occasional wrong word or "sound a like" substitutions may have occurred due to the inherent limitations of voice recognition software    Read the chart carefully and recognize, using context, where substitutions have occurred       [employer]

## 2018-02-08 NOTE — PROGRESS NOTES
Progress Note - Infectious Disease   Alec Romeo 75 y o  male MRN: [id number]  Unit/Bed#: MICU 12 Encounter: [id number]      Impression/Recommendations:  1   Severe sepsis   Source of sepsis is most likely right foot infection  Lactic acidosis persists  Temperature is down but leukocytosis persists  I suspect lack of clinical improvement is due to persistent source  I suspect the patient will not get much better unless his sources removed (TMA)  Has clinically unstable as he is, he should proceed to TMA as soon as he can be stabilized  Blood cultures remain negative so far  Antibiotic plan as in below  Monitor hemodynamics  Follow-up on pending blood cultures      2  Right 1st toe in distal foot wet gangrene  Patient is status post open 1st toe amputation  However, he has gangrenous changes in his other toes also  Given lack of clinical improvement, patient will need TMA sooner rather than later  Wound culture thus far is growing Staph aureus       Continue vancomycin/cefepime/Flagyl for now  Check vancomycin level  Serial foot exams  Follow-up on final wound culture  Recommend TMA is soon as patient is stabilized      3   Right 1st toe osteomyelitis   This is quite definitive foot x-ray   Clinical picture is also suggestive   Patient is status post open right 1st toe amputation  Antibiotic as in above      4   PVD    Patient is status post arteriogram with successful intervention  Circulatory status is now optimized  Vascular surgery following      5   Acute hypoxic respiratory failure  I suspect this is secondary to sepsis and pulmonary edema  No obvious pneumonia on CXR  O2 support per Critical Care Medicine Service  6  DM with hyperglycemia on admission   Patient appears noncompliant with diabetic medication   This clearly contributes to infection development    Blood sugar management per Medicine Service      7   PCN allergy on allergy list   Patient does not recall reaction   He states that this was when he was a young child  Negrita Vinson is tolerating cephalosporin well      Discussed with patient and his daughter in detail regarding the above plan  Discussed with Podiatry service  Discussed with Critical Care Medicine Service      Antibiotics:  Vancomycin/cefepime/Flagyl  POD # 2     Subjective:  Events noted  Patient became unresponsive  He was found to be in respiratory distress with lactic acidosis  Patient was transferred to ICU  At present, patient is stable dyspnea, not intubated  Stable foot pain  Temperature low-grade  No chills  He is tolerating antibiotics well  No nausea, vomiting or diarrhea  No dizziness or hearing loss  Objective:  Vitals:  HR:  [] 95  Resp:  [20-43] 35  BP: (137-171)/(61-88) 165/84  SpO2:  [90 %-100 %] 98 %  Temp (24hrs), Av 9 °F (36 6 °C), Min:96 7 °F (35 9 °C), Max:99 3 °F (37 4 °C)  Current: Temperature: 97 9 °F (36 6 °C)    Physical Exam:     General: Awake, alert, cooperative, no distress  Lungs: Decreased breath sounds, bibasilar rales, no wheezing, respirations unlabored  Heart[de-identified]  Tachycardic with regular rhythm, S1 and S2 normal, no murmur  Abdomen: Soft, nondistended, non-tender, bowel sounds active all four quadrants,        no masses, no organomegaly  Extremities: Right foot exam in a dressing change  First toe amputation site with some necrosis  Second and 4th toe are no gangrenous  Third toe is ischemic  Foot is erythematous  No obvious purulence  Moderate tenderness  Moderate edema  Skin:  No rash  Invasive Devices     Peripheral Intravenous Line            Peripheral IV 18 Left Antecubital 1 day    Peripheral IV 18 Left Forearm less than 1 day    Peripheral IV 18 Right Arm less than 1 day                Labs studies:   I have personally reviewed pertinent labs      Results from last 7 days  Lab Units 18  0418 18  0246 18  1501 18  1002  18  1829   SODIUM mmol/L 140  --  138 136  < > 135*   POTASSIUM mmol/L 3 7  --  3 2* 4 0  < > 3 8   CHLORIDE mmol/L 110*  --  108 102  < > 101   CO2 mmol/L 23  --  18* 18*  < > 28   ANION GAP mmol/L 7  --  12 16*  < > 6   BUN mg/dL 27*  --  27* 25  < > 19   CREATININE mg/dL 0 78  --  0 96 0 95  < > 0 84   EGFR ml/min/1 73sq m 93  --  81 82  < > 90   GLUCOSE RANDOM mg/dL 178*  --  201* 326*  < > 163*   GLUCOSE, ISTAT mg/dl  --  203*  --   --   < >  --    CALCIUM mg/dL 7 7*  --  7 4* 8 1*  < > 8 1*   AST U/L 438*  --   --  79*  --  76*   ALT U/L 160*  --   --  26  --  27   ALK PHOS U/L 134*  --   --  122*  --  110   TOTAL PROTEIN g/dL 7 2  --   --  7 6  --  7 1   BILIRUBIN TOTAL mg/dL 0 50  --   --  0 84  --  0 79   < > = values in this interval not displayed  Results from last 7 days  Lab Units 02/08/18  0418 02/08/18  0246 02/07/18  1002  02/07/18  0656   WBC Thousand/uL 25 79*  --  27 38*  --  23 74*   HEMOGLOBIN g/dL 10 9*  --  11 2*  --  11 6*   I STAT HEMOGLOBIN g/dl  --  9 2*  --   < >  --    PLATELETS Thousands/uL 298  --  364  --  299   < > = values in this interval not displayed  Results from last 7 days  Lab Units 02/06/18  1935 02/06/18  1756 02/05/18  2314 02/05/18  1721 02/05/18  1714   BLOOD CULTURE  No Growth at 24 hrs   --   --  No Growth at 48 hrs  No Growth at 48 hrs  GRAM STAIN RESULT   --  No polys seen  1+ Gram positive cocci in clusters No polys seen  3+ Gram positive cocci in pairs  2+ Gram positive cocci in clusters  2+ Gram negative rods  --   --    WOUND CULTURE   --   --  4+ Growth of Staphylococcus aureus*  --   --        Imaging Studies:   I have personally reviewed pertinent imaging study reports and images in PACS  CXR reviewed personally  Bilateral infiltrates, more consistent with pulmonary edema than consolidations  Head CT reviewed personally  Small area of hyperdensity in the right frontal lobe  No other abnormalities      EKG, Pathology, and Other Studies:   I have personally reviewed pertinent reports

## 2018-02-08 NOTE — MEDICAL STUDENT
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)    Cellulitis of right foot    Metabolic acidosis    Acute metabolic encephalopathy    Abnormal CT of the head  Resolved Problems:    Hyponatremia    Gangrenous toe (HCC)    Plan:   · Neuro:   · Altered mental status, unclear etiology, likely component metabolic encephalopathy  · Transferred to ICU from floors overnight for rapid response; pt responded with narcan at time however had not received narcotics in 24h prior  · Abnormal CT head 2/8 from rapid response - showed small hyperdensity likely  parenchymal contusion and associated subarachnoid hyemorrhage  · MRI brain pending  · Neurosurgery consult was placed  · Non-focal neuro exam - cont neuro checks  · Diabetic neuropathy - continue home gabpentin and lyrica  · Fentanyl PRN for pain,   · CV:   · NSTEMI II 2/2 Congestive HF, unclear inciting factor  · Elevated BNP 19,042  · ECG without any major findngs  · Troponin 5 45  · Given 1x 20mg lasix  · Stat Echo pending  · PAD, RLE arteriogram 2/6 with high grade popliteal artery stenosis s/p atherectomy and STEW PTA, posterior tibial artery stenosis s/p balloon angioplasty  · HTN - home dose lopressor held yesterday for hypotension; restart when BP stable  · Lung:   · Acute respiratory failure, req BIPAP, multifacotrial  · Component multifocal PNA on CXR 2/6  · CXR 2/8 showed increased opacities in mid to lower lung fields bilaterally suggestive of pulm edema vs PNA; and obscuration of left hemidiaphragm from pleural effusion vs consolidation  · Cardiac component causing SOB with pulm congestion on CXR and elevated BNP 38468  · cont abx see ID   · GI:   · Transaminitis - unclear etiology, likely ischemic/congestive vs sepsis vs hepatitis  · Trend LFTs  · FEN:   · NPO until improvement of respiratory status; return to dysphagia diet when stable  · Phos  · : no active issues, UOP at 0 8cc/kg/h appropriate  · ID:   · Sepsis, likely 2/2 gangrenous R great toe, possible component of multifocal PNA  · Leukocytosis, persistent from admission, 25 79 from 27 28   · Blood clx 2/5 negative 48h x2  · Blood clx 2/6 negative 24h  · Wound and tissue clx 2/6 both growing 4+ staph aureus  · Initially given ancef/flagyl on admission, broadened to cefepime, vancomycin, flagyl  abx day#4  · Heme: leukocytosis as noted in ID  · Endo:   · Uncontrolled DM2 with hyperglycemia  · Currently on insulin gtt per endocrinology rec  · Msk/Skin:   · Right great toe distal foot gangrene and osteomyelitis on XR, s/p decompressive open 1st toe guillotine amputation POD#2  · Appreciate ID rec  · given distal foot necrosis, will likely need transmetatarsal amputation planned for Friday per podiatry  · Continue with abx as in ID   · Disposition: step down    ______________________________________________________________________    HPI/24hr events: Admitted 2/5 for increasing pain and skin discoloration to right great toe, found to have osteomyelitis and wet gangrene  S/p R great toe amputation 2/6 and broad abx per ID  S/p RLE arteriogram and intervention 2/6  On morning of 2/7 had period of unresponsiveness that improved with flumazenil 0 5mg; at the time was hypotensive with metabolic acidosis  Rapid response was called overnight for AMS  He was tachypneic and placed on BIPAP 14/5 then found to be unresponsive and nonreponsive for the nurse  Given narcan with some response  CTH, CXR ordered   Pt transferred to MICU    ______________________________________________________________________  Physical Exam:     Physical Exam   Constitutional: He is oriented to person, place, and time  He appears well-developed and well-nourished  No distress  HENT:   Head: Normocephalic and atraumatic  Mouth/Throat: Oropharynx is clear and moist    Eyes: EOM are normal  Pupils are equal, round, and reactive to light  Neck: Normal range of motion  No JVD present  Cardiovascular: Normal rate, regular rhythm and normal heart sounds  Exam reveals no friction rub  No murmur heard  Pulmonary/Chest: Breath sounds normal  He has no wheezes  He has no rales  Abdominal: Soft  Bowel sounds are normal  He exhibits no distension  There is no tenderness  Musculoskeletal: He exhibits no edema  Right foot: There is deformity  Feet:    Neurological: He is alert and oriented to person, place, and time  Skin: Skin is warm and dry      ______________________________________________________________________  Temperature:   Temp (24hrs), Av 1 °F (36 7 °C), Min:96 7 °F (35 9 °C), Max:99 3 °F (37 4 °C)    Current Temperature: (!) 96 7 °F (35 9 °C)    Vitals:    18 0426 18 0432 18 0433 18 0452   BP: 166/80 168/81  161/80   BP Location:       Pulse: 102 102  98   Resp: (!) 43 (!) 40  (!) 37   Temp:  (!) 96 7 °F (35 9 °C)     TempSrc:  Rectal     SpO2: 91% 91% 95% 98%   Weight:       Height:                 Weights:   IBW: 68 4 kg    Body mass index is 20 27 kg/m²  Weight (last 2 days)     None        Height: 5' 8" (172 7 cm)    No results found for: PHART, AZK7YUH, PO2ART, UNS0GBL, E5KKDDXN, BEART, SOURCE    Intake and Outputs:  I/O        07 -  - 700    P  O  0 220     I V  (mL/kg) 3222 9 (53 3) 2120 8 (35 1)     IV Piggyback 250      Total Intake(mL/kg) 3472 9 (57 4) 2340 8 (38 7)     Urine (mL/kg/hr)  1150 (0 8)     Stool  0 (0)     Total Output 1150      Net +3472 9 +1190 8             Unmeasured Urine Occurrence 1 x 1 x     Unmeasured Stool Occurrence  3 x         UOP:  8cc/kg/hour   Nutrition:        Diet Orders            Start     Ordered    02/08/18 0359  Diet NPO  Diet effective now     Question Answer Comment   Diet Type NPO    RD to adjust diet per protocol? Yes        02/08/18 0401    02/06/18 1532  Room Service  Once     Question:  Type of Service  Answer:  Room Service-Appropriate    02/06/18 1531          Labs:     Results from last 7 days  Lab Units 02/08/18 0418 02/08/18  0246 02/07/18  1002  02/07/18  0656 02/06/18  1829 02/06/18  0508 02/05/18  1714   WBC Thousand/uL 25 79*  --  27 38*  --  23 74* 20 61* 18 86* 23 06*   HEMOGLOBIN g/dL 10 9*  --  11 2*  --  11 6* 11 1* 10 9* 12 2   I STAT HEMOGLOBIN g/dl  --  9 2*  --   < >  --   --   --   --    HEMATOCRIT % 31 9*  --  34 2*  --  34 0* 32 3* 31 1* 34 8*   PLATELETS Thousands/uL 298  --  364  --  299 282 306 293   NEUTROS PCT %  --   --   --   --   --  88* 88* 87*   MONOS PCT %  --   --   --   --   --  5 5 6   MONO PCT MAN % 3*  --  3*  --   --   --   --   --    < > = values in this interval not displayed  Results from last 7 days  Lab Units 02/08/18 0418 02/08/18  0246 02/07/18  1501 02/07/18  1002  02/06/18  1829   SODIUM mmol/L 140  --  138 136  < > 135*   POTASSIUM mmol/L 3 7  --  3 2* 4 0  < > 3 8   CHLORIDE mmol/L 110*  --  108 102  < > 101   CO2 mmol/L 23  --  18* 18*  < > 28   BUN mg/dL 27*  --  27* 25  < > 19   CREATININE mg/dL 0 78  --  0 96 0 95  < > 0 84   CALCIUM mg/dL 7 7*  --  7 4* 8 1*  < > 8 1*   TOTAL PROTEIN g/dL 7 2  --   --  7 6  --  7 1   BILIRUBIN TOTAL mg/dL 0 50  --   --  0 84  --  0 79   ALK PHOS U/L 134*  --   --  122*  --  110   ALT U/L 160*  --   --  26  --  27   AST U/L 438*  --   --  79*  --  76*   GLUCOSE RANDOM mg/dL 178*  --  201* 326*  < > 163*   GLUCOSE, ISTAT mg/dl  --  203*  --   --   < >  --    < > = values in this interval not displayed      Results from last 7 days  Lab Units 18  0418 18  1501 18  1002   MAGNESIUM mg/dL 2 2 2 4 2 5       Results from last 7 days  Lab Units 18  0418 18  1501 18  1002   PHOSPHORUS mg/dL 1 1* 1 8* 4 0        Results from last 7 days  Lab Units 18  1714   INR  1 30*   PTT seconds 38*       Results from last 7 days  Lab Units 18  0418   LACTIC ACID mmol/L 3 1*     No results found for: TROPONINI  Imaging:   CXR : IMPRESSION:  Increased opacity in the mid to lower lung fields bilaterally which may represent pulmonary edema versus pneumonia  Obscuration of the left hemidiaphragm may be due to pleural effusion and/or consolidation  CTH : IMPRESSION:  Small hyperdensity in the parafalcine right frontal lobe with surrounding mild serpiginous hyperdensity which likely reflects parenchymal contusion and associated subarachnoid hemorrhage  No significant mass effect or midline shift  If there is no   history of trauma, further evaluation with MRI may be obtained to evaluate for underlying lesion  EK/8 inverted T waves in lateral leads    Micro:  Blood Culture:   Lab Results   Component Value Date    BLOODCX No Growth at 24 hrs  2018    BLOODCX No Growth at 48 hrs  2018    BLOODCX No Growth at 48 hrs  2018     Urine Culture: No results found for: URINECX  Sputum Culture: No components found for: SPUTUMCX  Wound Culure:   Lab Results   Component Value Date    WOUNDCULT 4+ Growth of Staphylococcus aureus (A) 2018       Lab Results   Component Value Date    BLOODCX No Growth at 24 hrs  2018    BLOODCX No Growth at 48 hrs  2018    BLOODCX No Growth at 48 hrs  2018    WOUNDCULT 4+ Growth of Staphylococcus aureus (A) 2018     Allergies: Allergies   Allergen Reactions    Penicillins      Pt stated that he had a reaction to it a long time ago    Childhood history     Medications:   Scheduled Meds:  Current Facility-Administered Medications:  [MAR Hold] acetaminophen 325 mg Oral Q6H PRN Derick Griffin PA-C    cefepime 2,000 mg Intravenous Q12H Enrique Govea MD Last Rate: Stopped (02/08/18 0423)   dextrose 5 % and sodium chloride 0 9 % 250 mL/hr Intravenous Continuous Sharee Dahl MD Last Rate: 250 mL/hr (02/07/18 2154)   gabapentin 600 mg Oral HS Naveen Michelle MD    insulin regular (HumuLIN R,NovoLIN R) infusion 0 3-21 Units/hr Intravenous Titrated Arslan Bell MD Last Rate: Stopped (02/08/18 0458)   [MAR Hold] levalbuterol 1 25 mg Nebulization TID Taylor Johns MD    [MAR Hold] levalbuterol 1 25 mg Nebulization Q4H PRN Taylor Johns MD    [MAR Hold] metroNIDAZOLE 500 mg Intravenous Q8H Sharee Dahl MD Last Rate: 500 mg (02/08/18 0539)   potassium phosphate 30 mmol Intravenous Once 9032 Anmol Garcia DO Last Rate: 30 mmol (02/08/18 0532)   pregabalin 75 mg Oral HS Naveen Michelle MD    [MAR Hold] sodium chloride 3 mL Nebulization TID Yovany Zazueta MD    [MAR Hold] sodium chloride 3 mL Nebulization Q4H PRN Taylor Johns MD    vancomycin 15 mg/kg Intravenous Q12H Enrique Govea MD Last Rate: Stopped (02/08/18 0423)     Continuous Infusions:  dextrose 5 % and sodium chloride 0 9 % 250 mL/hr Last Rate: 250 mL/hr (02/07/18 2154)   insulin regular (HumuLIN R,NovoLIN R) infusion 0 3-21 Units/hr Last Rate: Stopped (02/08/18 0458)     PRN Meds:    [MAR Hold] acetaminophen 325 mg Q6H PRN   [MAR Hold] levalbuterol 1 25 mg Q4H PRN   [MAR Hold] sodium chloride 3 mL Q4H PRN     VTE Pharmacologic Prophylaxis: Sequential compression device (Venodyne)   VTE Mechanical Prophylaxis: sequential compression device  Invasive lines and devices:   Invasive Devices     Peripheral Intravenous Line            Peripheral IV 02/07/18 Left Antecubital less than 1 day    Peripheral IV 02/08/18 Left Forearm less than 1 day    Peripheral IV 02/08/18 Right Arm less than 1 day                   Code Status: Level 1 - Full Code    Portions of the record may have been created with voice recognition software  Occasional wrong word or "sound a like" substitutions may have occurred due to the inherent limitations of voice recognition software  Read the chart carefully and recognize, using context, where substitutions have occurred      Vanessa Ellis

## 2018-02-08 NOTE — PROGRESS NOTES
Per Dr Masha Lima, Q2 blood sugar checks, and to start on algorithm 1 of new order  Will continue to assess and change per protocol

## 2018-02-09 ENCOUNTER — ANESTHESIA (OUTPATIENT)
Dept: PERIOP | Facility: HOSPITAL | Age: 68
End: 2018-02-09

## 2018-02-09 ENCOUNTER — APPOINTMENT (INPATIENT)
Dept: RADIOLOGY | Facility: HOSPITAL | Age: 68
DRG: 853 | End: 2018-02-09
Payer: COMMERCIAL

## 2018-02-09 PROBLEM — D32.9 MENINGIOMA (HCC): Status: ACTIVE | Noted: 2018-02-09

## 2018-02-09 PROBLEM — I34.0 MODERATE MITRAL REGURGITATION: Status: ACTIVE | Noted: 2018-02-09

## 2018-02-09 PROBLEM — E87.6 HYPOKALEMIA: Status: ACTIVE | Noted: 2018-02-09

## 2018-02-09 PROBLEM — I50.9 ACUTE CONGESTIVE HEART FAILURE (HCC): Status: ACTIVE | Noted: 2018-02-09

## 2018-02-09 PROBLEM — E87.2 METABOLIC ACIDOSIS: Status: RESOLVED | Noted: 2018-02-07 | Resolved: 2018-02-09

## 2018-02-09 LAB
ALBUMIN SERPL BCP-MCNC: 1.8 G/DL (ref 3.5–5)
ALP SERPL-CCNC: 107 U/L (ref 46–116)
ALT SERPL W P-5'-P-CCNC: 125 U/L (ref 12–78)
ANION GAP SERPL CALCULATED.3IONS-SCNC: 9 MMOL/L (ref 4–13)
APTT PPP: 93 SECONDS (ref 23–35)
AST SERPL W P-5'-P-CCNC: 183 U/L (ref 5–45)
ATRIAL RATE: 102 BPM
ATRIAL RATE: 97 BPM
BACTERIA SPEC ANAEROBE CULT: ABNORMAL
BACTERIA SPEC ANAEROBE CULT: ABNORMAL
BACTERIA TISS AEROBE CULT: ABNORMAL
BACTERIA WND AEROBE CULT: ABNORMAL
BASOPHILS # BLD AUTO: 0.03 THOUSANDS/ΜL (ref 0–0.1)
BASOPHILS NFR BLD AUTO: 0 % (ref 0–1)
BILIRUB SERPL-MCNC: 0.82 MG/DL (ref 0.2–1)
BUN SERPL-MCNC: 22 MG/DL (ref 5–25)
CALCIUM SERPL-MCNC: 7.8 MG/DL (ref 8.3–10.1)
CHLORIDE SERPL-SCNC: 107 MMOL/L (ref 100–108)
CHOLEST SERPL-MCNC: 83 MG/DL (ref 50–200)
CO2 SERPL-SCNC: 25 MMOL/L (ref 21–32)
CREAT SERPL-MCNC: 0.66 MG/DL (ref 0.6–1.3)
EOSINOPHIL # BLD AUTO: 0.01 THOUSAND/ΜL (ref 0–0.61)
EOSINOPHIL NFR BLD AUTO: 0 % (ref 0–6)
ERYTHROCYTE [DISTWIDTH] IN BLOOD BY AUTOMATED COUNT: 13.1 % (ref 11.6–15.1)
GFR SERPL CREATININE-BSD FRML MDRD: 99 ML/MIN/1.73SQ M
GLUCOSE SERPL-MCNC: 117 MG/DL (ref 65–140)
GLUCOSE SERPL-MCNC: 122 MG/DL (ref 65–140)
GLUCOSE SERPL-MCNC: 126 MG/DL (ref 65–140)
GLUCOSE SERPL-MCNC: 130 MG/DL (ref 65–140)
GLUCOSE SERPL-MCNC: 130 MG/DL (ref 65–140)
GLUCOSE SERPL-MCNC: 141 MG/DL (ref 65–140)
GLUCOSE SERPL-MCNC: 145 MG/DL (ref 65–140)
GLUCOSE SERPL-MCNC: 152 MG/DL (ref 65–140)
GLUCOSE SERPL-MCNC: 153 MG/DL (ref 65–140)
GLUCOSE SERPL-MCNC: 154 MG/DL (ref 65–140)
GLUCOSE SERPL-MCNC: 175 MG/DL (ref 65–140)
GLUCOSE SERPL-MCNC: 217 MG/DL (ref 65–140)
GLUCOSE SERPL-MCNC: 247 MG/DL (ref 65–140)
GRAM STN SPEC: ABNORMAL
HCT VFR BLD AUTO: 29.6 % (ref 36.5–49.3)
HDLC SERPL-MCNC: 21 MG/DL (ref 40–60)
HGB BLD-MCNC: 10.2 G/DL (ref 12–17)
INR PPP: 1.85 (ref 0.86–1.16)
LDLC SERPL CALC-MCNC: 50 MG/DL (ref 0–100)
LYMPHOCYTES # BLD AUTO: 1.16 THOUSANDS/ΜL (ref 0.6–4.47)
LYMPHOCYTES NFR BLD AUTO: 9 % (ref 14–44)
MAGNESIUM SERPL-MCNC: 2.1 MG/DL (ref 1.6–2.6)
MCH RBC QN AUTO: 30.7 PG (ref 26.8–34.3)
MCHC RBC AUTO-ENTMCNC: 34.5 G/DL (ref 31.4–37.4)
MCV RBC AUTO: 89 FL (ref 82–98)
MONOCYTES # BLD AUTO: 0.3 THOUSAND/ΜL (ref 0.17–1.22)
MONOCYTES NFR BLD AUTO: 2 % (ref 4–12)
NEUTROPHILS # BLD AUTO: 11.1 THOUSANDS/ΜL (ref 1.85–7.62)
NEUTS SEG NFR BLD AUTO: 89 % (ref 43–75)
NRBC BLD AUTO-RTO: 0 /100 WBCS
P AXIS: 62 DEGREES
P AXIS: 75 DEGREES
PHOSPHATE SERPL-MCNC: 1.5 MG/DL (ref 2.3–4.1)
PLATELET # BLD AUTO: 323 THOUSANDS/UL (ref 149–390)
PMV BLD AUTO: 10.7 FL (ref 8.9–12.7)
POTASSIUM SERPL-SCNC: 3.4 MMOL/L (ref 3.5–5.3)
PR INTERVAL: 179 MS
PR INTERVAL: 204 MS
PROT SERPL-MCNC: 6.4 G/DL (ref 6.4–8.2)
PROTHROMBIN TIME: 21.5 SECONDS (ref 12.1–14.4)
QRS AXIS: 46 DEGREES
QRS AXIS: 60 DEGREES
QRSD INTERVAL: 79 MS
QRSD INTERVAL: 88 MS
QT INTERVAL: 379 MS
QT INTERVAL: 392 MS
QTC INTERVAL: 482 MS
QTC INTERVAL: 511 MS
RBC # BLD AUTO: 3.32 MILLION/UL (ref 3.88–5.62)
SODIUM SERPL-SCNC: 141 MMOL/L (ref 136–145)
T WAVE AXIS: 13 DEGREES
T WAVE AXIS: 25 DEGREES
TRIGL SERPL-MCNC: 59 MG/DL
TROPONIN I SERPL-MCNC: 4.94 NG/ML
VENTRICULAR RATE: 102 BPM
VENTRICULAR RATE: 97 BPM
WBC # BLD AUTO: 12.71 THOUSAND/UL (ref 4.31–10.16)

## 2018-02-09 PROCEDURE — 94760 N-INVAS EAR/PLS OXIMETRY 1: CPT

## 2018-02-09 PROCEDURE — 80061 LIPID PANEL: CPT | Performed by: INTERNAL MEDICINE

## 2018-02-09 PROCEDURE — 84484 ASSAY OF TROPONIN QUANT: CPT | Performed by: NURSE PRACTITIONER

## 2018-02-09 PROCEDURE — 83735 ASSAY OF MAGNESIUM: CPT | Performed by: NURSE PRACTITIONER

## 2018-02-09 PROCEDURE — 85610 PROTHROMBIN TIME: CPT | Performed by: NURSE PRACTITIONER

## 2018-02-09 PROCEDURE — 99233 SBSQ HOSP IP/OBS HIGH 50: CPT | Performed by: INTERNAL MEDICINE

## 2018-02-09 PROCEDURE — 85730 THROMBOPLASTIN TIME PARTIAL: CPT | Performed by: EMERGENCY MEDICINE

## 2018-02-09 PROCEDURE — 93010 ELECTROCARDIOGRAM REPORT: CPT | Performed by: INTERNAL MEDICINE

## 2018-02-09 PROCEDURE — 82948 REAGENT STRIP/BLOOD GLUCOSE: CPT

## 2018-02-09 PROCEDURE — 71045 X-RAY EXAM CHEST 1 VIEW: CPT

## 2018-02-09 PROCEDURE — 99291 CRITICAL CARE FIRST HOUR: CPT | Performed by: INTERNAL MEDICINE

## 2018-02-09 PROCEDURE — 84100 ASSAY OF PHOSPHORUS: CPT | Performed by: NURSE PRACTITIONER

## 2018-02-09 PROCEDURE — 85025 COMPLETE CBC W/AUTO DIFF WBC: CPT | Performed by: NURSE PRACTITIONER

## 2018-02-09 PROCEDURE — 94660 CPAP INITIATION&MGMT: CPT

## 2018-02-09 PROCEDURE — 99222 1ST HOSP IP/OBS MODERATE 55: CPT | Performed by: INTERNAL MEDICINE

## 2018-02-09 PROCEDURE — 80053 COMPREHEN METABOLIC PANEL: CPT | Performed by: NURSE PRACTITIONER

## 2018-02-09 RX ORDER — POTASSIUM CHLORIDE 14.9 MG/ML
20 INJECTION INTRAVENOUS ONCE
Status: DISCONTINUED | OUTPATIENT
Start: 2018-02-09 | End: 2018-02-11

## 2018-02-09 RX ORDER — FUROSEMIDE 10 MG/ML
20 INJECTION INTRAMUSCULAR; INTRAVENOUS
Status: DISCONTINUED | OUTPATIENT
Start: 2018-02-09 | End: 2018-02-09

## 2018-02-09 RX ORDER — HEPARIN SODIUM 10000 [USP'U]/100ML
3-20 INJECTION, SOLUTION INTRAVENOUS
Status: DISCONTINUED | OUTPATIENT
Start: 2018-02-09 | End: 2018-02-11

## 2018-02-09 RX ORDER — ATORVASTATIN CALCIUM 80 MG/1
80 TABLET, FILM COATED ORAL
Status: DISCONTINUED | OUTPATIENT
Start: 2018-02-09 | End: 2018-02-21 | Stop reason: HOSPADM

## 2018-02-09 RX ORDER — POTASSIUM CHLORIDE 14.9 MG/ML
20 INJECTION INTRAVENOUS ONCE
Status: COMPLETED | OUTPATIENT
Start: 2018-02-09 | End: 2018-02-09

## 2018-02-09 RX ORDER — ASPIRIN 81 MG/1
81 TABLET, CHEWABLE ORAL DAILY
Status: DISCONTINUED | OUTPATIENT
Start: 2018-02-09 | End: 2018-02-21 | Stop reason: HOSPADM

## 2018-02-09 RX ORDER — HEPARIN SODIUM 1000 [USP'U]/ML
3600 INJECTION, SOLUTION INTRAVENOUS; SUBCUTANEOUS ONCE
Status: COMPLETED | OUTPATIENT
Start: 2018-02-09 | End: 2018-02-09

## 2018-02-09 RX ORDER — FUROSEMIDE 10 MG/ML
40 INJECTION INTRAMUSCULAR; INTRAVENOUS EVERY 8 HOURS
Status: DISCONTINUED | OUTPATIENT
Start: 2018-02-09 | End: 2018-02-13

## 2018-02-09 RX ADMIN — HEPARIN SODIUM AND DEXTROSE 12 UNITS/KG/HR: 10000; 5 INJECTION INTRAVENOUS at 11:52

## 2018-02-09 RX ADMIN — METRONIDAZOLE 500 MG: 500 INJECTION, SOLUTION INTRAVENOUS at 06:09

## 2018-02-09 RX ADMIN — FUROSEMIDE 40 MG: 10 INJECTION, SOLUTION INTRAMUSCULAR; INTRAVENOUS at 15:45

## 2018-02-09 RX ADMIN — CEFAZOLIN SODIUM 2000 MG: 2 SOLUTION INTRAVENOUS at 17:37

## 2018-02-09 RX ADMIN — CEFAZOLIN SODIUM 2000 MG: 2 SOLUTION INTRAVENOUS at 10:59

## 2018-02-09 RX ADMIN — POTASSIUM CHLORIDE 20 MEQ: 200 INJECTION, SOLUTION INTRAVENOUS at 11:01

## 2018-02-09 RX ADMIN — GABAPENTIN 600 MG: 300 CAPSULE ORAL at 22:35

## 2018-02-09 RX ADMIN — VANCOMYCIN HYDROCHLORIDE 1000 MG: 1 INJECTION, SOLUTION INTRAVENOUS at 02:17

## 2018-02-09 RX ADMIN — HEPARIN SODIUM 3600 UNITS: 1000 INJECTION, SOLUTION INTRAVENOUS; SUBCUTANEOUS at 11:51

## 2018-02-09 RX ADMIN — METOPROLOL TARTRATE 25 MG: 25 TABLET ORAL at 12:05

## 2018-02-09 RX ADMIN — POTASSIUM CHLORIDE 20 MEQ: 200 INJECTION, SOLUTION INTRAVENOUS at 07:42

## 2018-02-09 RX ADMIN — FUROSEMIDE 20 MG: 10 INJECTION, SOLUTION INTRAMUSCULAR; INTRAVENOUS at 07:42

## 2018-02-09 RX ADMIN — CEFEPIME HYDROCHLORIDE 2000 MG: 2 INJECTION, POWDER, FOR SOLUTION INTRAVENOUS at 01:09

## 2018-02-09 RX ADMIN — POTASSIUM PHOSPHATE, MONOBASIC AND POTASSIUM PHOSPHATE, DIBASIC 30 MMOL: 224; 236 INJECTION, SOLUTION INTRAVENOUS at 08:44

## 2018-02-09 RX ADMIN — METRONIDAZOLE 500 MG: 500 INJECTION, SOLUTION INTRAVENOUS at 22:38

## 2018-02-09 RX ADMIN — ATORVASTATIN CALCIUM 80 MG: 80 TABLET, FILM COATED ORAL at 15:45

## 2018-02-09 RX ADMIN — PREGABALIN 75 MG: 75 CAPSULE ORAL at 22:35

## 2018-02-09 RX ADMIN — POTASSIUM CHLORIDE 20 MEQ: 200 INJECTION, SOLUTION INTRAVENOUS at 09:53

## 2018-02-09 RX ADMIN — ASPIRIN 81 MG 81 MG: 81 TABLET ORAL at 11:52

## 2018-02-09 RX ADMIN — METOPROLOL TARTRATE 25 MG: 25 TABLET ORAL at 22:36

## 2018-02-09 RX ADMIN — METRONIDAZOLE 500 MG: 500 INJECTION, SOLUTION INTRAVENOUS at 14:16

## 2018-02-09 RX ADMIN — FUROSEMIDE 40 MG: 10 INJECTION, SOLUTION INTRAMUSCULAR; INTRAVENOUS at 22:45

## 2018-02-09 NOTE — PROGRESS NOTES
Progress Note - Infectious Disease   Alec Hurley 75 y o  male MRN: [id number]  Unit/Bed#: MICU 12 Encounter: [id number]      Impression/Recommendations:  1   Severe sepsis   Source of sepsis is most likely right foot infection  Lactic acidosis persists  Temperature is down but leukocytosis persists  I suspect lack of clinical improvement is due to persistent source  I suspect the patient will not get much better unless his sources removed (TMA)  Plan for TMA later today noted  Blood cultures remain negative so far  Antibiotic plan as in below  Monitor hemodynamics  Follow-up on pending blood cultures      2  Right 1st toe in distal foot wet gangrene  Patient is status post open 1st toe amputation  However, he has gangrenous changes in his other toes also  Given lack of clinical improvement, patient will need TMA sooner rather than later  Wound culture growing MSSA  Unless deep wound is pristine, I would recommend open TMA with VAC dressing for few days, prior to wound closure      Deescalate antibiotic regimen to high-dose IV cefazolin/Flagyl  Serial foot exams  Follow-up on final wound culture  Plan for TMA later today noted  Recommend open TMA, as in above      3   Right 1st toe osteomyelitis   This is quite definitive foot x-ray   Clinical picture is also suggestive   Patient is status post open right 1st toe amputation  Antibiotic as in above      4   PVD    Patient is status post arteriogram with successful intervention   Circulatory status is now optimized  Vascular surgery following      5   Acute hypoxic respiratory failure  I suspect this is secondary to sepsis and pulmonary edema  No obvious pneumonia on CXR  Patient remains stable on BiPAP  O2 support per Critical Care Medicine Service      6  DM with hyperglycemia on admission   Patient appears noncompliant with diabetic medication   This clearly contributes to infection development    Blood sugar management per Medicine Service      7   PCN allergy on allergy list   Patient does not recall reaction  Urszula Lee states that this was when he was a young child  Urszula Lee is tolerating cephalosporin well      Discussed with patient and his daughter in detail regarding the above plan     Discussed with Dr Tavo Lee from Podiatry service  Discussed with Critical Care Medicine Service      Antibiotics:  Vancomycin/cefepime/Flagyl  POD # 3     Subjective:  Patient is stable  He remains in ICU  He remains on BiPAP  He is drowsy but awake and alert  Stable foot pain  Temperature is down   No chills  He is tolerating antibiotics well   No nausea, vomiting or diarrhea   No dizziness or hearing loss  Objective:  Vitals:  HR:  [] 108  Resp:  [24-42] 39  BP: (126-159)/(71-82) 151/79  SpO2:  [97 %-100 %] 98 %  Temp (24hrs), Av 9 °F (36 6 °C), Min:97 4 °F (36 3 °C), Max:98 2 °F (36 8 °C)  Current: Temperature: 97 9 °F (36 6 °C)    Physical Exam:     General: Awake, alert, cooperative, no distress  Lungs: Expansion symmetric, mild bibasilar rales, no wheezing, respirations unlabored  Heart[de-identified]  Tachycardic with regular rhythm, S1 and S2 normal, no murmur  Abdomen: Soft, nondistended, non-tender, bowel sounds active all four quadrants,        no masses, no organomegaly  Extremities: Right foot with dressing in place  Dressing is dry  Improve edema  Improved tenderness  No erythema beyond dressing      Skin:  No rash  Invasive Devices     Peripheral Intravenous Line            Peripheral IV 18 Left Antecubital 2 days    Peripheral IV 18 Left Forearm 1 day    Peripheral IV 18 Right Arm 1 day                Labs studies:   I have personally reviewed pertinent labs      Results from last 7 days  Lab Units 18  0614 18  0418 18  0246 18  1501 18  1002   SODIUM mmol/L 141 140  --  138 136   POTASSIUM mmol/L 3 4* 3 7  --  3 2* 4 0   CHLORIDE mmol/L 107 110*  --  108 102   CO2 mmol/L 25 23  --  18* 18*   ANION GAP mmol/L 9 7  --  12 16*   BUN mg/dL 22 27*  --  27* 25   CREATININE mg/dL 0 66 0 78  --  0 96 0 95   EGFR ml/min/1 73sq m 99 93  --  81 82   GLUCOSE RANDOM mg/dL 126 178*  --  201* 326*   GLUCOSE, ISTAT mg/dl  --   --  203*  --   --    CALCIUM mg/dL 7 8* 7 7*  --  7 4* 8 1*   AST U/L 183* 438*  --   --  79*   ALT U/L 125* 160*  --   --  26   ALK PHOS U/L 107 134*  --   --  122*   TOTAL PROTEIN g/dL 6 4 7 2  --   --  7 6   BILIRUBIN TOTAL mg/dL 0 82 0 50  --   --  0 84       Results from last 7 days  Lab Units 02/09/18  0614 02/08/18  0418 02/08/18  0246 02/07/18  1002   WBC Thousand/uL 12 71* 25 79*  --  27 38*   HEMOGLOBIN g/dL 10 2* 10 9*  --  11 2*   I STAT HEMOGLOBIN g/dl  --   --  9 2*  --    PLATELETS Thousands/uL 323 298  --  364       Results from last 7 days  Lab Units 02/06/18  1935 02/06/18  1756 02/05/18  2314 02/05/18  1721 02/05/18  1714   BLOOD CULTURE  No Growth at 48 hrs   --   --  No Growth at 72 hrs  No Growth at 72 hrs  GRAM STAIN RESULT   --  No polys seen  1+ Gram positive cocci in clusters No polys seen  3+ Gram positive cocci in pairs  2+ Gram positive cocci in clusters  2+ Gram negative rods  --   --    WOUND CULTURE   --   --  4+ Growth of Staphylococcus aureus*  --   --        Imaging Studies:   I have personally reviewed pertinent imaging study reports and images in PACS  CXR reviewed personally  Stable pulmonary edema  No consolidations  EKG, Pathology, and Other Studies:   I have personally reviewed pertinent reports

## 2018-02-09 NOTE — CASE MANAGEMENT
Continued Stay Review    Date: 2/9/18  Remains in MICU     Vital Signs: /75   Pulse 98   Temp 97 8 °F (36 6 °C) (Oral)   Resp (!) 33   Ht 5' 8" (1 727 m)   Wt 62 9 kg (138 lb 10 7 oz)   SpO2 96%   BMI 21 08 kg/m²     Medications:   Scheduled Meds:   Current Facility-Administered Medications:  acetaminophen 650 mg Oral Q6H PRN KathyARMANDO Phan    albuterol 2 5 mg Nebulization Q4H PRN Pily Rosa,     aspirin 81 mg Oral Daily ARMANDO Schmidt    atorvastatin 80 mg Oral Daily With Travis Tee MD    cefazolin 2,000 mg Intravenous Q8H Lenin Mota MD Last Rate: Stopped (02/09/18 1601)   fentanyl citrate (PF) 25 mcg Intravenous Q2H PRN ARMANDO Schmidt    furosemide 40 mg Intravenous Emerson Singh MD    gabapentin 600 mg Oral HS Vincent Bruce MD    heparin (porcine) 3-20 Units/kg/hr (Order-Specific) Intravenous Titrated Bobo Harding MD Last Rate: 12 Units/kg/hr (02/09/18 1152)   hydrALAZINE 10 mg Intravenous Q6H PRN ARMANDO Schmidt    insulin regular (HumuLIN R,NovoLIN R) infusion 0 3-21 Units/hr Intravenous Titrated Christoper Moritz, MD Last Rate: 1 Units/hr (02/09/18 1601)   metoprolol tartrate 25 mg Oral Q12H Joanna Rodriguez MD    metroNIDAZOLE 500 mg Intravenous Q8H Nellie Grossman MD Last Rate: Stopped (02/09/18 1601)   oxyCODONE 5 mg Oral Q4H PRN ARMANDO Schmidt    potassium chloride 20 mEq Intravenous Once ARMANDO Chávez    pregabalin 75 mg Oral HS Vincent Bruce MD      Continuous Infusions:   heparin (porcine) 3-20 Units/kg/hr (Order-Specific) Last Rate: 12 Units/kg/hr (02/09/18 1152)   insulin regular (HumuLIN R,NovoLIN R) infusion 0 3-21 Units/hr Last Rate: 1 Units/hr (02/09/18 1601)     PRN Meds:   acetaminophen    albuterol    fentanyl citrate (PF)    hydrALAZINE    oxyCODONE    Abnormal Labs/Diagnostic Results:    02/09/18 0614   Potassium 3 4     Calcium 7 8               Albumin 1 8       Trop 5 47, 5 55, 4 94   PT/INR 21 5/1 85  POC glucose 152, 154, 141     02/09/18 0614   WBC 12 71     RBC 3 32     Hemoglobin 10 2     Hematocrit 29 6      Age/Sex: 75 y o  male     Assessment/Plan:   2/9 Critical Care Progress Note  Assessment and Plan:   Principal Problem:    Sepsis ([facility])  Active Problems:    Gangrene of toe of right foot (HCC)    Great toe amputation status, right (HCC)    Popliteal artery stenosis, right (HCC)    Elevated brain natriuretic peptide (BNP) level    Acute congestive heart failure (HCC)    Moderate mitral regurgitation    Acute respiratory failure (HCC)    Abnormal CT of the head    Acute metabolic encephalopathy    Meningioma (HCC)    Transaminitis    Hypokalemia    Hypophosphatemia    Hypertension    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)    Coagulopathy (HCC)  Resolved Problems:    Hyponatremia    Gangrenous toe (HCC)    Metabolic acidosis      Neuro:  Encephalopathy toxic metabolic improved  Ammonia 27  CTH menigioma-continue serial neuro checks, avoid sedative medications/benzos     Abnormal CT of the head/meningioma per neurosurgery-outpt MRI, ok for anticoagulation     Neuropathy-home Lyrica/neurontin, prn fentanyl/oxy (0doses)  Mild dementia-baseline agitation     CV:  Acute valvular [place] 267[address]  ECHO EF 55% no RWMA 2+ MR- chest x-ray remains volume overloaded, Lasix 20 mg IV BID, goal neg 1-2L, monitor I/O, daily weights, resume bb/antihtn      NSTEMI type 2 in setting of sepsis/CHF--ekg no acute changes, echo no RWMA, resume BASA, f/u cards consult     Moderate MR     History of hypertension-resume home Lopressor/hyzaar post OR, p r n  hydralazine for SBP greater than 180     Pulm:   Acute respiratory failure secondary to CHF-Bipap PRN, diurese, respiratory protocol, oxygen for saturations greater than 92%,     GI:   Transaminitis likely 2/2 sepsis   Acute hep panel neg-abdominal exam benign, CMP for the a m , normal RV fx     :   No issues     F/E/N:  Hypokalemia/phos-replete     Persistent lactic acidosis likely 2/2 transaminitis--f/u am lactate     ID:   Sepsis present on admission secondary to right great toe osteo/gangrene  Blood cultures no growth, wound/tissue culture Staph aureus, fungal anaerobic/AFB pending-id following, continue cefepime/Flagyl/Vanco D4, narrow per ID is a recs, plan for TMA with Podiatry 1230 for source control     Heme:  Coagulopathy 2/2 transamintis--monitor     Endo:   DM 2 a1c9 7 blood sugars 121-154 -insulin drip 30U in last 24 hrs                Msk/Skin:   Postop day 3 from a right great toe amputation-podiatry following, antibiotics as above, will speak to Podiatry this morning given 2nd toe dusky/necrosis plan was for right TMA tomorrow, neurovascular checks q 1 hour posterior tib by Doppler     Postop day 3 right popliteal artery/posterior tibial artery angioplasty/stent-vascular surgery following, neurovascular checks q 1 hour, positive Dopplers bilateral     Disposition:   Cont diurese, or today with podiatry for TMA pending cards cx,      Code Status: Level 1 - Full Code     Counseling / Coordination of Care  Total time spent today 33 minutes  Greater than 50% of total time was spent with the patient and / or family counseling and / or coordination of care  A description of the counseling / coordination of care: plan of care    Chief Complaint:   Doesn't understand why he is here  Stating he needs his stent  Asking for water and off monitors     24 Hour Events:   Echo ef 55% 2+MR given lasix 20mg IV with 2 5L out neg 1 350  Trop peak 5 55, cards consult pending   bipap 4 hrs overnight for tachypnea  _________________________  2/9 Cardiology Progress Note   1  Elevated troponin likely NSTEMI  Patient had a troponin elevation at 5 45 that peaked at 5 5 and now is down trending to 4 94  Patient denies any chest pain, palpitations, abdominal pain, nausea, vomiting    EKG that was performed revealed sinus tachycardia and ST/T-wave changes  Telemetry reviewed no events have been noted  Patient has intermittently remain tachycardic however now improved  An echocardiogram was performed that revealed the left ventricular ejection fraction of 65%, no diastolic dysfunction was noted  Patient had a mitral regurgitation 2+ out of four  Patient also noted to have left pleural effusion  Patient has no known history of heart failure or murmurs per his family  Patient has been net positive 3 3 L since admission secondary to sepsis and DKA protocol  At this time patient should be medically managed, may need to pursue pulmonology goal stress test or cardiac catheterization in the future considering patient's poorly controlled vascular disease  Continue aspirin 81 mg, may restart metoprolol at a lower dose as he is intermittently tachycardic  Initiate Atorvastatin 80 mg daily  Follow up troponin and EKG in AM  Discussed with Podiatry, agreed to hold off on surgery at this time  We will aggressively diurese him, start heparin gtt, and medically manage  Will plan for surgery once medically stable  We will closely follow the patient       2   Elevated proBNP likely secondary to volume overload  Patient has no prior history of heart failure  Echocardiogram revealed 2+ mitral regurgitation with normal LV EF  Likely secondary to volume overload secondary to IV fluid resuscitation  Patient SpO2 100% on 6 L nasal cannula, has been off BiPAP  Patient receiving Lasix 20 mg IV twice daily  Increase IV Lasix 40 mg Q8 hours  Continue to monitor strict ins and outs       3  Hypokalemia  Patient has a low potassium at 3 2, this morning remain low at 3 4 with a normal magnesium at 2 1  Replete with a goal of potassium greater than four      4  Hypertension  Has been well controlled this admission    According to patient's family patient only takes metoprolol 50 mg twice daily at home  Will start low dose metoprolol   _______________________  2/9 Podiatry Progress Note  Assessment  1  Wet gangrene of right hallux and medial forefoot with cellulitis  2  Progress Dry gangrene 2nd digit right foot  3  PAD, status post angiogram with stenting  Viable posterior tibial runoff to the plantar arch  4  DM with neuropathy, A1c 9 7, per endo  5   Altered mental status     Plan:  1  I spent 45 minutes with the patient his wife and his daughter  I thoroughly explained the procedure  The wife and daughter are both in agreement and understand that at minimum the patient does require transmetatarsal amputation  There is some necrosis of the dorsal skin of his foot that most likely will require split-thickness skin graft which can be done at the same time  The patient will also need a wound VAC for an undetermined amount of time following surgery today  He did sign consent as did his wife for a right foot transmetatarsal amputation with debridement of wound and split-thickness skin graft  I feel all 3 members thoroughly understand the procedure plan today  All risks, alternatives, complications were discussed  They are well aware of the wound healing issues and need for possible further surgery in the future  The patient is NPO  I am currently awaiting Cardiology clearance for planned surgery 12:30 today  2  As with many limb salvage procedures, we contemplate the possibility of performing further stages to this procedure  Procedures may include debridements, delayed closure, plastic surgery techniques, or more proximal amputations  This procedure may be considered part of a multi-staged limb salvage treatment plan       I spoke with Dr Colin Grant of [facility] who feels patient is not stable enough for OR  The risk to his overall life is too much to proceed with foot salvage at this time  I will monitor the foot closely for now with daily washout  And dressing changes   Overall his foot is stable from an infection standpoint however I am concerned about long term salvageability  Both patient and family are aware of this concern    ________________________  2/9 ID Progress Note  1   Severe sepsis   Source of sepsis is most likely right foot infection   Lactic acidosis persists   Temperature is down but leukocytosis persists   I suspect lack of clinical improvement is due to persistent source   I suspect the patient will not get much better unless his sources removed (TMA)   Plan for TMA later today noted   Blood cultures remain negative so far  Antibiotic plan as in below  Monitor hemodynamics  Follow-up on pending blood cultures      2  Right 1st toe in distal foot wet gangrene   Patient is status post open 1st toe amputation  Joseph Figueredo, he has gangrenous changes in his other toes also   Given lack of clinical improvement, patient will need TMA sooner rather than later   Wound culture growing MSSA  Unless deep wound is pristine, I would recommend open TMA with VAC dressing for few days, prior to wound closure      Deescalate antibiotic regimen to high-dose IV cefazolin/Flagyl  Serial foot exams  Follow-up on final wound culture  Plan for TMA later today noted  Recommend open TMA, as in above      3   Right 1st toe osteomyelitis   This is quite definitive foot x-ray   Clinical picture is also suggestive   Patient is status post open right 1st toe amputation  Antibiotic as in above      4   PVD    Patient is status post arteriogram with successful intervention   Circulatory status is now optimized  Vascular surgery following      5   Acute hypoxic respiratory failure   I suspect this is secondary to sepsis and pulmonary edema   No obvious pneumonia on CXR  Patient remains stable on BiPAP  O2 support per Critical Care Medicine Service      6  DM with hyperglycemia on admission   Patient appears noncompliant with diabetic medication   This clearly contributes to infection development    Blood sugar management per Medicine Service      7   PCN allergy on allergy list   Patient does not recall reaction  [facility] states that this was when he was a young child  [facility] is tolerating cephalosporin well      Discussed with patient and his daughter in detail regarding the above plan     Discussed with Dr Kacie Bejarano from Podiatry service    Discussed with Critical Care Medicine Service      Antibiotics:  Vancomycin/cefepime/Flagyl  POD # 3    Discharge Plan: TBD

## 2018-02-09 NOTE — PROGRESS NOTES
I spoke with Dr Marquise Xiong of [facility] who feels patient is not stable enough for OR  The risk to his overall life is too much to proceed with foot salvage at this time  I will monitor the foot closely for now with daily washout  And dressing changes  Overall his foot is stable from an infection standpoint however I am concerned about long term salvageability  Both patient and family are aware of this concern

## 2018-02-09 NOTE — CONSULTS
Consultation - Cardiology   Mir Arauz 75 y o  male MRN: [id number]  [facility]: [id number]    Assessment/Plan     1  Elevated troponin likely NSTEMI  Patient had a troponin elevation at 5 45 that peaked at 5 5 and now is down trending to 4 94  Patient denies any chest pain, palpitations, abdominal pain, nausea, vomiting  EKG that was performed revealed sinus tachycardia and ST/T-wave changes  Telemetry reviewed no events have been noted  Patient has intermittently remain tachycardic however now improved  An echocardiogram was performed that revealed the left ventricular ejection fraction of 52%, no diastolic dysfunction was noted  Patient had a mitral regurgitation 2+ out of four  Patient also noted to have left pleural effusion  Patient has no known history of heart failure or murmurs per his family  Patient has been net positive 3 3 L since admission secondary to sepsis and DKA protocol  At this time patient should be medically managed, may need to pursue pulmonology goal stress test or cardiac catheterization in the future considering patient's poorly controlled vascular disease  Continue aspirin 81 mg, may restart metoprolol at a lower dose as he is intermittently tachycardic  Initiate Atorvastatin 80 mg daily  Follow up troponin and EKG in AM  Discussed with Podiatry, agreed to hold off on surgery at this time  We will aggressively diurese him, start heparin gtt, and medically manage  Will plan for surgery once medically stable  We will closely follow the patient  2   Elevated proBNP likely secondary to volume overload  Patient has no prior history of heart failure  Echocardiogram revealed 2+ mitral regurgitation with normal LV EF  Likely secondary to volume overload secondary to IV fluid resuscitation  Patient SpO2 100% on 6 L nasal cannula, has been off BiPAP  Patient receiving Lasix 20 mg IV twice daily    Increase IV Lasix 40 mg Q8 hours  Continue to monitor strict ins and outs  3  Hypokalemia  Patient has a low potassium at 3 2, this morning remain low at 3 4 with a normal magnesium at 2 1  Replete with a goal of potassium greater than four  4   Hypertension  Has been well controlled this admission  According to patient's family patient only takes metoprolol 50 mg twice daily at home  Will start low dose metoprolol       History of Present Illness   Physician Requesting Consult: Tom Acuna DO  Reason for Consult / Principal Problem: Elevated troponin, Elevated ProBNP,   HPI: Wanda Craig is a 75y o  year old male with a past medical history significant for peripheral artery disease status post amputation of right great toe, type 2 diabetes mellitus, hypertension, and hyperlipidemia who presented to the hospital for cellulitis and osteomyelitis requiring amputation  Patient was transferred to the medical ICU after rapid response was called for being unresponsive and improved after Narcan given  Patient required BiPAP as he was in acute respiratory failure  Patient currently denies any chest pain, palpitations, shortness of breath, nausea, vomiting, or abdominal pain  He is very agitated not responding to questions appropriately  Patient's family is in the room his three daughters one is who is a physician and his wife are present  Patient has seen a cardiologist in the past but dated med has been a long time  Patient is fairly compliant with his home medication regimen  Patient is a lifetime nonsmoker, no alcohol intake, no recreational drugs  Patient has never undergone cardiac catheterization or had any stress test performed in the past   Patient has normal renal function and no history of CKD  Patient is a poorly controlled diabetic with the last A1c per family to be around 8                 Inpatient consult to Cardiology     Performed by  Apollo Gibson by Tamie Antonio              Review of Systems   Constitutional: Negative for activity change (Chronic), appetite change, chills, fatigue, fever and unexpected weight change  Eyes: Negative for visual disturbance  Respiratory: Negative for apnea, cough, choking, shortness of breath and wheezing  Cardiovascular: Negative for chest pain, palpitations and leg swelling  Gastrointestinal: Negative for abdominal pain, constipation, diarrhea and vomiting  Genitourinary: Negative for dysuria  Musculoskeletal: Negative for arthralgias  Skin: Negative for color change  Neurological: Negative for weakness and headaches  Hematological: Negative for adenopathy  Psychiatric/Behavioral: Positive for agitation  Negative for behavioral problems         Historical Information   Past Medical History:   Diagnosis Date    Diabetes mellitus ([place] [address] )     Diabetic neuropathy associated with type 2 diabetes mellitus ([place] [address] )     DM (diabetes mellitus), type 2 with peripheral vascular complications (HCC)     Gangrene of toe of right foot ([place] [address] )     Hypertension     PVD (peripheral vascular disease) ([place] [address] )      Past Surgical History:   Procedure Laterality Date    EYE SURGERY      cataract- bilateral    INCISION AND DRAINAGE OF WOUND Right 2/6/2018    Procedure: INCISION AND DRAINAGE (I&D) EXTREMITY, right great toe amputation;  Surgeon: Homar Thomas DPM;  Location:  MAIN OR;  Service: Podiatry    NO PAST SURGERIES       History   Alcohol Use No     History   Drug Use No     History   Smoking Status    Never Smoker   Smokeless Tobacco    Never Used     Family History:   Family History   Problem Relation Age of Onset    Diabetes Mother     No Known Problems Father        Meds/Allergies   all current active meds have been reviewed, current meds:   Current Facility-Administered Medications   Medication Dose Route Frequency    acetaminophen (TYLENOL) tablet 650 mg  650 mg Oral Q6H PRN    albuterol inhalation solution 2 5 mg  2 5 mg Nebulization Q4H PRN    aspirin chewable tablet 81 mg  81 mg Oral Daily    cefepime (MAXIPIME) 2,000 mg in dextrose 5 % 50 mL IVPB  2,000 mg Intravenous Q12H    fentanyl citrate (PF) 100 MCG/2ML 25 mcg  25 mcg Intravenous Q2H PRN    furosemide (LASIX) injection 20 mg  20 mg Intravenous BID (diuretic)    gabapentin (NEURONTIN) capsule 600 mg  600 mg Oral HS    heparin (porcine) subcutaneous injection 5,000 Units  5,000 Units Subcutaneous Q8H [facility]    hydrALAZINE (APRESOLINE) injection 10 mg  10 mg Intravenous Q6H PRN    insulin regular (HumuLIN R,NovoLIN R) 1 Units/mL in sodium chloride 0 9 % 100 mL infusion  0 3-21 Units/hr Intravenous Titrated    metroNIDAZOLE (FLAGYL) IVPB (premix) 500 mg  500 mg Intravenous Q8H    oxyCODONE (ROXICODONE) IR tablet 5 mg  5 mg Oral Q4H PRN    potassium chloride 20 mEq IVPB (premix)  20 mEq Intravenous Once    potassium chloride 20 mEq IVPB (premix)  20 mEq Intravenous Once    potassium chloride 20 mEq IVPB (premix)  20 mEq Intravenous Once    potassium phosphate 30 mmol in sodium chloride 0 9 % 250 mL infusion  30 mmol Intravenous Once    pregabalin (LYRICA) capsule 75 mg  75 mg Oral HS    vancomycin (VANCOCIN) IVPB (premix) 1,000 mg  15 mg/kg Intravenous Q12H    and PTA meds:   Prior to Admission Medications   Prescriptions Last Dose Informant Patient Reported? Taking?    Alpha-Lipoic Acid 600 MG CAPS 2/5/2018 at Unknown time  Yes Yes   Sig: Take by mouth   Blood Glucose Monitoring Suppl (ONE TOUCH ULTRA MINI) w/Device KIT 2/5/2018 at Unknown time  Yes Yes   Sig: by Does not apply route   TRADJENTA 5 MG TABS 2/5/2018 at Unknown time  Yes Yes   aspirin 81 MG tablet 2/5/2018 at Unknown time  Yes Yes   Sig: Take 81 mg by mouth   cephalexin (KEFLEX) 500 mg capsule 2/5/2018 at Unknown time  No Yes   Sig: Take 1 capsule (500 mg total) by mouth every 12 (twelve) hours for 14 days   gabapentin (NEURONTIN) 300 mg capsule 2/5/2018 at Unknown time  Yes Yes   Sig: Take 600 mg by mouth daily at bedtime glipiZIDE-metFORMIN (METAGLIP) 5-500 MG per tablet 2/5/2018 at Unknown time  Yes Yes   Sig: Take 2 tablets by mouth 2 (two) times a day before meals     glucose blood (ACCU-CHEK ACTIVE STRIPS) test strip 2/5/2018 at Unknown time  Yes Yes   Sig: Testing twice daily   E11 65   losartan-hydrochlorothiazide (HYZAAR) 100-25 MG per tablet 2/5/2018 at Unknown time  Yes Yes   Sig: Take 1 tablet by mouth daily   metoprolol tartrate (LOPRESSOR) 25 mg tablet 2/5/2018 at Unknown time  Yes Yes   Sig: Take 25 mg by mouth every 12 (twelve) hours     metoprolol tartrate (LOPRESSOR) 50 mg tablet 2/5/2018 at Unknown time  Yes Yes   Sig: Take 50 mg by mouth every 12 (twelve) hours     pregabalin (LYRICA) 75 mg capsule 2/5/2018 at Unknown time  Yes Yes   Sig: Take 75 mg by mouth daily at bedtime        Facility-Administered Medications: None     Allergies   Allergen Reactions    Penicillins      Pt stated that he had a reaction to it a long time ago  Childhood history       Objective   Vitals: Blood pressure 126/74, pulse 93, temperature 98 1 °F (36 7 °C), temperature source Oral, resp  rate (!) 26, height 5' 8" (1 727 m), weight 62 9 kg (138 lb 10 7 oz), SpO2 100 %  Orthostatic Blood Pressures    Flowsheet Row Most Recent Value   Blood Pressure  126/74 filed at 02/09/2018 0400   Patient Position - Orthostatic VS  Lying filed at 02/09/2018 0400            Intake/Output Summary (Last 24 hours) at 02/09/18 0844  Last data filed at 02/09/18 0734   Gross per 24 hour   Intake          1110 22 ml   Output             2050 ml   Net          -939 78 ml       Invasive Devices     Peripheral Intravenous Line            Peripheral IV 02/07/18 Left Antecubital 1 day    Peripheral IV 02/08/18 Left Forearm 1 day    Peripheral IV 02/08/18 Right Arm 1 day                Physical Exam   Constitutional: He is oriented to person, place, and time  He appears distressed  HENT:   Head: Normocephalic and atraumatic     Mouth/Throat: No oropharyngeal exudate  Eyes: Pupils are equal, round, and reactive to light  No scleral icterus  Neck: No JVD present  Cardiovascular: Normal rate and regular rhythm  Murmur (LLSB Holosystolic) heard  Pulmonary/Chest: He is in respiratory distress  He has no wheezes  He has no rales  He exhibits no tenderness  Abdominal: Soft  Bowel sounds are normal  He exhibits no distension  There is no tenderness  Musculoskeletal: He exhibits no edema or tenderness  Deformity: Right great toe amputation  Lymphadenopathy:     He has no cervical adenopathy  Neurological: He is alert and oriented to person, place, and time  Skin: Skin is warm  He is diaphoretic  Psychiatric: He is agitated  Lab Results:   I have personally reviewed pertinent lab results      CBC with diff:   Results from last 7 days  Lab Units 02/09/18  0614   WBC Thousand/uL 12 71*   RBC Million/uL 3 32*   HEMOGLOBIN g/dL 10 2*   HEMATOCRIT % 29 6*   MCV fL 89   MCH pg 30 7   MCHC g/dL 34 5   RDW % 13 1   MPV fL 10 7   PLATELETS Thousands/uL 323     CMP:   Results from last 7 days  Lab Units 02/09/18  0614   SODIUM mmol/L 141   POTASSIUM mmol/L 3 4*   CHLORIDE mmol/L 107   CO2 mmol/L 25   ANION GAP mmol/L 9   BUN mg/dL 22   CREATININE mg/dL 0 66   GLUCOSE RANDOM mg/dL 126   CALCIUM mg/dL 7 8*   AST U/L 183*   ALT U/L 125*   ALK PHOS U/L 107   TOTAL PROTEIN g/dL 6 4   BILIRUBIN TOTAL mg/dL 0 82   EGFR ml/min/1 73sq m 99     Troponin:   0  Lab Value Date/Time   TROPONINI 4 94 (H) 02/09/2018 0614   TROPONINI 5 55 (H) 02/08/2018 1649   TROPONINI 5 47 (H) 02/08/2018 1333   TROPONINI 5 15 (H) 02/08/2018 1048   TROPONINI 5 45 (H) 02/08/2018 0755     BNP:   Results from last 7 days  Lab Units 02/09/18  0614   SODIUM mmol/L 141   POTASSIUM mmol/L 3 4*   CHLORIDE mmol/L 107   CO2 mmol/L 25   ANION GAP mmol/L 9   BUN mg/dL 22   CREATININE mg/dL 0 66   GLUCOSE RANDOM mg/dL 126   CALCIUM mg/dL 7 8*   EGFR ml/min/1 73sq m 99     Coags:   Results from last 7 days  Lab Units 02/09/18 0614 02/08/18  0755   PTT seconds  --  37*   INR  1 85* 1 64*     TSH:     Magnesium:   Results from last 7 days  Lab Units 02/09/18  0614   MAGNESIUM mg/dL 2 1     Lipid Profile:     Imaging: I have personally reviewed pertinent reports  EKG:  Sinus tachycardia with ST T wave changes in anterior leads V1, V2, and V3  VTE Prophylaxis: Heparin    Code Status: Level 1 - Full Code    Counseling / Coordination of Care  Total floor / unit time spent today 40 minutes  Greater than 50% of total time was spent with the patient and / or family counseling and / or coordination of care

## 2018-02-09 NOTE — MEDICAL STUDENT
Progress Note - Critical Care   Nettie Spencer 75 y o  male MRN: [id number]  [facility]: [id number]    Assessment:   Principal Problem:    Sepsis ([facility] [address] )  Active Problems:    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 of foot ([facility] [address] )    Hypertension    Gangrene of toe of right foot ([facility] )    Diabetic neuropathy associated with type 2 diabetes mellitus ([facility] )    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)    Cellulitis of right foot    Metabolic acidosis    Acute metabolic encephalopathy    Abnormal CT of the head    Elevated brain natriuretic peptide (BNP) level    Acute respiratory failure (HCC)    Transaminitis    Hypophosphatemia    Popliteal artery stenosis, right (HCC)    Great toe amputation status, right (HCC)    Coagulopathy ([facility])  Resolved Problems:    Hyponatremia    Gangrenous toe ([facility])    Plan:   · Neuro:   · AMS, likely 2/2 metabolic encephalopathy, improved  · Pt does get anxious and confused when feeling SOB and overstimulated; however, is redirectable   · Ammonia 27  · CTH with small right frontal hyperdensity, likely meningioma, per neurosurgery --> recommend non-urgent outpt MRI  · Q2h neurochecks  · Diabetic neuropathy - cont home gabapentin and lyrica  · PRN Tylenol Q4h, oxycodone Q4h, fentanyl Q2h for pain  · CV:   · NSTEMI I in setting sepsis/acute valvular CHF   · Elevated BNP 49094  · ECG with T wave inversions on lateral leads and troponin elevated but trending down, 4 94 from 5 55 - recheck AM troponin  · Echo 2/8: EF 17%, no systolic dysfunction noted, 2+ moderate mitral regurgitation   · Lasix 40mg IV TID per cardiology recommendations, for diuresis and afterload reduction   · Pt placed on heparin gtt for NSTEMI   · Hx HTN - lopressor 25 mg BID per cardiology  · PAD, RLE; s/p RLE angiogram, atherectomy, and PTA of popliteal stenosis and distal PT - cont Q1h neurovas/doppler checks  · Lung:   · Acute respiratory failure requiring BIPAP, 2/2 CHF and pulm congestion  · BIPAP intermittently overnight and in AM for SOB and LUEVANO  Pt only desat 93-94 with exertion or anxiety  · No evidence pneumonia on CXR, more fitting edema/congestion  · Repeat CXR 2/9 without much change in pulmonary edema from yesterday CXR  · Lasix 40mg TID for diuresis to help with congestion  · GI: Transaminitis, no clear etiology, likely ischemic  · F/u AM CMP  · Hepatitis panel negative  · FEN:   · Hypokalemia 3 4 and hypophosphatemia 1 1 - replete and f/u CMP tomorrow  · Lactic acidosis - likely 2/2 transaminitis/ischemia and sepsis  · : no active issues   · ID: Sepsis, 2/2 gangrenous R great toe and osteomyelitis  · Leukocytosis trending down, 12 71 from 25  · Blood clx 2/5 negative x72h x2; Blood clx 2/6 negative x48h  · Wound and tissue clx 2/5 growing 4+ staph aureus, pan-sensitive except ampicillin resistant  · Cefepime/flagyl/vanco de-escalated to ancef/flagyl per ID, abx day #4  · Heme:   · Coagulopathy, 2/2 transaminitis - INR 1 85  · Leukocytosis improving  · Stable anemia, hgb 10 2 from 10 9  · Endo: DM with neuropathy, A1c 9 7, per endo  · On insulin gtt algorithm 1  · Msk/Skin: Right great toe distal foot gangrene and osteomyelitis on XR, s/p decompressive open R great toe guillotine amputation , POD#3  · Appreciate ID rec, continue abx  · Podiatry following and plan for TMA in next few days pending better optimization from cardiac and pulm standpoint  · Disposition: Step down    ______________________________________________________________________    HPI/24hr events: no events overnight  Pt was on BIPAP four about 4h till midnight    ______________________________________________________________________  Physical Exam:     Physical Exam   Constitutional:   Thin appearing man, anxious but NAD   HENT:   Head: Normocephalic and atraumatic  Eyes: EOM are normal  Pupils are equal, round, and reactive to light  Neck: Neck supple  No JVD present  Cardiovascular: Regular rhythm  Tachycardia present  No murmur heard  HR 110s    Pulmonary/Chest: Tachypnea noted  He has rales  Abdominal: Soft  Bowel sounds are normal  He exhibits no distension  There is no tenderness  Musculoskeletal: He exhibits no edema  R foot wrapped per podiatry; dressings look clean and dry   Neurological: He is alert  Skin: Skin is warm and dry      ______________________________________________________________________  Temperature:   Temp (24hrs), Av 9 °F (36 6 °C), Min:97 4 °F (36 3 °C), Max:98 2 °F (36 8 °C)    Current Temperature: 98 1 °F (36 7 °C)    Vitals:    18 2100 18 0000 18 0400 18 0600   BP:  143/75 126/74    BP Location:  Right arm Right arm    Pulse: 88 90 96    Resp: (!) 24 (!) 31 (!) 33    Temp:  98 °F (36 7 °C) 98 1 °F (36 7 °C)    TempSrc:  Oral Oral    SpO2: 100% 100% 98%    Weight:    62 9 kg (138 lb 10 7 oz)   Height:                 Weights:   IBW: 68 4 kg    Body mass index is 21 08 kg/m²  Weight (last 2 days)     Date/Time   Weight    18 0600  62 9 (138 67)    18 0600  65 6 (144 62)            Height: 5' 8" (172 7 cm)     FiO2 (%): 40       No results found for: PHART, MII5GKR, PO2ART, ZMG2XOY, K3XYGWON, BEART, SOURCE    Intake and Outputs:  I/O       701 -  0700 701 -  07    P  O  220     I V  (mL/kg) 2120 8 (32 3) 30 2 (0 5)    IV Piggyback  1180    Total Intake(mL/kg) 2340 8 (35 7) 1210 2 (19 2)    Urine (mL/kg/hr) 1600 (1) 2564 (1 7)    Stool 0 (0)     Total Output 1600 2564    Net +740 8 -1353 8          Unmeasured Urine Occurrence 1 x 1 x    Unmeasured Stool Occurrence 3 x         UOP: 1 7cc/kg/hour   Nutrition:        Diet Orders            Start     Ordered    18 0001  Diet NPO; Sips with meds  Diet effective midnight     Comments:  Pt will be going to OR on 18 @ 1230PM    Question Answer Comment   Diet Type NPO    NPO Except:  Sips with meds    RD to adjust diet per protocol? Yes        02/08/18 1345          Labs:     Results from last 7 days  Lab Units 02/09/18 0614 02/08/18 0418 02/08/18  0246 02/07/18  1002  02/06/18  1829 02/06/18  0508   WBC Thousand/uL 12 71* 25 79*  --  27 38*  < > 20 61* 18 86*   HEMOGLOBIN g/dL 10 2* 10 9*  --  11 2*  < > 11 1* 10 9*   I STAT HEMOGLOBIN g/dl  --   --  9 2*  --   < >  --   --    HEMATOCRIT % 29 6* 31 9*  --  34 2*  < > 32 3* 31 1*   PLATELETS Thousands/uL 323 298  --  364  < > 282 306   NEUTROS PCT % 89*  --   --   --   --  88* 88*   MONOS PCT % 2*  --   --   --   --  5 5   MONO PCT MAN %  --  3*  --  3*  --   --   --    < > = values in this interval not displayed  Results from last 7 days  Lab Units 02/08/18 0418 02/08/18 0246 02/07/18  1501 02/07/18  1002  02/06/18  1829   SODIUM mmol/L 140  --  138 136  < > 135*   POTASSIUM mmol/L 3 7  --  3 2* 4 0  < > 3 8   CHLORIDE mmol/L 110*  --  108 102  < > 101   CO2 mmol/L 23  --  18* 18*  < > 28   BUN mg/dL 27*  --  27* 25  < > 19   CREATININE mg/dL 0 78  --  0 96 0 95  < > 0 84   CALCIUM mg/dL 7 7*  --  7 4* 8 1*  < > 8 1*   TOTAL PROTEIN g/dL 7 2  --   --  7 6  --  7 1   BILIRUBIN TOTAL mg/dL 0 50  --   --  0 84  --  0 79   ALK PHOS U/L 134*  --   --  122*  --  110   ALT U/L 160*  --   --  26  --  27   AST U/L 438*  --   --  79*  --  76*   GLUCOSE RANDOM mg/dL 178*  --  201* 326*  < > 163*   GLUCOSE, ISTAT mg/dl  --  203*  --   --   < >  --    < > = values in this interval not displayed      Results from last 7 days  Lab Units 02/08/18 0418 02/07/18  1501 02/07/18  1002   MAGNESIUM mg/dL 2 2 2 4 2 5       Results from last 7 days  Lab Units 02/08/18  0418 02/07/18  1501 02/07/18  1002   PHOSPHORUS mg/dL 1 1* 1 8* 4 0        Results from last 7 days  Lab Units 02/08/18  0755 02/05/18  1714   INR  1 64* 1 30*   PTT seconds 37* 38*       Results from last 7 days  Lab Units 02/08/18  1333   LACTIC ACID mmol/L 2 8*       0  Lab Value Date/Time   TROPONINI 5 55 (H) 02/08/2018 1649 TROPONINI 5 47 (H) 02/08/2018 1333   TROPONINI 5 15 (H) 02/08/2018 1048   TROPONINI 5 45 (H) 02/08/2018 0755     Imaging:   Echo 2/8:  Normal LV Systolic function,EF 55 %  Although no diagnostic regional wall motion abnormality was identified, this possibility cannot be completely excluded on the basis of this study  Moderate 2+ mitral valve regurgitation  CXR 2/9: no significant changes from yesterday; stable bilateral infiltrates; f/u official read   I have personally reviewed pertinent reports  Micro:  Blood Culture:   Lab Results   Component Value Date    BLOODCX No Growth at 48 hrs  02/06/2018    BLOODCX No Growth at 72 hrs  02/05/2018    BLOODCX No Growth at 72 hrs  02/05/2018     Urine Culture: No results found for: URINECX  Sputum Culture: No components found for: SPUTUMCX  Wound Culure:   Lab Results   Component Value Date    WOUNDCULT 4+ Growth of Staphylococcus aureus (A) 02/05/2018       Lab Results   Component Value Date    BLOODCX No Growth at 48 hrs  02/06/2018    BLOODCX No Growth at 72 hrs  02/05/2018    BLOODCX No Growth at 72 hrs  02/05/2018    WOUNDCULT 4+ Growth of Staphylococcus aureus (A) 02/05/2018     Allergies: Allergies   Allergen Reactions    Penicillins      Pt stated that he had a reaction to it a long time ago    Childhood history     Medications:   Scheduled Meds:  Current Facility-Administered Medications:  acetaminophen 650 mg Oral Q6H PRN ARMANDO Freedman    albuterol 2 5 mg Nebulization Q4H PRN Ian Rosa,     cefepime 2,000 mg Intravenous Q12H Daniel Pizarro MD Last Rate: Stopped (02/09/18 0217)   fentanyl citrate (PF) 25 mcg Intravenous Q2H PRN ARMANDO Freedman    furosemide 20 mg Intravenous BID (diuretic) ARMANDO Freedman    gabapentin 600 mg Oral HS Anson Davidson MD    heparin (porcine) 5,000 Units Subcutaneous [facility] ARMANDO Schmidt    hydrALAZINE 10 mg Intravenous Q6H PRN ARMANDO Schmidt    insulin regular (HumuLIN R,NovoLIN R) infusion 0 3-21 Units/hr Intravenous Titrated Angelo Linares MD Last Rate: 1 Units/hr (02/09/18 0400)   metroNIDAZOLE 500 mg Intravenous Q8H Irene Mckeon MD Last Rate: 500 mg (02/09/18 0334)   oxyCODONE 5 mg Oral Q4H PRN ARMANDO Peoples    pregabalin 75 mg Oral HS Ramesh Stanton MD    vancomycin 15 mg/kg Intravenous Q12H Satnam Moyer MD Last Rate: Stopped (02/09/18 0400)     Continuous Infusions:  insulin regular (HumuLIN R,NovoLIN R) infusion 0 3-21 Units/hr Last Rate: 1 Units/hr (02/09/18 0400)     PRN Meds:    acetaminophen 650 mg Q6H PRN   albuterol 2 5 mg Q4H PRN   fentanyl citrate (PF) 25 mcg Q2H PRN   hydrALAZINE 10 mg Q6H PRN   oxyCODONE 5 mg Q4H PRN     VTE Pharmacologic Prophylaxis: Sequential compression device (Venodyne)  and Heparin  VTE Mechanical Prophylaxis: sequential compression device  Invasive lines and devices: Invasive Devices     Peripheral Intravenous Line            Peripheral IV 02/07/18 Left Antecubital 1 day    Peripheral IV 02/08/18 Left Forearm 1 day    Peripheral IV 02/08/18 Right Arm 1 day                   Code Status: Level 1 - Full Code    Portions of the record may have been created with voice recognition software  Occasional wrong word or "sound a like" substitutions may have occurred due to the inherent limitations of voice recognition software  Read the chart carefully and recognize, using context, where substitutions have occurred      Yazmin Knight

## 2018-02-09 NOTE — PROGRESS NOTES
Progress Note - Podiatry  Susannah Modi 75 y o  male MRN: [id number]  Unit/Bed#: MICU 12 Encounter: [id number]    Assessment  1  Wet gangrene of right hallux and medial forefoot with cellulitis  2  Progress Dry gangrene 2nd digit right foot  3  PAD, status post angiogram with stenting  Viable posterior tibial runoff to the plantar arch  4  DM with neuropathy, A1c 9 7, per endo  5  Altered mental status    Plan:  1  I spent 45 minutes with the patient his wife and his daughter  I thoroughly explained the procedure  The wife and daughter are both in agreement and understand that at minimum the patient does require transmetatarsal amputation  There is some necrosis of the dorsal skin of his foot that most likely will require split-thickness skin graft which can be done at the same time  The patient will also need a wound VAC for an undetermined amount of time following surgery today  He did sign consent as did his wife for a right foot transmetatarsal amputation with debridement of wound and split-thickness skin graft  I feel all 3 members thoroughly understand the procedure plan today  All risks, alternatives, complications were discussed  They are well aware of the wound healing issues and need for possible further surgery in the future  The patient is NPO  I am currently awaiting Cardiology clearance for planned surgery 12:30 today  2  As with many limb salvage procedures, we contemplate the possibility of performing further stages to this procedure  Procedures may include debridements, delayed closure, plastic surgery techniques, or more proximal amputations  This procedure may be considered part of a multi-staged limb salvage treatment plan  Subjective/Objective   Chief Complaint:   Chief Complaint   Patient presents with    Foot Ulcer     Pt presents with diabetic ulcers on right foot and increased pain and swelling       Subjective: 75 y o  y/o male seen and evaluated at bedside    Patient is alert awake and oriented currently  His wife and daughter are at bedside  Patient does not remember having foot surgery  Patient does not remember the discussion of the secondary surgery that I had with him 48 hours ago  Blood pressure 151/79, pulse 93, temperature 97 9 °F (36 6 °C), temperature source Oral, resp  rate (!) 26, height 5' 8" (1 727 m), weight 62 9 kg (138 lb 10 7 oz), SpO2 100 %  ,Body mass index is 21 08 kg/m²  Lab Results   Component Value Date    WBC 12 71 (H) 02/09/2018    HGB 10 2 (L) 02/09/2018    HCT 29 6 (L) 02/09/2018    MCV 89 02/09/2018     02/09/2018     Lab Results   Component Value Date    GLUCOSE 126 02/09/2018    CALCIUM 7 8 (L) 02/09/2018     02/09/2018    K 3 4 (L) 02/09/2018    CO2 25 02/09/2018     02/09/2018    BUN 22 02/09/2018    CREATININE 0 66 02/09/2018         Invasive Devices     Peripheral Intravenous Line            Peripheral IV 02/07/18 Left Antecubital 1 day    Peripheral IV 02/08/18 Left Forearm 1 day    Peripheral IV 02/08/18 Right Arm 1 day                Physical Exam:   General: alert, cooperative and no distress  Lungs: Normal respiratory effort  Heart: regular rate and rhythm   Abdomen: soft, non-tender  Extremity:  Right foot dressing is clean dry and intact  The 2nd toe is purple to black in color  There is no malodor from the foot  Ankle range of motion intact without pain  No calf pain with compression  Lab, Imaging and other studies:     Imaging: I have personally reviewed pertinent films in PACS  EKG, Pathology, and Other Studies: I have personally reviewed pertinent reports  Portions of the record may have been created with voice recognition software  Occasional wrong word or "sound a like" substitutions may have occurred due to the inherent limitations of voice recognition software  Read the chart carefully and recognize, using context, where substitutions have occurred

## 2018-02-09 NOTE — PROGRESS NOTES
Chart reviewed - surgery on hold for cardiac evaluation   Sugars well controlled on IV insulin infusion     Continue IV insulin infusion for now

## 2018-02-09 NOTE — PLAN OF CARE
RESPIRATORY - ADULT     Achieves optimal ventilation and oxygenation Not Progressing          DISCHARGE PLANNING - CARE MANAGEMENT     Discharge to post-acute care or home with appropriate resources Progressing        METABOLIC, FLUID AND ELECTROLYTES - ADULT     Electrolytes maintained within normal limits Progressing     Fluid balance maintained Progressing     Glucose maintained within target range Progressing        Nutrition/Hydration-ADULT     Nutrient/Hydration intake appropriate for improving, restoring or maintaining nutritional needs Progressing        Potential for Falls     Patient will remain free of falls Progressing        Prexisting or High Potential for Compromised Skin Integrity     Skin integrity is maintained or improved Progressing

## 2018-02-10 ENCOUNTER — APPOINTMENT (INPATIENT)
Dept: RADIOLOGY | Facility: HOSPITAL | Age: 68
DRG: 853 | End: 2018-02-10
Payer: COMMERCIAL

## 2018-02-10 LAB
ALBUMIN SERPL BCP-MCNC: 2 G/DL (ref 3.5–5)
ALP SERPL-CCNC: 112 U/L (ref 46–116)
ALT SERPL W P-5'-P-CCNC: 87 U/L (ref 12–78)
ANION GAP SERPL CALCULATED.3IONS-SCNC: 13 MMOL/L (ref 4–13)
ANION GAP SERPL CALCULATED.3IONS-SCNC: 6 MMOL/L (ref 4–13)
APTT PPP: 70 SECONDS (ref 23–35)
APTT PPP: 85 SECONDS (ref 23–35)
AST SERPL W P-5'-P-CCNC: 96 U/L (ref 5–45)
BACTERIA BLD CULT: NORMAL
BACTERIA BLD CULT: NORMAL
BACTERIA SPEC ANAEROBE CULT: ABNORMAL
BASOPHILS # BLD MANUAL: 0 THOUSAND/UL (ref 0–0.1)
BASOPHILS NFR MAR MANUAL: 0 % (ref 0–1)
BILIRUB SERPL-MCNC: 0.39 MG/DL (ref 0.2–1)
BUN SERPL-MCNC: 24 MG/DL (ref 5–25)
BUN SERPL-MCNC: 26 MG/DL (ref 5–25)
CALCIUM SERPL-MCNC: 8 MG/DL (ref 8.3–10.1)
CALCIUM SERPL-MCNC: 8.1 MG/DL (ref 8.3–10.1)
CHLORIDE SERPL-SCNC: 100 MMOL/L (ref 100–108)
CHLORIDE SERPL-SCNC: 101 MMOL/L (ref 100–108)
CO2 SERPL-SCNC: 27 MMOL/L (ref 21–32)
CO2 SERPL-SCNC: 35 MMOL/L (ref 21–32)
CREAT SERPL-MCNC: 0.88 MG/DL (ref 0.6–1.3)
CREAT SERPL-MCNC: 0.91 MG/DL (ref 0.6–1.3)
EOSINOPHIL # BLD MANUAL: 0 THOUSAND/UL (ref 0–0.4)
EOSINOPHIL NFR BLD MANUAL: 0 % (ref 0–6)
ERYTHROCYTE [DISTWIDTH] IN BLOOD BY AUTOMATED COUNT: 13 % (ref 11.6–15.1)
GFR SERPL CREATININE-BSD FRML MDRD: 86 ML/MIN/1.73SQ M
GFR SERPL CREATININE-BSD FRML MDRD: 88 ML/MIN/1.73SQ M
GLUCOSE SERPL-MCNC: 121 MG/DL (ref 65–140)
GLUCOSE SERPL-MCNC: 133 MG/DL (ref 65–140)
GLUCOSE SERPL-MCNC: 148 MG/DL (ref 65–140)
GLUCOSE SERPL-MCNC: 176 MG/DL (ref 65–140)
GLUCOSE SERPL-MCNC: 181 MG/DL (ref 65–140)
GLUCOSE SERPL-MCNC: 192 MG/DL (ref 65–140)
GLUCOSE SERPL-MCNC: 193 MG/DL (ref 65–140)
GLUCOSE SERPL-MCNC: 193 MG/DL (ref 65–140)
GLUCOSE SERPL-MCNC: 204 MG/DL (ref 65–140)
GLUCOSE SERPL-MCNC: 70 MG/DL (ref 65–140)
GLUCOSE SERPL-MCNC: 71 MG/DL (ref 65–140)
GLUCOSE SERPL-MCNC: 81 MG/DL (ref 65–140)
GLUCOSE SERPL-MCNC: 98 MG/DL (ref 65–140)
HCT VFR BLD AUTO: 30.9 % (ref 36.5–49.3)
HGB BLD-MCNC: 10.8 G/DL (ref 12–17)
LACTATE SERPL-SCNC: 2.1 MMOL/L (ref 0.5–2)
LYMPHOCYTES # BLD AUTO: 1.43 THOUSAND/UL (ref 0.6–4.47)
LYMPHOCYTES # BLD AUTO: 11 % (ref 14–44)
MCH RBC QN AUTO: 30.9 PG (ref 26.8–34.3)
MCHC RBC AUTO-ENTMCNC: 35 G/DL (ref 31.4–37.4)
MCV RBC AUTO: 89 FL (ref 82–98)
MONOCYTES # BLD AUTO: 1.17 THOUSAND/UL (ref 0–1.22)
MONOCYTES NFR BLD: 9 % (ref 4–12)
NEUTROPHILS # BLD MANUAL: 10.41 THOUSAND/UL (ref 1.85–7.62)
NEUTS SEG NFR BLD AUTO: 80 % (ref 43–75)
NRBC BLD AUTO-RTO: 1 /100 WBCS
PHOSPHATE SERPL-MCNC: 2 MG/DL (ref 2.3–4.1)
PHOSPHATE SERPL-MCNC: 3.5 MG/DL (ref 2.3–4.1)
PLATELET # BLD AUTO: 359 THOUSANDS/UL (ref 149–390)
PLATELET BLD QL SMEAR: ADEQUATE
PMV BLD AUTO: 10.1 FL (ref 8.9–12.7)
POTASSIUM SERPL-SCNC: 2.7 MMOL/L (ref 3.5–5.3)
POTASSIUM SERPL-SCNC: 3.7 MMOL/L (ref 3.5–5.3)
PROT SERPL-MCNC: 6.9 G/DL (ref 6.4–8.2)
RBC # BLD AUTO: 3.49 MILLION/UL (ref 3.88–5.62)
SODIUM SERPL-SCNC: 140 MMOL/L (ref 136–145)
SODIUM SERPL-SCNC: 142 MMOL/L (ref 136–145)
TOTAL CELLS COUNTED SPEC: 100
TROPONIN I SERPL-MCNC: 5.18 NG/ML
TROPONIN I SERPL-MCNC: 5.84 NG/ML
WBC # BLD AUTO: 13.01 THOUSAND/UL (ref 4.31–10.16)

## 2018-02-10 PROCEDURE — 82948 REAGENT STRIP/BLOOD GLUCOSE: CPT

## 2018-02-10 PROCEDURE — 93005 ELECTROCARDIOGRAM TRACING: CPT | Performed by: INTERNAL MEDICINE

## 2018-02-10 PROCEDURE — 99233 SBSQ HOSP IP/OBS HIGH 50: CPT | Performed by: INTERNAL MEDICINE

## 2018-02-10 PROCEDURE — 85027 COMPLETE CBC AUTOMATED: CPT | Performed by: EMERGENCY MEDICINE

## 2018-02-10 PROCEDURE — 83605 ASSAY OF LACTIC ACID: CPT | Performed by: INTERNAL MEDICINE

## 2018-02-10 PROCEDURE — 80048 BASIC METABOLIC PNL TOTAL CA: CPT | Performed by: INTERNAL MEDICINE

## 2018-02-10 PROCEDURE — 94660 CPAP INITIATION&MGMT: CPT

## 2018-02-10 PROCEDURE — 84484 ASSAY OF TROPONIN QUANT: CPT | Performed by: INTERNAL MEDICINE

## 2018-02-10 PROCEDURE — 80053 COMPREHEN METABOLIC PANEL: CPT | Performed by: EMERGENCY MEDICINE

## 2018-02-10 PROCEDURE — 84100 ASSAY OF PHOSPHORUS: CPT | Performed by: EMERGENCY MEDICINE

## 2018-02-10 PROCEDURE — 71045 X-RAY EXAM CHEST 1 VIEW: CPT

## 2018-02-10 PROCEDURE — 85007 BL SMEAR W/DIFF WBC COUNT: CPT | Performed by: EMERGENCY MEDICINE

## 2018-02-10 PROCEDURE — 85730 THROMBOPLASTIN TIME PARTIAL: CPT | Performed by: EMERGENCY MEDICINE

## 2018-02-10 PROCEDURE — 99232 SBSQ HOSP IP/OBS MODERATE 35: CPT | Performed by: INTERNAL MEDICINE

## 2018-02-10 PROCEDURE — 84100 ASSAY OF PHOSPHORUS: CPT | Performed by: INTERNAL MEDICINE

## 2018-02-10 PROCEDURE — 94760 N-INVAS EAR/PLS OXIMETRY 1: CPT

## 2018-02-10 PROCEDURE — 84484 ASSAY OF TROPONIN QUANT: CPT | Performed by: EMERGENCY MEDICINE

## 2018-02-10 RX ORDER — POTASSIUM CHLORIDE 20 MEQ/1
40 TABLET, EXTENDED RELEASE ORAL ONCE
Status: COMPLETED | OUTPATIENT
Start: 2018-02-10 | End: 2018-02-10

## 2018-02-10 RX ADMIN — POTASSIUM CHLORIDE 40 MEQ: 1500 TABLET, EXTENDED RELEASE ORAL at 05:45

## 2018-02-10 RX ADMIN — Medication 3 UNITS/HR: at 16:15

## 2018-02-10 RX ADMIN — ATORVASTATIN CALCIUM 80 MG: 80 TABLET, FILM COATED ORAL at 17:35

## 2018-02-10 RX ADMIN — METOPROLOL TARTRATE 25 MG: 25 TABLET ORAL at 21:28

## 2018-02-10 RX ADMIN — CEFAZOLIN SODIUM 2000 MG: 2 SOLUTION INTRAVENOUS at 02:03

## 2018-02-10 RX ADMIN — METRONIDAZOLE 500 MG: 500 INJECTION, SOLUTION INTRAVENOUS at 14:11

## 2018-02-10 RX ADMIN — HEPARIN SODIUM AND DEXTROSE 10 UNITS/KG/HR: 10000; 5 INJECTION INTRAVENOUS at 21:21

## 2018-02-10 RX ADMIN — POTASSIUM PHOSPHATE, MONOBASIC AND POTASSIUM PHOSPHATE, DIBASIC 30 MMOL: 224; 236 INJECTION, SOLUTION INTRAVENOUS at 07:29

## 2018-02-10 RX ADMIN — FUROSEMIDE 40 MG: 10 INJECTION, SOLUTION INTRAMUSCULAR; INTRAVENOUS at 08:43

## 2018-02-10 RX ADMIN — PREGABALIN 75 MG: 75 CAPSULE ORAL at 21:28

## 2018-02-10 RX ADMIN — METOPROLOL TARTRATE 25 MG: 25 TABLET ORAL at 08:43

## 2018-02-10 RX ADMIN — CEFAZOLIN SODIUM 2000 MG: 2 SOLUTION INTRAVENOUS at 17:36

## 2018-02-10 RX ADMIN — GABAPENTIN 600 MG: 300 CAPSULE ORAL at 21:28

## 2018-02-10 RX ADMIN — METRONIDAZOLE 500 MG: 500 INJECTION, SOLUTION INTRAVENOUS at 21:21

## 2018-02-10 RX ADMIN — FUROSEMIDE 40 MG: 10 INJECTION, SOLUTION INTRAMUSCULAR; INTRAVENOUS at 17:35

## 2018-02-10 RX ADMIN — ASPIRIN 81 MG 81 MG: 81 TABLET ORAL at 08:43

## 2018-02-10 RX ADMIN — POTASSIUM CHLORIDE 40 MEQ: 1500 TABLET, EXTENDED RELEASE ORAL at 17:35

## 2018-02-10 RX ADMIN — METRONIDAZOLE 500 MG: 500 INJECTION, SOLUTION INTRAVENOUS at 05:53

## 2018-02-10 RX ADMIN — CEFAZOLIN SODIUM 2000 MG: 2 SOLUTION INTRAVENOUS at 08:43

## 2018-02-10 NOTE — PLAN OF CARE
DISCHARGE PLANNING - CARE MANAGEMENT     Discharge to post-acute care or home with appropriate resources Progressing        METABOLIC, FLUID AND ELECTROLYTES - ADULT     Electrolytes maintained within normal limits Progressing     Fluid balance maintained Progressing     Glucose maintained within target range Progressing        Nutrition/Hydration-ADULT     Nutrient/Hydration intake appropriate for improving, restoring or maintaining nutritional needs Progressing        Potential for Falls     Patient will remain free of falls Progressing        Prexisting or High Potential for Compromised Skin Integrity     Skin integrity is maintained or improved Progressing        RESPIRATORY - ADULT     Achieves optimal ventilation and oxygenation Progressing

## 2018-02-10 NOTE — PROGRESS NOTES
Progress Note - Podiatry  Adriana Shah 75 y o  male MRN: [id number]  Unit/Bed#: MICU 12 Encounter: [id number]    Assessment:  1  S/p I&D of the right hallux (DOS: 2/6/18) secondary to Wet gangrene of right hallux and medial forefoot with cellulitis  2  Progressive dry gangrene 2nd digit right foot  3  PAD, status post angiogram with stenting  4  DM with neuropathy, A1c 9 7, per endo  5  Altered mental status     Plan:  - patient seen and evaluated at bedside with Dr Oly Sebastian  Vital signs are stable, leukocytosis noted  - patient will eventually need a right transmetatarsal amputation  However, will wait for patient to be medically stable, and await clearance from Cardiology  - 2nd digit is completely dusky and 3rd and 4th digits are showing progressive ischemic changes  However foot looks stable from an infection standpoint   -Dressed toes with Betadine wet-to-dry dressing, and dressed entire foot with DSD and Ace  - will continue local wound care until cardiology clearance for right TMA    Subjective/Objective   Chief Complaint:   Chief Complaint   Patient presents with    Foot Ulcer     Pt presents with diabetic ulcers on right foot and increased pain and swelling       Subjective: 75 y o  y/o male was seen and evaluated at bedside in no acute distress, nontoxic in appearance  Patient denies any recent nausea, vomiting, fever, chills, shortness of breath, chest pains       Blood pressure 134/67, pulse 74, temperature 99 °F (37 2 °C), temperature source Oral, resp  rate (!) 25, height 5' 8" (1 727 m), weight 62 9 kg (138 lb 10 7 oz), SpO2 95 %  ,Body mass index is 21 08 kg/m²      Invasive Devices     Peripheral Intravenous Line            Peripheral IV 02/07/18 Left Antecubital 2 days    Peripheral IV 02/08/18 Left Forearm 2 days    Peripheral IV 02/08/18 Right Arm 2 days          Drain            External Urinary Catheter Medium less than 1 day                Physical Exam:   General: Alert, cooperative and no distress  Lungs: Non labored breathing  Heart: Positive S1, S2  Abdomen: Soft, non-tender  Extremity:     NVS at baseline B/l  MSK function at B/l  No calf tenderness noted B/l     RLE:  Dressing was c/d/i with no strike through to the outer dressing noted  2nd digit continues to be purple/black in color  There is no malodor noted  Bone, tendon, muscle is exposed  Lab, Imaging and other studies:   I have personally reviewed pertinent lab results  , CBC:   Lab Results   Component Value Date    WBC 13 01 (H) 02/10/2018    HGB 10 8 (L) 02/10/2018    HCT 30 9 (L) 02/10/2018    MCV 89 02/10/2018     02/10/2018    MCH 30 9 02/10/2018    MCHC 35 0 02/10/2018    RDW 13 0 02/10/2018    MPV 10 1 02/10/2018    NRBC 1 02/10/2018   , CMP:   Lab Results   Component Value Date     02/10/2018    K 2 7 (LL) 02/10/2018     02/10/2018    CO2 35 (H) 02/10/2018    ANIONGAP 6 02/10/2018    BUN 24 02/10/2018    CREATININE 0 88 02/10/2018    GLUCOSE 70 02/10/2018    CALCIUM 8 0 (L) 02/10/2018    AST 96 (H) 02/10/2018    ALT 87 (H) 02/10/2018    ALKPHOS 112 02/10/2018    PROT 6 9 02/10/2018    BILITOT 0 39 02/10/2018    EGFR 88 02/10/2018       Imaging: I have personally reviewed pertinent films in PACS  EKG, Pathology, and Other Studies: I have personally reviewed pertinent reports    VTE Pharmacologic Prophylaxis: Heparin gtt

## 2018-02-10 NOTE — PROGRESS NOTES
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)    Acute metabolic encephalopathy    Abnormal CT of the head    Elevated brain natriuretic peptide (BNP) level    Acute respiratory failure (HCC)    Transaminitis    Hypophosphatemia    Popliteal artery stenosis, right (HCC)    Great toe amputation status, right (HCC)    Coagulopathy (HCC)    Meningioma (HCC)    Hypokalemia    Acute congestive heart failure (HCC)    Moderate mitral regurgitation  Resolved Problems:    Hyponatremia    Gangrenous toe (HCC)    Metabolic acidosis        Neuro: Encephalopathy toxic/metabolic improved  Avoid sedative/benzos  Abnormal CTH - neursurgery recommend outpatient MRI, [place] for anticoagulation likely meningioma - continue serial neuro checks    Neuropathy - continue lyrica/neurontin, prn fentanyl/oxy  Mild baseline agitation/dementia    CV: Acute valvular CHF/elevated BNP of 03083  Echo showed preserved LV systolic function with moderate MR  Cardiology following and deemed patient unstable for surgery at this time  Started on therapeutic heparin and high dose lasix  Will likely need ischemic work up  Net negative 1 5L over 24 hours  Net +2 4 L since admission  Elevated troponin likely NSTEMI Type 1  Trops climbing 5 84 < 4 94 < 5 55 Continue aspirin, therapeutic heparin, atorvastatin 80mg, lopressor 25mg BID, follow up AM EKG and AM troponin pending  Will touch base regarding adequate time for surgery after patient is stable from a cardiac point  Pulm: Patient placed on high flow nasal canula overnight without issues   Continue diuresis as above: 40mg TID    GI: Transaminitis likely 2/2 sepsis  Acute hep panel NEGATIVE  : No issues    F/E/N:   Hypokalemia/phos  - Replete as necessary  Tolerating PO diet    ID: Patient has infected - wet gangrene - of the RLE  Podiatry and ID following  Needs source control by amputation  Continues with ancef and flagyl  Heme: On therapeutic heparin now  Hgb and platelet stable  Endo: DM II uncontrolled  Currently on insulin infusion algorithm 1  Endocrinology following  BS acceptable  Msk/Skin: Postop day 4 from R great toe amputation  Postop day 4 R popliteal artery/posterior tibial artery angioplasty/stent  Needs amputation  Disposition: Continue diuresis, ACS management per cards, to discuss with podiatry regarding scheduling surgery    Code Status: Level 1 - Full Code    Counseling / Coordination of Care  Total Critical Care time spent 45 minutes excluding procedures, teaching and family updates  ______________________________________________________________________    Chief Complaint: None    24 Hour Events: Taken off bipap yesterday, tolerated high flow nasal canula without incidence        ______________________________________________________________________    Physical Exam:   General appearance: alert and oriented, in no acute distress  Head: Normocephalic, without obvious abnormality, atraumatic  Eyes: conjunctivae/corneas clear  PERRL, EOM's intact  Fundi benign  Neck: no JVD and supple, symmetrical, trachea midline  Lungs: bilateral bibasilar crackles  Heart: regular rate and rhythm, S1, S2 normal, no murmur, click, rub or gallop  Abdomen: soft, non-tender; bowel sounds normal; no masses,  no organomegaly  Extremities: Right foot wrapped in ace bandage     Skin: Skin color, texture, turgor normal  No rashes or lesions  Neurologic: Grossly normal    ______________________________________________________________________  Vitals:    02/09/18 2000 02/10/18 0000 02/10/18 0400 02/10/18 0406   BP: 145/75 118/59 134/67 BP Location: Right arm Right arm Right arm    Pulse: 98 78 74    Resp: 22 21 (!) 25    Temp: 98 4 °F (36 9 °C) 98 8 °F (37 1 °C) 99 °F (37 2 °C)    TempSrc: Oral Oral Oral    SpO2: 98% 100% 100% 100%   Weight:       Height:           Temperature:   Temp (24hrs), Av 4 °F (36 9 °C), Min:97 8 °F (36 6 °C), Max:99 °F (37 2 °C)    Current Temperature: 99 °F (37 2 °C)  Weights:   IBW: 68 4 kg    Body mass index is 21 08 kg/m²  Weight (last 2 days)     Date/Time   Weight    18 06  62 9 (138 67)    18 0600  65 6 (144 62)            Hemodynamic Monitoring:  N/A     Non-Invasive/Invasive Ventilation Settings:  Respiratory    Lab Data (Last 4 hours)    None         O2/Vent Data (Last 4 hours)      02/10 0406          Non-Invasive Ventilation Mode HFNC                 No results found for: PHART, QTT9XQD, PO2ART, LSO5NSV, L4PPFXVU, BEART, SOURCE  SpO2: SpO2: 95 %, SpO2 Activity: SpO2 Activity: At Rest, SpO2 Device: O2 Device: None (Room air), Capnography: Capnography: No  Intake and Outputs:  I/O        07 -  0700  07 - 02/10 0700 02/10 07 -  0700    P  O        I V  (mL/kg) 30 2 (0 5) 149 1 (2 4)     IV Piggyback 1180 1140     Total Intake(mL/kg) 1210 2 (19 2) 1289 1 (20 5)     Urine (mL/kg/hr) 2564 (1 7) 2804 (1 9)     Stool       Total Output 2564 2804      Net -1353 8 -1514 9             Unmeasured Urine Occurrence 1 x 6 x         UOP: 62cc/hour   Nutrition:        Diet Orders            Start     Ordered    18 1517  Diet Cardiac; Cardiac Step 1; Consistent Carbohydrate Diet Level 1 (4 carb servings/60 grams CHO/meal), Vegetarian  Diet effective now     Question Answer Comment   Diet Type Cardiac    Cardiac Cardiac Step 1    Other Restriction(s): Consistent Carbohydrate Diet Level 1 (4 carb servings/60 grams CHO/meal)    Other Restriction(s): Vegetarian    RD to adjust diet per protocol?  Yes        18 1518        TF not running  Labs:     Results from last 7 days  Lab Units 02/10/18  0409 02/09/18  0614 02/08/18  0418  02/06/18  1829 02/06/18  0508   WBC Thousand/uL 13 01* 12 71* 25 79*  < > 20 61* 18 86*   HEMOGLOBIN g/dL 10 8* 10 2* 10 9*  < > 11 1* 10 9*   I STAT HEMOGLOBIN   --   --   --   < >  --   --    HEMATOCRIT % 30 9* 29 6* 31 9*  < > 32 3* 31 1*   PLATELETS Thousands/uL 359 323 298  < > 282 306   NEUTROS PCT %  --  89*  --   --  88* 88*   MONOS PCT %  --  2*  --   --  5 5   MONO PCT MAN % 9  --  3*  < >  --   --    < > = values in this interval not displayed  Results from last 7 days  Lab Units 02/10/18  0409 02/09/18  0614 02/08/18  0418   SODIUM mmol/L 142 141 140   POTASSIUM mmol/L 2 7* 3 4* 3 7   CHLORIDE mmol/L 101 107 110*   CO2 mmol/L 35* 25 23   BUN mg/dL 24 22 27*   CREATININE mg/dL 0 88 0 66 0 78   CALCIUM mg/dL 8 0* 7 8* 7 7*   TOTAL PROTEIN g/dL 6 9 6 4 7 2   BILIRUBIN TOTAL mg/dL 0 39 0 82 0 50   ALK PHOS U/L 112 107 134*   ALT U/L 87* 125* 160*   AST U/L 96* 183* 438*   GLUCOSE RANDOM mg/dL 70 126 178*       Results from last 7 days  Lab Units 02/09/18  0614 02/08/18  0418 02/07/18  1501   MAGNESIUM mg/dL 2 1 2 2 2 4     Lab Results   Component Value Date    PHOS 2 0 (L) 02/10/2018    PHOS 1 5 (L) 02/09/2018    PHOS 1 1 (L) 02/08/2018        Results from last 7 days  Lab Units 02/10/18  0205 02/09/18  1737 02/09/18  0614 02/08/18  0755 02/05/18  1714   INR   --   --  1 85* 1 64* 1 30*   PTT seconds 85* 93*  --  37* 38*       0  Lab Value Date/Time   TROPONINI 5 84 (H) 02/10/2018 0409   TROPONINI 4 94 (H) 02/09/2018 0614   TROPONINI 5 55 (H) 02/08/2018 1649   TROPONINI 5 47 (H) 02/08/2018 1333   TROPONINI 5 15 (H) 02/08/2018 1048   TROPONINI 5 45 (H) 02/08/2018 0755       Results from last 7 days  Lab Units 02/08/18  1333 02/08/18  1048 02/08/18  0756   LACTIC ACID mmol/L 2 8* 2 3* 2 2*     ABG:No results found for: PHART, PAZ3XPN, PO2ART, TNG0DZV, F3IIJUDH, BEART, SOURCE  Imaging:  I have personally reviewed pertinent reports      Micro:  Lab Results   Component Value Date    BLOODCX No Growth at 72 hrs  02/06/2018    BLOODCX No Growth After 4 Days  02/05/2018    BLOODCX No Growth After 4 Days  02/05/2018    WOUNDCULT 4+ Growth of Staphylococcus aureus (A) 02/05/2018     Allergies: Allergies   Allergen Reactions    Penicillins      Pt stated that he had a reaction to it a long time ago    Childhood history     Medications:   Scheduled Meds:  Current Facility-Administered Medications:  acetaminophen 650 mg Oral Q6H PRN Polo RemSTEFAN chapaNP    albuterol 2 5 mg Nebulization Q4H PRN Jeff La Paz Trager,     aspirin 81 mg Oral Daily ARMANDO Schmidt    atorvastatin 80 mg Oral Daily With Vannessa Hernandez MD    cefazolin 2,000 mg Intravenous Q8H Chaz Silvestre MD Last Rate: Stopped (02/10/18 0244)   fentanyl citrate (PF) 25 mcg Intravenous Q2H PRN Polo ARMANDO Galvan    furosemide 40 mg Intravenous Piper Silva MD    gabapentin 600 mg Oral HS Kristen Choudhury MD    heparin (porcine) 3-20 Units/kg/hr (Order-Specific) Intravenous Titrated Pamela Baca MD Last Rate: 10 Units/kg/hr (02/09/18 2000)   hydrALAZINE 10 mg Intravenous Q6H PRN ARMANDO Schmidt    insulin regular (HumuLIN R,NovoLIN R) infusion 0 3-21 Units/hr Intravenous Titrated Adalgisa Gabriel MD Last Rate: Stopped (02/10/18 0400)   metoprolol tartrate 25 mg Oral Q12H Joanna Rodriguez MD    metroNIDAZOLE 500 mg Intravenous Q8H Shree Kowalski MD Last Rate: 500 mg (02/10/18 0553)   oxyCODONE 5 mg Oral Q4H PRN ARMANDO Schmidt    potassium chloride 20 mEq Intravenous Once Polo Remak, ARMANDO    potassium phosphate 30 mmol Intravenous Once 9032 Anmol Garcia DO    pregabalin 75 mg Oral HS Kristen Choudhury MD      Continuous Infusions:  heparin (porcine) 3-20 Units/kg/hr (Order-Specific) Last Rate: 10 Units/kg/hr (02/09/18 2000)   insulin regular (HumuLIN R,NovoLIN R) infusion 0 3-21 Units/hr Last Rate: Stopped (02/10/18 3570)     PRN Meds:    acetaminophen 650 mg Q6H PRN   albuterol 2 5 mg Q4H PRN   fentanyl citrate (PF) 25 mcg Q2H PRN   hydrALAZINE 10 mg Q6H PRN   oxyCODONE 5 mg Q4H PRN     VTE Pharmacologic Prophylaxis: Heparin Drip  VTE Mechanical Prophylaxis: sequential compression device  Invasive lines and devices: Invasive Devices     Peripheral Intravenous Line            Peripheral IV 02/07/18 Left Antecubital 2 days    Peripheral IV 02/08/18 Left Forearm 2 days    Peripheral IV 02/08/18 Right Arm 2 days          Drain            External Urinary Catheter Medium less than 1 day                     Portions of the record may have been created with voice recognition software  Occasional wrong word or "sound a like" substitutions may have occurred due to the inherent limitations of voice recognition software  Read the chart carefully and recognize, using context, where substitutions have occurred      Iline Collet, MD

## 2018-02-10 NOTE — PROGRESS NOTES
Progress Note - Infectious Disease   Alec Boothe 75 y o  male MRN: [id number]  Unit/Bed#: MICU 12 Encounter: [id number]         Impression/Recommendations:  1   Severe sepsis   Source of sepsis is most likely right foot infection   Lactic acidosis persists   Temperature is down but leukocytosis persists   I suspect lack of clinical improvement is due to persistent source   I suspect the patient will not get much better unless his sources removed (TMA)  Ultimate plan for TMA   Blood cultures remain negative so far  Antibiotic plan as in below  TMA possibly tomorrow if medically cleared  Monitor hemodynamics  Follow-up on pending blood cultures-remain negative     2  Right 1st toe in distal foot wet gangrene   Patient is status post open 1st toe amputation  Yash Lopes, he has gangrenous changes in his other toes also   Given lack of clinical improvement, patient will need TMA sooner rather than later   Wound culture growing MSSA and mixed anaerobes  Unless deep wound is pristine, I would recommend open TMA with VAC dressing for few days, prior to wound closure      Continue high-dose IV cefazolin/Flagyl  Serial foot exams  TMA possibly tomorrow  Recommend open TMA      3   Right 1st toe osteomyelitis   This is quite definitive foot x-ray   Clinical picture is also suggestive   Patient is status post open right 1st toe amputation  Antibiotic as in above      4   PVD    Patient is status post arteriogram with successful intervention   Circulatory status is now optimized  Vascular surgery following      5   Acute hypoxic respiratory failure   I suspect this is secondary to sepsis and pulmonary edema   No obvious pneumonia on CXR  patient's respiratory status now improved, currently on room air  O2 support per Critical Care Medicine Service      6  DM with hyperglycemia on admission   Patient appears noncompliant with diabetic medication   This clearly contributes to infection development    Blood sugar management per Medicine Service      7   PCN allergy on allergy list  Jesenia Carcamo does not recall reaction  Lyndsey Mcclellan states that this was when he was a young child  Lyndsey Mcclellan is tolerating cephalosporin well         Antibiotics:  Vancomycin/cefepime/Flagyl  POD # 4       Subjective: On room air currently  No fevers  No overnight complaints  No nausea, diarrhea  Objective:  Vitals:  HR:  [] 76  Resp:  [16-41] 16  BP: (118-163)/(59-99) 148/74  SpO2:  [95 %-100 %] 95 %  Temp (24hrs), Av 6 °F (37 °C), Min:97 8 °F (36 6 °C), Max:99 °F (37 2 °C)  Current: Temperature: 98 8 °F (37 1 °C)    Physical Exam:   General:  No acute distress  Psychiatric:  Awake and alert  Pulmonary:  Normal respiratory excursion without accessory muscle use  Abdomen:  Soft, nontender  Extremities:  Right foot with dressing intact  Skin:  No rashes    Lab Results:  I have personally reviewed pertinent labs  Results from last 7 days  Lab Units 02/10/18  04018  0418   SODIUM mmol/L 142 141 140   POTASSIUM mmol/L 2 7* 3 4* 3 7   CHLORIDE mmol/L 101 107 110*   CO2 mmol/L 35* 25 23   ANION GAP mmol/L 6 9 7   BUN mg/dL 24 22 27*   CREATININE mg/dL 0 88 0 66 0 78   EGFR ml/min/1 73sq m 88 99 93   GLUCOSE RANDOM mg/dL 70 126 178*   CALCIUM mg/dL 8 0* 7 8* 7 7*   AST U/L 96* 183* 438*   ALT U/L 87* 125* 160*   ALK PHOS U/L 112 107 134*   TOTAL PROTEIN g/dL 6 9 6 4 7 2   BILIRUBIN TOTAL mg/dL 0 39 0 82 0 50       Results from last 7 days  Lab Units 02/10/18  0409 18  0614 18  0418   WBC Thousand/uL 13 01* 12 71* 25 79*   HEMOGLOBIN g/dL 10 8* 10 2* 10 9*   PLATELETS Thousands/uL 359 323 298       Results from last 7 days  Lab Units 18  1935 18  1756 18  2314 18  1721 18  1714   BLOOD CULTURE  No Growth at 72 hrs   --   --  No Growth After 4 Days  No Growth After 4 Days     GRAM STAIN RESULT   --  No polys seen  1+ Gram positive cocci in clusters No polys seen  3+ Gram positive cocci in pairs  2+ Gram positive cocci in clusters  2+ Gram negative rods  --   --    WOUND CULTURE   --   --  4+ Growth of Staphylococcus aureus*  --   --        Imaging Studies:   I have personally reviewed pertinent imaging study reports and images in PACS  EKG, Pathology, and Other Studies:   I have personally reviewed pertinent reports

## 2018-02-10 NOTE — PROGRESS NOTES
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)    Acute metabolic encephalopathy    Abnormal CT of the head    Elevated brain natriuretic peptide (BNP) level    Acute respiratory failure (HCC)    Transaminitis    Hypophosphatemia    Popliteal artery stenosis, right (HCC)    Great toe amputation status, right (HCC)    Coagulopathy (HCC)    Meningioma (HCC)    Hypokalemia    Acute congestive heart failure (HCC)    Moderate mitral regurgitation      Plan:  1  NSTEMI- Type  - Troponin improved to 4 94 and then went up to 5 18   - Echo reviewed- LVEF-60% with no obvious wall motion abnormalities  Moderate Mr   - Will complete 48hr of IV Heparin drip  - [id number] daily, Atorvastatin 80mg daily, Metoprolol tartrate 25mg twice daily   - Patient will be at high risk for Right TMA  Will discuss this with the patient and his daughter and proceed for surgery   - Once stable after surgery, he will be scheduled for a cardiac cath  - Lipid panel- Tchol-83, HDL-21, LDL-50, TG-59    2  Acute hypoxic respiratory failure secondary to flash pulmonary edema  - Net negative 2 38 overnight  Chest xray this morning shows pulmonary vascular congestion with bilateral effusions  Albumin-2 0  - Continue lasix 40mg IV q8h   - Replete electrolytes aggresively to maintain K-4 5 (patient is also on IV insulin drip which can worsen hypokalemia) and Mg-2      3  Peripheral vascular disease s/p right popliteal artery atherectomy and balloon angioplasty , right posterior tibial artery balloon angioplasty-2/6  - on ASA/Statin  4  Sepsis with leukocytosis secondary to right foot gangrene s/p 1st toe amputation-2/6  - on broad spectrum antibiotics      5  Type II diabetes  - on Insulin drip  - [id number] 7    Subjective:   Patient seen and examined  No significant events overnight  Denies chest pain, pnd, orthopnea  Has pain in the right hip area  Tried to discuss his cardiac condition but he did not want to know about his medical condition and would like for me to discuss with his daughter  Telemetry- sinus rhythm with NSVT-3 beats  Objective:     Vitals: Blood pressure 148/74, pulse 76, temperature 98 8 °F (37 1 °C), resp  rate 16, height 5' 8" (1 727 m), weight 62 9 kg (138 lb 10 7 oz), SpO2 95 %  , Body mass index is 21 08 kg/m² , Orthostatic Blood Pressures    Flowsheet Row Most Recent Value   Blood Pressure  148/74 filed at 02/10/2018 1200   Patient Position - Orthostatic VS  Lying filed at 02/10/2018 0400            Intake/Output Summary (Last 24 hours) at 02/10/18 1651  Last data filed at 02/10/18 1400   Gross per 24 hour   Intake            529 2 ml   Output             3240 ml   Net          -2710 8 ml         Physical Exam:    GEN: Leon Morales appears well, alert and oriented x 3, pleasant and cooperative   HEENT: anicteric, mucous membranes moist  NECK: elevated JVD, carotid bruits   HEART: regular rhythm, normal S1 and S2, no murmurs, clicks, gallops or rubs   LUNGS: coarse breath sounds bilaterally in the anterior lung fields  ABDOMEN: normal bowel sounds, soft, no tenderness, no distention  EXTREMITIES: intact dressing of the right lower extremity; no edema  NEURO: no focal findings   SKIN: warm      Current Facility-Administered Medications:     acetaminophen (TYLENOL) tablet 650 mg, 650 mg, Oral, Q6H PRN, ARMANDO Velez, 650 mg at 02/08/18 1917    aspirin chewable tablet 81 mg, 81 mg, Oral, Daily, ARMANDO Schmidt, 81 mg at 02/10/18 0843    atorvastatin (LIPITOR) tablet 80 mg, 80 mg, Oral, Daily With Radha Calloway MD, 80 mg at 02/09/18 1545    ceFAZolin (ANCEF) IVPB (premix) 2,000 mg, 2,000 mg, Intravenous, Q8H, Clark Hurd MD, Last Rate: 100 mL/hr at 02/10/18 0843, 2,000 mg at 02/10/18 0843    fentanyl citrate (PF) 100 MCG/2ML 25 mcg, 25 mcg, Intravenous, Q2H PRN, Kathy Finder, CRNP    furosemide (LASIX) injection 40 mg, 40 mg, Intravenous, Q8H, Bobo Harding MD, 40 mg at 02/10/18 0843    gabapentin (NEURONTIN) capsule 600 mg, 600 mg, Oral, HS, Vincent Bruce MD, 600 mg at 02/09/18 2235    heparin (porcine) 25,000 units in 250 mL infusion (premix), 3-20 Units/kg/hr (Order-Specific), Intravenous, Titrated, Bobo Harding MD, Last Rate: 6 mL/hr at 02/09/18 2000, 10 Units/kg/hr at 02/09/18 2000    hydrALAZINE (APRESOLINE) injection 10 mg, 10 mg, Intravenous, Q6H PRN, Kathy Finder, CRNP    insulin regular (HumuLIN R,NovoLIN R) 1 Units/mL in sodium chloride 0 9 % 100 mL infusion, 0 3-21 Units/hr, Intravenous, Titrated, Christoper Moritz, MD, Last Rate: 2 mL/hr at 02/10/18 1407, 2 Units/hr at 02/10/18 1407    metoprolol tartrate (LOPRESSOR) tablet 25 mg, 25 mg, Oral, Q12H [address], Bobo Harding MD, 25 mg at 02/10/18 0843    metroNIDAZOLE (FLAGYL) IVPB (premix) 500 mg, 500 mg, Intravenous, Q8H, Nellie Grossman MD, Last Rate: 200 mL/hr at 02/10/18 1411, 500 mg at 02/10/18 1411    oxyCODONE (ROXICODONE) IR tablet 5 mg, 5 mg, Oral, Q4H PRN, Kathy Finder, CRNP    potassium chloride 20 mEq IVPB (premix), 20 mEq, Intravenous, Once, Kathy Finder, CRNP    pregabalin (LYRICA) capsule 75 mg, 75 mg, Oral, HS, Vincent Bruce MD, 75 mg at 02/09/18 2235    Labs & Results:    Lab Results   Component Value Date    TROPONINI 5 18 (H) 02/10/2018    TROPONINI 5 84 (H) 02/10/2018    TROPONINI 4 94 (H) 02/09/2018       Lab Results   Component Value Date    GLUCOSE 148 (H) 02/10/2018    CALCIUM 8 1 (L) 02/10/2018     02/10/2018    K 3 7 02/10/2018    CO2 27 02/10/2018     02/10/2018    BUN 26 (H) 02/10/2018    CREATININE 0 91 02/10/2018       Lab Results   Component Value Date    WBC 13 01 (H) 02/10/2018    HGB 10 8 (L) 02/10/2018    HCT 30 9 (L) 02/10/2018    MCV 89 02/10/2018     02/10/2018       Results from last 7 days  Lab Units 02/09/18  0614   INR  1 85*       Lab Results   Component Value Date    CHOL 83 02/09/2018    CHOL 175 06/18/2015     Lab Results   Component Value Date    HDL 21 (L) 02/09/2018    HDL 39 (L) 06/18/2015     Lab Results   Component Value Date    LDLCALC 50 02/09/2018     Lab Results   Component Value Date    TRIG 59 02/09/2018    TRIG 129 06/18/2015       Lab Results   Component Value Date    ALT 87 (H) 02/10/2018    AST 96 (H) 02/10/2018

## 2018-02-11 LAB
ANION GAP SERPL CALCULATED.3IONS-SCNC: 7 MMOL/L (ref 4–13)
APTT PPP: 72 SECONDS (ref 23–35)
BUN SERPL-MCNC: 29 MG/DL (ref 5–25)
CALCIUM SERPL-MCNC: 8.1 MG/DL (ref 8.3–10.1)
CHLORIDE SERPL-SCNC: 99 MMOL/L (ref 100–108)
CO2 SERPL-SCNC: 35 MMOL/L (ref 21–32)
CREAT SERPL-MCNC: 0.79 MG/DL (ref 0.6–1.3)
ERYTHROCYTE [DISTWIDTH] IN BLOOD BY AUTOMATED COUNT: 13.4 % (ref 11.6–15.1)
GFR SERPL CREATININE-BSD FRML MDRD: 92 ML/MIN/1.73SQ M
GLUCOSE SERPL-MCNC: 102 MG/DL (ref 65–140)
GLUCOSE SERPL-MCNC: 139 MG/DL (ref 65–140)
GLUCOSE SERPL-MCNC: 165 MG/DL (ref 65–140)
GLUCOSE SERPL-MCNC: 231 MG/DL (ref 65–140)
GLUCOSE SERPL-MCNC: 248 MG/DL (ref 65–140)
GLUCOSE SERPL-MCNC: 278 MG/DL (ref 65–140)
GLUCOSE SERPL-MCNC: 326 MG/DL (ref 65–140)
GLUCOSE SERPL-MCNC: 334 MG/DL (ref 65–140)
GLUCOSE SERPL-MCNC: 341 MG/DL (ref 65–140)
GLUCOSE SERPL-MCNC: 74 MG/DL (ref 65–140)
GLUCOSE SERPL-MCNC: 89 MG/DL (ref 65–140)
GLUCOSE SERPL-MCNC: 96 MG/DL (ref 65–140)
GLUCOSE SERPL-MCNC: 99 MG/DL (ref 65–140)
HCT VFR BLD AUTO: 34.1 % (ref 36.5–49.3)
HGB BLD-MCNC: 11.6 G/DL (ref 12–17)
MAGNESIUM SERPL-MCNC: 2 MG/DL (ref 1.6–2.6)
MCH RBC QN AUTO: 30.6 PG (ref 26.8–34.3)
MCHC RBC AUTO-ENTMCNC: 34 G/DL (ref 31.4–37.4)
MCV RBC AUTO: 90 FL (ref 82–98)
PHOSPHATE SERPL-MCNC: 2.9 MG/DL (ref 2.3–4.1)
PLATELET # BLD AUTO: 350 THOUSANDS/UL (ref 149–390)
PMV BLD AUTO: 10.1 FL (ref 8.9–12.7)
POTASSIUM SERPL-SCNC: 3.3 MMOL/L (ref 3.5–5.3)
RBC # BLD AUTO: 3.79 MILLION/UL (ref 3.88–5.62)
SODIUM SERPL-SCNC: 141 MMOL/L (ref 136–145)
WBC # BLD AUTO: 11.46 THOUSAND/UL (ref 4.31–10.16)

## 2018-02-11 PROCEDURE — 83735 ASSAY OF MAGNESIUM: CPT | Performed by: INTERNAL MEDICINE

## 2018-02-11 PROCEDURE — 80048 BASIC METABOLIC PNL TOTAL CA: CPT | Performed by: INTERNAL MEDICINE

## 2018-02-11 PROCEDURE — 85027 COMPLETE CBC AUTOMATED: CPT | Performed by: INTERNAL MEDICINE

## 2018-02-11 PROCEDURE — 99232 SBSQ HOSP IP/OBS MODERATE 35: CPT | Performed by: INTERNAL MEDICINE

## 2018-02-11 PROCEDURE — 99233 SBSQ HOSP IP/OBS HIGH 50: CPT | Performed by: INTERNAL MEDICINE

## 2018-02-11 PROCEDURE — 82948 REAGENT STRIP/BLOOD GLUCOSE: CPT

## 2018-02-11 PROCEDURE — 85730 THROMBOPLASTIN TIME PARTIAL: CPT | Performed by: INTERNAL MEDICINE

## 2018-02-11 PROCEDURE — 84100 ASSAY OF PHOSPHORUS: CPT | Performed by: INTERNAL MEDICINE

## 2018-02-11 RX ORDER — POTASSIUM CHLORIDE 20 MEQ/1
40 TABLET, EXTENDED RELEASE ORAL ONCE
Status: COMPLETED | OUTPATIENT
Start: 2018-02-11 | End: 2018-02-11

## 2018-02-11 RX ADMIN — ATORVASTATIN CALCIUM 80 MG: 80 TABLET, FILM COATED ORAL at 16:01

## 2018-02-11 RX ADMIN — PREGABALIN 75 MG: 75 CAPSULE ORAL at 22:19

## 2018-02-11 RX ADMIN — FUROSEMIDE 40 MG: 10 INJECTION, SOLUTION INTRAMUSCULAR; INTRAVENOUS at 02:43

## 2018-02-11 RX ADMIN — FUROSEMIDE 40 MG: 10 INJECTION, SOLUTION INTRAMUSCULAR; INTRAVENOUS at 08:36

## 2018-02-11 RX ADMIN — INSULIN LISPRO 2 UNITS: 100 INJECTION, SOLUTION INTRAVENOUS; SUBCUTANEOUS at 22:29

## 2018-02-11 RX ADMIN — METOPROLOL TARTRATE 25 MG: 25 TABLET ORAL at 22:19

## 2018-02-11 RX ADMIN — METOPROLOL TARTRATE 25 MG: 25 TABLET ORAL at 08:57

## 2018-02-11 RX ADMIN — POTASSIUM CHLORIDE 40 MEQ: 1500 TABLET, EXTENDED RELEASE ORAL at 12:36

## 2018-02-11 RX ADMIN — METRONIDAZOLE 500 MG: 500 INJECTION, SOLUTION INTRAVENOUS at 22:30

## 2018-02-11 RX ADMIN — CEFAZOLIN SODIUM 2000 MG: 2 SOLUTION INTRAVENOUS at 08:57

## 2018-02-11 RX ADMIN — ASPIRIN 81 MG 81 MG: 81 TABLET ORAL at 08:57

## 2018-02-11 RX ADMIN — METRONIDAZOLE 500 MG: 500 INJECTION, SOLUTION INTRAVENOUS at 05:15

## 2018-02-11 RX ADMIN — FUROSEMIDE 40 MG: 10 INJECTION, SOLUTION INTRAMUSCULAR; INTRAVENOUS at 16:01

## 2018-02-11 RX ADMIN — INSULIN LISPRO 4 UNITS: 100 INJECTION, SOLUTION INTRAVENOUS; SUBCUTANEOUS at 19:09

## 2018-02-11 RX ADMIN — CEFAZOLIN SODIUM 2000 MG: 2 SOLUTION INTRAVENOUS at 02:43

## 2018-02-11 RX ADMIN — GABAPENTIN 600 MG: 300 CAPSULE ORAL at 22:19

## 2018-02-11 NOTE — PROGRESS NOTES
Heart Failure Service Progress Note - Mir Arauz 75 y o  male MRN: [id number]    Unit/Bed#: 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)    Acute metabolic encephalopathy    Abnormal CT of the head    Elevated brain natriuretic peptide (BNP) level    Acute respiratory failure (HCC)    Transaminitis    Hypophosphatemia    Popliteal artery stenosis, right (HCC)    Great toe amputation status, right (HCC)    Coagulopathy (HCC)    Meningioma (HCC)    Hypokalemia    Acute congestive heart failure (HCC)    Moderate mitral regurgitation    # NSTEMI type II: in the setting of severe sepsis, likely from R foot infection (lactate ~5)  Trops >5 8 now downtrending  Volume overloaded, likely partially iatrogenic (appropriate therapy for severe sepsis)  --TTE normal LVEF w/ mod MR  --Continue ASA, statin, BB  --Continue hep gtt for total of 48 hours; stop today  --Continue goal I=O     # Preop risk: Patient is high risk for surgery  Unfortunately, difficult situation as patient has infected toe that needs amputation  He likely has CAD given troponin elevation and severe PAD and other traditional risk factors  He denies any cardiac symptoms of CP, SOB, or LUEVANO prior to presentation  Discussed at length with the patient and family re: options  --Medical mgmt of NSTEMI and proceed with high risk surgery vs cardiac cath +/- revascularization (depending on nonobs CAD vs obstructive CAD [single vs multivessel]  The latter of which could require revascularization, stents vs bypass  Stents would potential delay surgery  CABG would likely require amputation prior to revascularization) then surgery   Given that he did not have any significant cardiac symptoms and likely had NSTEMI type II in the setting of severe sepsis with normal LVEF and mod MR, and the risks of delaying surgery, the patient and family initially decided to pursue high risk surgery after medical management of NSTEMI  Currently, the patient and family have decided to wait on surgery  --Will proceed with the following:  ----Stop hep gtt today  ----Continue ASA in perioperative setting  ----Currently euvolemic; BP and HR well controlled  ----To minimize the risk of anesthesia would favor nerve block w/ as minimal sedation as possible  ----After recovery from R transmetatarsal amputation can consider timing of cardiac cath based on safety of possible DAPT     # HTN  # R toe infection  # DMII  # Mod MR  # Diastolic HF: As above    Subjective:   Patient seen and examined  No significant events overnight  Objective:       [employer] (day, reason): Piedra catheter (day, reason):    Vitals: Blood pressure 140/61, pulse 72, temperature 98 8 °F (37 1 °C), resp  rate (!) 41, height 5' 8" (1 727 m), weight 62 9 kg (138 lb 10 7 oz), SpO2 96 %  , Body mass index is 21 08 kg/m² , I/O last 3 completed shifts: In: 1325 1 [I V :305 1; IV Piggyback:1020]  Out: 4990 [Urine:4990]  No intake/output data recorded  Wt Readings from Last 3 Encounters:   02/09/18 62 9 kg (138 lb 10 7 oz)   02/08/18 60 5 kg (133 lb 6 1 oz)   01/25/18 60 8 kg (134 lb)       Intake/Output Summary (Last 24 hours) at 02/11/18 0819  Last data filed at 02/11/18 0600   Gross per 24 hour   Intake           797 92 ml   Output             3440 ml   Net         -2642 08 ml     I/O last 3 completed shifts: In: 1325 1 [I V :305 1; IV Piggyback:1020]  Out: 7879 [Urine:4990]    No significant arrhythmias seen on telemetry review      Physical Exam:  Vitals:    02/10/18 2300 02/11/18 0112 02/11/18 0312 02/11/18 0712   BP:  118/64 119/55 140/61   BP Location:       Pulse: 78 74 76 72   Resp: 22 (!) 28 22 (!) 41   Temp:       TempSrc:       SpO2: 93% 95% 93% 96%   Weight:       Height:           GEN: Onalee Ly appears well, alert and oriented x 3, pleasant and cooperative   HEENT: pupils equal, round, and reactive to light; extraocular muscles intact  NECK: supple, no carotid bruits   HEART: regular rhythm, normal S1 and S2, no murmurs, clicks, gallops or rubs, JVD is flat    LUNGS: clear to auscultation bilaterally; no wheezes, rales, or rhonchi   ABDOMEN: normal bowel sounds, soft, no tenderness, no distention  EXTREMITIES: peripheral pulses normal; no clubbing, cyanosis, or edema  NEURO: no focal findings   SKIN: normal without suspicious lesions on exposed skin      Current Facility-Administered Medications:     acetaminophen (TYLENOL) tablet 650 mg, 650 mg, Oral, Q6H PRN, RayARMANDO villegas, 650 mg at 02/08/18 1917    aspirin chewable tablet 81 mg, 81 mg, Oral, Daily, ARMANDO Schmidt, 81 mg at 02/10/18 0843    atorvastatin (LIPITOR) tablet 80 mg, 80 mg, Oral, Daily With Angel García MD, 80 mg at 02/10/18 1735    ceFAZolin (ANCEF) IVPB (premix) 2,000 mg, 2,000 mg, Intravenous, Q8H, Malinda Raymundo MD, Last Rate: 100 mL/hr at 02/11/18 0243, 2,000 mg at 02/11/18 0243    fentanyl citrate (PF) 100 MCG/2ML 25 mcg, 25 mcg, Intravenous, Q2H PRN, ARMANDO Rubi    furosemide (LASIX) injection 40 mg, 40 mg, Intravenous, Q8H, Ruth Valdes MD, 40 mg at 02/11/18 0243    gabapentin (NEURONTIN) capsule 600 mg, 600 mg, Oral, HS, Annetta Dudley MD, 600 mg at 02/10/18 2128    heparin (porcine) 25,000 units in 250 mL infusion (premix), 3-20 Units/kg/hr (Order-Specific), Intravenous, Titrated, Ruth Valdes MD, Stopped at 02/11/18 0552    hydrALAZINE (APRESOLINE) injection 10 mg, 10 mg, Intravenous, Q6H PRN, ARMANDO Rubi    insulin regular (HumuLIN R,NovoLIN R) 1 Units/mL in sodium chloride 0 9 % 100 mL infusion, 0 3-21 Units/hr, Intravenous, Titrated, Lisa Sawant MD, Last Rate: 0 5 mL/hr at 02/11/18 0815, 0 5 Units/hr at 02/11/18 0815    metoprolol tartrate (LOPRESSOR) tablet 25 mg, 25 mg, Oral, Q12H [address], Pamela Baca MD, 25 mg at 02/10/18 2128    metroNIDAZOLE (FLAGYL) IVPB (premix) 500 mg, 500 mg, Intravenous, Q8H, Shree Kowalski MD, Stopped at 02/11/18 0600    oxyCODONE (ROXICODONE) IR tablet 5 mg, 5 mg, Oral, Q4H PRN, Polo Remak, CRNP    potassium chloride 20 mEq IVPB (premix), 20 mEq, Intravenous, Once, Polo Remak, CRNP    pregabalin (LYRICA) capsule 75 mg, 75 mg, Oral, HS, Kristen Chouhdury MD, 75 mg at 02/10/18 2128      Labs & Results:      Results from last 7 days  Lab Units 02/10/18  1250 02/10/18  0409 02/09/18  0614   TROPONIN I ng/mL 5 18* 5 84* 4 94*     Results from last 7 days  Lab Units 02/11/18  0503 02/10/18  0409 02/09/18  0614   WBC Thousand/uL 11 46* 13 01* 12 71*   HEMOGLOBIN g/dL 11 6* 10 8* 10 2*   HEMATOCRIT % 34 1* 30 9* 29 6*   PLATELETS Thousands/uL 350 359 323       Results from last 7 days  Lab Units 02/09/18  0614   CHOLESTEROL mg/dL 83       Results from last 7 days  Lab Units 02/11/18  0503 02/10/18  1311 02/10/18  0409 02/09/18  0614 02/08/18  0418   SODIUM mmol/L 141 140 142 141 140   POTASSIUM mmol/L 3 3* 3 7 2 7* 3 4* 3 7   CHLORIDE mmol/L 99* 100 101 107 110*   CO2 mmol/L 35* 27 35* 25 23   BUN mg/dL 29* 26* 24 22 27*   CREATININE mg/dL 0 79 0 91 0 88 0 66 0 78   CALCIUM mg/dL 8 1* 8 1* 8 0* 7 8* 7 7*   TOTAL PROTEIN g/dL  --   --  6 9 6 4 7 2   BILIRUBIN TOTAL mg/dL  --   --  0 39 0 82 0 50   ALK PHOS U/L  --   --  112 107 134*   ALT U/L  --   --  87* 125* 160*   AST U/L  --   --  96* 183* 438*   GLUCOSE RANDOM mg/dL 89 148* 70 126 178*     Results from last 7 days  Lab Units 02/09/18  0614 02/08/18  0755 02/05/18  1714   INR  1 85* 1 64* 1 30*     EKG personally reviewed by Savannah Riley MD      Counseling / Coordination of Care  Total floor / unit time spent today 40 minutes  Greater than 50% of total time was spent with the patient and / or family counseling and / or coordination of care    A description of the counseling / coordination of care: 25 min  Thank you for the opportunity to participate in the care of this patient      Owen Clark MD, PhD   Yanna Beckham

## 2018-02-11 NOTE — PROGRESS NOTES
Progress Note - Zelda Cancino 75 y o  male MRN: [id number]    Unit/Bed#: [facility] 12 Encounter: [id number]      CC: diabetes f/u    Subjective:   Zelda Cancino is a 75y o  year old male with type 2 diabetes  Feels well  No complaints  No hypoglycemia  Blood sugars elevated, as pt is on regular diet , currently on insulin infusion at 6 units per hour     Objective:     Vitals: Blood pressure 132/68, pulse 84, temperature 98 3 °F (36 8 °C), resp  rate (!) 33, height 5' 8" (1 727 m), weight 62 9 kg (138 lb 10 7 oz), SpO2 100 %  ,Body mass index is 21 08 kg/m²  Intake/Output Summary (Last 24 hours) at 02/11/18 1447  Last data filed at 02/11/18 1057   Gross per 24 hour   Intake           547 92 ml   Output             3700 ml   Net         -3152 08 ml       Physical Exam:  General Appearance: awake, appears stated age and cooperative  Head: Normocephalic, without obvious abnormality, atraumatic  Extremities: moves all extremities  Skin: Skin color and temperature normal    Pulm: no labored breathing    Lab, Imaging and other studies: I have personally reviewed pertinent reports  POC Glucose (mg/dl)   Date Value   02/11/2018 248 (H)   02/11/2018 165 (H)   02/11/2018 102   02/11/2018 96   02/11/2018 99   02/11/2018 74   02/10/2018 139   02/10/2018 193 (H)   02/10/2018 204 (H)   02/10/2018 176 (H)       Assessment:  diabetes with hyperglycemia with severe sepsis, ( rt foot infection) still in ICU , appetite is variable     Plan:  - Continue insulin infusion as per current protocol ,   ( important to control blood sugars in 100-180 range, with ongoing sepsis)   -once he is switched to step down unit, will consider to switch to SQ insulin regimen   -continue blood sugars monitoring , q 2 hours  And adjust as per protocol   -change diet to carb controlled diet , currently on regular diet     Bruno Payton MD        Portions of the record may have been created with voice recognition software

## 2018-02-11 NOTE — PROGRESS NOTES
Progress Note - ICU Transfer to Step down/[facility]  surg  [facility] Bobbi 75 y o  male MRN: [id number]    Unit/Bed#: [facility] 12 Encounter: [id number]      Code Status: Level 1 - Full Code     Slim attending accepting transfer : Fronie Klinefelter MD    PCP: Chris Duque MD    Date of ICU admission: 2/5/2018    Reason for ICU adm:  Acute hypoxic respiratory failure  Sepsis from gangrene right foot  Foot pain [M79 673]  Hyponatremia [E87 1]  Gangrenous toe ([address] ) [I96]  Cellulitis of right foot [L03 115]    Active problems:   Patient Active Problem List   Diagnosis    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 ([address] )    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 of foot ([address] )    PVD (peripheral vascular disease) ([address] )    Hypertension    Gangrene of toe of right foot ([address] )    Diabetic neuropathy associated with type 2 diabetes mellitus ([address] )    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 ([address] )    Cellulitis of right foot    Sepsis ([address] )    Acute metabolic encephalopathy    Abnormal CT of the head    Elevated brain natriuretic peptide (BNP) level    Acute respiratory failure (HCC)    Transaminitis    Hypophosphatemia    Popliteal artery stenosis, right ([address] )    Great toe amputation status, right (HCC)    Coagulopathy ([address] )    Meningioma ([address] )    Hypokalemia    Acute congestive heart failure (HCC)    Moderate mitral regurgitation       Summary of clinical course:   Patient was originally admitted on  02/05//2018 to [facility] on found to have with gangrene with surrounding cellulitis and osteomyelitis of the right great toe, started on Ancef Flagyl  On February 6, 2018 antibiotics were broadened to vancomycin and cefepime Flagyl  Patient also had right lower extremity arteriogram with intervention right hallux amputation by Podiatry    On February 7, 2018 patient was transferred to the Medical ICU after rapid response was called for being unresponsive and with  acute hypoxic respiratory failure  Patient woke up after norcon x1 and was placed on  BiPAP  The patient's troponins were elevated and he was diagnosed with a non STEMI type 2 due to increased demand in the setting of acute heart failure exacerbation and pulmonary edema, both issues were addressed with IV diuresis  At this time patient' s oxygen requirements are minimal /room air  He was also septic with persistent lactic acidosis, infectious Disease team felt the patient will not get much better until his sepsis source was removed  with TMA, although the patient has persistently refuse procedure  Other comorbidities include poorly controlled diabetes A1c 9 7 hypertension hyperlipidemia  Recent or scheduled procedures:   TMA:  Surgery date to be determined by Podiatry    Recent diagnostics:   Diastolic heart failure exacerbation  Progressive dry gangrene 2nd digit right foot    Neuro: GCS 15   No Pain   CV:RRR, normotensive  Pulm:  Clear to auscultation bilateral  Encourage Incentive Spirometry  Titrate O2 to 92-94%  GI:  Diabetes Diet Last BM 2/10/18  : Monitor Intake and Output  Skin: Encourage turning every 2 hours    No skin breakdown  F/E/N:  Replete as needed  Activity: OOB/PT    Hospital discharge planning:  Med surg tele

## 2018-02-11 NOTE — PROGRESS NOTES
Patient has been deemed high risk for right TMA per cardiology  Patient and family understand the risks, and wish to proceed with right foot TMA tomorrow 02/11/2018 with Dr Rene Barragan  NPO at midnight  Heparin gtt will be stopped at 0600 hours on 02/11/2018 and will be restarted after surgery

## 2018-02-11 NOTE — PROGRESS NOTES
Progress Note - Podiatry  Francisco Diaz 75 y o  male MRN: [id number]  [facility]: [id number]    Assessment:  1  S/p I&D of the right hallux (DOS: 2/6/18) secondary to Wet gangrene of right hallux and medial forefoot with cellulitis  2  Progressive dry gangrene 2nd digit right foot  3  PAD, status post angiogram with stenting  4  DM with neuropathy, A1c 9 7, per endo  5   Altered mental status     Plan:  - patient seen and evaluated at bedside with Dr Sharon Mae  Vital signs are stable, leukocytosis noted  - patient will be going to the OR today 02/11/2018 at 0800 hours with Dr Sharon Mae for a right transmetatarsal amputation, with possible skin graft, possible wound VAC application  - patient has been deemed high risk for surgery per Cardiology  Patient and family understand the high risk for surgery, and wished to proceed with surgery for infection control   - NPO status confirmed  - The benefits, risks, and alternatives of the surgery were discussed with the patient  Consent was signed and is in chart  - All questions/concerns were answered  No guarantees given to outcome of procedure   -dressing was left intact  Will change in OR today  Subjective/Objective   Chief Complaint:   Chief Complaint   Patient presents with    Foot Ulcer     Pt presents with diabetic ulcers on right foot and increased pain and swelling       Subjective: 75 y o  y/o male was seen and evaluated at bedside in no acute distress, nontoxic in appearance  Patient denies any recent nausea, vomiting, fever, chills, shortness of breath, chest pains       Blood pressure 140/61, pulse 72, temperature 98 8 °F (37 1 °C), resp  rate (!) 41, height 5' 8" (1 727 m), weight 62 9 kg (138 lb 10 7 oz), SpO2 96 %  ,Body mass index is 21 08 kg/m²      Invasive Devices     Peripheral Intravenous Line            Peripheral IV 02/07/18 Left Antecubital 3 days    Peripheral IV 02/08/18 Left Forearm 3 days    Peripheral IV 02/08/18 Right Arm 3 days          Drain            External Urinary Catheter Medium 1 day                Physical Exam:   General: Alert, cooperative and no distress  Lungs: Non labored breathing  Heart: Positive S1, S2  Abdomen: Soft, non-tender  Extremity:     NVS at baseline B/l  MSK function at B/l  No calf tenderness noted B/l     RLE:  Dressing was c/d/i with no strike through to the outer dressing noted  Lab, Imaging and other studies:   I have personally reviewed pertinent lab results  , CBC:   Lab Results   Component Value Date    WBC 11 46 (H) 02/11/2018    HGB 11 6 (L) 02/11/2018    HCT 34 1 (L) 02/11/2018    MCV 90 02/11/2018     02/11/2018    MCH 30 6 02/11/2018    MCHC 34 0 02/11/2018    RDW 13 4 02/11/2018    MPV 10 1 02/11/2018   , CMP:   Lab Results   Component Value Date     02/11/2018    K 3 3 (L) 02/11/2018    CL 99 (L) 02/11/2018    CO2 35 (H) 02/11/2018    ANIONGAP 7 02/11/2018    BUN 29 (H) 02/11/2018    CREATININE 0 79 02/11/2018    GLUCOSE 89 02/11/2018    CALCIUM 8 1 (L) 02/11/2018    EGFR 92 02/11/2018       Imaging: I have personally reviewed pertinent films in PACS  EKG, Pathology, and Other Studies: I have personally reviewed pertinent reports    VTE Pharmacologic Prophylaxis: Heparin gtt (on hold)

## 2018-02-11 NOTE — PROGRESS NOTES
Progress Note - Critical Care   Lawson Griffin 75 y o  male MRN: [id number]  Unit/Bed#: MICU 12 Encounter: [id number]    Attending Physician: Osei Modi DO    ______________________________________________________________________  Assessment and Plan:   Sepsis from gangrene right foot  Acute hypoxic respiratory failure  Non STEMI type 1  Congestive heart failure exacerbation moderated mitral regurgitation  Metabolic encephalopathy  Postop day 4 from right great toe amputation    Neuro:   CAM assessment negative  Neuropathy, stable, continue with Lyrica gabapentin p r n  Fentanyl    CV:   Heart failure exacerbation continue with Lasix 40 mg IV 3 times daily  Cleared by Cardiology for surgery  Hypertension:  Blood pressure 140/60, continue with Lopressor     Pulm:   Continue high-flow nasal cannula    GI:   Transaminitis likely secondary to sepsis  Follow-up LFTs    :   No acute issues  Urine output adequate in the setting of IV furosemide    F/E/N:   IV fluids  Electrolytes:  Replete as needed potassium this morning 3 3, repleted  NPO for procedure    ID:   Infected with Gangrene toes RLE  Plan for TMA per Podiatry  Continue with cefazolin 2 g q8H & Flagyl    Heme:   Hemoglobin stable 11 6  Off  heparin drip    Endo:   Diabetes   Blood sugar this morning 89  Continue with ssi     Msk/Skin:   Turn frequently    Disposition:  Tx out to Med/Surg      Code Status: Level 1 - Full Code      Chief Complaint:   No complaints    24 Hour Events:   He was scheduled for OR  Today, although patient is refusing surgery this morning and  family is in agreement for more time  Podiatry team aware    Patient requested vascular consult for 2nd opinion    ______________________________________________________________________    Physical Exam:   General appearance:  Not in acute distress  Head:  Normocephalic atraumatic  Lungs:  Clear to auscultation bilateral  Heart:  S1-S2 rrr  Abdomen:  Soft nontender nondistended  Extremities:  Right foot wrapped in Ace bandage    ______________________________________________________________________  Vitals:    18 8918 18 0800 18 0912 18 1000   BP: 140/61  151/80    Pulse: 72   82   Resp: (!) 41   (!) 36   Temp:  98 1 °F (36 7 °C)     TempSrc:       SpO2: 96%   99%   Weight:       Height:           Temperature:   Temp (24hrs), Av 1 °F (36 7 °C), Min:98 1 °F (36 7 °C), Max:98 1 °F (36 7 °C)    Current Temperature: 98 1 °F (36 7 °C)  Weights:   IBW: 68 4 kg    Body mass index is 21 08 kg/m²  Weight (last 2 days)     Date/Time   Weight    18 0600  62 9 (138 67)            Hemodynamic Monitoring:  PAP:  , PAP mean:   , CVP:       Non-Invasive/Invasive Ventilation Settings:  Respiratory    Lab Data (Last 4 hours)    None         O2/Vent Data (Last 4 hours)    None              No results found for: PHART, RZI3OTU, PO2ART, SYX6CKE, D2GEVBOF, BEART, SOURCE  SpO2: SpO2: 99 %, SpO2 Activity: SpO2 Activity: At Rest  Intake and Outputs:  I/O       701 - 02/10 0700 02/10 07 -  0700    I V  (mL/kg) 149 1 (2 4) 197 9 (3 1)    IV Piggyback 1270 600    Total Intake(mL/kg) 1419 1 (22 6) 797 9 (12 7)    Urine (mL/kg/hr) 3804 (2 5) 3640 (2 4)    Total Output 3804 3640    Net -2384 9 -2842 1          Unmeasured Urine Occurrence 6 x         UOP: 2 4c/kg/hour   Nutrition:        Diet Orders            Start     Ordered    18 0807  Diet Cardiac; Cardiac TLC 2 3 GM NA  Diet effective now     Question Answer Comment   Diet Type Cardiac    Cardiac Cardiac TLC 2 3 GM NA    RD to adjust diet per protocol?  Yes        18 0807        Labs:     Results from last 7 days  Lab Units 18  0503 02/10/18  0409 18  0614 18  0418  18  1829 18  0508   WBC Thousand/uL 11 46* 13 01* 12 71* 25 79*  < > 20 61* 18 86*   HEMOGLOBIN g/dL 11 6* 10 8* 10 2* 10 9*  < > 11 1* 10 9*   I STAT HEMOGLOBIN   --   --   --   --   < >  --   -- HEMATOCRIT % 34 1* 30 9* 29 6* 31 9*  < > 32 3* 31 1*   PLATELETS Thousands/uL 350 359 323 298  < > 282 306   NEUTROS PCT %  --   --  89*  --   --  88* 88*   MONOS PCT %  --   --  2*  --   --  5 5   MONO PCT MAN %  --  9  --  3*  < >  --   --    < > = values in this interval not displayed  Results from last 7 days  Lab Units 02/11/18  0503 02/10/18  1311 02/10/18  0409 02/09/18  0614 02/08/18  0418   SODIUM mmol/L 141 140 142 141 140   POTASSIUM mmol/L 3 3* 3 7 2 7* 3 4* 3 7   CHLORIDE mmol/L 99* 100 101 107 110*   CO2 mmol/L 35* 27 35* 25 23   BUN mg/dL 29* 26* 24 22 27*   CREATININE mg/dL 0 79 0 91 0 88 0 66 0 78   CALCIUM mg/dL 8 1* 8 1* 8 0* 7 8* 7 7*   TOTAL PROTEIN g/dL  --   --  6 9 6 4 7 2   BILIRUBIN TOTAL mg/dL  --   --  0 39 0 82 0 50   ALK PHOS U/L  --   --  112 107 134*   ALT U/L  --   --  87* 125* 160*   AST U/L  --   --  96* 183* 438*   GLUCOSE RANDOM mg/dL 89 148* 70 126 178*       Results from last 7 days  Lab Units 02/11/18  0503 02/09/18  0614 02/08/18  0418   MAGNESIUM mg/dL 2 0 2 1 2 2     Lab Results   Component Value Date    PHOS 2 9 02/11/2018    PHOS 3 5 02/10/2018    PHOS 2 0 (L) 02/10/2018        Results from last 7 days  Lab Units 02/11/18  0503 02/10/18  0823 02/10/18  0205  02/09/18  8627 02/08/18  0755 02/05/18  1714   INR   --   --   --   --  1 85* 1 64* 1 30*   PTT seconds 72* 70* 85*  < >  --  37* 38*   < > = values in this interval not displayed      0  Lab Value Date/Time   TROPONINI 5 18 (H) 02/10/2018 1250   TROPONINI 5 84 (H) 02/10/2018 0409   TROPONINI 4 94 (H) 02/09/2018 0614   TROPONINI 5 55 (H) 02/08/2018 1649   TROPONINI 5 47 (H) 02/08/2018 1333   TROPONINI 5 15 (H) 02/08/2018 1048   TROPONINI 5 45 (H) 02/08/2018 0755       Results from last 7 days  Lab Units 02/10/18  1215 02/08/18  1333 02/08/18  1048   LACTIC ACID mmol/L 2 1* 2 8* 2 3*     ABG:No results found for: PHART, RJR4TGP, PO2ART, KWA4BHX, L4MOGXCI, BEART, SOURCE  Imaging:  I have personally reviewed pertinent reports  Xr Chest Portable    Result Date: 2/8/2018  Impression: Increased opacity in the mid to lower lung fields bilaterally which may represent pulmonary edema versus pneumonia  Obscuration of the left hemidiaphragm may be due to pleural effusion and/or consolidation  Workstation performed: [id number]     Xr Chest Portable    Result Date: 2/6/2018  Impression: Multifocal airspace disease suggestive of multifocal pneumonia  The study was marked in [facility] for immediate notification  Workstation performed: [id number]     Xr Foot Right 3+ Views    Result Date: 2/6/2018  Impression: Expected postoperative change status post 1st digit amputation  Workstation performed: [id number]     Xr Foot 3+ Views Right    Result Date: 2/5/2018  Impression: There is permeation noted within the 1st distal phalanx with associated erosive changes and foci of air in the soft tissue compatible with osteomyelitis The study was marked in [employer] for immediate notification  Workstation performed: [id number]     Ct Head Wo Contrast    Result Date: 2/8/2018  Impression: Small hyperdensity in the parafalcine right frontal lobe with surrounding mild serpiginous hyperdensity which likely reflects parenchymal contusion and associated subarachnoid hemorrhage  No significant mass effect or midline shift  If there is no history of trauma, further evaluation with MRI may be obtained to evaluate for underlying lesion  I personally discussed this study with Dr Alana Millan on 2/8/2018 at 3:50 AM with read back verification  Workstation performed: [id number]     Ir Abdominal Angiography    Result Date: 2/6/2018  Impression: Impression: High-grade right above-knee popliteal artery stenosis and moderate right below knee popliteal artery stenosis treated with atherectomy and drug-eluting balloon angioplasty High-grade right posterior tibial artery stenosis treated with balloon angioplasty   Workstation performed: [id number]     Xr Chest Portable Icu    Result Date: 2/11/2018  Impression: Stable airspace disease and effusions consistent with moderately severe pulmonary edema  Workstation performed: [id number]     Xr Chest Portable Icu    Result Date: 2/9/2018  Impression: Persistent pulmonary edema  Overall, the appearance of the chest is without significant change allowing for differences in technique Workstation performed: [id number]     EKG: NSR  Micro:  Lab Results   Component Value Date    BLOODCX No Growth After 4 Days  02/06/2018    BLOODCX No Growth After 5 Days  02/05/2018    BLOODCX No Growth After 5 Days  02/05/2018    WOUNDCULT 4+ Growth of Staphylococcus aureus (A) 02/05/2018     Allergies: Allergies   Allergen Reactions    Penicillins      Pt stated that he had a reaction to it a long time ago    Childhood history     Medications:   Scheduled Meds:    Current Facility-Administered Medications:  acetaminophen 650 mg Oral Q6H PRN Radonna Lundborg, CRNP    aspirin 81 mg Oral Daily ARMANDO Schmidt    atorvastatin 80 mg Oral Daily With Chaya Remy MD    cefazolin 2,000 mg Intravenous Q8H Fern Tian MD Last Rate: 2,000 mg (02/11/18 0857)   fentanyl citrate (PF) 25 mcg Intravenous Q2H PRN ARMANDO Schmidt    furosemide 40 mg Intravenous Arlen Bullard MD    gabapentin 600 mg Oral HS Benton Whitehead MD    heparin (porcine) 3-20 Units/kg/hr (Order-Specific) Intravenous Titrated Robert Coe MD Last Rate: Stopped (02/11/18 0552)   hydrALAZINE 10 mg Intravenous Q6H PRN ARMANDO Schmidt    insulin regular (HumuLIN R,NovoLIN R) infusion 0 3-21 Units/hr Intravenous Titrated Jude Dennis MD Last Rate: 1 Units/hr (02/11/18 1004)   metoprolol tartrate 25 mg Oral Q12H Joanna Rodriguez MD    metroNIDAZOLE 500 mg Intravenous Q8H Marco Antonio Hough MD Last Rate: Stopped (02/11/18 0600)   oxyCODONE 5 mg Oral Q4H PRN ARMANDO Schmidt    potassium chloride 40 mEq Oral Once Cristobal Ramirez DO potassium chloride 20 mEq Intravenous Once ARMANDO Rubi    pregabalin 75 mg Oral HS Ramesh Stanton MD      Continuous Infusions:    heparin (porcine) 3-20 Units/kg/hr (Order-Specific) Last Rate: Stopped (02/11/18 0552)   insulin regular (HumuLIN R,NovoLIN R) infusion 0 3-21 Units/hr Last Rate: 1 Units/hr (02/11/18 1004)     PRN Meds:    acetaminophen 650 mg Q6H PRN   fentanyl citrate (PF) 25 mcg Q2H PRN   hydrALAZINE 10 mg Q6H PRN   oxyCODONE 5 mg Q4H PRN     VTE Pharmacologic Prophylaxis: Heparin Drip  VTE Mechanical Prophylaxis: sequential compression device  Invasive lines and devices: Invasive Devices     Peripheral Intravenous Line            Peripheral IV 02/07/18 Left Antecubital 4 days    Peripheral IV 02/08/18 Left Forearm 3 days    Peripheral IV 02/08/18 Right Arm 3 days          Drain            External Urinary Catheter Medium 1 day                     Portions of the record may have been created with voice recognition software  Occasional wrong word or "sound a like" substitutions may have occurred due to the inherent limitations of voice recognition software  Read the chart carefully and recognize, using context, where substitutions have occurred      Sheila Bell DO

## 2018-02-11 NOTE — PROGRESS NOTES
Progress Note - Infectious Disease   Alec Haroon Schultz 75 y o  male MRN: [id number]  Unit/Bed#: MICU 12 Encounter: [id number]         Impression/Recommendations:  1   Severe sepsis   Source of sepsis is most likely right foot infection   Lactic acidosis persists   Temperature is down but leukocytosis persists   I suspect lack of clinical improvement is due to persistent source   I suspect the patient will not get much better unless his sources removed (TMA)  Ultimate plan for TMA   Blood cultures remain negative so far  Antibiotic plan as in below  Patient refusing TMA today  Will reconsider early this week  Monitor hemodynamics      2  Right 1st toe in distal foot wet gangrene   Patient is status post open 1st toe amputation  Xiong Null, he has gangrenous changes in his other toes also   Given lack of clinical improvement, patient will need TMA sooner rather than later   Wound culture growing MSSA and mixed anaerobes   Unless deep wound is pristine, I would recommend open TMA with VAC dressing for few days, prior to wound closure      Continue high-dose IV cefazolin/Flagyl  Serial foot exams  TMA possibly this week  Recommend open TMA      3   Right 1st toe osteomyelitis   This is quite definitive foot x-ray   Clinical picture is also suggestive   Patient is status post open right 1st toe amputation  Antibiotic as in above      4   PVD    Patient is status post arteriogram with successful intervention   Circulatory status is now optimized  Vascular surgery following      5   Acute hypoxic respiratory failure   I suspect this is secondary to sepsis and pulmonary edema   No obvious pneumonia on CXR   patient's respiratory status now improved, currently on room air  O2 support per Critical Care Medicine Service      6  DM with hyperglycemia on admission   Patient appears noncompliant with diabetic medication   This clearly contributes to infection development  Blood sugar management per Medicine Service      7   PCN allergy on allergy list   Patient does not recall reaction  Evert Fu states that this was when he was a young child  Evert Fu is tolerating cephalosporin well         Antibiotics:  Cefazolin D3/Flagyl  POD # 5       Subjective: The patient again refusing surgery this morning  Denies fevers, chills, or sweats  Denies nausea, vomiting, or diarrhea  Objective:  Vitals:  HR:  [72-82] 82  Resp:  [15-41] 36  BP: (118-151)/(55-80) 151/80  SpO2:  [93 %-99 %] 99 %  Temp (24hrs), Av 1 °F (36 7 °C), Min:98 1 °F (36 7 °C), Max:98 1 °F (36 7 °C)  Current: Temperature: 98 1 °F (36 7 °C)    Physical Exam:   General:  No acute distress  Psychiatric:  Awake and alert  Pulmonary:  Normal respiratory excursion without accessory muscle use  Abdomen:  Soft, nontender  Extremities:  Right foot with dressing intact  Skin:  No rashes    Lab Results:  I have personally reviewed pertinent labs  Results from last 7 days  Lab Units 18  0503 02/10/18  1311 02/10/18  0409 18  0614 18  0418   SODIUM mmol/L 141 140 142 141 140   POTASSIUM mmol/L 3 3* 3 7 2 7* 3 4* 3 7   CHLORIDE mmol/L 99* 100 101 107 110*   CO2 mmol/L 35* 27 35* 25 23   ANION GAP mmol/L 7 13 6 9 7   BUN mg/dL 29* 26* 24 22 27*   CREATININE mg/dL 0 79 0 91 0 88 0 66 0 78   EGFR ml/min/1 73sq m 92 86 88 99 93   GLUCOSE RANDOM mg/dL 89 148* 70 126 178*   CALCIUM mg/dL 8 1* 8 1* 8 0* 7 8* 7 7*   AST U/L  --   --  96* 183* 438*   ALT U/L  --   --  87* 125* 160*   ALK PHOS U/L  --   --  112 107 134*   TOTAL PROTEIN g/dL  --   --  6 9 6 4 7 2   BILIRUBIN TOTAL mg/dL  --   --  0 39 0 82 0 50       Results from last 7 days  Lab Units 18  0503 02/10/18  0409 18  0614   WBC Thousand/uL 11 46* 13 01* 12 71*   HEMOGLOBIN g/dL 11 6* 10 8* 10 2*   PLATELETS Thousands/uL 350 359 323       Results from last 7 days  Lab Units 18  1935 18  1756 18  2314 18  1721 18  1714   BLOOD CULTURE  No Growth After 4 Days    --   --  No Growth After 5 Days  No Growth After 5 Days  GRAM STAIN RESULT   --  No polys seen  1+ Gram positive cocci in clusters No polys seen  3+ Gram positive cocci in pairs  2+ Gram positive cocci in clusters  2+ Gram negative rods  --   --    WOUND CULTURE   --   --  4+ Growth of Staphylococcus aureus*  --   --        Imaging Studies:   I have personally reviewed pertinent imaging study reports and images in PACS  EKG, Pathology, and Other Studies:   I have personally reviewed pertinent reports

## 2018-02-12 ENCOUNTER — ANESTHESIA (INPATIENT)
Dept: PERIOP | Facility: HOSPITAL | Age: 68
DRG: 853 | End: 2018-02-12
Payer: COMMERCIAL

## 2018-02-12 ENCOUNTER — ANESTHESIA EVENT (INPATIENT)
Dept: PERIOP | Facility: HOSPITAL | Age: 68
DRG: 853 | End: 2018-02-12
Payer: COMMERCIAL

## 2018-02-12 ENCOUNTER — APPOINTMENT (INPATIENT)
Dept: RADIOLOGY | Facility: HOSPITAL | Age: 68
DRG: 853 | End: 2018-02-12
Payer: COMMERCIAL

## 2018-02-12 PROBLEM — I25.10 CORONARY ARTERY DISEASE INVOLVING NATIVE CORONARY ARTERY: Status: ACTIVE | Noted: 2018-02-12

## 2018-02-12 PROBLEM — I50.30 (HFPEF) HEART FAILURE WITH PRESERVED EJECTION FRACTION (HCC): Status: ACTIVE | Noted: 2018-02-12

## 2018-02-12 PROBLEM — I96 GANGRENOUS TOE (HCC): Status: ACTIVE | Noted: 2018-02-05

## 2018-02-12 LAB
ATRIAL RATE: 73 BPM
BACTERIA BLD CULT: NORMAL
GLUCOSE SERPL-MCNC: 157 MG/DL (ref 65–140)
GLUCOSE SERPL-MCNC: 197 MG/DL (ref 65–140)
GLUCOSE SERPL-MCNC: 211 MG/DL (ref 65–140)
GLUCOSE SERPL-MCNC: 235 MG/DL (ref 65–140)
P AXIS: 49 DEGREES
PR INTERVAL: 208 MS
QRS AXIS: 13 DEGREES
QRSD INTERVAL: 88 MS
QT INTERVAL: 421 MS
QTC INTERVAL: 464 MS
T WAVE AXIS: -72 DEGREES
VENTRICULAR RATE: 73 BPM

## 2018-02-12 PROCEDURE — 0Y6M0Z0 DETACHMENT AT RIGHT FOOT, COMPLETE, OPEN APPROACH: ICD-10-PCS | Performed by: PODIATRIST

## 2018-02-12 PROCEDURE — 99232 SBSQ HOSP IP/OBS MODERATE 35: CPT | Performed by: INTERNAL MEDICINE

## 2018-02-12 PROCEDURE — 88300 SURGICAL PATH GROSS: CPT | Performed by: PATHOLOGY

## 2018-02-12 PROCEDURE — 99233 SBSQ HOSP IP/OBS HIGH 50: CPT | Performed by: INTERNAL MEDICINE

## 2018-02-12 PROCEDURE — 82948 REAGENT STRIP/BLOOD GLUCOSE: CPT

## 2018-02-12 PROCEDURE — 2W1SX6Z COMPRESSION OF RIGHT FOOT USING PRESSURE DRESSING: ICD-10-PCS | Performed by: PODIATRIST

## 2018-02-12 PROCEDURE — 88300 SURGICAL PATH GROSS: CPT | Performed by: PODIATRIST

## 2018-02-12 PROCEDURE — 93010 ELECTROCARDIOGRAM REPORT: CPT | Performed by: INTERNAL MEDICINE

## 2018-02-12 PROCEDURE — 73630 X-RAY EXAM OF FOOT: CPT

## 2018-02-12 RX ORDER — OXYCODONE HYDROCHLORIDE 5 MG/1
10 TABLET ORAL EVERY 6 HOURS PRN
Status: DISCONTINUED | OUTPATIENT
Start: 2018-02-12 | End: 2018-02-13

## 2018-02-12 RX ORDER — BUPIVACAINE HYDROCHLORIDE 5 MG/ML
INJECTION, SOLUTION PERINEURAL AS NEEDED
Status: DISCONTINUED | OUTPATIENT
Start: 2018-02-12 | End: 2018-02-12 | Stop reason: HOSPADM

## 2018-02-12 RX ORDER — ONDANSETRON 2 MG/ML
INJECTION INTRAMUSCULAR; INTRAVENOUS AS NEEDED
Status: DISCONTINUED | OUTPATIENT
Start: 2018-02-12 | End: 2018-02-12 | Stop reason: SURG

## 2018-02-12 RX ORDER — INSULIN GLARGINE 100 [IU]/ML
20 INJECTION, SOLUTION SUBCUTANEOUS
Status: DISCONTINUED | OUTPATIENT
Start: 2018-02-12 | End: 2018-02-15

## 2018-02-12 RX ORDER — ACETAMINOPHEN 325 MG/1
650 TABLET ORAL EVERY 6 HOURS PRN
Status: DISCONTINUED | OUTPATIENT
Start: 2018-02-12 | End: 2018-02-13

## 2018-02-12 RX ORDER — SODIUM CHLORIDE 9 MG/ML
INJECTION, SOLUTION INTRAVENOUS AS NEEDED
Status: DISCONTINUED | OUTPATIENT
Start: 2018-02-12 | End: 2018-02-12 | Stop reason: HOSPADM

## 2018-02-12 RX ORDER — PROPOFOL 10 MG/ML
INJECTION, EMULSION INTRAVENOUS CONTINUOUS PRN
Status: DISCONTINUED | OUTPATIENT
Start: 2018-02-12 | End: 2018-02-12 | Stop reason: SURG

## 2018-02-12 RX ORDER — METOCLOPRAMIDE HYDROCHLORIDE 5 MG/ML
10 INJECTION INTRAMUSCULAR; INTRAVENOUS ONCE AS NEEDED
Status: DISCONTINUED | OUTPATIENT
Start: 2018-02-12 | End: 2018-02-12 | Stop reason: HOSPADM

## 2018-02-12 RX ORDER — PROPOFOL 10 MG/ML
INJECTION, EMULSION INTRAVENOUS AS NEEDED
Status: DISCONTINUED | OUTPATIENT
Start: 2018-02-12 | End: 2018-02-12 | Stop reason: SURG

## 2018-02-12 RX ORDER — LIDOCAINE HYDROCHLORIDE 10 MG/ML
INJECTION, SOLUTION EPIDURAL; INFILTRATION; INTRACAUDAL; PERINEURAL AS NEEDED
Status: DISCONTINUED | OUTPATIENT
Start: 2018-02-12 | End: 2018-02-12 | Stop reason: HOSPADM

## 2018-02-12 RX ORDER — METOCLOPRAMIDE HYDROCHLORIDE 5 MG/ML
INJECTION INTRAMUSCULAR; INTRAVENOUS AS NEEDED
Status: DISCONTINUED | OUTPATIENT
Start: 2018-02-12 | End: 2018-02-12 | Stop reason: SURG

## 2018-02-12 RX ORDER — FENTANYL CITRATE 50 UG/ML
INJECTION, SOLUTION INTRAMUSCULAR; INTRAVENOUS AS NEEDED
Status: DISCONTINUED | OUTPATIENT
Start: 2018-02-12 | End: 2018-02-12 | Stop reason: SURG

## 2018-02-12 RX ORDER — OXYCODONE HYDROCHLORIDE AND ACETAMINOPHEN 5; 325 MG/1; MG/1
1 TABLET ORAL EVERY 4 HOURS PRN
Status: DISCONTINUED | OUTPATIENT
Start: 2018-02-12 | End: 2018-02-16

## 2018-02-12 RX ORDER — SODIUM CHLORIDE, SODIUM LACTATE, POTASSIUM CHLORIDE, CALCIUM CHLORIDE 600; 310; 30; 20 MG/100ML; MG/100ML; MG/100ML; MG/100ML
INJECTION, SOLUTION INTRAVENOUS CONTINUOUS PRN
Status: DISCONTINUED | OUTPATIENT
Start: 2018-02-12 | End: 2018-02-12 | Stop reason: SURG

## 2018-02-12 RX ORDER — FENTANYL CITRATE/PF 50 MCG/ML
25 SYRINGE (ML) INJECTION
Status: DISCONTINUED | OUTPATIENT
Start: 2018-02-12 | End: 2018-02-12 | Stop reason: HOSPADM

## 2018-02-12 RX ORDER — HEPARIN SODIUM 5000 [USP'U]/ML
5000 INJECTION, SOLUTION INTRAVENOUS; SUBCUTANEOUS EVERY 8 HOURS SCHEDULED
Status: DISCONTINUED | OUTPATIENT
Start: 2018-02-12 | End: 2018-02-21 | Stop reason: HOSPADM

## 2018-02-12 RX ORDER — ONDANSETRON 2 MG/ML
4 INJECTION INTRAMUSCULAR; INTRAVENOUS ONCE AS NEEDED
Status: DISCONTINUED | OUTPATIENT
Start: 2018-02-12 | End: 2018-02-12 | Stop reason: HOSPADM

## 2018-02-12 RX ORDER — INSULIN GLARGINE 100 [IU]/ML
5 INJECTION, SOLUTION SUBCUTANEOUS
Status: DISCONTINUED | OUTPATIENT
Start: 2018-02-12 | End: 2018-02-12

## 2018-02-12 RX ADMIN — METOPROLOL TARTRATE 25 MG: 25 TABLET ORAL at 21:58

## 2018-02-12 RX ADMIN — GABAPENTIN 600 MG: 300 CAPSULE ORAL at 21:58

## 2018-02-12 RX ADMIN — ASPIRIN 81 MG 81 MG: 81 TABLET ORAL at 10:44

## 2018-02-12 RX ADMIN — METRONIDAZOLE 500 MG: 500 INJECTION, SOLUTION INTRAVENOUS at 05:58

## 2018-02-12 RX ADMIN — INSULIN LISPRO 2 UNITS: 100 INJECTION, SOLUTION INTRAVENOUS; SUBCUTANEOUS at 22:00

## 2018-02-12 RX ADMIN — ONDANSETRON 4 MG: 2 INJECTION INTRAMUSCULAR; INTRAVENOUS at 18:24

## 2018-02-12 RX ADMIN — FUROSEMIDE 40 MG: 10 INJECTION, SOLUTION INTRAMUSCULAR; INTRAVENOUS at 00:35

## 2018-02-12 RX ADMIN — CEFAZOLIN SODIUM 2000 MG: 2 SOLUTION INTRAVENOUS at 00:35

## 2018-02-12 RX ADMIN — METRONIDAZOLE 500 MG: 500 INJECTION, SOLUTION INTRAVENOUS at 14:55

## 2018-02-12 RX ADMIN — METRONIDAZOLE 500 MG: 500 INJECTION, SOLUTION INTRAVENOUS at 21:59

## 2018-02-12 RX ADMIN — FUROSEMIDE 40 MG: 10 INJECTION, SOLUTION INTRAMUSCULAR; INTRAVENOUS at 10:44

## 2018-02-12 RX ADMIN — PROPOFOL 50 MCG/KG/MIN: 10 INJECTION, EMULSION INTRAVENOUS at 17:38

## 2018-02-12 RX ADMIN — METOPROLOL TARTRATE 25 MG: 25 TABLET ORAL at 10:44

## 2018-02-12 RX ADMIN — INSULIN LISPRO 2 UNITS: 100 INJECTION, SOLUTION INTRAVENOUS; SUBCUTANEOUS at 12:44

## 2018-02-12 RX ADMIN — METOCLOPRAMIDE 10 MG: 5 INJECTION, SOLUTION INTRAMUSCULAR; INTRAVENOUS at 18:24

## 2018-02-12 RX ADMIN — SODIUM CHLORIDE, SODIUM LACTATE, POTASSIUM CHLORIDE, AND CALCIUM CHLORIDE: .6; .31; .03; .02 INJECTION, SOLUTION INTRAVENOUS at 17:25

## 2018-02-12 RX ADMIN — PROPOFOL 80 MG: 10 INJECTION, EMULSION INTRAVENOUS at 17:38

## 2018-02-12 RX ADMIN — FENTANYL CITRATE 50 MCG: 50 INJECTION, SOLUTION INTRAMUSCULAR; INTRAVENOUS at 17:27

## 2018-02-12 RX ADMIN — PREGABALIN 75 MG: 75 CAPSULE ORAL at 21:58

## 2018-02-12 RX ADMIN — CEFAZOLIN SODIUM 2000 MG: 2 SOLUTION INTRAVENOUS at 10:44

## 2018-02-12 RX ADMIN — FENTANYL CITRATE 50 MCG: 50 INJECTION, SOLUTION INTRAMUSCULAR; INTRAVENOUS at 17:51

## 2018-02-12 RX ADMIN — INSULIN GLARGINE 20 UNITS: 100 INJECTION, SOLUTION SUBCUTANEOUS at 21:58

## 2018-02-12 NOTE — PROGRESS NOTES
Progress Note - Vascular Surgery   Melissa Gonzalez 75 y o  male MRN: [id number]  Unit/Bed#: [facility] 931-01 Encounter: [id number]      Subjective:  Continued infection right foot    Vitals:  /72 (BP Location: Right arm)   Pulse 79   Temp 97 9 °F (36 6 °C) (Oral)   Resp 18   Ht 5' 8" (1 727 m)   Wt 62 9 kg (138 lb 10 7 oz)   SpO2 98%   BMI 21 08 kg/m²     I/Os:  I/O last 3 completed shifts: In: 739 8 [P O :360; I V :129 8; IV Piggyback:250]  Out: 2150 [HDZQU:0853]  I/O this shift: In: 0   Out: 750 [Urine:750]    Lab Results and Cultures:   CBC with diff:   Lab Results   Component Value Date    WBC 11 46 (H) 02/11/2018    HGB 11 6 (L) 02/11/2018    HCT 34 1 (L) 02/11/2018    MCV 90 02/11/2018     02/11/2018    MCH 30 6 02/11/2018    MCHC 34 0 02/11/2018    RDW 13 4 02/11/2018    MPV 10 1 02/11/2018    NRBC 1 02/10/2018   ,   BMP/CMP:  Lab Results   Component Value Date     02/11/2018    K 3 3 (L) 02/11/2018    CL 99 (L) 02/11/2018    CO2 35 (H) 02/11/2018    ANIONGAP 7 02/11/2018    BUN 29 (H) 02/11/2018    CREATININE 0 79 02/11/2018    GLUCOSE 89 02/11/2018    GLUCOSE 203 (H) 02/08/2018    CALCIUM 8 1 (L) 02/11/2018    AST 96 (H) 02/10/2018    ALT 87 (H) 02/10/2018    ALKPHOS 112 02/10/2018    PROT 6 9 02/10/2018    BILITOT 0 39 02/10/2018    EGFR 92 02/11/2018   ,   Lipid Panel:   Lab Results   Component Value Date    CHOL 83 02/09/2018    CHOL 175 06/18/2015   ,   Coags:   Lab Results   Component Value Date    PTT 72 (H) 02/11/2018    INR 1 85 (H) 02/09/2018   ,     Blood Culture:   Lab Results   Component Value Date    BLOODCX No Growth After 5 Days   02/06/2018   ,   Urinalysis: No results found for: Juline Felipa, SPECGRAV, PHUR, LEUKOCYTESUR, NITRITE, PROTEINUA, GLUCOSEU, KETONESU, BILIRUBINUR, BLOODU,   Urine Culture: No results found for: URINECX,   Wound Culure:   Lab Results   Component Value Date    WOUNDCULT 4+ Growth of Staphylococcus aureus (A) 02/05/2018 Medications:  Current Facility-Administered Medications   Medication Dose Route Frequency    acetaminophen (TYLENOL) tablet 650 mg  650 mg Oral Q6H PRN    aspirin chewable tablet 81 mg  81 mg Oral Daily    atorvastatin (LIPITOR) tablet 80 mg  80 mg Oral Daily With Dinner    ceFAZolin (ANCEF) IVPB (premix) 2,000 mg  2,000 mg Intravenous Q8H    fentanyl citrate (PF) 100 MCG/2ML 25 mcg  25 mcg Intravenous Q2H PRN    furosemide (LASIX) injection 40 mg  40 mg Intravenous Q8H    gabapentin (NEURONTIN) capsule 600 mg  600 mg Oral HS    hydrALAZINE (APRESOLINE) injection 10 mg  10 mg Intravenous Q6H PRN    insulin lispro (HumaLOG) 100 units/mL subcutaneous injection 1-5 Units  1-5 Units Subcutaneous TID AC    insulin lispro (HumaLOG) 100 units/mL subcutaneous injection 1-5 Units  1-5 Units Subcutaneous HS    metoprolol tartrate (LOPRESSOR) tablet 25 mg  25 mg Oral Q12H VIVIENNE    metroNIDAZOLE (FLAGYL) IVPB (premix) 500 mg  500 mg Intravenous Q8H    oxyCODONE (ROXICODONE) IR tablet 5 mg  5 mg Oral Q4H PRN    pregabalin (LYRICA) capsule 75 mg  75 mg Oral HS       Imaging:  Arteriogram reviewed    Physical Exam:    General:  Well developed well nourished patient in no acute distress  CV:   Respiratory:   Abdominal:   Extremities:  Right lower extremity palpable posterior tibial pulse extensive tissue loss throughout the right forefoot with necrosis to the dorsum and intact plantar flap  Neurologic:     Wound/Incision:        Assessment:  Extensive tissue loss right forefoot    Plan:  Attempt at high transmetatarsal amputation today  Unfortunately the patient has refused multiple attempts to perform the transmetatarsal amputation that resulted in him being sent to the ICU with ascending infection and NSTEMI  I initially saw him in the office on February 1st when the patient and his wife came to the office without an appointment    I did speak with them for 15 or 20 minutes regarding the gravity of the situation since he had extensive great toe infection at that time, I recommended that he be admitted to the hospital for urgent evaluation possible forefoot amputation and an arteriogram for revascularization but he  refused to comply with my recommendation  I spoke with the patient and his daughters today in the hospital reviewing the above discussion  While hopeful that a transmetatarsal amputation will be successful I did state that it is possible that he will not heal and that he will require a below-knee amputation      Lucius Modi MD  2/12/2018

## 2018-02-12 NOTE — OP NOTE
OPERATIVE REPORT  PATIENT NAME: Lionel Elliott    :  1950  MRN: [id number]  Pt Location: BE OR ROOM 07    SURGERY DATE: 2018    Surgeon(s) and Role:     * Jacob Galicia DPM - Primary     * Shon Dos Santos DPM - Assisting    Preop Diagnosis:  Gangrenous toe ([address] ) [I96]  Cellulitis of right foot [E10 404]    Post-Op Diagnosis Codes:     * Gangrenous toe ([address] ) [I96]     * Cellulitis of right foot [Y55 436]    Procedure(s) (LRB):  AMPUTATION TRANSMETATARSAL (TMA) (Right)  DEBRIDEMENT WOUND ([address] OUT) (Right)  APPLICATION VAC DRESSING (Right)    Specimen(s):  ID Type Source Tests Collected by Time Destination   1 : Forefoot - Right Tissue Foot, Right TISSUE EXAM Jacob Galicia DPM 2018 1752        Estimated Blood Loss:   Minimal    Drains:  Negative Pressure Wound Therapy (V A C ) Foot Right (Active)   Black foam- # applied 1 2018  6:01 PM   Cycle Continuous 2018  6:34 PM   Target Pressure (mmHg) 125 2018  6:34 PM   Dressing Status Clean;Dry; Intact 2018  6:34 PM   Number of days: 0       External Urinary Catheter Medium (Active)   Collection Container Standard drainage bag 2018  8:00 AM   Securement Method Tape 2018  8:00 AM   Output (mL) 125 mL 2018  6:52 PM   Number of days: 3       Anesthesia Type:   Choice    Operative Indications:  Gangrenous toe ([address] ) [I96]  Cellulitis of right foot [G42 332]      Operative Findings:  Due to extensive dorsal soft tissue loss a proximal TMA was performed  There was adequate bleeding at the resection margins with good capillary refill to the plantar flap  There is a dorsal wound that remains that was unable to be skin grafted today  The wound VAC was applied over this area  There is no remaining infection within the foot  Complications:   None    Procedure and Technique:  Under mild sedation the patient was transferred to the operating room on the operating table  Patient was placed in the supine position  Following IV sedation a time-out was performed  A local ankle block consisting of 10 cc of 1% lidocaine plain and 10 cc of 0 5% Marcaine plain was infiltrated in an ankle block fashion about the right foot  The foot was then scrubbed with Betadine and prepped and draped    Attention was then directed to the right foot where gangrene was noted to the distal foot  There was extensive soft tissue loss on the dorsal aspect of the foot overlapping the 1st and 2nd metatarsals  This area was inspected proximally until soft tissue bleeding was noted  This served as the area for planned amputation at the proximal portion of the metatarsals  Using a 10 blade a fish mouth incision was made circumferentially around the foot with care to maintain the plantar flap  Soft tissue attachments reflected off the dorsal aspect of all 5 metatarsals  The 1st metatarsal was disarticulated at its base  The remaining metatarsals were then resected using a sagittal saw with care to maintain the metatarsal parabola  All bony margins were bleeding well with no sign of any infection or necrosis  The forefoot was then amputated with care to maintain viability of the plantar flap  It was removed passed off the operating table and sent to pathology in formalin  All exposed tendons were then resected  Any bleeders were tied off with 2 0 Vicryl and cauterized  There was no pulsatile flow however the soft tissue was bleeding well  All margins appeared viable  Attention was then directed to the dorsal aspect of the foot where written remaining necrotic area of skin was noted preoperatively  This was excisionally debrided with a 15  Blade to remove all nonviable soft tissue, eschar, necrotic tissue  The wound did not go to any neurovascular or musculotendinous structures  The wound bed was 90% white 10% red  There was no sign of any residual infection or necrotic tissue  The skin edges were bleeding well    The wound measured 6 5 x 4 5 x 0 5 cm  Approximately 20 subcutaneously cm were excisionally debrided  Next to the plantar flap was modified for closure  Some excess plantar skin was removed distally  Using 2 0 Vicryl the flap was brought around the front of the foot and sutured to the dorsal aspect of the foot  Great care was taken to ensure all bone was covered with soft tissue with overlapping Vicryl suture  Next the medial and lateral aspect of the skin was repaired with 3 0 nylon in simple interrupted and horizontal sutures  The central aspect of the TMA was not sutured with nylon due to the dorsal soft tissue wound  A black wound VAC sponge was then measured and cut to size for the dorsal wound  It was applied over the dorsal wound with 1 piece of black sponge at 125 mm of continuous pressure on a low setting  The VAC had adequate seal   It should be noted the medial and lateral incisions that had been sutured shut were covered with alginate dressing before applying the wound VAC  Brisk capillary refill was noted to the plantar flap following application of the wound VAC  The wound VAC was then covered with 4 x 4 gauze and Kerlix  Patient emerged from anesthesia having tolerated the procedure well  He was transferred to PACU with vital signs stable  The wound VAC is running intact at 125 low continuous pressure with 1 piece of black sponge  Please see above for wound measurements  As with many limb salvage procedures, we contemplate the possibility of performing further stages to this procedure  Procedures may include debridements, delayed closure, plastic surgery techniques, or more proximal amputations  This procedure may be considered part of a multi-staged limb salvage treatment plan  I am anticipating in the future the patient needing a split-thickness skin graft  This is a planned part of the procedure         I was present for the entire procedure    Patient Disposition:  PACU  and hemodynamically stable    SIGNATURE: Artie Stephen DPM  DATE: February 12, 2018  TIME: 6:55 PM

## 2018-02-12 NOTE — ASSESSMENT & PLAN NOTE
As evidence by the dense coronary calcifications seen on CT  Cardiology following  Patient is a high risk of the surgery but needs a surgery  So can proceed with the surgery

## 2018-02-12 NOTE — ASSESSMENT & PLAN NOTE
Blood sugar still fluctuating elevated at times  Endocrine following  Appreciate recommendations  Plan to start Lantus 20 units with Humalog 8 units t i d

## 2018-02-12 NOTE — PROGRESS NOTES
Progress Note - Podiatry  Chuy Funk 75 y o  male MRN: [id number]  Unit/Bed#: [facility] 931-01 Encounter: [id number]    Assessment:  1  S/p I&D of the right hallux (DOS: 2/6/18) secondary to Wet gangrene of right hallux and medial forefoot with cellulitis  2  Progressive dry gangrene 2nd digit right foot  3  PAD, status post angiogram with stenting  4  DM with neuropathy, A1c 9 7, per endo  5  NSTEMI, type 2      Plan:  -At this time, patient agreeable to surgery today with Dr Izabela Fountain with family support  Time tentatively after 1700 today  -NPO initiated  On insulin sliding scale, hold with parameters  -heparin gtt discontinued  -all risks, benefits and alternatives have been explained to patient and family  -all questions answered  No guarantees were given  The patients daughter has some questions about the anesthesia portion of the procedure which will be directed to anesthesiology  -dressing left intact, will change in OR this afternoon  -medical mgmt per primary team  -c/w antibiotics as per ID recommendations (ancef/flagyl)    Subjective/Objective   Chief Complaint:   Chief Complaint   Patient presents with    Foot Ulcer     Pt presents with diabetic ulcers on right foot and increased pain and swelling       Subjective: 75 y o  y/o male was seen and evaluated at bedside in [facility]  Patient states he would still like to talk to vascular surgery today but is willing to proceed with procedure today  He doesn't have any questions    Blood pressure 156/72, pulse 79, temperature 97 9 °F (36 6 °C), temperature source Oral, resp  rate 18, height 5' 8" (1 727 m), weight 62 9 kg (138 lb 10 7 oz), SpO2 98 %  ,Body mass index is 21 08 kg/m²      Invasive Devices     Peripheral Intravenous Line            Peripheral IV 02/08/18 Left Forearm 4 days    Peripheral IV 02/08/18 Right Arm 4 days          Drain            External Urinary Catheter Medium 2 days                Physical Exam:   General: Alert, cooperative and no distress  Lungs: Non labored breathing  Abdomen: Soft, non-tender  Extremity: Drsesing c/d/i to the right foot  There is no strikethrough  There is no cellulitis proximal to bandaging  Lab, Imaging and other studies:   I have personally reviewed pertinent lab results  Lab Results   Component Value Date    WBC 11 46 (H) 02/11/2018    HGB 11 6 (L) 02/11/2018    HCT 34 1 (L) 02/11/2018    MCV 90 02/11/2018     02/11/2018     Lab Results   Component Value Date    GLUCOSE 89 02/11/2018    CALCIUM 8 1 (L) 02/11/2018     02/11/2018    K 3 3 (L) 02/11/2018    CO2 35 (H) 02/11/2018    CL 99 (L) 02/11/2018    BUN 29 (H) 02/11/2018    CREATININE 0 79 02/11/2018       Imaging: I have personally reviewed pertinent films in PACS  EKG, Pathology, and Other Studies: I have personally reviewed pertinent reports

## 2018-02-12 NOTE — PLAN OF CARE
Problem: Potential for Falls  Goal: Patient will remain free of falls  INTERVENTIONS:  - Assess patient frequently for physical needs  -  Identify cognitive and physical deficits and behaviors that affect risk of falls  -  [place] fall precautions as indicated by assessment   - Educate patient/family on patient safety including physical limitations  - Instruct patient to call for assistance with activity based on assessment  - Modify environment to reduce risk of injury  - Consider OT/PT consult to assist with strengthening/mobility   Outcome: Progressing      Problem: Nutrition/Hydration-ADULT  Goal: Nutrient/Hydration intake appropriate for improving, restoring or maintaining nutritional needs  Monitor and assess patient's nutrition/hydration status for malnutrition (ex- brittle hair, bruises, dry skin, pale skin and conjunctiva, muscle wasting, smooth red tongue, and disorientation)  Collaborate with interdisciplinary team and initiate plan and interventions as ordered  Monitor patient's weight and dietary intake as ordered or per policy  Utilize nutrition screening tool and intervene per policy  Determine patient's food preferences and provide high-protein, high-caloric foods as appropriate       INTERVENTIONS:  - Monitor oral intake, urinary output, labs, and treatment plans  - Assess nutrition and hydration status and recommend course of action  - Evaluate amount of meals eaten  - Assist patient with eating if necessary   - Allow adequate time for meals  - Recommend/ encourage appropriate diets, oral nutritional supplements, and vitamin/mineral supplements  - Order, calculate, and assess calorie counts as needed  - Recommend, monitor, and adjust tube feedings and TPN/PPN based on assessed needs  - Assess need for intravenous fluids  - Provide specific nutrition/hydration education as appropriate  - Include patient/family/caregiver in decisions related to nutrition   Outcome: Progressing      Problem: Prexisting or High Potential for Compromised Skin Integrity  Goal: Skin integrity is maintained or improved  INTERVENTIONS:  - Identify patients at risk for skin breakdown  - Assess and monitor skin integrity  - Assess and monitor nutrition and hydration status  - Monitor labs (i e  albumin)  - Assess for incontinence   - Turn and reposition patient  - Assist with mobility/ambulation  - Relieve pressure over bony prominences  - Avoid friction and shearing  - Provide appropriate hygiene as needed including keeping skin clean and dry  - Evaluate need for skin moisturizer/barrier cream  - Collaborate with interdisciplinary team (i e  Nutrition, Rehabilitation, etc )   - Patient/family teaching   Outcome: Progressing      Problem: DISCHARGE PLANNING - CARE MANAGEMENT  Goal: Discharge to post-acute care or home with appropriate resources  INTERVENTIONS:  - Conduct assessment to determine patient/family and health care team treatment goals, and need for post-acute services based on payer coverage, community resources, and patient preferences, and barriers to discharge  - Address psychosocial, clinical, and financial barriers to discharge as identified in assessment in conjunction with the patient/family and health care team  - Arrange appropriate level of post-acute services according to patient's   needs and preference and payer coverage in collaboration with the physician and health care team  - Communicate with and update the patient/family, physician, and health care team regarding progress on the discharge plan  - Arrange appropriate transportation to post-acute venues  - Anticipated TMA on Friday  CM continues to follow for DC needs post procedure      Outcome: Progressing      Problem: RESPIRATORY - ADULT  Goal: Achieves optimal ventilation and oxygenation  INTERVENTIONS:  - Assess for changes in respiratory status  - Assess for changes in mentation and behavior  - Position to facilitate oxygenation and minimize respiratory effort  - Oxygen administration by appropriate delivery method based on oxygen saturation (per order) or ABGs  - Initiate smoking cessation education as indicated  - Encourage broncho-pulmonary hygiene including cough, deep breathe, Incentive Spirometry  - Assess the need for suctioning and aspirate as needed  - Assess and instruct to report SOB or any respiratory difficulty  - Respiratory Therapy support as indicated   Outcome: Progressing      Problem: METABOLIC, FLUID AND ELECTROLYTES - ADULT  Goal: Electrolytes maintained within normal limits  INTERVENTIONS:  - Monitor labs and assess patient for signs and symptoms of electrolyte imbalances  - Administer electrolyte replacement as ordered  - Monitor response to electrolyte replacements, including repeat lab results as appropriate  - Instruct patient on fluid and nutrition as appropriate   Outcome: Progressing    Goal: Fluid balance maintained  INTERVENTIONS:  - Monitor labs and assess for signs and symptoms of volume excess or deficit  - Monitor I/O and WT  - Instruct patient on fluid and nutrition as appropriate   Outcome: Progressing    Goal: Glucose maintained within target range  INTERVENTIONS:  - Monitor Blood Glucose as ordered  - Assess for signs and symptoms of hyperglycemia and hypoglycemia  - Administer ordered medications to maintain glucose within target range  - Assess nutritional intake and initiate nutrition service referral as needed   Outcome: Progressing

## 2018-02-12 NOTE — ASSESSMENT & PLAN NOTE
Patient is status 1st toe amputation with Podiatry with possible cellulitis and osteomyelitis  Requires transmetatarsal amputation  Patient was initially refusing but now agreeable  So plan for transmetatarsal amputation by vascular surgery/Podiatry later today possibly  Continue NPO for now  Continue IV antibiotics as per ID

## 2018-02-12 NOTE — ANESTHESIA POSTPROCEDURE EVALUATION
Post-Op Assessment Note      CV Status:  Stable    Mental Status:  Lethargic    Hydration Status:  Euvolemic    Airway Patency:  Patent    Post Op Vitals Reviewed: Yes          Staff: Anesthesiologist, CRNA           BP   140/76   Temp (!) 96 9 °F (36 1 °C) (02/12/18 1834)    Pulse 89 (02/12/18 1834)   Resp   14   SpO2   100

## 2018-02-12 NOTE — PROGRESS NOTES
Progress Note Zulma Abraham 1950, 75 y o  male MRN: [id number]    [facility] Encounter: [id number]    Primary Care Provider: Brenda Baez MD   Date and time admitted to hospital: 2/5/2018  4:18 PM        * Gangrene of toe of right foot [facility])   Assessment & Plan    Patient is status 1st toe amputation with Podiatry with possible cellulitis and osteomyelitis  Requires transmetatarsal amputation  Patient was initially refusing but now agreeable  So plan for transmetatarsal amputation by vascular surgery/Podiatry later today possibly  Continue NPO for now  Continue IV antibiotics as per ID  Cellulitis of right foot   Assessment & Plan    · Antibiotics as below        (HFpEF) heart failure with preserved ejection fraction [facility])   Assessment & Plan    Patient currently on IV Lasix 40 mg t i d  as per Cardiology  Currently appears euvolemic  Consider switching to p  o  after surgery today from tomorrow  Coronary artery disease involving native coronary artery   Assessment & Plan    As evidence by the dense coronary calcifications seen on CT  Cardiology following  Patient is a high risk of the surgery but needs a surgery  So can proceed with the surgery  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)   Assessment & Plan    Blood sugar still fluctuating elevated at times  Endocrine following  Appreciate recommendations  Plan to start Lantus 20 units with Humalog 8 units t i d  Hypertension   Assessment & Plan    Continue Lopressor for now  Patient was taking losartan-HCTZ at home  Currently being held  Consider resuming it after surgery  VTE Pharmacologic Prophylaxis:   Pharmacologic: Pharmacologic VTE Prophylaxis contraindicated due to surgery today  Mechanical VTE Prophylaxis in Place: Yes    Patient Centered Rounds: I have performed bedside rounds with nursing staff today      Discussions with Specialists or Other Care Team Provider: vascular, ID    Education and Discussions with Family / Patient: patient and  daughter at bedside    Time Spent for Care: 30 minutes  More than 50% of total time spent on counseling and coordination of care as described above  Current Length of Stay: 7 day(s)    Current Patient Status: Inpatient   Certification Statement: The patient will continue to require additional inpatient hospital stay due to above    Discharge Plan: once cleared by vascular surgery    Code Status: Level 1 - Full Code      Subjective:   Pt seen and examined by me this morning  Pt denies any complaints currently  He is agreeable for surgery  Patient's daughter was at bedside  Patient is still not ready to accept that he has heart problems  Objective:     Vitals:   Temp (24hrs), Av 8 °F (36 6 °C), Min:97 3 °F (36 3 °C), Max:98 °F (36 7 °C)    HR:  [72-85] 72  Resp:  [18-24] 18  BP: (121-156)/(64-84) 150/70  SpO2:  [94 %-100 %] 100 %  Body mass index is 21 08 kg/m²  Input and Output Summary (last 24 hours): Intake/Output Summary (Last 24 hours) at 18 1622  Last data filed at 18 1400   Gross per 24 hour   Intake              360 ml   Output             2685 ml   Net            -2325 ml       Physical Exam:     Physical Exam    Constitutional: Pt appears poorly developed and nourished  Not in any acute distress  HENT:   Head: Normocephalic and atraumatic  Eyes: EOM are normal    Neck: Neck supple  Cardiovascular: Normal rate, regular rhythm, normal heart sounds and intact distal pulses  Exam reveals no gallop and no friction rub  No murmur heard  Pulmonary/Chest: Effort normal and breath sounds normal  No respiratory distress  Pt has no wheezes or rales  Abdominal: Soft  Non-distended, Non-tender  Bowel sounds are normal    Musculoskeletal: Normal range of motion  Right foot -dressed, no bleeding or discharge  Neurological: alert and oriented to person, place, and time   Normal strength and sensations  Psychiatric: normal mood and affect  Additional Data:     Labs:      Results from last 7 days  Lab Units 02/11/18  0503 02/10/18  0409 02/09/18  0614   WBC Thousand/uL 11 46* 13 01* 12 71*   HEMOGLOBIN g/dL 11 6* 10 8* 10 2*   HEMATOCRIT % 34 1* 30 9* 29 6*   PLATELETS Thousands/uL 350 359 323   NEUTROS PCT %  --   --  89*   LYMPHS PCT %  --   --  9*   LYMPHO PCT %  --  11*  --    MONOS PCT %  --   --  2*   MONO PCT MAN %  --  9  --    EOS PCT %  --   --  0   EOSINO PCT MANUAL %  --  0  --        Results from last 7 days  Lab Units 02/11/18  0503  02/10/18  0409   SODIUM mmol/L 141  < > 142   POTASSIUM mmol/L 3 3*  < > 2 7*   CHLORIDE mmol/L 99*  < > 101   CO2 mmol/L 35*  < > 35*   BUN mg/dL 29*  < > 24   CREATININE mg/dL 0 79  < > 0 88   CALCIUM mg/dL 8 1*  < > 8 0*   TOTAL PROTEIN g/dL  --   --  6 9   BILIRUBIN TOTAL mg/dL  --   --  0 39   ALK PHOS U/L  --   --  112   ALT U/L  --   --  87*   AST U/L  --   --  96*   GLUCOSE RANDOM mg/dL 89  < > 70   < > = values in this interval not displayed  Results from last 7 days  Lab Units 02/09/18  0614   INR  1 85*       * I Have Reviewed All Lab Data Listed Above  * Additional Pertinent Lab Tests Reviewed: [address] 66 Admission Reviewed    Imaging:    Imaging Reports Reviewed Today Include:   Imaging Personally Reviewed by Myself Includes:      Recent Cultures (last 7 days):       Results from last 7 days  Lab Units 02/06/18  1935 02/06/18  1756 02/05/18  2314 02/05/18  1721 02/05/18  1714   BLOOD CULTURE  No Growth After 5 Days  --   --  No Growth After 5 Days  No Growth After 5 Days     GRAM STAIN RESULT   --  No polys seen  1+ Gram positive cocci in clusters No polys seen  3+ Gram positive cocci in pairs  2+ Gram positive cocci in clusters  2+ Gram negative rods  --   --    WOUND CULTURE   --   --  4+ Growth of Staphylococcus aureus*  --   --        Last 24 Hours Medication List:     Current Facility-Administered Medications:  acetaminophen 650 mg Oral Q6H PRN ARMANDO Mccann    aspirin 81 mg Oral Daily ARMANDO Schmidt    atorvastatin 80 mg Oral Daily With Prudencio Cheema MD    cefazolin 2,000 mg Intravenous Q8H Pastor Ammy MD Last Rate: 2,000 mg (02/12/18 1044)   fentanyl citrate (PF) 25 mcg Intravenous Q2H PRN ARMANDO Mccann    furosemide 40 mg Intravenous Q8H Tyrel Lugo MD    gabapentin 600 mg Oral HS Gladys Sage MD    hydrALAZINE 10 mg Intravenous Q6H PRN ARMANDO Mccann    insulin glargine 20 Units Subcutaneous HS Yung Le MD    insulin lispro 1-5 Units Subcutaneous TID AC Octavio Wilkerson DO    insulin lispro 1-5 Units Subcutaneous HS Octavio Wilkerson DO    [START ON 2/13/2018] insulin lispro 8 Units Subcutaneous TID With Meals Yung Le MD    metoprolol tartrate 25 [facility]12H [facility]    metroNIDAZOLE 500 mg Intravenous Q8H Kiera Woodson MD Last Rate: 500 mg (02/12/18 1455)   oxyCODONE 5 mg Oral Q4H PRN ARMANDO Schmidt    pregabalin 75 mg Oral HS Gladys Sage MD         Today, Patient Was Seen By: Gladys Saeg MD    ** Please Note: Dictation voice to text software may have been used in the creation of this document   **

## 2018-02-12 NOTE — PROGRESS NOTES
Progress Note - Billie Gentile 75 y o  male MRN: [id number]    Unit/Bed#: [facility] 931-01 Encounter: [id number]      CC: diabetes f/u    Subjective:   Billie Gentile is a 75y o  year old male with type 2  diabetes  Switched to subcu insulin therapy last   No hypoglycemia  Will be undergoing TMA later today    Objective:     Vitals: Blood pressure 156/72, pulse 79, temperature 97 9 °F (36 6 °C), temperature source Oral, resp  rate 18, height 5' 8" (1 727 m), weight 62 9 kg (138 lb 10 7 oz), SpO2 98 %  ,Body mass index is 21 08 kg/m²  Intake/Output Summary (Last 24 hours) at 02/12/18 1329  Last data filed at 02/12/18 1145   Gross per 24 hour   Intake            367 7 ml   Output             2805 ml   Net          -2437 3 ml       Physical Exam:  General Appearance: awake, appears stated age and cooperative  Head: Normocephalic, without obvious abnormality, atraumatic  Extremities: moves all extremities  Skin: Skin color and temperature normal    Pulm: no labored breathing    Lab, Imaging and other studies: I have personally reviewed pertinent reports  Results from last 7 days  Lab Units 02/11/18  0503   SODIUM mmol/L 141   POTASSIUM mmol/L 3 3*   CHLORIDE mmol/L 99*   CO2 mmol/L 35*   BUN mg/dL 29*   CREATININE mg/dL 0 79   GLUCOSE RANDOM mg/dL 89   CALCIUM mg/dL 8 1*       POC Glucose (mg/dl)   Date Value   02/12/2018 211 (H)   02/12/2018 157 (H)   02/11/2018 278 (H)   02/11/2018 334 (H)   02/11/2018 341 (H)   02/11/2018 326 (H)   02/11/2018 231 (H)   02/11/2018 248 (H)   02/11/2018 165 (H)   02/11/2018 102       Assessment:  Type 2 diabetes with hyperglycemia  right distal foot gangrene  Peripheral arterial disease  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

## 2018-02-12 NOTE — ASSESSMENT & PLAN NOTE
Continue Lopressor for now  Patient was taking losartan-HCTZ at home  Currently being held  Consider resuming it after surgery

## 2018-02-12 NOTE — ANESTHESIA PREPROCEDURE EVALUATION
Review of Systems/Medical History  Patient summary reviewed  Chart reviewed      Cardiovascular  Exercise tolerance: good,  Hypertension , Valve replacement , CAD, , History of CABG, CHF ,   Comment: Popliteal stent,  Pulmonary       GI/Hepatic  Negative GI/hepatic ROS          Negative  ROS        Endo/Other  Diabetes poorly controlled Oral agent,      GYN       Hematology  Negative hematology ROS      Musculoskeletal  Negative musculoskeletal ROS        Neurology    Diabetic neuropathy,    Psychology   Negative psychology ROS              Physical Exam    Airway    Mallampati score: III  TM Distance: <3 FB  Neck ROM: full     Dental   No notable dental hx     Cardiovascular  Cardiovascular exam normal    Pulmonary  Pulmonary exam normal     Other Findings        Anesthesia Plan  ASA Score- 3     Anesthesia Type- IV sedation with anesthesia and general with ASA Monitors  Additional Monitors:   Airway Plan:         Plan Factors-    Induction- intravenous  Postoperative Plan-     Informed Consent- Anesthetic plan and risks discussed with patient  OPOC Gluc 211 [id number] 2/12/18    LEFT VENTRICLE: Size was normal  Systolic function was normal  Ejection fraction was estimated to be 55 %  Although no diagnostic regional wall motion abnormality was identified, this possibility cannot be completely excluded on the basis  of this study  Wall thickness was normal  There was no evidence of concentric hypertrophy  DOPPLER: Due to tachycardia, there was fusion of early and atrial contributions to ventricular filling  The study was not technically sufficient to  allow evaluation of LV diastolic function      RIGHT VENTRICLE: The size was normal  Systolic function was normal  Wall thickness was normal      LEFT ATRIUM: The atrium was mildly dilated      RIGHT ATRIUM: Size was normal      MITRAL VALVE: Valve structure was normal  There was normal leaflet separation   DOPPLER: The transmitral velocity was within the normal range  There was no evidence for stenosis  There was moderate regurgitation  Regurgitation grade was 2+  on a scale of 0 to 4+      AORTIC VALVE: The valve was trileaflet  Leaflets exhibited normal cuspal separation and sclerosis  DOPPLER: Transaortic velocity was within the normal range  There was no evidence for stenosis  There was trace regurgitation      TRICUSPID VALVE: The valve structure was normal  There was normal leaflet separation  DOPPLER: The transtricuspid velocity was within the normal range  There was no evidence for stenosis  There was no regurgitation      PULMONIC VALVE: Leaflets exhibited normal thickness, no calcification, and normal cuspal separation  DOPPLER: The transpulmonic velocity was within the normal range  There was trace regurgitation      PERICARDIUM: There was no pericardial effusion  There was a left pleural effusion   The pericardium was normal in appearance      AORTA: The root exhibited normal size      Normal sinus rhythm  Septal infarct , age undetermined  Abnormal ECG  When compared with ECG of 08-FEB-2018 07:36, (unconfirmed)  No significant change was found  Confirmed by [facility] Stephanie HERRERA ([id number]) on 2/9/2018 8:23:24 AM

## 2018-02-12 NOTE — ASSESSMENT & PLAN NOTE
Patient currently on IV Lasix 40 mg t i d  as per Cardiology  Currently appears euvolemic  Consider switching to p  o  after surgery today from tomorrow

## 2018-02-12 NOTE — PROGRESS NOTES
Progress Note - Infectious Disease   Alec Rodriguez 75 y o  male MRN: [id number]  Unit/Bed#: [facility] 931-01 Encounter: [id number]      Impression/Recommendations:  1  Right distal foot gangrene  Patient will need TMA  Antibiotic alone will not cure gangrenous tissue  I discussed with the patient and his daughters in detail  The leg pain TMA runs the risk of gangrene spreading proximally to the ankle, requiring BKA down the line  In addition, persistent gangrene can lead to sepsis and bacteremia, leading to long-term IV antibiotic or death  Antibiotic plan as in below  Recommend TMA ASAP  2  Right 1st toe wet gangrene with osteomyelitis   Patient is status post open 1st toe amputation  Walter Moses, he has gangrenous changes in his other toes also   Given lack of clinical improvement, patient will need TMA sooner rather than later, as in above   Wound culture growing MSSA and mixed anaerobes   Unless deep wound is pristine, I would recommend open TMA with VAC dressing for few days, prior to wound closure      Continue high-dose IV cefazolin/Flagyl  Serial foot exams  Recommend TMA ASAP, as in above      3   Severe sepsis   Source of sepsis is most likely right foot infection  Patient is clinically improved with 1st toe amputation and IV antibiotic  However, I suspect this is only temporary improvement  Patient will need TMA, as in above   Blood cultures remain negative so far  Antibiotic plan as in above  Recommend TMA ASAP, as in above  Monitor hemodynamics      4   PVD    Patient is status post arteriogram with successful intervention   Circulatory status is now optimized  Vascular surgery following      5   Acute hypoxic respiratory failure   I suspect this is secondary to sepsis and pulmonary edema   No obvious pneumonia on CXR    Patient's respiratory status now improved, currently on room air    O2 support per primary service      6  DM with hyperglycemia on admission   Patient appears noncompliant with diabetic medication   This clearly contributes to infection development  Blood sugar management per Medicine Service      7   PCN allergy on allergy list   Patient does not recall reaction  Christ Loving states that this was when he was a young child  Christ Loving is tolerating cephalosporin well  Discussed with the patient and his daughters in detail regarding the above  Time spent in counseling for proceeding to TMA > 20 minutes       Antibiotics:  Cefazolin/Flagyl  POD # 6      Subjective:  Events over the weekend noted  Patient was clear by Cardiology to undergo TMA  However, he refused  At present, patient states that he feels comfortable  Foot pain controlled  Dyspnea minimal   Temperature stays down  No chills  He is tolerating antibiotics well  No nausea, vomiting or diarrhea  Objective:  Vitals:  HR:  [78-85] 79  Resp:  [18-36] 18  BP: (120-156)/(64-84) 156/72  SpO2:  [94 %-100 %] 98 %  Temp (24hrs), Av 1 °F (36 7 °C), Min:97 9 °F (36 6 °C), Max:98 3 °F (36 8 °C)  Current: Temperature: 97 9 °F (36 6 °C)    Physical Exam:     General: Awake, alert, cooperative, no distress  Lungs: Expansion symmetric, sparse basilar rales, no wheezing, respirations unlabored  Heart[de-identified]  Regular rate and rhythm, S1 and S2 normal, no murmur  Abdomen: Soft, nondistended, non-tender, bowel sounds active all four quadrants,        no masses, no organomegaly  Extremities: Right foot with stable 2nd and 4th toe gangrene  Third toe ischemic  No purulence  Mild tenderness  Dressing is dry  No erythema around dressing  Skin:  No rash  Invasive Devices     Peripheral Intravenous Line            Peripheral IV 18 Left Forearm 4 days    Peripheral IV 18 Right Arm 4 days          Drain            External Urinary Catheter Medium 2 days                Labs studies:   I have personally reviewed pertinent labs      Results from last 7 days  Lab Units 18  0503 02/10/18  1311 02/10/18  0402 02/09/18  0614 02/08/18  0418   SODIUM mmol/L 141 140 142 141 140   POTASSIUM mmol/L 3 3* 3 7 2 7* 3 4* 3 7   CHLORIDE mmol/L 99* 100 101 107 110*   CO2 mmol/L 35* 27 35* 25 23   ANION GAP mmol/L 7 13 6 9 7   BUN mg/dL 29* 26* 24 22 27*   CREATININE mg/dL 0 79 0 91 0 88 0 66 0 78   EGFR ml/min/1 73sq m 92 86 88 99 93   GLUCOSE RANDOM mg/dL 89 148* 70 126 178*   CALCIUM mg/dL 8 1* 8 1* 8 0* 7 8* 7 7*   AST U/L  --   --  96* 183* 438*   ALT U/L  --   --  87* 125* 160*   ALK PHOS U/L  --   --  112 107 134*   TOTAL PROTEIN g/dL  --   --  6 9 6 4 7 2   BILIRUBIN TOTAL mg/dL  --   --  0 39 0 82 0 50       Results from last 7 days  Lab Units 02/11/18  0503 02/10/18  0409 02/09/18  0614   WBC Thousand/uL 11 46* 13 01* 12 71*   HEMOGLOBIN g/dL 11 6* 10 8* 10 2*   PLATELETS Thousands/uL 350 359 323       Results from last 7 days  Lab Units 02/06/18  1935 02/06/18  1756 02/05/18  2314 02/05/18  1721 02/05/18  1714   BLOOD CULTURE  No Growth After 5 Days  --   --  No Growth After 5 Days  No Growth After 5 Days  GRAM STAIN RESULT   --  No polys seen  1+ Gram positive cocci in clusters No polys seen  3+ Gram positive cocci in pairs  2+ Gram positive cocci in clusters  2+ Gram negative rods  --   --    WOUND CULTURE   --   --  4+ Growth of Staphylococcus aureus*  --   --        Imaging Studies:   I have personally reviewed pertinent imaging study reports and images in PACS  EKG, Pathology, and Other Studies:   I have personally reviewed pertinent reports

## 2018-02-12 NOTE — PROGRESS NOTES
Rounds done with Dr Margie Srivastava of [facility]  Pt had discussion with both podiatry and vascular surgery this AM   Pt currently agreeable to TMA procedure this afternoon  Remains NPO  Pt resting comfortably in bed with family present  Will continue to monitor

## 2018-02-13 PROBLEM — M79.671 PAIN IN RIGHT FOOT: Status: ACTIVE | Noted: 2018-02-13

## 2018-02-13 PROBLEM — G93.41 ACUTE METABOLIC ENCEPHALOPATHY: Status: RESOLVED | Noted: 2018-02-07 | Resolved: 2018-02-13

## 2018-02-13 PROBLEM — A41.9 SEPSIS (HCC): Status: RESOLVED | Noted: 2018-02-07 | Resolved: 2018-02-13

## 2018-02-13 LAB
ANION GAP SERPL CALCULATED.3IONS-SCNC: 9 MMOL/L (ref 4–13)
BUN SERPL-MCNC: 29 MG/DL (ref 5–25)
CALCIUM SERPL-MCNC: 8.2 MG/DL (ref 8.3–10.1)
CHLORIDE SERPL-SCNC: 91 MMOL/L (ref 100–108)
CO2 SERPL-SCNC: 33 MMOL/L (ref 21–32)
CREAT SERPL-MCNC: 0.93 MG/DL (ref 0.6–1.3)
ERYTHROCYTE [DISTWIDTH] IN BLOOD BY AUTOMATED COUNT: 13.8 % (ref 11.6–15.1)
GFR SERPL CREATININE-BSD FRML MDRD: 84 ML/MIN/1.73SQ M
GLUCOSE SERPL-MCNC: 182 MG/DL (ref 65–140)
GLUCOSE SERPL-MCNC: 222 MG/DL (ref 65–140)
GLUCOSE SERPL-MCNC: 227 MG/DL (ref 65–140)
GLUCOSE SERPL-MCNC: 238 MG/DL (ref 65–140)
GLUCOSE SERPL-MCNC: 93 MG/DL (ref 65–140)
HCT VFR BLD AUTO: 32.3 % (ref 36.5–49.3)
HGB BLD-MCNC: 10.6 G/DL (ref 12–17)
MCH RBC QN AUTO: 29.9 PG (ref 26.8–34.3)
MCHC RBC AUTO-ENTMCNC: 32.8 G/DL (ref 31.4–37.4)
MCV RBC AUTO: 91 FL (ref 82–98)
PLATELET # BLD AUTO: 305 THOUSANDS/UL (ref 149–390)
PMV BLD AUTO: 10.4 FL (ref 8.9–12.7)
POTASSIUM SERPL-SCNC: 3.3 MMOL/L (ref 3.5–5.3)
RBC # BLD AUTO: 3.54 MILLION/UL (ref 3.88–5.62)
SODIUM SERPL-SCNC: 133 MMOL/L (ref 136–145)
WBC # BLD AUTO: 11.35 THOUSAND/UL (ref 4.31–10.16)

## 2018-02-13 PROCEDURE — 99233 SBSQ HOSP IP/OBS HIGH 50: CPT | Performed by: INTERNAL MEDICINE

## 2018-02-13 PROCEDURE — 82948 REAGENT STRIP/BLOOD GLUCOSE: CPT

## 2018-02-13 PROCEDURE — 99232 SBSQ HOSP IP/OBS MODERATE 35: CPT | Performed by: INTERNAL MEDICINE

## 2018-02-13 PROCEDURE — 80048 BASIC METABOLIC PNL TOTAL CA: CPT | Performed by: INTERNAL MEDICINE

## 2018-02-13 PROCEDURE — 85027 COMPLETE CBC AUTOMATED: CPT | Performed by: INTERNAL MEDICINE

## 2018-02-13 RX ORDER — FUROSEMIDE 40 MG/1
40 TABLET ORAL
Status: DISCONTINUED | OUTPATIENT
Start: 2018-02-13 | End: 2018-02-21 | Stop reason: HOSPADM

## 2018-02-13 RX ORDER — PREGABALIN 75 MG/1
75 CAPSULE ORAL DAILY
Status: DISCONTINUED | OUTPATIENT
Start: 2018-02-13 | End: 2018-02-18

## 2018-02-13 RX ORDER — ACETAMINOPHEN 325 MG/1
975 TABLET ORAL EVERY 8 HOURS SCHEDULED
Status: DISCONTINUED | OUTPATIENT
Start: 2018-02-13 | End: 2018-02-15 | Stop reason: SDUPTHER

## 2018-02-13 RX ORDER — OXYCODONE HYDROCHLORIDE 10 MG/1
10 TABLET ORAL EVERY 4 HOURS PRN
Status: DISCONTINUED | OUTPATIENT
Start: 2018-02-13 | End: 2018-02-21

## 2018-02-13 RX ORDER — GABAPENTIN 300 MG/1
300 CAPSULE ORAL 3 TIMES DAILY
Status: DISCONTINUED | OUTPATIENT
Start: 2018-02-13 | End: 2018-02-21 | Stop reason: HOSPADM

## 2018-02-13 RX ADMIN — OXYCODONE HYDROCHLORIDE 10 MG: 10 TABLET ORAL at 13:07

## 2018-02-13 RX ADMIN — ACETAMINOPHEN 975 MG: 325 TABLET, FILM COATED ORAL at 12:02

## 2018-02-13 RX ADMIN — INSULIN GLARGINE 20 UNITS: 100 INJECTION, SOLUTION SUBCUTANEOUS at 21:32

## 2018-02-13 RX ADMIN — FUROSEMIDE 40 MG: 40 TABLET ORAL at 17:01

## 2018-02-13 RX ADMIN — HEPARIN SODIUM 5000 UNITS: 5000 INJECTION, SOLUTION INTRAVENOUS; SUBCUTANEOUS at 21:32

## 2018-02-13 RX ADMIN — ATORVASTATIN CALCIUM 80 MG: 80 TABLET, FILM COATED ORAL at 17:01

## 2018-02-13 RX ADMIN — CEFAZOLIN SODIUM 2000 MG: 2 SOLUTION INTRAVENOUS at 17:02

## 2018-02-13 RX ADMIN — INSULIN LISPRO 1 UNITS: 100 INJECTION, SOLUTION INTRAVENOUS; SUBCUTANEOUS at 21:40

## 2018-02-13 RX ADMIN — INSULIN LISPRO 2 UNITS: 100 INJECTION, SOLUTION INTRAVENOUS; SUBCUTANEOUS at 09:15

## 2018-02-13 RX ADMIN — CEFAZOLIN SODIUM 2000 MG: 2 SOLUTION INTRAVENOUS at 01:38

## 2018-02-13 RX ADMIN — METRONIDAZOLE 500 MG: 500 INJECTION, SOLUTION INTRAVENOUS at 21:32

## 2018-02-13 RX ADMIN — FUROSEMIDE 40 MG: 10 INJECTION, SOLUTION INTRAMUSCULAR; INTRAVENOUS at 00:00

## 2018-02-13 RX ADMIN — PREGABALIN 75 MG: 75 CAPSULE ORAL at 12:02

## 2018-02-13 RX ADMIN — ASPIRIN 81 MG 81 MG: 81 TABLET ORAL at 09:13

## 2018-02-13 RX ADMIN — OXYCODONE HYDROCHLORIDE 10 MG: 5 TABLET ORAL at 01:40

## 2018-02-13 RX ADMIN — OXYCODONE HYDROCHLORIDE 10 MG: 5 TABLET ORAL at 09:13

## 2018-02-13 RX ADMIN — METOPROLOL TARTRATE 25 MG: 25 TABLET ORAL at 09:14

## 2018-02-13 RX ADMIN — INSULIN LISPRO 8 UNITS: 100 INJECTION, SOLUTION INTRAVENOUS; SUBCUTANEOUS at 09:15

## 2018-02-13 RX ADMIN — GABAPENTIN 300 MG: 300 CAPSULE ORAL at 17:01

## 2018-02-13 RX ADMIN — METRONIDAZOLE 500 MG: 500 INJECTION, SOLUTION INTRAVENOUS at 14:45

## 2018-02-13 RX ADMIN — HEPARIN SODIUM 5000 UNITS: 5000 INJECTION, SOLUTION INTRAVENOUS; SUBCUTANEOUS at 06:14

## 2018-02-13 RX ADMIN — FENTANYL CITRATE 25 MCG: 50 INJECTION INTRAMUSCULAR; INTRAVENOUS at 10:41

## 2018-02-13 RX ADMIN — METRONIDAZOLE 500 MG: 500 INJECTION, SOLUTION INTRAVENOUS at 06:14

## 2018-02-13 RX ADMIN — GABAPENTIN 300 MG: 300 CAPSULE ORAL at 21:31

## 2018-02-13 RX ADMIN — ACETAMINOPHEN 975 MG: 325 TABLET, FILM COATED ORAL at 21:32

## 2018-02-13 RX ADMIN — METOPROLOL TARTRATE 25 MG: 25 TABLET ORAL at 21:31

## 2018-02-13 RX ADMIN — HEPARIN SODIUM 5000 UNITS: 5000 INJECTION, SOLUTION INTRAVENOUS; SUBCUTANEOUS at 14:45

## 2018-02-13 RX ADMIN — CEFAZOLIN SODIUM 2000 MG: 2 SOLUTION INTRAVENOUS at 09:14

## 2018-02-13 NOTE — ASSESSMENT & PLAN NOTE
- severe sepsis likely evolving since the admission presenting this morning with hypotension, lactate above 5 acute encephalopathy  - patient found to have severe metabolic acidosis  - blood pressure systolic in the low 44U with map below 65 improved with 2 in-house L bolus of normal saline  - at this time, patient has refused for lactate to be repeated, nurse to try after giving patient Zyprexa  - monitor clinically  - discussed with infectious disease, continue with vancomycin, cefepime, Flagyl and expedite for TMA amputation later on this week now that the vascular status is optimized  - continue with level of care as step-down level 2 today  - WBC remains severely elevated above 27  - discussed with his wife, given the idea of severe tear of acute illness and poor prognosis if surgery is not accomplished

## 2018-02-13 NOTE — PROGRESS NOTES
Cardiology Progress Note - Laura Patrick 75 y o  male MRN: [id number]    Unit/Bed#: [facility] 931-01 Encounter: [id number]    Assessment and plan  1  Non STEMI type 2 in the setting of sepsis  2  Preop for transmetatarsal amputation  3  Right foot gangrene  4  Hypertension  5  Diabetes  6  Coronary artery disease as evidenced by heavy coronary calcifications on CT  7  Chronic diastolic heart failure      Recommendations:   he did okay with surgery last evening no cardiac complications  Continue current cardiac medications  I had a long talk with the patient and the family  He does have coronary artery disease as evidenced by heavy coronary calcifications on a CT scan from 2016  Continue aspirin, statin, beta-blocker  No anginal symptoms  No angina or signs of heart failure will continue to follow  Discontinue IV Lasix change to 40 mg p o  b i d  Replete potassium keep above 4  Subjective:    No significant events overnight  Denies any complaints other than foot pain from the surgery  ROS    Objective:   Vitals: Blood pressure 141/64, pulse 78, temperature 98 4 °F (36 9 °C), temperature source Oral, resp  rate 18, height 5' 8" (1 727 m), weight 62 9 kg (138 lb 10 7 oz), SpO2 98 %  , Body mass index is 21 08 kg/m² , Orthostatic Blood Pressures    Flowsheet Row Most Recent Value   Blood Pressure  141/64 filed at 02/13/2018 0834   Patient Position - Orthostatic VS  Lying filed at 02/13/2018 2402         Systolic (07RJA), NYX:838 , Min:106 , BGD:655     Diastolic (76TWR), TCQ:55, Min:57, Max:76      Intake/Output Summary (Last 24 hours) at 02/13/18 0858  Last data filed at 02/13/18 0052   Gross per 24 hour   Intake              400 ml   Output             1350 ml   Net             -950 ml     Weight (last 2 days)     None            Telemetry Review: No significant arrhythmias seen on telemetry review  EKG personally reviewed by Jeffrey Singleton DO       Physical Exam   Constitutional: He is oriented to person, place, and time  He appears well-nourished  No distress  HENT:   Head: Atraumatic  Eyes: Conjunctivae are normal  Pupils are equal, round, and reactive to light  Neck: Neck supple  Cardiovascular: Normal rate and regular rhythm  Exam reveals no friction rub  Murmur heard  Pulmonary/Chest: Effort normal and breath sounds normal  No respiratory distress  He has no wheezes  He has no rales  Abdominal: Bowel sounds are normal  He exhibits no distension  There is no tenderness  There is no rebound  Musculoskeletal: Normal range of motion  He exhibits no edema or deformity  Neurological: He is alert and oriented to person, place, and time  No cranial nerve deficit  Skin: Skin is warm and dry  No erythema  Nursing note and vitals reviewed  Laboratory Results:    Results from last 7 days  Lab Units 02/10/18  1250 02/10/18  0409 02/09/18  0614   TROPONIN I ng/mL 5 18* 5 84* 4 94*       CBC with diff:   Results from last 7 days  Lab Units 02/13/18  0517 02/11/18  0503 02/10/18  0409 02/09/18  0614 02/08/18  0418 02/08/18  0246 02/07/18  1002  02/07/18  0656 02/06/18  1829   WBC Thousand/uL 11 35* 11 46* 13 01* 12 71* 25 79*  --  27 38*  --  23 74* 20 61*   HEMOGLOBIN g/dL 10 6* 11 6* 10 8* 10 2* 10 9*  --  11 2*  --  11 6* 11 1*   I STAT HEMOGLOBIN g/dl  --   --   --   --   --  9 2*  --   < >  --   --    HEMATOCRIT % 32 3* 34 1* 30 9* 29 6* 31 9*  --  34 2*  --  34 0* 32 3*   MCV fL 91 90 89 89 91  --  93  --  90 91   PLATELETS Thousands/uL 305 350 359 323 298  --  364  --  299 282   MCH pg 29 9 30 6 30 9 30 7 31 1  --  30 4  --  30 8 31 2   MCHC g/dL 32 8 34 0 35 0 34 5 34 2  --  32 7  --  34 1 34 4   RDW % 13 8 13 4 13 0 13 1 13 0  --  13 0  --  12 8 12 6   MPV fL 10 4 10 1 10 1 10 7 10 4  --  10 8  --  10 3 10 1   NRBC AUTO /100 WBCs  --   --  1 0 0  --  0  --   --  0   < > = values in this interval not displayed        CMP:  Results from last 7 days  Lab Units 02/13/18  0588 02/11/18  0503 02/10/18  1311 02/10/18  0409 02/09/18  1870 02/08/18  0418 02/08/18  0246 02/07/18  1501 02/07/18  1002  02/06/18  1829   SODIUM mmol/L 133* 141 140 142 141 140  --  138 136  < > 135*   POTASSIUM mmol/L 3 3* 3 3* 3 7 2 7* 3 4* 3 7  --  3 2* 4 0  < > 3 8   CHLORIDE mmol/L 91* 99* 100 101 107 110*  --  108 102  < > 101   CO2 mmol/L 33* 35* 27 35* 25 23  --  18* 18*  < > 28   ANION GAP mmol/L 9 7 13 6 9 7  --  12 16*  < > 6   BUN mg/dL 29* 29* 26* 24 22 27*  --  27* 25  < > 19   CREATININE mg/dL 0 93 0 79 0 91 0 88 0 66 0 78  --  0 96 0 95  < > 0 84   GLUCOSE RANDOM mg/dL 238* 89 148* 70 126 178*  --  201* 326*  < > 163*   GLUCOSE, ISTAT mg/dl  --   --   --   --   --   --  203*  --   --   < >  --    CALCIUM mg/dL 8 2* 8 1* 8 1* 8 0* 7 8* 7 7*  --  7 4* 8 1*  < > 8 1*   AST U/L  --   --   --  96* 183* 438*  --   --  79*  --  76*   ALT U/L  --   --   --  87* 125* 160*  --   --  26  --  27   ALK PHOS U/L  --   --   --  112 107 134*  --   --  122*  --  110   TOTAL PROTEIN g/dL  --   --   --  6 9 6 4 7 2  --   --  7 6  --  7 1   BILIRUBIN TOTAL mg/dL  --   --   --  0 39 0 82 0 50  --   --  0 84  --  0 79   EGFR ml/min/1 73sq m 84 92 86 88 99 93  --  81 82  < > 90   < > = values in this interval not displayed  BMP:  Results from last 7 days  Lab Units 02/13/18  0517 02/11/18  0503 02/10/18  1311 02/10/18  0409 02/09/18  0614 02/08/18  0418  02/07/18  1501   SODIUM mmol/L 133* 141 140 142 141 140  --  138   POTASSIUM mmol/L 3 3* 3 3* 3 7 2 7* 3 4* 3 7  --  3 2*   CHLORIDE mmol/L 91* 99* 100 101 107 110*  --  108   CO2 mmol/L 33* 35* 27 35* 25 23  --  18*   BUN mg/dL 29* 29* 26* 24 22 27*  --  27*   CREATININE mg/dL 0 93 0 79 0 91 0 88 0 66 0 78  --  0 96   GLUCOSE RANDOM mg/dL 238* 89 148* 70 126 178*  --  201*   GLUCOSE, ISTAT   --   --   --   --   --   --   < >  --    CALCIUM mg/dL 8 2* 8 1* 8 1* 8 0* 7 8* 7 7*  --  7 4*   < > = values in this interval not displayed      BNP: No results for input(s): BNP in the last 72 hours  Magnesium:   Results from last 7 days  Lab Units 18  0503 18  0614 18  0418 18  1501 18  1002   MAGNESIUM mg/dL 2 0 2 1 2 2 2 4 2 5       Coags:   Results from last 7 days  Lab Units 18  0503 02/10/18  0823 02/10/18  0205 18  1737 18  0614 18  0755   PTT seconds 72* 70* 85* 93*  --  37*   INR   --   --   --   --  1 85* 1 64*       TSH:        Hemoglobin A1C       Lipid Profile:   Results from last 7 days  Lab Units 18  0614   CHOLESTEROL mg/dL 83   TRIGLYCERIDES mg/dL 59   HDL mg/dL 21*       Cardiac testing:   Results for orders placed during the hospital encounter of 18   Echo complete with contrast if indicated    N[facility]ative [facility]  [address]  [phone]    Transthoracic Echocardiogram  2D, M-mode, Doppler, and Color Doppler    Study date:  2018    Patient: Angel Bauer  MR number: [id number]  Account number: [de-identified]  : 1950  Age: 75 years  Gender: Male  Status: Inpatient  Location: Bedside  Height: 68 in  Weight: 133 lb  BP: 161/ 80 mmHg    Indications: Dyspnea    Diagnoses: R06 00 - Dyspnea, unspecified    Sonographer:  SAM Chandler, RDCS  Primary Physician:  Jennifer Armstrong MD  Referring Physician:  Celina Quintanilla DO  [facility]  [facility]  Cardiology Fellow:  Mandy Eden MD  Interpreting Physician:  Jessie Faustin MD    SUMMARY    PROCEDURE INFORMATION:  This was a technically difficult study  LEFT VENTRICLE:  Systolic function was normal  Ejection fraction was estimated to be 55 %  Although no diagnostic regional wall motion abnormality was identified, this possibility cannot be completely excluded on the basis of this study  LEFT ATRIUM:  The atrium was mildly dilated  MITRAL VALVE:  There was moderate regurgitation  Regurgitation grade was 2+ on a scale of 0 to 4+      AORTIC VALVE:  There was trace regurgitation  PULMONIC VALVE:  There was trace regurgitation  PERICARDIUM:  There was a left pleural effusion  HISTORY: PRIOR HISTORY: Altered mental status; Sepsis; Diabetes Mellitus type II; Hypertension    PROCEDURE: The procedure was performed at the bedside  This was a routine study  The transthoracic approach was used  The study included complete 2D imaging, M-mode, complete spectral Doppler, and color Doppler  The heart rate was 94 bpm,  at the start of the study  Images were obtained from the parasternal, apical, subcostal, and suprasternal notch acoustic windows  Echocardiographic views were limited due to restricted patient mobility, poor acoustic window availability,  and lung interference  This was a technically difficult study  LEFT VENTRICLE: Size was normal  Systolic function was normal  Ejection fraction was estimated to be 55 %  Although no diagnostic regional wall motion abnormality was identified, this possibility cannot be completely excluded on the basis  of this study  Wall thickness was normal  There was no evidence of concentric hypertrophy  DOPPLER: Due to tachycardia, there was fusion of early and atrial contributions to ventricular filling  The study was not technically sufficient to  allow evaluation of LV diastolic function  RIGHT VENTRICLE: The size was normal  Systolic function was normal  Wall thickness was normal     LEFT ATRIUM: The atrium was mildly dilated  RIGHT ATRIUM: Size was normal     MITRAL VALVE: Valve structure was normal  There was normal leaflet separation  DOPPLER: The transmitral velocity was within the normal range  There was no evidence for stenosis  There was moderate regurgitation  Regurgitation grade was 2+  on a scale of 0 to 4+  AORTIC VALVE: The valve was trileaflet  Leaflets exhibited normal cuspal separation and sclerosis  DOPPLER: Transaortic velocity was within the normal range   There was no evidence for stenosis  There was trace regurgitation  TRICUSPID VALVE: The valve structure was normal  There was normal leaflet separation  DOPPLER: The transtricuspid velocity was within the normal range  There was no evidence for stenosis  There was no regurgitation  PULMONIC VALVE: Leaflets exhibited normal thickness, no calcification, and normal cuspal separation  DOPPLER: The transpulmonic velocity was within the normal range  There was trace regurgitation  PERICARDIUM: There was no pericardial effusion  There was a left pleural effusion  The pericardium was normal in appearance  AORTA: The root exhibited normal size  SYSTEM MEASUREMENT TABLES    2D  %FS: 22 23 %  Ao Diam: 3 27 cm  EDV(Teich): 78 14 ml  EF Biplane: 47 97 %  EF(Teich): 45 23 %  ESV(Teich): 42 8 ml  IVSd: 1 01 cm  LA Area: 17 83 cm2  LA Diam: 3 91 cm  LVEDV MOD A2C: 80 32 ml  LVEDV MOD A4C: 73 97 ml  LVEDV MOD BP: 78 31 ml  LVEF MOD A2C: 47 09 %  LVEF MOD A4C: 51 51 %  LVESV MOD A2C: 42 5 ml  LVESV MOD A4C: 35 87 ml  LVESV MOD BP: 40 75 ml  LVIDd: 4 19 cm  LVIDs: 3 26 cm  LVLd A2C: 7 06 cm  LVLd A4C: 7 36 cm  LVLs A2C: 6 35 cm  LVLs A4C: 6 66 cm  LVPWd: 0 95 cm  RA Area: 10 35 cm2  RVIDd: 3 82 cm  SV MOD A2C: 37 82 ml  SV MOD A4C: 38 1 ml  SV(Teich): 35 34 ml    MM  TAPSE: 1 85 cm    PW  E': 0 11 m/s    [employer] Accredited Echocardiography Laboratory    Prepared and electronically signed by    Shaye Islas MD  Signed 08-Feb-2018 17:12:05       No results found for this or any previous visit  No results found for this or any previous visit  No results found for this or any previous visit      Meds/Allergies   all current active meds have been reviewed  Prescriptions Prior to Admission   Medication    Alpha-Lipoic Acid 600 MG CAPS    aspirin 81 MG tablet    Blood Glucose Monitoring Suppl (ONE TOUCH ULTRA MINI) w/Device KIT    cephalexin (KEFLEX) 500 mg capsule    gabapentin (NEURONTIN) 300 mg capsule    glipiZIDE-metFORMIN (METAGLIP) 5-500 MG per tablet    glucose blood (ACCU-CHEK ACTIVE STRIPS) test strip    losartan-hydrochlorothiazide (HYZAAR) 100-25 MG per tablet    metoprolol tartrate (LOPRESSOR) 25 mg tablet    metoprolol tartrate (LOPRESSOR) 50 mg tablet    pregabalin (LYRICA) 75 mg capsule    TRADJENTA 5 MG TABS          Assessment:  Principal Problem:    Gangrene of toe of right foot (HCC)  Active Problems:    Hypertension    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)    Cellulitis of right foot    Sepsis (HCC)    Acute metabolic encephalopathy    Abnormal CT of the head    Elevated brain natriuretic peptide (BNP) level    Acute respiratory failure (HCC)    Transaminitis    Hypophosphatemia    Popliteal artery stenosis, right (HCC)    Great toe amputation status, right (HCC)    Coagulopathy (HCC)    Meningioma (HCC)    Hypokalemia    Moderate mitral regurgitation    Gangrenous toe (HCC)    Coronary artery disease involving native coronary artery    (HFpEF) heart failure with preserved ejection fraction ([address] )

## 2018-02-13 NOTE — PROGRESS NOTES
Pt acre rounds completed with Dr Francis Hurd, will make changes to pain meds, will order diabetic diet

## 2018-02-13 NOTE — ASSESSMENT & PLAN NOTE
As evidence by the dense coronary calcifications seen on CT  Cardiology following  Will need evaluation after he has recovered from his amputation

## 2018-02-13 NOTE — PROGRESS NOTES
Progress Note - Podiatry  Angie Martines 75 y o  male MRN: [id number]  [facility] Encounter: [id number]    Assessment:  1  S/p Right TMA with wound vac applicastion (DOS: 1/86/70), and S/p I&D of the right hallux (DOS: 2/6/18) secondary to Wet gangrene of right hallux and medial forefoot with cellulitis  2  Progressive dry gangrene 2nd digit right foot  3  PAD, status post angiogram with stenting  4  DM with neuropathy, A1c 9 7, per endo  5  NSTEMI, type 2        Plan:  -patient seen and examined at bedside  Vital signs are stable, leukocytosis noted  However, nontoxic in appearance  -dressing on the right foot shows no strike through  Wound VAC is running at 125 millimeters per Hg at low continuous with no leaks noted  Will change on 2/15   -medical mgmt per primary team  -c/w IV Ancef/PO Flagyl per Infectious Disease recommendations  Subjective/Objective   Chief Complaint:   Chief Complaint   Patient presents with    Foot Ulcer     Pt presents with diabetic ulcers on right foot and increased pain and swelling       Subjective: 75 y o  y/o male was seen and evaluated at bedside in no acute distress, nontoxic in appearance  Patient states that he is occasionally having sharp shooting pain down his right lower extremity  He states that internal Medicine started gabapentin  Patient denies any recent nausea, vomiting, fever, chills, shortness of breath, chest pains       Blood pressure 114/58, pulse 76, temperature 98 °F (36 7 °C), temperature source Oral, resp  rate 16, height 5' 8" (1 727 m), weight 62 9 kg (138 lb 10 7 oz), SpO2 98 %  ,Body mass index is 21 08 kg/m²      Invasive Devices     Peripheral Intravenous Line            Peripheral IV 02/08/18 Left Forearm 5 days    Peripheral IV 02/12/18 Right Antecubital less than 1 day          Drain            External Urinary Catheter Medium 3 days    Negative Pressure Wound Therapy (V A C ) Foot Right less than 1 day                Physical Exam: General: Alert, cooperative and no distress  Lungs: Non labored breathing  Heart: Positive S1, S2  Abdomen: Soft, non-tender  Extremity:     NVS at baseline B/l  MSK function at B/l  No calf tenderness noted B/l     RLE:   Dressing was c/d/i with no strike through to the outer dressing noted  Wound VAC is running at 125 millimeters per Hg at low continuous with no leaks noted  Lab, Imaging and other studies:   I have personally reviewed pertinent lab results  , CBC:   Lab Results   Component Value Date    WBC 11 35 (H) 02/13/2018    HGB 10 6 (L) 02/13/2018    HCT 32 3 (L) 02/13/2018    MCV 91 02/13/2018     02/13/2018    MCH 29 9 02/13/2018    MCHC 32 8 02/13/2018    RDW 13 8 02/13/2018    MPV 10 4 02/13/2018   , CMP:   Lab Results   Component Value Date     (L) 02/13/2018    K 3 3 (L) 02/13/2018    CL 91 (L) 02/13/2018    CO2 33 (H) 02/13/2018    ANIONGAP 9 02/13/2018    BUN 29 (H) 02/13/2018    CREATININE 0 93 02/13/2018    GLUCOSE 238 (H) 02/13/2018    CALCIUM 8 2 (L) 02/13/2018    EGFR 84 02/13/2018       Imaging: I have personally reviewed pertinent films in PACS  EKG, Pathology, and Other Studies: I have personally reviewed pertinent reports    VTE Pharmacologic Prophylaxis: Heparin

## 2018-02-13 NOTE — ASSESSMENT & PLAN NOTE
Blood sugar still elevated more than 200 most of the times  Endocrine following  Appreciate recommendations

## 2018-02-13 NOTE — PLAN OF CARE
Discharge to post-acute care or home with appropriate resources Progressing      Electrolytes maintained within normal limits Progressing      Fluid balance maintained Progressing      Glucose maintained within target range Progressing      Nutrient/Hydration intake appropriate for improving, restoring or maintaining nutritional needs Progressing      Patient will remain free of falls Progressing      Skin integrity is maintained or improved Progressing      Achieves optimal ventilation and oxygenation Progressing

## 2018-02-13 NOTE — ASSESSMENT & PLAN NOTE
Small hyperdensity in right frontal lobe - possible parenchymal contusion and associated subarachnoid hemorrhage  No significant mass effect or midline shift  Pt neurologically intact  Cont to monitor

## 2018-02-13 NOTE — ASSESSMENT & PLAN NOTE
Continue Lopressor for now  Patient was taking losartan-HCTZ at home  Currently being held as BP well controlled  Consider resuming it BP elevated

## 2018-02-13 NOTE — SOCIAL WORK
CM reviewed Pt in care coordination rounds, Pt is POD#1 R TMA, Need PT/OT orders place for evaluation, podiatry aware and will place

## 2018-02-13 NOTE — ASSESSMENT & PLAN NOTE
Patient is status transmetatarsal amputation on 2/12/2018  Continue NPO for now  Continue IV antibiotics as per ID  Ancef and Flagyl

## 2018-02-13 NOTE — PROGRESS NOTES
Progress Note - Infectious Disease   Alec Pena 75 y o  male MRN: [id number]  Unit/Bed#: [facility] 931-01 Encounter: [id number]      Impression/Recommendations:  1  Right distal foot gangrene  Patient finally consented to TMA  He is now status post open TMA yesterday evening  He is now clinically and systemically well  Wound VAC with minimal drainage  Length of antibiotic regimen will depend on adequacy of bone resection  Antibiotic plan as in below  Follow-up on pathology  VAC per Podiatry service  Eventuall wound closure per Podiatry service      2  Right 1st toe wet gangrene with osteomyelitis   Patient is status post open 1st toe amputation  [place], he has gangrenous changes in his other toes also   He is now status post TMA       Continue high-dose IV cefazolin/Flagyl  Serial foot exams       3   Severe sepsis   Source of sepsis is most likely right foot infection  Patient is clinically improved with 1st toe amputation and IV antibiotic  He is now status post TMA  He should do well  Blood cultures remain negative  Antibiotic plan as in above  Monitor hemodynamics      4   PVD    Patient is status post arteriogram with successful intervention   Circulatory status has been optimized  Vascular surgery following      5   Acute hypoxic respiratory failure   I suspect this is secondary to sepsis and pulmonary edema   No obvious pneumonia on CXR    Patient's respiratory status now improved, currently on room air  O2 support per primary service      6  DM with hyperglycemia on admission   Patient appears noncompliant with diabetic medication   This clearly contributes to infection development  Blood sugar management per Medicine Service      7   PCN allergy on allergy list   Patient does not recall reaction  Urszula Lee states that this was when he was a young child  Urszula Lee is tolerating cephalosporin well      Discussed with the patient and his daughters in detail regarding the above     Antibiotics:  Cefazolin/Flagyl  POD # 7/      Subjective:  Patient finally consented to TMA  He is now status post open TMA with VAC in place  Patient is comfortable  Foot pain controlled  Dyspnea minimal   Temperature stays down  No chills  He is tolerating antibiotics well  No nausea, vomiting or diarrhea  Objective:  Vitals:  HR:  [72-89] 78  Resp:  [16-20] 18  BP: (106-150)/(57-76) 141/64  SpO2:  [98 %-100 %] 98 %  Temp (24hrs), Av 5 °F (36 4 °C), Min:96 9 °F (36 1 °C), Max:98 4 °F (36 9 °C)  Current: Temperature: 98 4 °F (36 9 °C)    Physical Exam:     General: Awake, alert, cooperative, no distress  Lungs: Expansion symmetric, no rales, no wheezing, respirations unlabored  Heart[de-identified]  Regular rate and rhythm, S1 and S2 normal, no murmur  Abdomen: Soft, nondistended, non-tender, bowel sounds active all four quadrants,        no masses, no organomegaly  Extremities: The foot wound with VAC in place  Sparse serous drainage  No erythema around dressing  Mild-to-moderate tenderness  Skin:  No rash  Invasive Devices     Peripheral Intravenous Line            Peripheral IV 18 Right Antecubital less than 1 day          Drain            External Urinary Catheter Medium 3 days    Negative Pressure Wound Therapy (V A C ) Foot Right less than 1 day                Labs studies:   I have personally reviewed pertinent labs      Results from last 7 days  Lab Units 18  0517 18  0503 02/10/18  1311 02/10/18  0409 18  0614 18  0418   SODIUM mmol/L 133* 141 140 142 141 140   POTASSIUM mmol/L 3 3* 3 3* 3 7 2 7* 3 4* 3 7   CHLORIDE mmol/L 91* 99* 100 101 107 110*   CO2 mmol/L 33* 35* 27 35* 25 23   ANION GAP mmol/L 9 7 13 6 9 7   BUN mg/dL 29* 29* 26* 24 22 27*   CREATININE mg/dL 0 93 0 79 0 91 0 88 0 66 0 78   EGFR ml/min/1 73sq m 84 92 86 88 99 93   GLUCOSE RANDOM mg/dL 238* 89 148* 70 126 178*   CALCIUM mg/dL 8 2* 8 1* 8 1* 8 0* 7 8* 7 7*   AST U/L  --   --   -- 96* 183* 438*   ALT U/L  --   --   --  87* 125* 160*   ALK PHOS U/L  --   --   --  112 107 134*   TOTAL PROTEIN g/dL  --   --   --  6 9 6 4 7 2   BILIRUBIN TOTAL mg/dL  --   --   --  0 39 0 82 0 50       Results from last 7 days  Lab Units 02/13/18  0517 02/11/18  0503 02/10/18  0409   WBC Thousand/uL 11 35* 11 46* 13 01*   HEMOGLOBIN g/dL 10 6* 11 6* 10 8*   PLATELETS Thousands/uL 305 350 359       Results from last 7 days  Lab Units 02/06/18  1935 02/06/18  1756   BLOOD CULTURE  No Growth After 5 Days  --    GRAM STAIN RESULT   --  No polys seen  1+ Gram positive cocci in clusters       Imaging Studies:   I have personally reviewed pertinent imaging study reports and images in PACS  EKG, Pathology, and Other Studies:   I have personally reviewed pertinent reports

## 2018-02-13 NOTE — ASSESSMENT & PLAN NOTE
Following surgery  Possibly of phantom pain  Also patient does have neuropathy and takes gabapentin and Lyrica both at home  Will discontinue IV fentanyl for now as it did not help and patient was very drowsy while talking to me  Continue oxy which helped as per patient  Will increase it to every 4 hours p r n  Dmitriy Zambrano Also will give 1 dose of Lyrica for now  Increase gabapentin to 300 mg t i d  (patient takes 600 mg HS at home)  Also start the patient on standing Tylenol 975 t i d

## 2018-02-13 NOTE — PROGRESS NOTES
Progress Note Georgette Grijalva 1950, 75 y o  male MRN: [id number]    Unit/Bed#: [facility] 931-01 Encounter: [id number]    Primary Care Provider: Constance Ruiz MD   Date and time admitted to hospital: 2/5/2018  4:18 PM        * Gangrene of toe of right foot [facility])   Assessment & Plan    Patient is status transmetatarsal amputation on 2/12/2018  Continue NPO for now  Continue IV antibiotics as per ID  Ancef and Flagyl  Pain in right foot   Assessment & Plan    Following surgery  Possibly of phantom pain  Also patient does have neuropathy and takes gabapentin and Lyrica both at home  Will discontinue IV fentanyl for now as it did not help and patient was very drowsy while talking to me  Continue oxy which helped as per patient  Will increase it to every 4 hours p r n  Azeb Tenoriojoe Also will give 1 dose of Lyrica for now  Increase gabapentin to 300 mg t i d  (patient takes 600 mg HS at home)  Also start the patient on standing Tylenol 975 t i d  Cellulitis of right foot   Assessment & Plan    · Antibiotics as per ID        (HFpEF) heart failure with preserved ejection fraction [facility])   Assessment & Plan    Patient switch to p o  Lasix today as per Cardiology  Appears euvolemic  Continue to monitor  Coronary artery disease involving native coronary artery   Assessment & Plan    As evidence by the dense coronary calcifications seen on CT  Cardiology following  Will need evaluation after he has recovered from his amputation  Abnormal CT of the head   Assessment & Plan    Small hyperdensity in right frontal lobe - possible parenchymal contusion and associated subarachnoid hemorrhage  No significant mass effect or midline shift  Pt neurologically intact  Cont to monitor  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)   Assessment & Plan    Blood sugar still elevated more than 200 most of the times  Endocrine following  Appreciate recommendations  Hypertension   Assessment & Plan    Continue Lopressor for now  Patient was taking losartan-HCTZ at home  Currently being held as BP well controlled  Consider resuming it BP elevated  VTE Pharmacologic Prophylaxis:   Pharmacologic: Pharmacologic VTE Prophylaxis contraindicated due to surgery today  Mechanical VTE Prophylaxis in Place: Yes   Patient Centered Rounds: I have performed bedside rounds with nursing staff today    Discussions with Specialists or Other Care Team Provider: vascular, ID   Education and Discussions with Family / Patient: patient and  daughter at bedside   Time Spent for Care: 30 minutes  More than 50% of total time spent on counseling and coordination of care as described above    Current Length of Stay: 8 day(s)   Current Patient Status: Inpatient   Certification Statement: The patient will continue to require additional inpatient hospital stay due to above   Discharge Plan: once cleared by vascular surgery   Code Status: Level 1 - Full Code    Subjective:   Pt seen and examined by me this morning  Pt complained of  severe pain in his right foot  Describes it as electrical shocks shooting from his foot  Denies any other complaints  He was very sleepy while talking to me and would doze off intermittently  Objective:     Vitals:   Temp (24hrs), Av 6 °F (36 4 °C), Min:96 9 °F (36 1 °C), Max:98 5 °F (36 9 °C)    HR:  [72-89] 76  Resp:  [16-20] 16  BP: (106-150)/(57-76) 136/64  SpO2:  [97 %-100 %] 98 %  Body mass index is 20 72 kg/m²  Input and Output Summary (last 24 hours): Intake/Output Summary (Last 24 hours) at 18 1457  Last data filed at 18 1445   Gross per 24 hour   Intake              570 ml   Output              350 ml   Net              220 ml       Physical Exam:     Physical Exam    Constitutional: Pt appears poorly developed and nourished  Not in any acute distress    Cardiovascular: Normal rate, regular rhythm, normal heart sounds and intact distal pulses  Exam reveals no gallop and no friction rub  No murmur heard  Pulmonary/Chest: Effort normal and breath sounds normal  No respiratory distress  Pt has no wheezes or rales  Abdominal: Soft  Non-distended, Non-tender  Bowel sounds are normal    Musculoskeletal: Normal range of motion  Right foot - dressed, no bleeding or discharge  Wound vac present  Neurological: alert and oriented to person, place, and time  Normal strength and sensations  Was drowsy and sleepy and fell asleep intermittently  Answered all questions appropriately  Psychiatric: normal mood and affect  Additional Data:     Labs:      Results from last 7 days  Lab Units 02/13/18  0517  02/10/18  0409 02/09/18 0614   WBC Thousand/uL 11 35*  < > 13 01* 12 71*   HEMOGLOBIN g/dL 10 6*  < > 10 8* 10 2*   HEMATOCRIT % 32 3*  < > 30 9* 29 6*   PLATELETS Thousands/uL 305  < > 359 323   NEUTROS PCT %  --   --   --  89*   LYMPHS PCT %  --   --   --  9*   LYMPHO PCT %  --   --  11*  --    MONOS PCT %  --   --   --  2*   MONO PCT MAN %  --   --  9  --    EOS PCT %  --   --   --  0   EOSINO PCT MANUAL %  --   --  0  --    < > = values in this interval not displayed  Results from last 7 days  Lab Units 02/13/18  0517  02/10/18  0409   SODIUM mmol/L 133*  < > 142   POTASSIUM mmol/L 3 3*  < > 2 7*   CHLORIDE mmol/L 91*  < > 101   CO2 mmol/L 33*  < > 35*   BUN mg/dL 29*  < > 24   CREATININE mg/dL 0 93  < > 0 88   CALCIUM mg/dL 8 2*  < > 8 0*   TOTAL PROTEIN g/dL  --   --  6 9   BILIRUBIN TOTAL mg/dL  --   --  0 39   ALK PHOS U/L  --   --  112   ALT U/L  --   --  87*   AST U/L  --   --  96*   GLUCOSE RANDOM mg/dL 238*  < > 70   < > = values in this interval not displayed  Results from last 7 days  Lab Units 02/09/18  0614   INR  1 85*       * I Have Reviewed All Lab Data Listed Above  * Additional Pertinent Lab Tests Reviewed:  All Labs For Current Hospital Admission Reviewed    Imaging:    Imaging Reports Reviewed Today Include: Imaging Personally Reviewed by Myself Includes:      Recent Cultures (last 7 days):       Results from last 7 days  Lab Units 02/06/18  1935 02/06/18  1756   BLOOD CULTURE  No Growth After 5 Days  --    GRAM STAIN RESULT   --  No polys seen  1+ Gram positive cocci in clusters       Last 24 Hours Medication List:     Current Facility-Administered Medications:  acetaminophen 975 mg Oral Q8H [address] Pramod Pitt MD    aspirin 81 mg Oral Daily ARMANDO Schmidt    atorvastatin 80 mg Oral Daily With Chirs Stephen MD    cefazolin 2,000 mg Intravenous Q8H Armin Arango MD Last Rate: Stopped (02/13/18 1000)   furosemide 40 mg Oral BID (diuretic) Colton Shepard,     gabapentin 300 mg Oral TID Pramod Pitt MD    heparin (porcine) 5,000 Units Subcutaneous [facility] Maxim Murillo DPRADHA    hydrALAZINE 10 mg Intravenous Q6H PRN ARMANDO Purvis    insulin glargine 20 Units Subcutaneous HS Kelley Bee MD    insulin lispro 1-5 Units Subcutaneous TID AC Octavio Wilkerson DO    insulin lispro 1-5 Units Subcutaneous HS Octavio Wilkerson DO    insulin lispro 8 Units Subcutaneous TID With Meals Kelley Bee MD    metoprolol tartrate 25 mg Oral Q12H Joanna Rodriguez MD    metroNIDAZOLE 500 mg Intravenous Q8H Humphrey Vargas MD Last Rate: 500 mg (02/13/18 1445)   oxyCODONE 10 mg Oral Q4H PRN Pramod Pitt MD    oxyCODONE 5 mg Oral Q4H PRN ARMANDO Purvis    oxyCODONE-acetaminophen 1 tablet Oral Q4H PRN Maxim Murillo DPRADHA    pregabalin 75 mg Oral Daily Pramod Pitt MD         Today, Patient Was Seen By: Pramod Pitt MD    ** Please Note: Dictation voice to text software may have been used in the creation of this document   **

## 2018-02-13 NOTE — CASE MANAGEMENT
Continued Stay Review     Date:  2/13/18    Vital Signs: /64 (BP Location: Right arm)   Pulse 76   Temp 98 5 °F (36 9 °C) (Oral)   Resp 16   Ht 5' 8" (1 727 m)   Wt 61 8 kg (136 lb 3 9 oz)   SpO2 98%   BMI 20 72 kg/m²     Medications:     Scheduled Meds:   Current Facility-Administered Medications:  acetaminophen 975 mg Oral Q8H VIVIENNE    aspirin 81 mg Oral Daily    atorvastatin 80 mg Oral Daily With Dinner    cefazolin 2,000 mg Intravenous Q8H Last Rate: Stopped (02/13/18 1000)   furosemide 40 mg Oral BID (diuretic)    gabapentin 300 mg Oral TID    heparin (porcine) 5,000 Units Subcutaneous Q8H [facility]    insulin glargine 20 Units Subcutaneous HS    insulin lispro 1-5 Units Subcutaneous TID AC    insulin lispro 1-5 Units Subcutaneous HS    insulin lispro 8 Units Subcutaneous TID With Meals    metoprolol tartrate 25 mg Oral Q12H [facility]    metroNIDAZOLE 500 mg Intravenous Q8H Last Rate: 500 mg (02/13/18 1445)   pregabalin 75 mg Oral Daily      Continuous Infusions:   None  PRN Meds: hydrALAZINE    oxyCODONE PO x3     oxyCODONE-acetaminophen    Abnormal Labs:   02/13/18 0517    Sodium 136 - 145 mmol/L 133     Potassium 3 5 - 5 3 mmol/L 3 3     Chloride 100 - 108 mmol/L 91     CO2 21 - 32 mmol/L 33     Anion Gap 4 - 13 mmol/L 9    BUN 5 - 25 mg/dL 29     Creatinine 0 60 - 1 30 mg/dL 0 93    Comments: Standardized to IDMS reference method   Glucose 65 - 140 mg/dL 238       02/13/18 0517     WBC 4 31 - 10 16 Thousand/uL 11 35     RBC 3 88 - 5 62 Million/uL 3 54     Hemoglobin 12 0 - 17 0 g/dL 10 6     Hematocrit 36 5 - 49 3 % 32 3      Diagnostic Results: N/A    Age/Sex: 75 y o  male     Assessment/Plan:     S/P AMPUTATION TRANSMETATARSAL (TMA) (Right Foot)  DEBRIDEMENT WOUND ([address] OUT) (Right Foot) APPLICATION VAC DRESSING (Right Foot) ON 2/12/18  * Gangrene of toe of right foot [facility])   Assessment & Plan     Patient is status transmetatarsal amputation on 2/12/2018  Continue NPO for now    Continue IV antibiotics as per ID  Ancef and Flagyl           Pain in right foot   Assessment & Plan     Following surgery  Possibly of phantom pain  Also patient does have neuropathy and takes gabapentin and Lyrica both at home  Will discontinue IV fentanyl for now as it did not help and patient was very drowsy while talking to me  Continue oxy which helped as per patient  Will increase it to every 4 hours p r n  Gerhardt Sep Also will give 1 dose of Lyrica for now  Increase gabapentin to 300 mg t i d  (patient takes 600 mg HS at home)  Also start the patient on standing Tylenol 975 t i d             Cellulitis of right foot   Assessment & Plan     · Antibiotics as per ID          (HFpEF) heart failure with preserved ejection fraction [facility])   Assessment & Plan     Patient switch to p o  Lasix today as per Cardiology  Appears euvolemic  Continue to monitor           Coronary artery disease involving native coronary artery   Assessment & Plan     As evidence by the dense coronary calcifications seen on CT  Cardiology following  Will need evaluation after he has recovered from his amputation             Abnormal CT of the head   Assessment & Plan     Small hyperdensity in right frontal lobe - possible parenchymal contusion and associated subarachnoid hemorrhage   No significant mass effect or midline shift  Pt neurologically intact  Cont to monitor           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)   Assessment & Plan     Blood sugar still elevated more than 200 most of the times  Endocrine following  Appreciate recommendations            Hypertension   Assessment & Plan     Continue Lopressor for now  Patient was taking losartan-HCTZ at home  Currently being held as BP well controlled    Consider resuming it BP elevated               VTE Pharmacologic Prophylaxis:   Pharmacologic: Pharmacologic VTE Prophylaxis contraindicated due to surgery today  Mechanical VTE Prophylaxis in Place: Yes   Current Length of Stay: 8 day(s)   Current Patient Status: Inpatient   Certification Statement: The patient will continue to require additional inpatient hospital stay due to above       Discharge Plan: TBD      ==============================================================================================================  2/13/18 PODIATRY PROGRESS NOTE    Assessment:  1  S/p Right TMA with wound vac applicastion (DOS: 1/70/51), and S/p I&D of the right hallux (DOS: 2/6/18) secondary to Wet gangrene of right hallux and medial forefoot with cellulitis    2  Progressive dry gangrene 2nd digit right foot  3  PAD, status post angiogram with stenting  4  DM with neuropathy, A1c 9 7, per endo  5  NSTEMI, type 2      Plan:  --dressing on the right foot shows no strike through  Wound VAC is running at 125 millimeters per Hg at low continuous with no leaks noted  Will change on 2/15   -medical mgmt per primary team  -c/w IV Ancef/PO Flagyl per Infectious Disease recommendations    ==============================================================================================================  2/12/18 OPERATIVE REPORT: As with many limb salvage procedures, we contemplate the possibility of performing further stages to this procedure  Procedures may include debridements, delayed closure, plastic surgery techniques, or more proximal amputations  This procedure may be considered part of a multi-staged limb salvage treatment plan  I am anticipating in the future the patient needing a split-thickness skin graft    This is a planned part of the procedure   ========================================================================================================================

## 2018-02-13 NOTE — PROGRESS NOTES
Progress Note - Mir Arauz 75 y o  male MRN: [id number]    Unit/Bed#: [facility] 931-01 Encounter: [id number]      CC: diabetes f/u    Subjective:   Mir Arauz is a 75y o  year old male with type 2  diabetes  Feels ok, not much of an appetite  No hypoglycemia  Objective:     Vitals: Blood pressure 136/64, pulse 76, temperature 98 5 °F (36 9 °C), temperature source Oral, resp  rate 16, height 5' 8" (1 727 m), weight 62 9 kg (138 lb 10 7 oz), SpO2 98 %  ,Body mass index is 21 08 kg/m²  Intake/Output Summary (Last 24 hours) at 02/13/18 1346  Last data filed at 02/13/18 1000   Gross per 24 hour   Intake              450 ml   Output              800 ml   Net             -350 ml       Physical Exam:  General Appearance: awake, appears stated age and cooperative  Head: Normocephalic, without obvious abnormality, atraumatic  Extremities: moves all extremities  Skin: Skin color and temperature normal    Pulm: no labored breathing    Lab, Imaging and other studies: I have personally reviewed pertinent reports  Results from last 7 days  Lab Units 02/13/18  0517   SODIUM mmol/L 133*   POTASSIUM mmol/L 3 3*   CHLORIDE mmol/L 91*   CO2 mmol/L 33*   BUN mg/dL 29*   CREATININE mg/dL 0 93   GLUCOSE RANDOM mg/dL 238*   CALCIUM mg/dL 8 2*       POC Glucose (mg/dl)   Date Value   02/13/2018 93   02/13/2018 222 (H)   02/12/2018 235 (H)   02/12/2018 197 (H)   02/12/2018 211 (H)   02/12/2018 157 (H)   02/11/2018 278 (H)   02/11/2018 334 (H)   02/11/2018 341 (H)   02/11/2018 326 (H)       Assessment:  Type 2 diabetes with hyperglycemia  Peripheral arterial disease  Right distal foot gangrene/right first toe osteomyelitis  Status post right TMA    Plan:  Sugars improving-inconsistent carbohydrate intake at meals  For now decrease Humalog to 6 units before meals  Increase Lantus to 24 units at bedtime    Continue to monitor blood sugars before meals and bedtime adjust accordingly    Peripheral arterial disease-vascular on board    Right distal foot gangrene/breast 1st toe osteomyelitis-status post TMA yesterday and wound VAC placement  On IV antibiotics      Portions of the record may have been created with voice recognition software

## 2018-02-14 LAB
ANION GAP SERPL CALCULATED.3IONS-SCNC: 7 MMOL/L (ref 4–13)
BUN SERPL-MCNC: 18 MG/DL (ref 5–25)
CALCIUM SERPL-MCNC: 8.5 MG/DL (ref 8.3–10.1)
CHLORIDE SERPL-SCNC: 93 MMOL/L (ref 100–108)
CO2 SERPL-SCNC: 32 MMOL/L (ref 21–32)
CREAT SERPL-MCNC: 0.94 MG/DL (ref 0.6–1.3)
ERYTHROCYTE [DISTWIDTH] IN BLOOD BY AUTOMATED COUNT: 14 % (ref 11.6–15.1)
GFR SERPL CREATININE-BSD FRML MDRD: 83 ML/MIN/1.73SQ M
GLUCOSE SERPL-MCNC: 106 MG/DL (ref 65–140)
GLUCOSE SERPL-MCNC: 136 MG/DL (ref 65–140)
GLUCOSE SERPL-MCNC: 145 MG/DL (ref 65–140)
GLUCOSE SERPL-MCNC: 169 MG/DL (ref 65–140)
GLUCOSE SERPL-MCNC: 208 MG/DL (ref 65–140)
HCT VFR BLD AUTO: 33.7 % (ref 36.5–49.3)
HGB BLD-MCNC: 11.1 G/DL (ref 12–17)
MCH RBC QN AUTO: 30.1 PG (ref 26.8–34.3)
MCHC RBC AUTO-ENTMCNC: 32.9 G/DL (ref 31.4–37.4)
MCV RBC AUTO: 91 FL (ref 82–98)
PLATELET # BLD AUTO: 325 THOUSANDS/UL (ref 149–390)
PMV BLD AUTO: 10.5 FL (ref 8.9–12.7)
POTASSIUM SERPL-SCNC: 3.3 MMOL/L (ref 3.5–5.3)
RBC # BLD AUTO: 3.69 MILLION/UL (ref 3.88–5.62)
SODIUM SERPL-SCNC: 132 MMOL/L (ref 136–145)
WBC # BLD AUTO: 21.64 THOUSAND/UL (ref 4.31–10.16)

## 2018-02-14 PROCEDURE — 99232 SBSQ HOSP IP/OBS MODERATE 35: CPT | Performed by: INTERNAL MEDICINE

## 2018-02-14 PROCEDURE — 85027 COMPLETE CBC AUTOMATED: CPT | Performed by: INTERNAL MEDICINE

## 2018-02-14 PROCEDURE — 80048 BASIC METABOLIC PNL TOTAL CA: CPT | Performed by: INTERNAL MEDICINE

## 2018-02-14 PROCEDURE — 99233 SBSQ HOSP IP/OBS HIGH 50: CPT | Performed by: INTERNAL MEDICINE

## 2018-02-14 PROCEDURE — 82948 REAGENT STRIP/BLOOD GLUCOSE: CPT

## 2018-02-14 RX ADMIN — HEPARIN SODIUM 5000 UNITS: 5000 INJECTION, SOLUTION INTRAVENOUS; SUBCUTANEOUS at 15:16

## 2018-02-14 RX ADMIN — HEPARIN SODIUM 5000 UNITS: 5000 INJECTION, SOLUTION INTRAVENOUS; SUBCUTANEOUS at 21:57

## 2018-02-14 RX ADMIN — METOPROLOL TARTRATE 25 MG: 25 TABLET ORAL at 21:58

## 2018-02-14 RX ADMIN — METRONIDAZOLE 500 MG: 500 INJECTION, SOLUTION INTRAVENOUS at 21:58

## 2018-02-14 RX ADMIN — ACETAMINOPHEN 975 MG: 325 TABLET, FILM COATED ORAL at 05:21

## 2018-02-14 RX ADMIN — GABAPENTIN 300 MG: 300 CAPSULE ORAL at 21:57

## 2018-02-14 RX ADMIN — GABAPENTIN 300 MG: 300 CAPSULE ORAL at 18:20

## 2018-02-14 RX ADMIN — FUROSEMIDE 40 MG: 40 TABLET ORAL at 18:20

## 2018-02-14 RX ADMIN — HEPARIN SODIUM 5000 UNITS: 5000 INJECTION, SOLUTION INTRAVENOUS; SUBCUTANEOUS at 05:22

## 2018-02-14 RX ADMIN — PREGABALIN 75 MG: 75 CAPSULE ORAL at 09:35

## 2018-02-14 RX ADMIN — INSULIN GLARGINE 20 UNITS: 100 INJECTION, SOLUTION SUBCUTANEOUS at 21:57

## 2018-02-14 RX ADMIN — ATORVASTATIN CALCIUM 80 MG: 80 TABLET, FILM COATED ORAL at 18:20

## 2018-02-14 RX ADMIN — INSULIN LISPRO 1 UNITS: 100 INJECTION, SOLUTION INTRAVENOUS; SUBCUTANEOUS at 21:58

## 2018-02-14 RX ADMIN — FUROSEMIDE 40 MG: 40 TABLET ORAL at 09:35

## 2018-02-14 RX ADMIN — METRONIDAZOLE 500 MG: 500 INJECTION, SOLUTION INTRAVENOUS at 05:25

## 2018-02-14 RX ADMIN — ASPIRIN 81 MG 81 MG: 81 TABLET ORAL at 09:35

## 2018-02-14 RX ADMIN — CEFAZOLIN SODIUM 2000 MG: 2 SOLUTION INTRAVENOUS at 18:20

## 2018-02-14 RX ADMIN — CEFAZOLIN SODIUM 2000 MG: 2 SOLUTION INTRAVENOUS at 09:36

## 2018-02-14 RX ADMIN — METRONIDAZOLE 500 MG: 500 INJECTION, SOLUTION INTRAVENOUS at 15:16

## 2018-02-14 RX ADMIN — GABAPENTIN 300 MG: 300 CAPSULE ORAL at 09:35

## 2018-02-14 RX ADMIN — CEFAZOLIN SODIUM 2000 MG: 2 SOLUTION INTRAVENOUS at 01:33

## 2018-02-14 RX ADMIN — METOPROLOL TARTRATE 25 MG: 25 TABLET ORAL at 09:35

## 2018-02-14 RX ADMIN — INSULIN LISPRO 1 UNITS: 100 INJECTION, SOLUTION INTRAVENOUS; SUBCUTANEOUS at 18:21

## 2018-02-14 RX ADMIN — ACETAMINOPHEN 975 MG: 325 TABLET, FILM COATED ORAL at 21:57

## 2018-02-14 RX ADMIN — OXYCODONE HYDROCHLORIDE AND ACETAMINOPHEN 1 TABLET: 5; 325 TABLET ORAL at 13:57

## 2018-02-14 RX ADMIN — ACETAMINOPHEN 975 MG: 325 TABLET, FILM COATED ORAL at 13:02

## 2018-02-14 NOTE — PHYSICAL THERAPY NOTE
Physical Therapy Cancellation Note    PT order received  Chart review performed  At this time, PT evaluation held as patient receiving continuous care by MD in AM and PM  Upon PM attempt, pt with MD at bedside with notable sanguinous drainage and elevation  Pt also scheduled for 2/15 R foot split thickness skin graft  PT will follow and evaluate as appropriate      Irlanda Bernstein PT

## 2018-02-14 NOTE — OCCUPATIONAL THERAPY NOTE
OCCUPATIONAL THERAPY CANCEL NOTE:    ORDERS RECEIVED  CHART REVIEW COMPLETED  ATTEMPTED TO SEE PT BOTH AM/PM TODAY HOWEVER, PT WITH MEDICAL TEAM  PT CURRENTLY WITH MD AT BEDSIDE WITH DRAINAGE/ELEVATION OF RLE  PT PENDING SPLIT THICKNESS SKIN GRAFT OF R FOOT TOMORROW  WILL HOLD THERAPY AT THIS TIME  WILL CONT TO FOLLOW TO COMPLETE OT EVAL AS MEDICALLY APPROPRIATE       Chisé Diacik, MOT, OTR/L

## 2018-02-14 NOTE — PROGRESS NOTES
Progress Note Rene Pimentel 1950, 75 y o  male MRN: [id number]    Unit/Bed#: [facility] 931-01 Encounter: [id number]    Primary Care Provider: Lilibeth Root MD   Date and time admitted to hospital: 2/5/2018  4:18 PM        Pain in right foot   Assessment & Plan    Following surgery  Possibly of phantom pain  Also patient does have neuropathy and takes gabapentin and Lyrica both at home  Continue oxy which helped as per patient  Will increase it to every 4 hours p r n  Pérez Campo Also will give 1 dose of Lyrica for now  Increase gabapentin to 300 mg t i d  (patient takes 600 mg HS at home)  Also start the patient on standing Tylenol 975 t i d         (HFpEF) heart failure with preserved ejection fraction [facility])   Assessment & Plan    Patient switch to p o  Lasix today as per Cardiology  Appears euvolemic  Continue to monitor  Coronary artery disease involving native coronary artery   Assessment & Plan    As evidence by the dense coronary calcifications seen on CT  Cardiology following  Will need evaluation after he has recovered from his amputation  Cellulitis of right foot   Assessment & Plan    · Antibiotics as per ID        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)   Assessment & Plan    Blood sugar still elevated more than 200 most of the times  Endocrine following  Appreciate recommendations  Hypertension   Assessment & Plan    Continue Lopressor for now  Patient was taking losartan-HCTZ at home  Currently being held as BP well controlled  Consider resuming it BP elevated  * Gangrene of toe of right foot [facility])   Assessment & Plan    Patient is status transmetatarsal amputation on 2/12/2018  Note Podiatry input regarding plans for flap  Continue IV antibiotics as per ID  Ancef and Flagyl            VTE Pharmacologic Prophylaxis:   Pharmacologic: Heparin  Mechanical VTE Prophylaxis in Place: Yes    Patient Centered Rounds: I have performed bedside rounds with nursing staff today  Time Spent for Care: 15 minutes  More than 50% of total time spent on counseling and coordination of care as described above  Current Length of Stay: 9 day(s)    Current Patient Status: Inpatient   Certification Statement: The patient will continue to require additional inpatient hospital stay due to Need to monitor symptoms      Code Status: Level 1 - Full Code      Subjective:   Patient comfortable    Objective:     Vitals:   Temp (24hrs), Av 1 °F (36 7 °C), Min:97 6 °F (36 4 °C), Max:98 5 °F (36 9 °C)    HR:  [67-91] 67  Resp:  [17-18] 18  BP: (111-137)/(55-67) 111/55  SpO2:  [97 %-99 %] 97 %  Body mass index is 20 41 kg/m²  Input and Output Summary (last 24 hours): Intake/Output Summary (Last 24 hours) at 18 1515  Last data filed at 18 1301   Gross per 24 hour   Intake             1020 ml   Output              800 ml   Net              220 ml       Physical Exam:     Physical Exam   Constitutional: He is oriented to person, place, and time  He appears well-developed and well-nourished  HENT:   Head: Normocephalic and atraumatic  Eyes: Conjunctivae are normal  Pupils are equal, round, and reactive to light  Neck: Normal range of motion  Neck supple  Cardiovascular: Normal rate and regular rhythm  Pulmonary/Chest: Effort normal and breath sounds normal  No respiratory distress  Abdominal: Soft  Bowel sounds are normal  He exhibits no distension  There is no tenderness  Musculoskeletal: Normal range of motion  He exhibits no edema  Neurological: He is alert and oriented to person, place, and time  Skin: Skin is warm and dry  No erythema  Psychiatric: He has a normal mood and affect         Additional Data:     Labs:      Results from last 7 days  Lab Units 18  0547  02/10/18  0409 18  0614   WBC Thousand/uL 21 64*  < > 13 01* 12 71*   HEMOGLOBIN g/dL 11 1*  < > 10 8* 10 2*   HEMATOCRIT % 33 7*  < > 30 9* 29 6*   PLATELETS Thousands/uL 325  < > 359 323   NEUTROS PCT %  --   --   --  89*   LYMPHS PCT %  --   --   --  9*   LYMPHO PCT %  --   --  11*  --    MONOS PCT %  --   --   --  2*   MONO PCT MAN %  --   --  9  --    EOS PCT %  --   --   --  0   EOSINO PCT MANUAL %  --   --  0  --    < > = values in this interval not displayed  Results from last 7 days  Lab Units 02/14/18  0547  02/10/18  0409   SODIUM mmol/L 132*  < > 142   POTASSIUM mmol/L 3 3*  < > 2 7*   CHLORIDE mmol/L 93*  < > 101   CO2 mmol/L 32  < > 35*   BUN mg/dL 18  < > 24   CREATININE mg/dL 0 94  < > 0 88   CALCIUM mg/dL 8 5  < > 8 0*   TOTAL PROTEIN g/dL  --   --  6 9   BILIRUBIN TOTAL mg/dL  --   --  0 39   ALK PHOS U/L  --   --  112   ALT U/L  --   --  87*   AST U/L  --   --  96*   GLUCOSE RANDOM mg/dL 145*  < > 70   < > = values in this interval not displayed  Results from last 7 days  Lab Units 02/09/18  0614   INR  1 85*       * I Have Reviewed All Lab Data Listed Above  * Additional Pertinent Lab Tests Reviewed:  All Labs Within Last 24 Hours Reviewed        Recent Cultures (last 7 days):           Last 24 Hours Medication List:     Current Facility-Administered Medications:  acetaminophen 975 mg Oral Q8H Ankit Younger MD    aspirin 81 mg Oral Daily ARMANDO Kulkarni    atorvastatin 80 mg Oral Daily With Tamiko Michel MD    cefazolin 2,000 mg Intravenous Q8H Jamaal Jordan MD Last Rate: 2,000 mg (02/14/18 0936)   furosemide 40 mg Oral BID (diuretic) Russell Snow DO    gabapentin 300 mg Oral TID Bolivar Dillard MD    heparin (porcine) 5,000 Units Subcutaneous [facility] Charles Nolan DPM    hydrALAZINE 10 mg Intravenous Q6H PRN ARMANDO Kulkarni    insulin glargine 20 Units Subcutaneous HS Gaetano Klinefelter, MD    insulin lispro 1-5 Units Subcutaneous TID AC Octavio Wilkerson, DO    insulin lispro 1-5 Units Subcutaneous HS Octavio Wilkerson, DO    insulin lispro 8 Units Subcutaneous TID With Meals Derrel Rosette MD Hi    metoprolol tartrate 25 mg Oral Q12H Joanna Rodriguez MD    metroNIDAZOLE 500 mg Intravenous Q8H Kandi Coombs MD Last Rate: Stopped (02/14/18 0615)   oxyCODONE 10 mg Oral Q4H PRN Jeana Miguel MD    oxyCODONE 5 mg Oral Q4H PRN ARMANDO Bonilla    oxyCODONE-acetaminophen 1 tablet Oral Q4H PRN Michelle Salinas DPM    pregabalin 75 mg Oral Daily Jeana Miguel MD         Today, Patient Was Seen By: Maryln Mohs, DO    ** Please Note: Dictation voice to text software may have been used in the creation of this document   **

## 2018-02-14 NOTE — PROGRESS NOTES
Progress Note - Podiatry  Darshan Mcfarland 75 y o  male MRN: [id number]  [facility] Encounter: [id number]    Assessment:  1  S/p Right TMA with wound vac applicastion (DOS: 6/77/32), and S/p I&D of the right hallux (DOS: 2/6/18) secondary to Wet gangrene of right hallux and medial forefoot with cellulitis  2  Progressive dry gangrene 2nd digit right foot  3  PAD, status post angiogram with stenting  4  DM with neuropathy, A1c 9 7, per endo  5  NSTEMI, type 2        Plan:  -patient seen and examined at bedside  Vital signs are stable, leukocytosis noted  However, nontoxic in appearance   -wound VAC was changed today due to increased white count  One black sponge was removed  Dorsal foot wound is granular in appearance, with no clinical signs of infection noted; no ascending cellulitis, no purulent drainage  Wound measures 5 0 x 6 0 x 0 5 cm  Wound VAC was reapplied today utilizing 1 black sponge  Wound VAC was set at 125 mm per Hg at low continuous with no leaks noted  Maxorb was applied to the medial and lateral sutures  - patient will tentatively be going to the OR tomorrow 2/15 with Dr Martha Campbell for a right foot split-thickness skin graft  - the benefits, and risks were explained to patient and family  Currently patient is in agreement with surgery   - NPO at midnight  Consent was reviewed with patient, and signed by attending and patient  Consent is in patient's chart   -medical mgmt per primary team  -c/w IV Ancef/PO Flagyl per Infectious Disease recommendations  Subjective/Objective   Chief Complaint:   Chief Complaint   Patient presents with    Foot Ulcer     Pt presents with diabetic ulcers on right foot and increased pain and swelling       Subjective: 75 y o  y/o male was seen and evaluated at bedside in no acute distress, nontoxic in appearance  Patient states that he is having mild pain in the right foot   Patient denies any recent nausea, vomiting, fever, chills, shortness of breath, chest pains       Blood pressure 111/55, pulse 67, temperature 97 6 °F (36 4 °C), temperature source Oral, resp  rate 18, height 5' 8" (1 727 m), weight 60 9 kg (134 lb 3 2 oz), SpO2 97 %  ,Body mass index is 20 41 kg/m²  Invasive Devices     Peripheral Intravenous Line            Peripheral IV 02/13/18 Left Antecubital 1 day          Drain            External Urinary Catheter Medium 4 days    Negative Pressure Wound Therapy (V A C ) Foot Right 1 day                Physical Exam:   General: Alert, cooperative and no distress  Lungs: Non labored breathing  Heart: Positive S1, S2  Abdomen: Soft, non-tender  Extremity:     NVS at baseline B/l  MSK function at B/l  No calf tenderness noted B/l     RLE:  Dressing had mild strike through noted to the plantar aspect  Wound VAC was running at 125 mm per Hg at low continuous with no leaks noted  Dorsal foot wound was granular in appearance with no clinical signs of infection noted; no ascending cellulitis, no purulent drainage  Wound measured 5 0 x 6 0 x 0 5 cm  Medial and lateral aspect show no signs of wound dehiscence, and sutures are intact  Lab, Imaging and other studies:   I have personally reviewed pertinent lab results  , CBC:   Lab Results   Component Value Date    WBC 21 64 (H) 02/14/2018    HGB 11 1 (L) 02/14/2018    HCT 33 7 (L) 02/14/2018    MCV 91 02/14/2018     02/14/2018    MCH 30 1 02/14/2018    MCHC 32 9 02/14/2018    RDW 14 0 02/14/2018    MPV 10 5 02/14/2018   , CMP:   Lab Results   Component Value Date     (L) 02/14/2018    K 3 3 (L) 02/14/2018    CL 93 (L) 02/14/2018    CO2 32 02/14/2018    ANIONGAP 7 02/14/2018    BUN 18 02/14/2018    CREATININE 0 94 02/14/2018    GLUCOSE 145 (H) 02/14/2018    CALCIUM 8 5 02/14/2018    EGFR 83 02/14/2018       Imaging: I have personally reviewed pertinent films in PACS  EKG, Pathology, and Other Studies: I have personally reviewed pertinent reports    VTE Pharmacologic Prophylaxis: Heparin

## 2018-02-14 NOTE — ASSESSMENT & PLAN NOTE
Patient is status transmetatarsal amputation on 2/12/2018  Note Podiatry input regarding plans for flap  Continue IV antibiotics as per ID  Ancef and Flagyl

## 2018-02-14 NOTE — PROGRESS NOTES
Cardiology Progress Note - Francisca Morris 75 y o  male MRN: [id number]    Unit/Bed#: [facility] 931-01 Encounter: [id number]    Assessment and plan  1   Non STEMI type 2 in the setting of sepsis  2   Preop for transmetatarsal amputation  3   Right foot gangrene  4   Hypertension  5   Diabetes  6   Coronary artery disease as evidenced by heavy coronary calcifications on CT  7   Chronic diastolic heart failure      Recommendations:    Resting comfortably with no cardiac complaints     Continue current cardiac medications   I had a long talk with the patient and the family  Lavern Su does have coronary artery disease as evidenced by heavy coronary calcifications on a CT scan from 2016   Continue aspirin, statin, beta-blocker   No anginal symptoms  No angina or signs of heart failure will continue to follow  Continue oral Lasix at 40 mg p o  b i d  Replete potassium keep above 4  Subjective:    No significant events overnight  Denies any complaints  ROS    Objective:   Vitals: Blood pressure 131/67, pulse 91, temperature 98 3 °F (36 8 °C), temperature source Oral, resp  rate 17, height 5' 8" (1 727 m), weight 60 9 kg (134 lb 3 2 oz), SpO2 98 %  , Body mass index is 20 41 kg/m² , Orthostatic Blood Pressures    Flowsheet Row Most Recent Value   Blood Pressure  131/67 filed at 02/13/2018 2352   Patient Position - Orthostatic VS  Lying filed at 02/13/2018 2924         Systolic (75YWN), JOHNATHAN:970 , Min:131 , ALBA:266     Diastolic (29RYG), EGU:82, Min:60, Max:67      Intake/Output Summary (Last 24 hours) at 02/14/18 0810  Last data filed at 02/14/18 0615   Gross per 24 hour   Intake             1190 ml   Output              650 ml   Net              540 ml     Weight (last 2 days)     Date/Time   Weight    02/14/18 0638  60 9 (134 2)    02/13/18 1449  61 8 (136 24)                Telemetry Review: No significant arrhythmias seen on telemetry review  EKG personally reviewed by Carol Monroy DO       Physical Exam Constitutional: He is oriented to person, place, and time  He appears well-nourished  No distress  HENT:   Head: Atraumatic  Eyes: Conjunctivae are normal  Pupils are equal, round, and reactive to light  Neck: Neck supple  Cardiovascular: Normal rate, regular rhythm and normal heart sounds  Exam reveals no friction rub  No murmur heard  Pulmonary/Chest: Effort normal and breath sounds normal  No respiratory distress  He has no wheezes  He has no rales  Abdominal: Bowel sounds are normal  He exhibits no distension  There is no tenderness  There is no rebound  Musculoskeletal: Normal range of motion  He exhibits no edema  Neurological: He is alert and oriented to person, place, and time  No cranial nerve deficit  Skin: Skin is warm and dry  There is erythema  Nursing note and vitals reviewed          Laboratory Results:    Results from last 7 days  Lab Units 02/10/18  1250 02/10/18  0409 02/09/18  0614   TROPONIN I ng/mL 5 18* 5 84* 4 94*       CBC with diff:   Results from last 7 days  Lab Units 02/14/18  0547 02/13/18  0517 02/11/18  0503 02/10/18  0409 02/09/18  0614 02/08/18  0418 02/08/18  0246 02/07/18  1002   WBC Thousand/uL 21 64* 11 35* 11 46* 13 01* 12 71* 25 79*  --  27 38*   HEMOGLOBIN g/dL 11 1* 10 6* 11 6* 10 8* 10 2* 10 9*  --  11 2*   I STAT HEMOGLOBIN g/dl  --   --   --   --   --   --  9 2*  --    HEMATOCRIT % 33 7* 32 3* 34 1* 30 9* 29 6* 31 9*  --  34 2*   MCV fL 91 91 90 89 89 91  --  93   PLATELETS Thousands/uL 325 305 350 359 323 298  --  364   MCH pg 30 1 29 9 30 6 30 9 30 7 31 1  --  30 4   MCHC g/dL 32 9 32 8 34 0 35 0 34 5 34 2  --  32 7   RDW % 14 0 13 8 13 4 13 0 13 1 13 0  --  13 0   MPV fL 10 5 10 4 10 1 10 1 10 7 10 4  --  10 8   NRBC AUTO /100 WBCs  --   --   --  1 0 0  --  0         CMP:  Results from last 7 days  Lab Units 02/14/18  0547 02/13/18  0517 02/11/18  0503 02/10/18  1311 02/10/18  0409 02/09/18  0614 02/08/18  0418  02/07/18  1002   SODIUM mmol/L 132* 133* 141 140 142 141 140  < > 136   POTASSIUM mmol/L 3 3* 3 3* 3 3* 3 7 2 7* 3 4* 3 7  < > 4 0   CHLORIDE mmol/L 93* 91* 99* 100 101 107 110*  < > 102   CO2 mmol/L 32 33* 35* 27 35* 25 23  < > 18*   ANION GAP mmol/L 7 9 7 13 6 9 7  < > 16*   BUN mg/dL 18 29* 29* 26* 24 22 27*  < > 25   CREATININE mg/dL 0 94 0 93 0 79 0 91 0 88 0 66 0 78  < > 0 95   GLUCOSE RANDOM mg/dL 145* 238* 89 148* 70 126 178*  < > 326*   GLUCOSE, ISTAT   --   --   --   --   --   --   --   < >  --    CALCIUM mg/dL 8 5 8 2* 8 1* 8 1* 8 0* 7 8* 7 7*  < > 8 1*   AST U/L  --   --   --   --  96* 183* 438*  --  79*   ALT U/L  --   --   --   --  87* 125* 160*  --  26   ALK PHOS U/L  --   --   --   --  112 107 134*  --  122*   TOTAL PROTEIN g/dL  --   --   --   --  6 9 6 4 7 2  --  7 6   BILIRUBIN TOTAL mg/dL  --   --   --   --  0 39 0 82 0 50  --  0 84   EGFR ml/min/1 73sq m 83 84 92 86 88 99 93  < > 82   < > = values in this interval not displayed  BMP:  Results from last 7 days  Lab Units 02/14/18  0547 02/13/18  0517 02/11/18  0503 02/10/18  1311 02/10/18  0409 02/09/18  0614 02/08/18  0418   SODIUM mmol/L 132* 133* 141 140 142 141 140   POTASSIUM mmol/L 3 3* 3 3* 3 3* 3 7 2 7* 3 4* 3 7   CHLORIDE mmol/L 93* 91* 99* 100 101 107 110*   CO2 mmol/L 32 33* 35* 27 35* 25 23   BUN mg/dL 18 29* 29* 26* 24 22 27*   CREATININE mg/dL 0 94 0 93 0 79 0 91 0 88 0 66 0 78   GLUCOSE RANDOM mg/dL 145* 238* 89 148* 70 126 178*   CALCIUM mg/dL 8 5 8 2* 8 1* 8 1* 8 0* 7 8* 7 7*       BNP: No results for input(s): BNP in the last 72 hours      Magnesium:   Results from last 7 days  Lab Units 02/11/18  0503 02/09/18  0614 02/08/18  0418 02/07/18  1501 02/07/18  1002   MAGNESIUM mg/dL 2 0 2 1 2 2 2 4 2 5       Coags:   Results from last 7 days  Lab Units 02/11/18  0503 02/10/18  0823 02/10/18  0205 02/09/18  1737 02/09/18  0614 02/08/18  0755   PTT seconds 72* 70* 85* 93*  --  37*   INR   --   --   --   --  1 85* 1 64*       TSH:        Hemoglobin A1C Lipid Profile:   Results from last 7 days  Lab Units 18  0614   CHOLESTEROL mg/dL 83   TRIGLYCERIDES mg/dL 59   HDL mg/dL 21*       Cardiac testing:   Results for orders placed during the hospital encounter of 18   Echo complete with contrast if indicated    [place]ative [address]  [facility] [place] [address] [facility]  [phone]    Transthoracic Echocardiogram  2D, M-mode, Doppler, and Color Doppler    Study date:  2018    Patient: Cindy Bosworth  MR number: [id number]  Account number: [de-identified]  : 1950  Age: 75 years  Gender: Male  Status: Inpatient  Location: Bedside  Height: 68 in  Weight: 133 lb  BP: 161/ 80 mmHg    Indications: Dyspnea    Diagnoses: R06 00 - Dyspnea, unspecified    Sonographer:  SAM Taylor, RDCS  Primary Physician:  Yefri Silva MD  Referring Physician:  Maryse Chung DO  Group:  [facility]  Cardiology Fellow:  Marino Madison MD  Interpreting Physician:  Azalia Harper MD    SUMMARY    PROCEDURE INFORMATION:  This was a technically difficult study  LEFT VENTRICLE:  Systolic function was normal  Ejection fraction was estimated to be 55 %  Although no diagnostic regional wall motion abnormality was identified, this possibility cannot be completely excluded on the basis of this study  LEFT ATRIUM:  The atrium was mildly dilated  MITRAL VALVE:  There was moderate regurgitation  Regurgitation grade was 2+ on a scale of 0 to 4+  AORTIC VALVE:  There was trace regurgitation  PULMONIC VALVE:  There was trace regurgitation  PERICARDIUM:  There was a left pleural effusion  HISTORY: PRIOR HISTORY: Altered mental status; Sepsis; Diabetes Mellitus type II; Hypertension    PROCEDURE: The procedure was performed at the bedside  This was a routine study  The transthoracic approach was used   The study included complete 2D imaging, M-mode, complete spectral Doppler, and color Doppler  The heart rate was 94 bpm,  at the start of the study  Images were obtained from the parasternal, apical, subcostal, and suprasternal notch acoustic windows  Echocardiographic views were limited due to restricted patient mobility, poor acoustic window availability,  and lung interference  This was a technically difficult study  LEFT VENTRICLE: Size was normal  Systolic function was normal  Ejection fraction was estimated to be 55 %  Although no diagnostic regional wall motion abnormality was identified, this possibility cannot be completely excluded on the basis  of this study  Wall thickness was normal  There was no evidence of concentric hypertrophy  DOPPLER: Due to tachycardia, there was fusion of early and atrial contributions to ventricular filling  The study was not technically sufficient to  allow evaluation of LV diastolic function  RIGHT VENTRICLE: The size was normal  Systolic function was normal  Wall thickness was normal     LEFT ATRIUM: The atrium was mildly dilated  RIGHT ATRIUM: Size was normal     MITRAL VALVE: Valve structure was normal  There was normal leaflet separation  DOPPLER: The transmitral velocity was within the normal range  There was no evidence for stenosis  There was moderate regurgitation  Regurgitation grade was 2+  on a scale of 0 to 4+  AORTIC VALVE: The valve was trileaflet  Leaflets exhibited normal cuspal separation and sclerosis  DOPPLER: Transaortic velocity was within the normal range  There was no evidence for stenosis  There was trace regurgitation  TRICUSPID VALVE: The valve structure was normal  There was normal leaflet separation  DOPPLER: The transtricuspid velocity was within the normal range  There was no evidence for stenosis  There was no regurgitation  PULMONIC VALVE: Leaflets exhibited normal thickness, no calcification, and normal cuspal separation  DOPPLER: The transpulmonic velocity was within the normal range   There was trace regurgitation  PERICARDIUM: There was no pericardial effusion  There was a left pleural effusion  The pericardium was normal in appearance  AORTA: The root exhibited normal size  SYSTEM MEASUREMENT TABLES    2D  %FS: 22 23 %  Ao Diam: 3 27 cm  EDV(Teich): 78 14 ml  EF Biplane: 47 97 %  EF(Teich): 45 23 %  ESV(Teich): 42 8 ml  IVSd: 1 01 cm  LA Area: 17 83 cm2  LA Diam: 3 91 cm  LVEDV MOD A2C: 80 32 ml  LVEDV MOD A4C: 73 97 ml  LVEDV MOD BP: 78 31 ml  LVEF MOD A2C: 47 09 %  LVEF MOD A4C: 51 51 %  LVESV MOD A2C: 42 5 ml  LVESV MOD A4C: 35 87 ml  LVESV MOD BP: 40 75 ml  LVIDd: 4 19 cm  LVIDs: 3 26 cm  LVLd A2C: 7 06 cm  LVLd A4C: 7 36 cm  LVLs A2C: 6 35 cm  LVLs A4C: 6 66 cm  LVPWd: 0 95 cm  RA Area: 10 35 cm2  RVIDd: 3 82 cm  SV MOD A2C: 37 82 ml  SV MOD A4C: 38 1 ml  SV(Teich): 35 34 ml    MM  TAPSE: 1 85 cm    PW  E': 0 11 m/s    [facility] Accredited [facility]chocardi[facility]    Prepared and electronically signed by    Shonda Arambula MD  Signed 08-Feb-2018 17:12:05       No results found for this or any previous visit  No results found for this or any previous visit  No results found for this or any previous visit      Meds/Allergies   all current active meds have been reviewed  Prescriptions Prior to Admission   Medication    Alpha-Lipoic Acid 600 MG CAPS    aspirin 81 MG tablet    Blood Glucose Monitoring Suppl (ONE TOUCH ULTRA MINI) w/Device KIT    cephalexin (KEFLEX) 500 mg capsule    gabapentin (NEURONTIN) 300 mg capsule    glipiZIDE-metFORMIN (METAGLIP) 5-500 MG per tablet    glucose blood (ACCU-CHEK ACTIVE STRIPS) test strip    losartan-hydrochlorothiazide (HYZAAR) 100-25 MG per tablet    metoprolol tartrate (LOPRESSOR) 25 mg tablet    metoprolol tartrate (LOPRESSOR) 50 mg tablet    pregabalin (LYRICA) 75 mg capsule    TRADJENTA 5 MG TABS          Assessment:  Principal Problem:    Gangrene of toe of right foot (HCC)  Active Problems:    Hypertension    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)    Cellulitis of right foot    Abnormal CT of the head    Acute respiratory failure (HCC)    Transaminitis    Hypophosphatemia    Popliteal artery stenosis, right (HCC)    Great toe amputation status, right (HCC)    Coagulopathy (HCC)    Meningioma (HCC)    Hypokalemia    Moderate mitral regurgitation    Gangrenous toe (HCC)    Coronary artery disease involving native coronary artery    (HFpEF) heart failure with preserved ejection fraction (HCC)    Pain in right foot

## 2018-02-14 NOTE — ASSESSMENT & PLAN NOTE
Following surgery  Possibly of phantom pain  Also patient does have neuropathy and takes gabapentin and Lyrica both at home  Continue oxy which helped as per patient  Will increase it to every 4 hours p r n  Maynor Watts Also will give 1 dose of Lyrica for now  Increase gabapentin to 300 mg t i d  (patient takes 600 mg HS at home)  Also start the patient on standing Tylenol 975 t i d

## 2018-02-14 NOTE — PLAN OF CARE
Problem: Potential for Falls  Goal: Patient will remain free of falls  INTERVENTIONS:  - Assess patient frequently for physical needs  -  Identify cognitive and physical deficits and behaviors that affect risk of falls  -  [place] fall precautions as indicated by assessment   - Educate patient/family on patient safety including physical limitations  - Instruct patient to call for assistance with activity based on assessment  - Modify environment to reduce risk of injury  - Consider OT/PT consult to assist with strengthening/mobility   Outcome: Progressing      Problem: Nutrition/Hydration-ADULT  Goal: Nutrient/Hydration intake appropriate for improving, restoring or maintaining nutritional needs  Monitor and assess patient's nutrition/hydration status for malnutrition (ex- brittle hair, bruises, dry skin, pale skin and conjunctiva, muscle wasting, smooth red tongue, and disorientation)  Collaborate with interdisciplinary team and initiate plan and interventions as ordered  Monitor patient's weight and dietary intake as ordered or per policy  Utilize nutrition screening tool and intervene per policy  Determine patient's food preferences and provide high-protein, high-caloric foods as appropriate       INTERVENTIONS:  - Monitor oral intake, urinary output, labs, and treatment plans  - Assess nutrition and hydration status and recommend course of action  - Evaluate amount of meals eaten  - Assist patient with eating if necessary   - Allow adequate time for meals  - Recommend/ encourage appropriate diets, oral nutritional supplements, and vitamin/mineral supplements  - Order, calculate, and assess calorie counts as needed  - Recommend, monitor, and adjust tube feedings and TPN/PPN based on assessed needs  - Assess need for intravenous fluids  - Provide specific nutrition/hydration education as appropriate  - Include patient/family/caregiver in decisions related to nutrition   Outcome: Progressing      Problem: Prexisting or High Potential for Compromised Skin Integrity  Goal: Skin integrity is maintained or improved  INTERVENTIONS:  - Identify patients at risk for skin breakdown  - Assess and monitor skin integrity  - Assess and monitor nutrition and hydration status  - Monitor labs (i e  albumin)  - Assess for incontinence   - Turn and reposition patient  - Assist with mobility/ambulation  - Relieve pressure over bony prominences  - Avoid friction and shearing  - Provide appropriate hygiene as needed including keeping skin clean and dry  - Evaluate need for skin moisturizer/barrier cream  - Collaborate with interdisciplinary team (i e  Nutrition, Rehabilitation, etc )   - Patient/family teaching   Outcome: Progressing      Problem: DISCHARGE PLANNING - CARE MANAGEMENT  Goal: Discharge to post-acute care or home with appropriate resources  INTERVENTIONS:  - Conduct assessment to determine patient/family and health care team treatment goals, and need for post-acute services based on payer coverage, community resources, and patient preferences, and barriers to discharge  - Address psychosocial, clinical, and financial barriers to discharge as identified in assessment in conjunction with the patient/family and health care team  - Arrange appropriate level of post-acute services according to patient's   needs and preference and payer coverage in collaboration with the physician and health care team  - Communicate with and update the patient/family, physician, and health care team regarding progress on the discharge plan  - Arrange appropriate transportation to post-acute venues  - Anticipated TMA on Friday  CM continues to follow for DC needs post procedure      Outcome: Progressing      Problem: RESPIRATORY - ADULT  Goal: Achieves optimal ventilation and oxygenation  INTERVENTIONS:  - Assess for changes in respiratory status  - Assess for changes in mentation and behavior  - Position to facilitate oxygenation and minimize respiratory effort  - Oxygen administration by appropriate delivery method based on oxygen saturation (per order) or ABGs  - Initiate smoking cessation education as indicated  - Encourage broncho-pulmonary hygiene including cough, deep breathe, Incentive Spirometry  - Assess the need for suctioning and aspirate as needed  - Assess and instruct to report SOB or any respiratory difficulty  - Respiratory Therapy support as indicated   Outcome: Progressing      Problem: METABOLIC, FLUID AND ELECTROLYTES - ADULT  Goal: Electrolytes maintained within normal limits  INTERVENTIONS:  - Monitor labs and assess patient for signs and symptoms of electrolyte imbalances  - Administer electrolyte replacement as ordered  - Monitor response to electrolyte replacements, including repeat lab results as appropriate  - Instruct patient on fluid and nutrition as appropriate   Outcome: Progressing    Goal: Fluid balance maintained  INTERVENTIONS:  - Monitor labs and assess for signs and symptoms of volume excess or deficit  - Monitor I/O and WT  - Instruct patient on fluid and nutrition as appropriate   Outcome: Progressing    Goal: Glucose maintained within target range  INTERVENTIONS:  - Monitor Blood Glucose as ordered  - Assess for signs and symptoms of hyperglycemia and hypoglycemia  - Administer ordered medications to maintain glucose within target range  - Assess nutritional intake and initiate nutrition service referral as needed   Outcome: Progressing

## 2018-02-14 NOTE — PROGRESS NOTES
Progress Note - Infectious Disease   Alec Nye 75 y o  male MRN: [id number]  Unit/Bed#: [facility] 931-01 Encounter: [id number]      Impression/Recommendations:  1   Right distal foot gangrene  Patient finally consented to TMA  He is now status post open TMA yesterday evening  He is now clinically and systemically well  Wound VAC with minimal drainage  Length of antibiotic regimen will depend on adequacy of bone resection  Antibiotic plan as in below  Follow-up on pathology  VAC per Podiatry service  Eventual wound closure per Podiatry service      2  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   Patient is status post open 1st toe amputation  Katerin Fleming, he has gangrenous changes in his other toes also   He is now status post TMA       Continue high-dose IV cefazolin/Flagyl  Serial foot exams       3   Severe sepsis   Source of sepsis is most likely right foot infection   Patient is clinically improved with 1st toe amputation and IV antibiotic  Warren Mercury is now status post TMA  He should do well  Blood cultures remain negative  Antibiotic plan as in above  Monitor hemodynamics      4   PVD    Patient is status post arteriogram with successful intervention   Circulatory status has been optimized  Vascular surgery following      5   Leukocytosis  WBC increased this morning  Patient has no fever  He remains clinically and systemically well  I suspect this is secondary to recent surgery  Will monitor  Recheck CBC in a m  6  Acute hypoxic respiratory failure   I suspect this is secondary to sepsis and pulmonary edema   No obvious pneumonia on CXR    Patient's respiratory status now improved, currently on room air  O2 support per primary service      7  DM with hyperglycemia on admission   Patient appears noncompliant with diabetic medication   This clearly contributes to infection development    Blood sugar management per Medicine Service      8   PCN allergy on allergy list  Jaskaran Rust does not recall reaction  Candace Jenkins states that this was when he was a young child  Candace Jenkins is tolerating cephalosporin well      Discussed with the patient and his daughters in detail regarding the above       Antibiotics:  Cefazolin/Flagyl  POD # 8/2      Subjective:  Patient is comfortable  Foot pain minimal   Dyspnea minimal   Temperature stays down   No chills  He is tolerating antibiotics well   No nausea, vomiting or diarrhea  Objective:  Vitals:  HR:  [67-91] 67  Resp:  [16-18] 18  BP: (111-137)/(55-67) 111/55  SpO2:  [97 %-99 %] 97 %  Temp (24hrs), Av 2 °F (36 8 °C), Min:97 6 °F (36 4 °C), Max:98 5 °F (36 9 °C)  Current: Temperature: 97 6 °F (36 4 °C)    Physical Exam:     General: Awake, alert, cooperative, no distress  Lungs: Expansion symmetric, no rales, no wheezing, respirations unlabored  Heart[de-identified]  Regular rate and rhythm, S1 and S2 normal, no murmur  Abdomen: Soft, nondistended, non-tender, bowel sounds active all four quadrants,        no masses, no organomegaly  Extremities: Right foot with VAC in place  Minimal serous drainage  No erythema/warmth around dressing  No tenderness  Skin:  No rash  Invasive Devices     Peripheral Intravenous Line            Peripheral IV 18 Left Antecubital 1 day          Drain            External Urinary Catheter Medium 4 days    Negative Pressure Wound Therapy (V A C ) Foot Right 1 day                Labs studies:   I have personally reviewed pertinent labs      Results from last 7 days  Lab Units 18  0547 18  0517 18  0503  02/10/18  0409 18  0614 18  0418   SODIUM mmol/L 132* 133* 141  < > 142 141 140   POTASSIUM mmol/L 3 3* 3 3* 3 3*  < > 2 7* 3 4* 3 7   CHLORIDE mmol/L 93* 91* 99*  < > 101 107 110*   CO2 mmol/L 32 33* 35*  < > 35* 25 23   ANION GAP mmol/L 7 9 7  < > 6 9 7   BUN mg/dL 18 29* 29*  < > 24 22 27*   CREATININE mg/dL 0 94 0 93 0 79  < > 0 88 0 66 0 78   EGFR ml/min/1 73sq m 83 84 92  < > 88 99 93   GLUCOSE RANDOM mg/dL 145* 238* 89  < > 70 126 178*   CALCIUM mg/dL 8 5 8 2* 8 1*  < > 8 0* 7 8* 7 7*   AST U/L  --   --   --   --  96* 183* 438*   ALT U/L  --   --   --   --  87* 125* 160*   ALK PHOS U/L  --   --   --   --  112 107 134*   TOTAL PROTEIN g/dL  --   --   --   --  6 9 6 4 7 2   BILIRUBIN TOTAL mg/dL  --   --   --   --  0 39 0 82 0 50   < > = values in this interval not displayed  Results from last 7 days  Lab Units 02/14/18  0547 02/13/18  0517 02/11/18  0503   WBC Thousand/uL 21 64* 11 35* 11 46*   HEMOGLOBIN g/dL 11 1* 10 6* 11 6*   PLATELETS Thousands/uL 325 305 350           Imaging Studies:   I have personally reviewed pertinent imaging study reports and images in PACS  EKG, Pathology, and Other Studies:   I have personally reviewed pertinent reports

## 2018-02-15 ENCOUNTER — ANESTHESIA EVENT (INPATIENT)
Dept: PERIOP | Facility: HOSPITAL | Age: 68
DRG: 853 | End: 2018-02-15
Payer: COMMERCIAL

## 2018-02-15 ENCOUNTER — ANESTHESIA (INPATIENT)
Dept: PERIOP | Facility: HOSPITAL | Age: 68
DRG: 853 | End: 2018-02-15
Payer: COMMERCIAL

## 2018-02-15 LAB
ANION GAP SERPL CALCULATED.3IONS-SCNC: 4 MMOL/L (ref 4–13)
BASOPHILS # BLD AUTO: 0.01 THOUSANDS/ΜL (ref 0–0.1)
BASOPHILS NFR BLD AUTO: 0 % (ref 0–1)
BUN SERPL-MCNC: 14 MG/DL (ref 5–25)
CALCIUM SERPL-MCNC: 7.8 MG/DL (ref 8.3–10.1)
CHLORIDE SERPL-SCNC: 98 MMOL/L (ref 100–108)
CO2 SERPL-SCNC: 34 MMOL/L (ref 21–32)
CREAT SERPL-MCNC: 0.76 MG/DL (ref 0.6–1.3)
EOSINOPHIL # BLD AUTO: 0.13 THOUSAND/ΜL (ref 0–0.61)
EOSINOPHIL NFR BLD AUTO: 1 % (ref 0–6)
ERYTHROCYTE [DISTWIDTH] IN BLOOD BY AUTOMATED COUNT: 14.1 % (ref 11.6–15.1)
GFR SERPL CREATININE-BSD FRML MDRD: 94 ML/MIN/1.73SQ M
GLUCOSE SERPL-MCNC: 114 MG/DL (ref 65–140)
GLUCOSE SERPL-MCNC: 65 MG/DL (ref 65–140)
GLUCOSE SERPL-MCNC: 67 MG/DL (ref 65–140)
GLUCOSE SERPL-MCNC: 68 MG/DL (ref 65–140)
GLUCOSE SERPL-MCNC: 81 MG/DL (ref 65–140)
GLUCOSE SERPL-MCNC: 84 MG/DL (ref 65–140)
GLUCOSE SERPL-MCNC: 98 MG/DL (ref 65–140)
HCT VFR BLD AUTO: 28.2 % (ref 36.5–49.3)
HGB BLD-MCNC: 9.5 G/DL (ref 12–17)
LYMPHOCYTES # BLD AUTO: 1.43 THOUSANDS/ΜL (ref 0.6–4.47)
LYMPHOCYTES NFR BLD AUTO: 12 % (ref 14–44)
MCH RBC QN AUTO: 30.3 PG (ref 26.8–34.3)
MCHC RBC AUTO-ENTMCNC: 33.7 G/DL (ref 31.4–37.4)
MCV RBC AUTO: 90 FL (ref 82–98)
MONOCYTES # BLD AUTO: 0.61 THOUSAND/ΜL (ref 0.17–1.22)
MONOCYTES NFR BLD AUTO: 5 % (ref 4–12)
NEUTROPHILS # BLD AUTO: 10.24 THOUSANDS/ΜL (ref 1.85–7.62)
NEUTS SEG NFR BLD AUTO: 82 % (ref 43–75)
NRBC BLD AUTO-RTO: 0 /100 WBCS
PLATELET # BLD AUTO: 289 THOUSANDS/UL (ref 149–390)
PMV BLD AUTO: 10.1 FL (ref 8.9–12.7)
POTASSIUM SERPL-SCNC: 3 MMOL/L (ref 3.5–5.3)
RBC # BLD AUTO: 3.14 MILLION/UL (ref 3.88–5.62)
SODIUM SERPL-SCNC: 136 MMOL/L (ref 136–145)
WBC # BLD AUTO: 12.47 THOUSAND/UL (ref 4.31–10.16)

## 2018-02-15 PROCEDURE — 82948 REAGENT STRIP/BLOOD GLUCOSE: CPT

## 2018-02-15 PROCEDURE — 0HRMX74 REPLACEMENT OF RIGHT FOOT SKIN WITH AUTOLOGOUS TISSUE SUBSTITUTE, PARTIAL THICKNESS, EXTERNAL APPROACH: ICD-10-PCS | Performed by: PODIATRIST

## 2018-02-15 PROCEDURE — 85025 COMPLETE CBC W/AUTO DIFF WBC: CPT | Performed by: INTERNAL MEDICINE

## 2018-02-15 PROCEDURE — 99232 SBSQ HOSP IP/OBS MODERATE 35: CPT | Performed by: INTERNAL MEDICINE

## 2018-02-15 PROCEDURE — 0HBKXZZ EXCISION OF RIGHT LOWER LEG SKIN, EXTERNAL APPROACH: ICD-10-PCS | Performed by: PODIATRIST

## 2018-02-15 PROCEDURE — 2W0SX6Z CHANGE PRESSURE DRESSING ON RIGHT FOOT: ICD-10-PCS | Performed by: PODIATRIST

## 2018-02-15 PROCEDURE — 99233 SBSQ HOSP IP/OBS HIGH 50: CPT | Performed by: INTERNAL MEDICINE

## 2018-02-15 PROCEDURE — 80048 BASIC METABOLIC PNL TOTAL CA: CPT | Performed by: INTERNAL MEDICINE

## 2018-02-15 RX ORDER — SODIUM CHLORIDE, SODIUM LACTATE, POTASSIUM CHLORIDE, CALCIUM CHLORIDE 600; 310; 30; 20 MG/100ML; MG/100ML; MG/100ML; MG/100ML
100 INJECTION, SOLUTION INTRAVENOUS CONTINUOUS
Status: DISCONTINUED | OUTPATIENT
Start: 2018-02-15 | End: 2018-02-21

## 2018-02-15 RX ORDER — HYDROCODONE BITARTRATE AND ACETAMINOPHEN 5; 325 MG/1; MG/1
1 TABLET ORAL EVERY 6 HOURS PRN
Status: DISCONTINUED | OUTPATIENT
Start: 2018-02-15 | End: 2018-02-15

## 2018-02-15 RX ORDER — EPHEDRINE SULFATE 50 MG/ML
INJECTION, SOLUTION INTRAVENOUS AS NEEDED
Status: DISCONTINUED | OUTPATIENT
Start: 2018-02-15 | End: 2018-02-15 | Stop reason: SURG

## 2018-02-15 RX ORDER — CEFAZOLIN SODIUM 1 G/3ML
INJECTION, POWDER, FOR SOLUTION INTRAMUSCULAR; INTRAVENOUS AS NEEDED
Status: DISCONTINUED | OUTPATIENT
Start: 2018-02-15 | End: 2018-02-15 | Stop reason: SURG

## 2018-02-15 RX ORDER — INSULIN GLARGINE 100 [IU]/ML
14 INJECTION, SOLUTION SUBCUTANEOUS
Status: DISCONTINUED | OUTPATIENT
Start: 2018-02-15 | End: 2018-02-16

## 2018-02-15 RX ORDER — FENTANYL CITRATE/PF 50 MCG/ML
50 SYRINGE (ML) INJECTION
Status: DISCONTINUED | OUTPATIENT
Start: 2018-02-15 | End: 2018-02-15 | Stop reason: HOSPADM

## 2018-02-15 RX ORDER — PROPOFOL 10 MG/ML
INJECTION, EMULSION INTRAVENOUS AS NEEDED
Status: DISCONTINUED | OUTPATIENT
Start: 2018-02-15 | End: 2018-02-15 | Stop reason: SURG

## 2018-02-15 RX ORDER — MINERAL OIL
OIL (ML) MISCELLANEOUS AS NEEDED
Status: DISCONTINUED | OUTPATIENT
Start: 2018-02-15 | End: 2018-02-15 | Stop reason: HOSPADM

## 2018-02-15 RX ORDER — SODIUM CHLORIDE, SODIUM LACTATE, POTASSIUM CHLORIDE, CALCIUM CHLORIDE 600; 310; 30; 20 MG/100ML; MG/100ML; MG/100ML; MG/100ML
INJECTION, SOLUTION INTRAVENOUS CONTINUOUS PRN
Status: DISCONTINUED | OUTPATIENT
Start: 2018-02-15 | End: 2018-02-15 | Stop reason: SURG

## 2018-02-15 RX ORDER — LIDOCAINE HYDROCHLORIDE AND EPINEPHRINE 10; 10 MG/ML; UG/ML
INJECTION, SOLUTION INFILTRATION; PERINEURAL AS NEEDED
Status: DISCONTINUED | OUTPATIENT
Start: 2018-02-15 | End: 2018-02-15 | Stop reason: HOSPADM

## 2018-02-15 RX ORDER — DEXTROSE MONOHYDRATE 25 G/50ML
25 INJECTION, SOLUTION INTRAVENOUS ONCE
Status: COMPLETED | OUTPATIENT
Start: 2018-02-15 | End: 2018-02-15

## 2018-02-15 RX ORDER — ACETAMINOPHEN 325 MG/1
650 TABLET ORAL EVERY 8 HOURS SCHEDULED
Status: DISCONTINUED | OUTPATIENT
Start: 2018-02-15 | End: 2018-02-21 | Stop reason: HOSPADM

## 2018-02-15 RX ORDER — LIDOCAINE HYDROCHLORIDE 10 MG/ML
INJECTION, SOLUTION INFILTRATION; PERINEURAL AS NEEDED
Status: DISCONTINUED | OUTPATIENT
Start: 2018-02-15 | End: 2018-02-15 | Stop reason: SURG

## 2018-02-15 RX ORDER — LANOLIN ALCOHOL/MO/W.PET/CERES
3 CREAM (GRAM) TOPICAL
Status: DISCONTINUED | OUTPATIENT
Start: 2018-02-15 | End: 2018-02-21 | Stop reason: HOSPADM

## 2018-02-15 RX ORDER — PROPOFOL 10 MG/ML
INJECTION, EMULSION INTRAVENOUS CONTINUOUS PRN
Status: DISCONTINUED | OUTPATIENT
Start: 2018-02-15 | End: 2018-02-15 | Stop reason: SURG

## 2018-02-15 RX ORDER — HYDROCODONE BITARTRATE AND ACETAMINOPHEN 5; 325 MG/1; MG/1
1 TABLET ORAL EVERY 6 HOURS PRN
Status: DISCONTINUED | OUTPATIENT
Start: 2018-02-15 | End: 2018-02-21 | Stop reason: HOSPADM

## 2018-02-15 RX ADMIN — SODIUM CHLORIDE, SODIUM LACTATE, POTASSIUM CHLORIDE, AND CALCIUM CHLORIDE: .6; .31; .03; .02 INJECTION, SOLUTION INTRAVENOUS at 15:26

## 2018-02-15 RX ADMIN — GABAPENTIN 300 MG: 300 CAPSULE ORAL at 20:34

## 2018-02-15 RX ADMIN — DEXTROSE MONOHYDRATE 25 ML: 25 INJECTION, SOLUTION INTRAVENOUS at 13:16

## 2018-02-15 RX ADMIN — EPHEDRINE SULFATE 15 MG: 50 INJECTION, SOLUTION INTRAMUSCULAR; INTRAVENOUS; SUBCUTANEOUS at 15:43

## 2018-02-15 RX ADMIN — METOPROLOL TARTRATE 25 MG: 25 TABLET ORAL at 10:36

## 2018-02-15 RX ADMIN — INSULIN GLARGINE 14 UNITS: 100 INJECTION, SOLUTION SUBCUTANEOUS at 22:22

## 2018-02-15 RX ADMIN — OXYCODONE HYDROCHLORIDE AND ACETAMINOPHEN 1 TABLET: 5; 325 TABLET ORAL at 20:34

## 2018-02-15 RX ADMIN — CEFAZOLIN SODIUM 2000 MG: 2 SOLUTION INTRAVENOUS at 23:51

## 2018-02-15 RX ADMIN — PROPOFOL 10 MG: 10 INJECTION, EMULSION INTRAVENOUS at 16:07

## 2018-02-15 RX ADMIN — PROPOFOL 50 MG: 10 INJECTION, EMULSION INTRAVENOUS at 15:26

## 2018-02-15 RX ADMIN — PREGABALIN 75 MG: 75 CAPSULE ORAL at 10:36

## 2018-02-15 RX ADMIN — PROPOFOL 50 MCG/KG/MIN: 10 INJECTION, EMULSION INTRAVENOUS at 15:32

## 2018-02-15 RX ADMIN — LIDOCAINE HYDROCHLORIDE 40 MG: 10 INJECTION, SOLUTION INFILTRATION; PERINEURAL at 15:29

## 2018-02-15 RX ADMIN — METRONIDAZOLE 500 MG: 500 INJECTION, SOLUTION INTRAVENOUS at 17:34

## 2018-02-15 RX ADMIN — GABAPENTIN 300 MG: 300 CAPSULE ORAL at 10:36

## 2018-02-15 RX ADMIN — EPHEDRINE SULFATE 10 MG: 50 INJECTION, SOLUTION INTRAMUSCULAR; INTRAVENOUS; SUBCUTANEOUS at 15:40

## 2018-02-15 RX ADMIN — ASPIRIN 81 MG 81 MG: 81 TABLET ORAL at 10:36

## 2018-02-15 RX ADMIN — METRONIDAZOLE 500 MG: 500 INJECTION, SOLUTION INTRAVENOUS at 06:03

## 2018-02-15 RX ADMIN — OXYCODONE HYDROCHLORIDE AND ACETAMINOPHEN 1 TABLET: 5; 325 TABLET ORAL at 13:21

## 2018-02-15 RX ADMIN — MELATONIN TAB 3 MG 3 MG: 3 TAB at 01:41

## 2018-02-15 RX ADMIN — EPHEDRINE SULFATE 10 MG: 50 INJECTION, SOLUTION INTRAMUSCULAR; INTRAVENOUS; SUBCUTANEOUS at 15:37

## 2018-02-15 RX ADMIN — CEFAZOLIN SODIUM 2000 MG: 2 SOLUTION INTRAVENOUS at 10:37

## 2018-02-15 RX ADMIN — FUROSEMIDE 40 MG: 40 TABLET ORAL at 10:36

## 2018-02-15 RX ADMIN — CEFAZOLIN 1000 MG: 1 INJECTION, POWDER, FOR SOLUTION INTRAVENOUS at 15:35

## 2018-02-15 RX ADMIN — METOPROLOL TARTRATE 25 MG: 25 TABLET ORAL at 20:34

## 2018-02-15 RX ADMIN — PROPOFOL 20 MG: 10 INJECTION, EMULSION INTRAVENOUS at 15:31

## 2018-02-15 RX ADMIN — CEFAZOLIN SODIUM 2000 MG: 2 SOLUTION INTRAVENOUS at 01:24

## 2018-02-15 NOTE — PLAN OF CARE
Problem: Potential for Falls  Goal: Patient will remain free of falls  INTERVENTIONS:  - Assess patient frequently for physical needs  -  Identify cognitive and physical deficits and behaviors that affect risk of falls  -  [place] fall precautions as indicated by assessment   - Educate patient/family on patient safety including physical limitations  - Instruct patient to call for assistance with activity based on assessment  - Modify environment to reduce risk of injury  - Consider OT/PT consult to assist with strengthening/mobility   Outcome: Progressing      Problem: Nutrition/Hydration-ADULT  Goal: Nutrient/Hydration intake appropriate for improving, restoring or maintaining nutritional needs  Monitor and assess patient's nutrition/hydration status for malnutrition (ex- brittle hair, bruises, dry skin, pale skin and conjunctiva, muscle wasting, smooth red tongue, and disorientation)  Collaborate with interdisciplinary team and initiate plan and interventions as ordered  Monitor patient's weight and dietary intake as ordered or per policy  Utilize nutrition screening tool and intervene per policy  Determine patient's food preferences and provide high-protein, high-caloric foods as appropriate       INTERVENTIONS:  - Monitor oral intake, urinary output, labs, and treatment plans  - Assess nutrition and hydration status and recommend course of action  - Evaluate amount of meals eaten  - Assist patient with eating if necessary   - Allow adequate time for meals  - Recommend/ encourage appropriate diets, oral nutritional supplements, and vitamin/mineral supplements  - Order, calculate, and assess calorie counts as needed  - Recommend, monitor, and adjust tube feedings and TPN/PPN based on assessed needs  - Assess need for intravenous fluids  - Provide specific nutrition/hydration education as appropriate  - Include patient/family/caregiver in decisions related to nutrition   Outcome: Progressing      Problem: Prexisting or High Potential for Compromised Skin Integrity  Goal: Skin integrity is maintained or improved  INTERVENTIONS:  - Identify patients at risk for skin breakdown  - Assess and monitor skin integrity  - Assess and monitor nutrition and hydration status  - Monitor labs (i e  albumin)  - Assess for incontinence   - Turn and reposition patient  - Assist with mobility/ambulation  - Relieve pressure over bony prominences  - Avoid friction and shearing  - Provide appropriate hygiene as needed including keeping skin clean and dry  - Evaluate need for skin moisturizer/barrier cream  - Collaborate with interdisciplinary team (i e  Nutrition, Rehabilitation, etc )   - Patient/family teaching   Outcome: Progressing      Problem: DISCHARGE PLANNING - CARE MANAGEMENT  Goal: Discharge to post-acute care or home with appropriate resources  INTERVENTIONS:  - Conduct assessment to determine patient/family and health care team treatment goals, and need for post-acute services based on payer coverage, community resources, and patient preferences, and barriers to discharge  - Address psychosocial, clinical, and financial barriers to discharge as identified in assessment in conjunction with the patient/family and health care team  - Arrange appropriate level of post-acute services according to patient's   needs and preference and payer coverage in collaboration with the physician and health care team  - Communicate with and update the patient/family, physician, and health care team regarding progress on the discharge plan  - Arrange appropriate transportation to post-acute venues  - Anticipated TMA on Friday  CM continues to follow for DC needs post procedure      Outcome: Progressing      Problem: RESPIRATORY - ADULT  Goal: Achieves optimal ventilation and oxygenation  INTERVENTIONS:  - Assess for changes in respiratory status  - Assess for changes in mentation and behavior  - Position to facilitate oxygenation and minimize respiratory effort  - Oxygen administration by appropriate delivery method based on oxygen saturation (per order) or ABGs  - Initiate smoking cessation education as indicated  - Encourage broncho-pulmonary hygiene including cough, deep breathe, Incentive Spirometry  - Assess the need for suctioning and aspirate as needed  - Assess and instruct to report SOB or any respiratory difficulty  - Respiratory Therapy support as indicated   Outcome: Progressing      Problem: METABOLIC, FLUID AND ELECTROLYTES - ADULT  Goal: Electrolytes maintained within normal limits  INTERVENTIONS:  - Monitor labs and assess patient for signs and symptoms of electrolyte imbalances  - Administer electrolyte replacement as ordered  - Monitor response to electrolyte replacements, including repeat lab results as appropriate  - Instruct patient on fluid and nutrition as appropriate   Outcome: Progressing    Goal: Fluid balance maintained  INTERVENTIONS:  - Monitor labs and assess for signs and symptoms of volume excess or deficit  - Monitor I/O and WT  - Instruct patient on fluid and nutrition as appropriate   Outcome: Progressing    Goal: Glucose maintained within target range  INTERVENTIONS:  - Monitor Blood Glucose as ordered  - Assess for signs and symptoms of hyperglycemia and hypoglycemia  - Administer ordered medications to maintain glucose within target range  - Assess nutritional intake and initiate nutrition service referral as needed   Outcome: Progressing

## 2018-02-15 NOTE — SPEECH THERAPY NOTE
Speech Language/Pathology    Speech/Language Pathology Progress Note    Patient Name: Susannah HOUBLANCAB'R Date: 2/15/2018     Attempted to see pt for f/u dysphagia tx  Pt in OR  Will f/u

## 2018-02-15 NOTE — PROGRESS NOTES
Progress Note - Infectious Disease   Chuy Funk 75 y o  male MRN: [id number]  Unit/Bed#: OR POOL Encounter: [id number]      Impression/Recommendations:  1  Right distal foot gangrene   Patient finally consented to Fortunato Kahn is now status post open TMA with apparent surgical cure  No bone margin pathology was requested  Annabel Jordan is now clinically and systemically well   Wound VAC with minimal drainage  Antibiotic plan as in below  VAC per Podiatry  Plan for STSG later today noted  2  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   Patient is status post open 1st toe amputation  Mgagie Nestorsanto, he has gangrenous changes in his other toes also   He is now status post TMA        Continue high-dose IV cefazolin/Flagyl  Serial foot exams  Anticipate either stop antibiotics altogether or transition to p o  after STSG      3   Severe sepsis   Source of sepsis is most likely right foot infection   Patient is clinically improved with 1st toe amputation and IV antibiotic  Annabel Jordan is now status post TMA   He should do well   Blood cultures remain negative  Antibiotic plan as in above  Monitor hemodynamics      4   PVD    Patient is status post arteriogram with successful intervention   Circulatory status has been optimized  Vascular surgery following      5   Leukocytosis  WBC increased postop  It is normalizing again today, as expected  Patient remains clinically and systemically well  Monitor WBC      6  Acute hypoxic respiratory failure   I suspect this is secondary to sepsis and pulmonary edema   No obvious pneumonia on CXR    Patient's respiratory status now improved, currently on room air  O2 support per primary service      7  DM with hyperglycemia on admission   Patient appears noncompliant with diabetic medication   This clearly contributes to infection development    Blood sugar management per Medicine Service      8   PCN allergy on allergy list   Patient does not recall reaction  Annabel Jordan states that this was when he was a young child  Estelle Jimenes is tolerating cephalosporin well      Discussed with the patient and his daughter in detail regarding the above       Antibiotics:  Cefazolin/Flagyl  POD # /3      Subjective:  Patient is comfortable   Foot pain minimal   No dyspnea  Temperature stays down   No chills  He is tolerating antibiotics well   No nausea, vomiting or diarrhea  Objective:  Vitals:  HR:  [67-79] 79  Resp:  [18] 18  BP: (107-149)/(54-70) 149/70  SpO2:  [97 %-100 %] 98 %  Temp (24hrs), Av 7 °F (36 5 °C), Min:97 5 °F (36 4 °C), Max:98 °F (36 7 °C)  Current: Temperature: 97 6 °F (36 4 °C)    Physical Exam:     General: Awake, alert, cooperative, no distress  Lungs: Expansion symmetric, no rales, no wheezing, respirations unlabored  Heart[de-identified]  Regular rate and rhythm, S1 and S2 normal, no murmur  Abdomen: Soft, nondistended, non-tender, bowel sounds active all four quadrants,        no masses, no organomegaly  Extremities: Right foot wound with VAC in place  Sparse serous drainage  No erythema  No fluctuance  Minimal tenderness  Skin:  No rash  Invasive Devices     Peripheral Intravenous Line            Peripheral IV 18 Left Antecubital 2 days          Drain            External Urinary Catheter Medium 5 days    Negative Pressure Wound Therapy (V A C ) Foot Right 2 days                Labs studies:   I have personally reviewed pertinent labs      Results from last 7 days  Lab Units 02/15/18  0526 18  0547 18  0517  02/10/18  0409 18  0614   SODIUM mmol/L 136 132* 133*  < > 142 141   POTASSIUM mmol/L 3 0* 3 3* 3 3*  < > 2 7* 3 4*   CHLORIDE mmol/L 98* 93* 91*  < > 101 107   CO2 mmol/L 34* 32 33*  < > 35* 25   ANION GAP mmol/L 4 7 9  < > 6 9   BUN mg/dL 14 18 29*  < > 24 22   CREATININE mg/dL 0 76 0 94 0 93  < > 0 88 0 66   EGFR ml/min/1 73sq m 94 83 84  < > 88 99   GLUCOSE RANDOM mg/dL 67 145* 238*  < > 70 126   CALCIUM mg/dL 7 8* 8 5 8 2*  < > 8 0* 7 8*   AST U/L  --   --   -- --  96* 183*   ALT U/L  --   --   --   --  87* 125*   ALK PHOS U/L  --   --   --   --  112 107   TOTAL PROTEIN g/dL  --   --   --   --  6 9 6 4   BILIRUBIN TOTAL mg/dL  --   --   --   --  0 39 0 82   < > = values in this interval not displayed  Results from last 7 days  Lab Units 02/15/18  0526 02/14/18  0547 02/13/18  0517   WBC Thousand/uL 12 47* 21 64* 11 35*   HEMOGLOBIN g/dL 9 5* 11 1* 10 6*   PLATELETS Thousands/uL 289 325 305           Imaging Studies:   I have personally reviewed pertinent imaging study reports and images in PACS  EKG, Pathology, and Other Studies:   I have personally reviewed pertinent reports

## 2018-02-15 NOTE — OP NOTE
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 (HCC) [I70 238]  Wound to right foot    Procedure(s) (LRB):  SKIN GRAFT SPLIT THICKNESS (STSG)  EXTREMITY (Right)    Specimen(s):  * No specimens in log *    Hemostasis:  Anatomic dissection    Estimated Blood Loss:   5 mL    Drains:  Negative Pressure Wound Therapy (V A C ) Foot Right (Active)   Unit Type wound vac 2/15/2018  7:30 AM   Black foam- # applied 1 2/15/2018  4:11 PM   Cycle Continuous 2/15/2018  7:30 AM   Target Pressure (mmHg) 75 2/15/2018  4:11 PM   Canister Changed Yes 2/15/2018  4:11 PM   Dressing Status Clean;Dry; Intact 2/15/2018  7:30 AM   Black foam- # removed 1 2/15/2018  4:04 PM   Output (mL) 0 mL 2/15/2018  6:03 AM   Number of days: 3       External Urinary Catheter Medium (Active)   Collection Container Standard drainage bag 2/15/2018  1:25 PM   Securement Method Tape 2018 11:01 PM   Output (mL) 850 mL 2/15/2018  1:25 PM   Number of days: 6       Anesthesia Type:   IV Sedation with Anesthesia      Materials:  4-0 chromic gut  Wound VAC    Operative Indications: 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 (HCC) [I70 238]      Operative Findings:  Right foot wound appears clean without clinical signs of infection  Viable bleeding noted donor site at right medial leg  Right foot wound measures 6 5 x 4 5 x 0 2 cm    Wound VAC was applied with 1 black sponge under standard KCI technique set at 100 mm of mercury low continuous with minimal leaks  Injectables:  18 cc of 1% lidocaine with epi    Complications:   None    Procedure and Technique:  Under mild sedation, the patient was brought into the operating room and placed on the operating room table in the supine position  A time out was performed to confirm the correct patient, procedure and site with all parties in agreement  The right lower extremity was then scrubbed, prepped and draped in the usual aseptic manner  Attention was directed to the right lower extremity where a wound was noted at the TMA site  The wound was selectively debrided with forceps and 15 blade  At this time the wound was inspected was noted to be clean in appearance without clinical signs infection  No purulence or sinus tracts were noted, and it was deemed appropriate to proceed with this split-thickness skin graft procedure  Attention was directed to the medial aspect of the right lower leg where 18 cc of 1% lidocaine with epi was infiltrated into the site intraoperatively  Mineral oil was then applied to the donor site  Utilizing the dermatome, a split-thickness skin graft was obtained from the donor site at the right medial leg  The split-thickness skin graft was then meshed 1:1 5 utilizing the mesher  The split thickness skin graft was then secured to the recipient site at the TMA site wound utilizing 4 0 chromic gut in simple interrupted fashion  The wound measured 6 5 x 4 5 x 0 2 cm  Adaptic was then placed over the recipient site/split-thickness skin graft and 1 black sponge was applied  Maxorb was applied over TMA site sutures  The wound VAC was applied under standard KCI technique running at 100 mm of mercury low continuous with minimal leaks  The wound VAC was then covered with 4 x 4 gauze  The donor site was dressed with Maxorb and Tegaderm  The entire lower extremity was dressed with Kerlix, and an Ace was used to apply compression over the donor site    A posterior splint was then applied to the right lower extremity and secured with an Ace bandage  Patient tolerated procedure and anesthesia well and was transported to PACU vital signs stable and neurovascular status at baseline            Patient Disposition:  PACU     SIGNATURE: Isaura Mendoza DPM  DATE: February 15, 2018  TIME: 4:28 PM

## 2018-02-15 NOTE — PROGRESS NOTES
Progress Note - Podiatry  Narciso Wilkins 75 y o  male MRN: [id number]  Unit/Bed#: [facility] 931-01 Encounter: [id number]    Assessment:  1  S/p Right TMA with wound vac applicastion (DOS: 6/87/35), and S/p I&D of the right hallux (DOS: 2/6/18) secondary to Wet gangrene of right hallux and medial forefoot with cellulitis  2  Progressive dry gangrene 2nd digit right foot  3  PAD, status post angiogram with stenting  4  DM with neuropathy, A1c 9 7, per endo  5  NSTEMI, type 2     Plan:  - pt seen at bedside, leukocytosis trending down  - patient to go to OR later this afternoon with Dr Nevaeh Villar for right foot split-thickness skin graft and application of wound VAC  -consent was signed and placed in chart after benefits, risks and alternatives were discussed with patient  No guarantees given  -NPO status confirmed  -continue Ancef/Flagyl per ID recommendations  -wound VAC left intact, will change in OR  Wound bed noted to be at 125 mm of mercury low continuous with minimal leaks noted, minimal drainage in canister  -rest of care per primary      Subjective/Objective   Chief Complaint:   Chief Complaint   Patient presents with    Foot Ulcer     Pt presents with diabetic ulcers on right foot and increased pain and swelling       Subjective: 75 y o  male was seen and evaluated at bedside  No acute events overnight  NAD  Lying comfortably in bed  Denies recent nausea, fever, shortness of breath, chest pain, chills, fever  Aware of surgical plan    Blood pressure 149/70, pulse 79, temperature 97 6 °F (36 4 °C), resp  rate 18, height 5' 8" (1 727 m), weight 61 3 kg (135 lb 2 3 oz), SpO2 98 %  ,Body mass index is 20 55 kg/m²      Invasive Devices     Peripheral Intravenous Line            Peripheral IV 02/13/18 Left Antecubital 1 day          Drain            External Urinary Catheter Medium 5 days    Negative Pressure Wound Therapy (V A C ) Foot Right 2 days                Physical Exam:   General: Alert, cooperative and no distress  Lungs: Non labored breathing  Heart: Positive S1, S2  Abdomen: Soft, non-tender  Extremity:  Gross motor function and neurovascular status at baseline  No calf tenderness bilaterally  Dressing on right foot noted to be clean dry and intact  Wound VAC running at 125 mm of mercury low continuous with minimal leaks and minimal drainage in canister          Lab, Imaging and other studies:   I have personally reviewed pertinent lab results  , CBC:   Lab Results   Component Value Date    WBC 12 47 (H) 02/15/2018    HGB 9 5 (L) 02/15/2018    HCT 28 2 (L) 02/15/2018    MCV 90 02/15/2018     02/15/2018    MCH 30 3 02/15/2018    MCHC 33 7 02/15/2018    RDW 14 1 02/15/2018    MPV 10 1 02/15/2018    NRBC 0 02/15/2018   , CMP:   Lab Results   Component Value Date     02/15/2018    K 3 0 (L) 02/15/2018    CL 98 (L) 02/15/2018    CO2 34 (H) 02/15/2018    ANIONGAP 4 02/15/2018    BUN 14 02/15/2018    CREATININE 0 76 02/15/2018    GLUCOSE 67 02/15/2018    CALCIUM 7 8 (L) 02/15/2018    EGFR 94 02/15/2018       Imaging: I have personally reviewed pertinent films in PACS  EKG, Pathology, and Other Studies: I have personally reviewed pertinent reports    VTE Pharmacologic Prophylaxis: Heparin

## 2018-02-15 NOTE — OCCUPATIONAL THERAPY NOTE
Occupational Therapy         Patient Name: Sharona MARIE Date: 2/15/2018      Pt to OR today for STSG r foot and application of VAC dressing - will defer and address OT needs post op    Shane Maria, OT

## 2018-02-15 NOTE — SOCIAL WORK
CM reviewed Pt in care coordination rounds, Pt to OR today with podoatry for Rt foot split-thickness skin graft and application of wound VAC  Need PT/OT Eval post op

## 2018-02-15 NOTE — ANESTHESIA PREPROCEDURE EVALUATION
Review of Systems/Medical History  Patient summary reviewed  Chart reviewed  No history of anesthetic complications     Cardiovascular  EKG reviewed, Exercise tolerance: good,  Hypertension on > 1 medication, Valvular heart disease , mitral regurgitation, CAD, ,    Pulmonary       GI/Hepatic            Endo/Other  Diabetes poorly controlled type 2 Insulin,      GYN       Hematology   Musculoskeletal       Neurology   Psychology           Physical Exam    Airway    Mallampati score: III  TM Distance: >3 FB  Neck ROM: full     Dental   No notable dental hx     Cardiovascular      Pulmonary      Other Findings        Anesthesia Plan  ASA Score- 3     Anesthesia Type- IV sedation with anesthesia with ASA Monitors  Additional Monitors:   Airway Plan:         Plan Factors-    Induction- intravenous  Postoperative Plan-     Informed Consent- Anesthetic plan and risks discussed with patient  I personally reviewed this patient with the CRNA  Discussed and agreed on the Anesthesia Plan with the CRNA  Lizette Buckner

## 2018-02-15 NOTE — ANESTHESIA POSTPROCEDURE EVALUATION
Post-Op Assessment Note      CV Status:  Stable    Mental Status:  Awake    Hydration Status:  Stable    PONV Controlled:  Controlled    Airway Patency:  Patent    Post Op Vitals Reviewed: Yes          Staff: AnesthesiologistSUNDAY           BP   129/64   Temp   96 9   Pulse 89   Resp 14   SpO2 97     Patient verbally denies pain

## 2018-02-15 NOTE — PROGRESS NOTES
Progress Note Alyse Dow 1950, 75 y o  male MRN: [id number]    Unit/Bed#: [facility] 931-01 Encounter: [id number]    Primary Care Provider: Sheldon Mcgregor MD   Date and time admitted to hospital: 2018  4:18 PM        Gangrene of toe of right foot [facility])   Assessment & Plan    Patient is status transmetatarsal amputation on 2018  Note Podiatry input regarding plans for flap  Continue IV antibiotics as per ID  Ancef and Flagyl  Coronary artery disease involving native coronary artery   Assessment & Plan    As evidence by the dense coronary calcifications seen on CT  Cardiology following  Will need evaluation after he has recovered from his amputation  Cellulitis of right foot   Assessment & Plan    · Antibiotics as per ID        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)   Assessment & Plan    Blood sugar still elevated more than 200 most of the times  Endocrine following  Appreciate recommendations  Hypertension   Assessment & Plan    Continue Lopressor for now  Patient was taking losartan-HCTZ at home  Currently being held as BP well controlled  Consider resuming it BP elevated  VTE Pharmacologic Prophylaxis:   Pharmacologic: Heparin  Mechanical VTE Prophylaxis in Place: Yes    Patient Centered Rounds: I have performed bedside rounds with nursing staff today  Time Spent for Care: 15 minutes  More than 50% of total time spent on counseling and coordination of care as described above      Current Length of Stay: 10 day(s)    Current Patient Status: Inpatient   Certification Statement: The patient will continue to require additional inpatient hospital stay due to Need to monitor symptoms        Code Status: Level 1 - Full Code      Subjective:   Patient comfortable    Objective:     Vitals:   Temp (24hrs), Av 7 °F (36 5 °C), Min:97 5 °F (36 4 °C), Max:98 °F (36 7 °C)    HR:  [67-79] 79  Resp:  [18] 18  BP: (107-149)/(54-70) 149/70  SpO2:  [97 %-100 %] 98 %  Body mass index is 20 55 kg/m²  Input and Output Summary (last 24 hours): Intake/Output Summary (Last 24 hours) at 02/15/18 1055  Last data filed at 02/15/18 0603   Gross per 24 hour   Intake              590 ml   Output             1500 ml   Net             -910 ml       Physical Exam:     Physical Exam   Constitutional: He is oriented to person, place, and time  He appears well-developed and well-nourished  HENT:   Head: Normocephalic and atraumatic  Eyes: Conjunctivae are normal  Pupils are equal, round, and reactive to light  Neck: Normal range of motion  Neck supple  Cardiovascular: Normal rate and regular rhythm  No murmur heard  Pulmonary/Chest: Effort normal and breath sounds normal  He has no wheezes  He has no rales  Abdominal: Soft  Bowel sounds are normal    Neurological: He is alert and oriented to person, place, and time  No cranial nerve deficit  Skin: Skin is warm and dry  No erythema  Psychiatric: He has a normal mood and affect  Additional Data:     Labs:      Results from last 7 days  Lab Units 02/15/18  0526   WBC Thousand/uL 12 47*   HEMOGLOBIN g/dL 9 5*   HEMATOCRIT % 28 2*   PLATELETS Thousands/uL 289   NEUTROS PCT % 82*   LYMPHS PCT % 12*   MONOS PCT % 5   EOS PCT % 1       Results from last 7 days  Lab Units 02/15/18  0526  02/10/18  0409   SODIUM mmol/L 136  < > 142   POTASSIUM mmol/L 3 0*  < > 2 7*   CHLORIDE mmol/L 98*  < > 101   CO2 mmol/L 34*  < > 35*   BUN mg/dL 14  < > 24   CREATININE mg/dL 0 76  < > 0 88   CALCIUM mg/dL 7 8*  < > 8 0*   TOTAL PROTEIN g/dL  --   --  6 9   BILIRUBIN TOTAL mg/dL  --   --  0 39   ALK PHOS U/L  --   --  112   ALT U/L  --   --  87*   AST U/L  --   --  96*   GLUCOSE RANDOM mg/dL 67  < > 70   < > = values in this interval not displayed  Results from last 7 days  Lab Units 02/09/18  0614   INR  1 85*       * I Have Reviewed All Lab Data Listed Above    * Additional Pertinent Lab Tests Reviewed: All Labs Within Last 24 Hours Reviewed      Recent Cultures (last 7 days):           Last 24 Hours Medication List:     Current Facility-Administered Medications:  acetaminophen 975 mg Oral Q8H Candelario Lael MD    aspirin 81 mg Oral Daily ARMANDO Singh    atorvastatin 80 mg Oral Daily With Mega Mcgee MD    cefazolin 2,000 mg Intravenous Q8H Joselin Mena MD Last Rate: 2,000 mg (02/15/18 1037)   furosemide 40 mg Oral BID (diuretic) Aleta Rao DO    gabapentin 300 mg Oral TID Lexi Patten MD    heparin (porcine) 5,000 Units Subcutaneous [facility] Diana Tripp DPM    hydrALAZINE 10 mg Intravenous Q6H PRN ARMANDO Singh    insulin glargine 20 Units Subcutaneous HS Glory Reinoso MD    insulin lispro 1-5 Units Subcutaneous TID AC Octavio Wilkerson DO    insulin lispro 1-5 Units Subcutaneous HS Octavio Wilkerson,     insulin lispro 8 Units Subcutaneous TID With Meals Glory Reinoso MD    melatonin 3 mg Oral HS Maribel Jackson PA-C    metoprolol tartrate 25 mg Oral Q12H Joanna MedicMD rosario    metroNIDAZOLE 500 mg Intravenous Q8H Sean Paredes MD Last Rate: 500 mg (02/15/18 0603)   oxyCODONE 10 mg Oral Q4H PRN Lexi Patten MD    oxyCODONE 5 mg Oral Q4H PRN ARMANDO Singh    oxyCODONE-acetaminophen 1 tablet Oral Q4H PRN Diana Tripp DPM    pregabalin 75 mg Oral Daily Lexi Patten MD         Today, Patient Was Seen By: Stanislav Cutler DO    ** Please Note: Dictation voice to text software may have been used in the creation of this document   **

## 2018-02-16 LAB
ANION GAP SERPL CALCULATED.3IONS-SCNC: 8 MMOL/L (ref 4–13)
BUN SERPL-MCNC: 13 MG/DL (ref 5–25)
CALCIUM SERPL-MCNC: 8.6 MG/DL (ref 8.3–10.1)
CHLORIDE SERPL-SCNC: 92 MMOL/L (ref 100–108)
CO2 SERPL-SCNC: 33 MMOL/L (ref 21–32)
CREAT SERPL-MCNC: 0.79 MG/DL (ref 0.6–1.3)
GFR SERPL CREATININE-BSD FRML MDRD: 92 ML/MIN/1.73SQ M
GLUCOSE SERPL-MCNC: 101 MG/DL (ref 65–140)
GLUCOSE SERPL-MCNC: 106 MG/DL (ref 65–140)
GLUCOSE SERPL-MCNC: 221 MG/DL (ref 65–140)
GLUCOSE SERPL-MCNC: 238 MG/DL (ref 65–140)
GLUCOSE SERPL-MCNC: 53 MG/DL (ref 65–140)
PLATELET # BLD AUTO: 347 THOUSANDS/UL (ref 149–390)
PMV BLD AUTO: 10.3 FL (ref 8.9–12.7)
POTASSIUM SERPL-SCNC: 4 MMOL/L (ref 3.5–5.3)
SODIUM SERPL-SCNC: 133 MMOL/L (ref 136–145)

## 2018-02-16 PROCEDURE — 97167 OT EVAL HIGH COMPLEX 60 MIN: CPT

## 2018-02-16 PROCEDURE — 99232 SBSQ HOSP IP/OBS MODERATE 35: CPT | Performed by: INTERNAL MEDICINE

## 2018-02-16 PROCEDURE — 80048 BASIC METABOLIC PNL TOTAL CA: CPT | Performed by: INTERNAL MEDICINE

## 2018-02-16 PROCEDURE — G8987 SELF CARE CURRENT STATUS: HCPCS

## 2018-02-16 PROCEDURE — 85049 AUTOMATED PLATELET COUNT: CPT | Performed by: PODIATRIST

## 2018-02-16 PROCEDURE — 99233 SBSQ HOSP IP/OBS HIGH 50: CPT | Performed by: INTERNAL MEDICINE

## 2018-02-16 PROCEDURE — G8978 MOBILITY CURRENT STATUS: HCPCS

## 2018-02-16 PROCEDURE — 97163 PT EVAL HIGH COMPLEX 45 MIN: CPT

## 2018-02-16 PROCEDURE — G8979 MOBILITY GOAL STATUS: HCPCS

## 2018-02-16 PROCEDURE — 82948 REAGENT STRIP/BLOOD GLUCOSE: CPT

## 2018-02-16 PROCEDURE — G8988 SELF CARE GOAL STATUS: HCPCS

## 2018-02-16 RX ORDER — DOCUSATE SODIUM 100 MG/1
100 CAPSULE, LIQUID FILLED ORAL 2 TIMES DAILY
Status: DISCONTINUED | OUTPATIENT
Start: 2018-02-16 | End: 2018-02-21 | Stop reason: HOSPADM

## 2018-02-16 RX ORDER — POTASSIUM CHLORIDE 20 MEQ/1
40 TABLET, EXTENDED RELEASE ORAL ONCE
Status: COMPLETED | OUTPATIENT
Start: 2018-02-16 | End: 2018-02-16

## 2018-02-16 RX ORDER — POLYETHYLENE GLYCOL 3350 17 G/17G
17 POWDER, FOR SOLUTION ORAL DAILY PRN
Status: DISCONTINUED | OUTPATIENT
Start: 2018-02-16 | End: 2018-02-21 | Stop reason: HOSPADM

## 2018-02-16 RX ORDER — SENNOSIDES 8.6 MG
1 TABLET ORAL
Status: DISCONTINUED | OUTPATIENT
Start: 2018-02-16 | End: 2018-02-21 | Stop reason: HOSPADM

## 2018-02-16 RX ORDER — CEPHALEXIN 500 MG/1
500 CAPSULE ORAL EVERY 6 HOURS SCHEDULED
Status: DISCONTINUED | OUTPATIENT
Start: 2018-02-17 | End: 2018-02-21 | Stop reason: HOSPADM

## 2018-02-16 RX ORDER — METRONIDAZOLE 500 MG/1
500 TABLET ORAL EVERY 8 HOURS SCHEDULED
Status: DISCONTINUED | OUTPATIENT
Start: 2018-02-16 | End: 2018-02-21 | Stop reason: HOSPADM

## 2018-02-16 RX ORDER — INSULIN GLARGINE 100 [IU]/ML
8 INJECTION, SOLUTION SUBCUTANEOUS
Status: DISCONTINUED | OUTPATIENT
Start: 2018-02-16 | End: 2018-02-17

## 2018-02-16 RX ADMIN — CEFAZOLIN SODIUM 2000 MG: 2 SOLUTION INTRAVENOUS at 16:07

## 2018-02-16 RX ADMIN — FUROSEMIDE 40 MG: 40 TABLET ORAL at 09:23

## 2018-02-16 RX ADMIN — ACETAMINOPHEN 650 MG: 325 TABLET, FILM COATED ORAL at 16:06

## 2018-02-16 RX ADMIN — OXYCODONE HYDROCHLORIDE 5 MG: 5 TABLET ORAL at 10:35

## 2018-02-16 RX ADMIN — METRONIDAZOLE 500 MG: 500 TABLET ORAL at 16:06

## 2018-02-16 RX ADMIN — CEPHALEXIN 500 MG: 500 CAPSULE ORAL at 23:30

## 2018-02-16 RX ADMIN — METRONIDAZOLE 500 MG: 500 INJECTION, SOLUTION INTRAVENOUS at 09:29

## 2018-02-16 RX ADMIN — GABAPENTIN 300 MG: 300 CAPSULE ORAL at 09:23

## 2018-02-16 RX ADMIN — ACETAMINOPHEN 650 MG: 325 TABLET, FILM COATED ORAL at 22:06

## 2018-02-16 RX ADMIN — METRONIDAZOLE 500 MG: 500 INJECTION, SOLUTION INTRAVENOUS at 01:08

## 2018-02-16 RX ADMIN — CEFAZOLIN SODIUM 2000 MG: 2 SOLUTION INTRAVENOUS at 07:37

## 2018-02-16 RX ADMIN — GABAPENTIN 300 MG: 300 CAPSULE ORAL at 22:09

## 2018-02-16 RX ADMIN — METOPROLOL TARTRATE 25 MG: 25 TABLET ORAL at 09:23

## 2018-02-16 RX ADMIN — INSULIN GLARGINE 8 UNITS: 100 INJECTION, SOLUTION SUBCUTANEOUS at 22:09

## 2018-02-16 RX ADMIN — OXYCODONE HYDROCHLORIDE 10 MG: 10 TABLET ORAL at 12:48

## 2018-02-16 RX ADMIN — DOCUSATE SODIUM 100 MG: 100 CAPSULE, LIQUID FILLED ORAL at 22:09

## 2018-02-16 RX ADMIN — ATORVASTATIN CALCIUM 80 MG: 80 TABLET, FILM COATED ORAL at 18:39

## 2018-02-16 RX ADMIN — HEPARIN SODIUM 5000 UNITS: 5000 INJECTION, SOLUTION INTRAVENOUS; SUBCUTANEOUS at 16:07

## 2018-02-16 RX ADMIN — SENNOSIDES 8.6 MG: 8.6 TABLET, FILM COATED ORAL at 22:09

## 2018-02-16 RX ADMIN — FUROSEMIDE 40 MG: 40 TABLET ORAL at 16:06

## 2018-02-16 RX ADMIN — PREGABALIN 75 MG: 75 CAPSULE ORAL at 09:23

## 2018-02-16 RX ADMIN — GABAPENTIN 300 MG: 300 CAPSULE ORAL at 16:06

## 2018-02-16 RX ADMIN — POTASSIUM CHLORIDE 40 MEQ: 1500 TABLET, EXTENDED RELEASE ORAL at 09:23

## 2018-02-16 RX ADMIN — MELATONIN TAB 3 MG 3 MG: 3 TAB at 22:09

## 2018-02-16 RX ADMIN — INSULIN LISPRO 2 UNITS: 100 INJECTION, SOLUTION INTRAVENOUS; SUBCUTANEOUS at 18:40

## 2018-02-16 RX ADMIN — METOPROLOL TARTRATE 25 MG: 25 TABLET ORAL at 22:08

## 2018-02-16 RX ADMIN — INSULIN LISPRO 2 UNITS: 100 INJECTION, SOLUTION INTRAVENOUS; SUBCUTANEOUS at 22:10

## 2018-02-16 RX ADMIN — OXYCODONE HYDROCHLORIDE 10 MG: 10 TABLET ORAL at 22:06

## 2018-02-16 RX ADMIN — ACETAMINOPHEN 650 MG: 325 TABLET, FILM COATED ORAL at 09:23

## 2018-02-16 RX ADMIN — METRONIDAZOLE 500 MG: 500 TABLET ORAL at 22:09

## 2018-02-16 RX ADMIN — HEPARIN SODIUM 5000 UNITS: 5000 INJECTION, SOLUTION INTRAVENOUS; SUBCUTANEOUS at 22:11

## 2018-02-16 RX ADMIN — HEPARIN SODIUM 5000 UNITS: 5000 INJECTION, SOLUTION INTRAVENOUS; SUBCUTANEOUS at 05:58

## 2018-02-16 RX ADMIN — ASPIRIN 81 MG 81 MG: 81 TABLET ORAL at 09:23

## 2018-02-16 NOTE — ASSESSMENT & PLAN NOTE
Following surgery  Possibly of phantom pain  Also patient does have neuropathy and takes gabapentin and Lyrica both at home  This CC pain meds  Norco for mild pain  Scheduled Tylenol  P r n   Oxycodone  Lyrica  Neurontin

## 2018-02-16 NOTE — PLAN OF CARE
Problem: OCCUPATIONAL THERAPY ADULT  Goal: Performs self-care activities at highest level of function for planned discharge setting  See evaluation for individualized goals  Treatment Interventions: ADL retraining, Functional transfer training, Endurance training, Patient/family training, Equipment evaluation/education, Compensatory technique education, Continued evaluation, Energy conservation, Activityengagement          See flowsheet documentation for full assessment, interventions and recommendations  Limitation: Decreased ADL status, Decreased cognition, Decreased Safe judgement during ADL, Decreased endurance, Decreased self-care trans, Decreased high-level ADLs  Prognosis: Good  Assessment: Pt  is a 75 y o  male who was admitted to [facility] on 2/5/2018 with gangrenous toe  Pt  S/p R great toe amputation and STSG  Pt  w/a significant PMH of DM, peripheral neuropathy, HTN, PVD  Pt  currently lives in a home with FF s/u with wife  PTA, pt  (I) in all ADLs/IADLs  Pt did not use AD for functional mobility  Currently, pt  requires min A for bed mobility, max A x2 for functional mobility and transfers, min A-s/u for UB ADLs and total A for LB ADLs  A is needed d/t the following impairments: decreased functional activity tolerance, deconditioning, decreased seated/standing balance, increased impulsivity, increased distractibility, decreased problem solving and sequencing (noted during STS transfer), decreased STM, poor safety/insight/judgment and awareness into deficits, and additional time required for delayed processing  Additionally, pt  requires max v c  during functional activity 2* to cognitive deficits noted  Therefore, pt  will benefit from skilled OT services to maximize functional gains, monitor status and prevent decline  Will continue to follow pt  3-5x/week to meet the goals described below in 10-14 days  Recommend STR once medically stable       OT Discharge Recommendation: Short Term Rehab  OT - OK to Discharge:  Yes

## 2018-02-16 NOTE — PROGRESS NOTES
Progress Note - Infectious Disease   Alec Junior Graham 75 y o  male MRN: [id number]  Unit/Bed#: [facility] 931-01 Encounter: [id number]      Impression/Recommendations:  1  Right distal foot gangrene   Patient finally consented to Far[facility] Rand is now status post open TMA with apparent surgical cure  No bone margin pathology was requested  Morales Martinez is now clinically and systemically well   Patient is now status post STSG  Continue cefazolin/Flagyl  VAC per Podiatry  Change antibiotic to p o  Keflex/Flagyl in a m  Treat x 7 days post STSG, another 6 days      2  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   Patient is status post open 1st toe amputation  Vivek Moya, he has gangrenous changes in his other toes also  Morales Martinez is now status post TMA   He is also now status post STSG  Antibiotic plan as in above  Serial foot exams       3   Severe sepsis   Source of sepsis is most likely right foot infection   Patient is clinically improved with 1st toe amputation and IV antibiotic  Morales Martinez is now status post TMA   He should do well   Blood cultures remain negative  Antibiotic plan as in above  Monitor hemodynamics      4   PVD    Patient is status post arteriogram with successful intervention   Circulatory status has been optimized  Vascular surgery following      5   Leukocytosis   WBC increased postop  It is normalizing again, as expected  Patient remains clinically and systemically well  Monitor WBC      6  Acute hypoxic respiratory failure   I suspect this is secondary to sepsis and pulmonary edema   No obvious pneumonia on CXR    Patient's respiratory status now improved, currently on room air  O2 support per primary service      7  DM with hyperglycemia on admission   Patient appears noncompliant with diabetic medication   This clearly contributes to infection development    Blood sugar management per Medicine Service      8   PCN allergy on allergy list   Patient does not recall reaction  Morales Martinez states that this was when he was a young child  [facility] is tolerating cephalosporin well      Discussed with the patient and his daughter in detail regarding the above  Discussed with Dr Karli Chávez from Podiatry service earlier      Antibiotics:  Cefazolin/Flagyl  POD # 10/4/1      Subjective:  Patient is status post STSG  He has expected pain in foot  No dyspnea  Temperature stays down   No chills  He is tolerating antibiotics well   No nausea, vomiting or diarrhea  Objective:  Vitals:  HR:  [83-96] 96  Resp:  [12-18] 17  BP: (117-190)/(56-85) 170/85  SpO2:  [95 %-99 %] 99 %  Temp (24hrs), Av 9 °F (36 6 °C), Min:96 9 °F (36 1 °C), Max:98 5 °F (36 9 °C)  Current: Temperature: 98 5 °F (36 9 °C)    Physical Exam:     General: Awake, alert, cooperative, no distress  Lungs: Expansion symmetric, no rales, no wheezing, respirations unlabored  Heart[de-identified]  Tachycardic with regular rhythm, S1 and S2 normal, no murmur  Abdomen: Soft, nondistended, non-tender, bowel sounds active all four quadrants,        no masses, no organomegaly  Extremities: Foot wound with VAC in place  No purulence  Moderate tenderness  No erythema outside the dressing  Trace edema  Skin:  No rash  Invasive Devices     Peripheral Intravenous Line            Peripheral IV 18 Left Antecubital 3 days          Drain            External Urinary Catheter Medium 6 days    Negative Pressure Wound Therapy (V A C ) Foot Right 3 days                Labs studies:   I have personally reviewed pertinent labs      Results from last 7 days  Lab Units 02/15/18  0526 18  0547 18  0517  02/10/18  0409   SODIUM mmol/L 136 132* 133*  < > 142   POTASSIUM mmol/L 3 0* 3 3* 3 3*  < > 2 7*   CHLORIDE mmol/L 98* 93* 91*  < > 101   CO2 mmol/L 34* 32 33*  < > 35*   ANION GAP mmol/L 4 7 9  < > 6   BUN mg/dL 14 18 29*  < > 24   CREATININE mg/dL 0 76 0 94 0 93  < > 0 88   EGFR ml/min/1 73sq m 94 83 84  < > 88   GLUCOSE RANDOM mg/dL 67 145* 238*  < > 70   CALCIUM mg/dL 7 8* 8 5 8 2*  < > 8 0*   AST U/L  --   --   --   --  96*   ALT U/L  --   --   --   --  87*   ALK PHOS U/L  --   --   --   --  112   TOTAL PROTEIN g/dL  --   --   --   --  6 9   BILIRUBIN TOTAL mg/dL  --   --   --   --  0 39   < > = values in this interval not displayed  Results from last 7 days  Lab Units 02/15/18  0526 02/14/18  0547 02/13/18  0517   WBC Thousand/uL 12 47* 21 64* 11 35*   HEMOGLOBIN g/dL 9 5* 11 1* 10 6*   PLATELETS Thousands/uL 289 325 305           Imaging Studies:   I have personally reviewed pertinent imaging study reports and images in PACS  EKG, Pathology, and Other Studies:   I have personally reviewed pertinent reports

## 2018-02-16 NOTE — SOCIAL WORK
CM reviewed Pt with [facility] admissions, Per Sherrie, Pt will not be admitted over the weekend as he is post operative day 1 and max assist with transfers  Per podiatry team, if Pt cannot transition to [facility], will need to stay IP for wound VAC monitoring x 5 days

## 2018-02-16 NOTE — PHYSICAL THERAPY NOTE
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 ([facility] )    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 of foot ([facility] )    PVD (peripheral vascular disease) ([place] [address] )    Hypertension    [facility]    Diabetic neuropathy associated with type 2 diabetes mellitus ([facility] )    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)    Cellulitis of right foot    Abnormal CT of the head    Elevated brain natriuretic peptide (BNP) level    Acute respiratory failure (HCC)    Transaminitis    Hypophosphatemia    Popliteal artery stenosis, right (HCC)    Great toe amputation status, right (HCC)    Coagulopathy (HCC)    Meningioma (HCC)    Hypokalemia    Moderate mitral regurgitation    Gang[facility]ous toe ([facility]    Coronary artery disease involving native coronary artery    (HFpEF) heart failure with preserved ejection fraction (HCC)    Pain in right foot       Past Medical History:   Diagnosis Date    Diabetes mellitus ([facility] )     Diabetic neuropathy associated with type 2 diabetes mellitus ([facility]  )     DM (diabetes mellitus), type 2 with peripheral vascular complications (HCC)     Gangrene of toe of right foot ([facility] )     Hypertension     PVD (peripheral vascular disease) ([place] [address] )        Past Surgical History:   Procedure Laterality Date    EYE SURGERY      cataract- bilateral    INCISION AND DRAINAGE OF WOUND Right 2/6/2018    Procedure: INCISION AND DRAINAGE (I&D) EXTREMITY, right great toe amputation;  Surgeon: Marisela Werner DPM;  [facility];  Service: Podiatry    NO PAST SURGERIES      MN AMPUTATION FOOT,TRANSMETATARSAL Right 2/12/2018    Procedure: AMPUTATION TRANSMETATARSAL (TMA);  Surgeon: Marisela Werner DPM;  [facility]; Service: Podiatry    SPLIT THICKNESS SKIN GRAFT Right 2/15/2018    Procedure: SKIN GRAFT SPLIT THICKNESS (STSG)  EXTREMITY;  Surgeon: Mor Gaviria DPM;  [facility];  Service: Podiatry    VAC DRESSING APPLICATION Right 5/90/2353    Procedure: APPLICATION VAC DRESSING;  Surgeon: oMr Gaviria DPM;  [facility];  Service: Podiatry    WOUND DEBRIDEMENT Right 2/12/2018    Procedure: DEBRIDEMENT WOUND [facility] OUT); Surgeon: Mor Gaviria DPM;  [facility];  Service: Podiatry          02/16/18 1315   Pain Assessment   Pain Assessment 0-10   Pain Score 7   Pain Type Acute pain;Chronic pain   Pain Location Foot   Pain Orientation Right   Hospital Pain Intervention(s) Repositioned   Response to Interventions unchanged    Home Living   Type of [address] to live on main level with bedroom/bathroom  (1 ERIC)   Prior Function   Level of [place] Independent with ADLs and functional mobility   Lives With Spouse   Receives Help From Family   Restrictions/Precautions   Weight Bearing Precautions Per Order Yes   RLE Weight Bearing Per Order NWB   General   Family/Caregiver Present Yes   Cognition   Arousal/Participation Alert   Orientation Level Oriented X4   Following Commands Follows all commands and directions without difficulty   RUE Assessment   RUE Assessment WFL   LUE Assessment   LUE Assessment WFL   RLE Assessment   RLE Assessment X   Strength RLE   RLE Overall Strength (hip fl 3+/5 knee ext 4-/5)   LLE Assessment   LLE Assessment X   Strength LLE   LLE Overall Strength 4-/5   Coordination   Movements are Fluid and Coordinated 0   Coordination and Movement Description antalgic LE instability   Bed Mobility   Supine to Sit 4  Minimal assistance   Additional items HOB elevated; Bedrails; Increased time required;Verbal cues;LE management   Transfers   Sit to Stand 2  Maximal assistance   Additional items Assist x 2; Increased time required;Verbal cues Stand to Sit 2  Maximal assistance   Additional items Assist x 2; Increased time required;Verbal cues   Stand pivot 2  Maximal assistance   Additional items Assist x 2; Increased time required;Verbal cues  (RW)   Ambulation/Elevation   Gait pattern Not appropriate   Balance   Dynamic Sitting Fair -  (forward reach)   Static Standing Poor +  (RW)   Dynamic Standing Poor  (RW)   Endurance Deficit   Endurance Deficit Yes   Endurance Deficit Description fatigues w exertion   Activity Tolerance   Activity Tolerance Patient limited by fatigue;Patient limited by pain   Nurse Made Aware yes   Assessment   Prognosis Good   Problem List Decreased strength;Decreased range of motion;Decreased endurance; Impaired balance;Decreased mobility; Decreased coordination;Decreased cognition;Decreased safety awareness; Impaired judgement;Pain;Orthopedic restrictions;Decreased skin integrity; Impaired sensation   Assessment Pt is a 75 y o  male [facility]2018 w/ Gangrenous toe ([facility] ); is s/p TMA and STSG NWB w  Wound vac in place Pt exhibits significant impairments with weakness, decreased ROM, impaired balance, decreased endurance, impaired coordination, gait deviations, pain, decreased activity tolerance, decreased functional mobility tolerance, altered sensation, decreased safety awareness, fall risk, orthopedic restrictions, decreased skin integrity and decreased cognition; these impact independence with mobility, ADLs, and IADLs; requires max assist x2 w stand pivot transf bed-chair on 3rd trial; pt has difficulty w comprehension and execution of motor tasks for effective WB through RW; objective measures on the Barthel Index also reveal limitations;  therapy prognosis is impacted by relevant co morbidities as noted in evaluation; clinical presentation is currently unstable/unpredictable - pt is on cont pulse oximetry, pain inadequately controlled, has wound vac in place, presents w abnormal labs, phys impairments as noted- a regression from baseline ; PTA, pt was Independent with mobility lives in single level setup 1 ERIC skilled PT is indicated to to optimize functional independence and discharge planning; pending functional progress, PT recommendation at discharge is IP rehab    Goals   Patient Goals go home   STG Expiration Date 03/02/18   Short Term Goal #1 1  Independent with Bed Mobility Rolling Right and Left     2  Modified Independent with Bed Mobility Supine-Sit     3  Modified Independent with Transfer Bed-Chair     4  Increase Dynamic Sitting Balance at least 1 Grade for improved stability with functional reach activities     5  Increase Dynamic Standing Balance at least 1 Grade for improved ease with Activities of Daily Living     6  Increase Lower Extremity Strength at least 1 Grade for improved ease mobility tasks     7  Minimum assist with  Ambulation 15 feet using RW RLE NWB to facilitate home and community mobility 8  Modified Independent with wc mob 300 ft to facilitate home and community accessibility  9  Independent w wc parts mgt   Plan   Treatment/Interventions Functional transfer training;LE strengthening/ROM; Elevations;Cognitive reorientation; Endurance training; Therapeutic exercise;Patient/family training;Equipment eval/education; Bed mobility;Gait training; Compensatory technique education;Continued evaluation;Spoke to nursing;Spoke to case management;OT   PT Frequency (6x/wk)   Recommendation   Recommendation Post acute IP rehab   Equipment Recommended Ray Slajulisa; Wheelchair   Barthel Index   Feeding 10   Bathing 0   Grooming Score 0   Dressing Score 5   Bladder Score 0   Bowels Score 10   Toilet Use Score 5   Transfers (Bed/Chair) Score 5   Mobility (Level Surface) Score 0   Stairs Score 0   Barthel Index Score 35

## 2018-02-16 NOTE — CASE MANAGEMENT
Thank you,  [facility] in the [facility] by Placido Devine for 2017  Network Utilization Review Department  Phone: [phone]; Fax [phone]  ATTENTION: The Network Utilization Review Department is now centralized for our 7 Facilities  Make a note that we have a new phone and fax numbers for our Department  Please call with any questions or concerns to [phone] and carefully follow the prompts so that you are directed to the right person  All voicemails are confidential  Fax any determinations, approvals, denials, and requests for initial or continue stay review clinical to [phone]  Due to HIGH CALL volume, it would be easier if you could please send faxed requests to expedite your requests and in part, help us provide discharge notifications faster  Continued Stay Review    Date:   ACUTE MED SURG LEVEL OF CARE    Vital Signs: /69 (BP Location: Right arm)   Pulse 87   Temp 98 5 °F (36 9 °C) (Oral)   Resp 17   Ht 5' 8" (1 727 m)   Wt 61 3 kg (135 lb 2 3 oz)   SpO2 99%   BMI 20 55 kg/m²      Objective:  Vitals:  HR:  [83-96] 96  Resp:  [12-18] 17  BP: (117-190)/(56-85) 170/85  SpO2:  [95 %-99 %] 99 %  Temp (24hrs), Av 9 °F (36 6 °C), Min:96 9 °F (36 1 °C), Max:98 5 °F (36 9 °C)  Current: Temperature: 98 5 °F (36 9 °c)    Diet Richard/CHO Controlled; Consistent Carbohydrate Diet Level 2 (5 carb servings/75 grams CHO/meal);  Vegetarian       Continuous IV Infusions:   lactated ringers 100 mL/hr Last Rate: Stopped (18 0145)       Medications:   Scheduled Meds:   Current Facility-Administered Medications:  acetaminophen 650 mg Oral Q8H [facility] Jorge Cruz DO    aspirin 81 mg Oral Daily ARMANDO Schmidt    atorvastatin 80 mg Oral Daily With Tamiko Michel MD    cefazolin 2,000 mg Intravenous Q8H Jamaal Jordan MD    [START ON 2018] cephalexin 500 mg Oral Q6H [facility] Jamaal Jordan MD    furosemide 40 mg Oral BID (diuretic) Nida Bal, DO    gabapentin 300 mg Oral TID Love Sherwood MD    heparin (porcine) 5,000 Units Subcutaneous [facility] Diane Morgan, DPM    hydrALAZINE 10 mg Intravenous Q6H PRN ARMANDO Kaur    HYDROcodone-acetaminophen 1 tablet Oral Q6H PRN Jorge Roota, DO    insulin glargine 14 Units Subcutaneous HS Wm Quijano MD    insulin lispro 1-5 Units Subcutaneous TID AC Octavio Wilkerson, DO    insulin lispro 1-5 Units Subcutaneous HS Octavio Wilkerson, DO    insulin lispro 8 Units Subcutaneous TID With Meals Wm Quijano MD    lactated ringers 100 mL/hr Intravenous Continuous Neeraj Olivarez MD Last Rate: Stopped (02/16/18 0145)   melatonin 3 mg Oral HS Kun Wolfe PA-C    metoprolol tartrate 25 mg Oral Q12H Joanna Rodriguez MD    metroNIDAZOLE 500 mg Oral [facility] Niels Grant MD    oxyCODONE 10 mg Oral Q4H PRN Love Sherwood MD    oxyCODONE 5 mg Oral Q4H PRN ARMANDO Kaur    pregabalin 75 mg Oral Daily Love Sherwood MD        PRN Meds:      hydrALAZINE    HYDROcodone-acetaminophen    oxyCODONE 1 Tablet q4hrs prn given x 1/ 24 hrs    oxyCODONE- acetaminophen (PERCOECT) 5/325 mg 1 Tablet q4hrs prn given x 1/ 24 hrs      LABS/Diagnostic Results:   Results from last 7 days  Lab Units 02/15/18  0526 02/14/18  0547 02/13/18  0517   02/10/18  0409   SODIUM mmol/L 136 132* 133*  < > 142   POTASSIUM mmol/L 3 0* 3 3* 3 3*  < > 2 7*   CHLORIDE mmol/L 98* 93* 91*  < > 101   CO2 mmol/L 34* 32 33*  < > 35*   ANION GAP mmol/L 4 7 9  < > 6   BUN mg/dL 14 18 29*  < > 24   CREATININE mg/dL 0 76 0 94 0 93  < > 0 88   EGFR ml/min/1 73sq m 94 83 84  < > 88   GLUCOSE RANDOM mg/dL 67 145* 238*  < > 70   CALCIUM mg/dL 7 8* 8 5 8 2*  < > 8 0*   AST U/L  --   --   --   --  96*   ALT U/L  --   --   --   --  87*   ALK PHOS U/L  --   --   --   --  112   TOTAL PROTEIN g/dL  --   --   --   --  6 9   BILIRUBIN TOTAL mg/dL  --   --   --   --  0 39      Results from last 7 days  Lab Units 02/15/18  0526 02/14/18  0547 02/13/18  0517   WBC Thousand/uL 12 47* 21 64* 11 35*   HEMOGLOBIN g/dL 9 5* 11 1* 10 6*   PLATELETS Thousands/uL 289 325 305               Age/Sex: 75 y o  male     Assessment/Plan:             Gangrene of toe of right foot [facility])   Assessment & Plan     Patient is status transmetatarsal amputation on 2/12/2018  Note Podiatry input regarding plans for flap  Continue IV antibiotics as per ID  Ancef and Flagyl  Note surgery input from yesterday           Pain in right foot   Assessment & Plan     Following surgery  Possibly of phantom pain  Also patient does have neuropathy and takes gabapentin and Lyrica both at home  This CC pain meds  Norco for mild pain  Scheduled Tylenol  P r n  Oxycodone  Lyrica  Neurontin             (HFpEF) heart failure with preserved ejection fraction [facility])   Assessment & Plan     Patient switch to p o  Lasix today as per Cardiology  Appears euvolemic  Continue to monitor           Great toe amputation status, right [facility])   Assessment & Plan     Antibiotics as per Infectious Disease  Wound care as per surgery  Note Podiatry input           Cellulitis of right foot   Assessment & Plan     · Antibiotics as per ID          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)   Assessment & Plan     Blood sugar still elevated more than 200 most of the times  Endocrine following  Appreciate recommendations            Hypertension   Assessment & Plan     Continue Lopressor for now                   VTE Pharmacologic Prophylaxis:   Pharmacologic: Heparin  Mechanical VTE Prophylaxis in Place: Yes     Current Length of Stay: 11 day(s)     Current Patient Status: Inpatient   Certification Statement: The patient will continue to require additional inpatient hospital stay due to Need to monitor symptoms        Infectious Disease  Progress Notes Date of Service: 2/16/2018 11:47 AM      Impression/Recommendations:  1   Right distal foot gangrene   Patient finally consented to [facility] Gentiles is now status post open TMA with apparent surgical cure   No bone margin pathology was requested   He is now clinically and systemically well   Patient is now status post STSG  Continue cefazolin/Flagyl  VAC per Podiatry  Change antibiotic to p o  Keflex/Flagyl in a m  Treat x 7 days post STSG, another 6 days      2  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   Patient is status post open 1st toe amputation  Mason Severino, he has gangrenous changes in his other toes also  [facility] is now status post TMA   He is also now status post STSG  Antibiotic plan as in above  Serial foot exams       3   Severe sepsis   Source of sepsis is most likely right foot infection   Patient is clinically improved with 1st toe amputation and IV antibiotic  [facility] is now status post TMA   He should do well   Blood cultures remain negative  Antibiotic plan as in above  Monitor hemodynamics      4   PVD    Patient is status post arteriogram with successful intervention   Circulatory status has been optimized  Vascular surgery following      5   Leukocytosis   WBC increased postop   It is normalizing again, as expected   Patient remains clinically and systemically well  Monitor WBC      6  Acute hypoxic respiratory failure   I suspect this is secondary to sepsis and pulmonary edema   No obvious pneumonia on CXR    Patient's respiratory status now improved, currently on room air  O2 support per primary service      7  DM with hyperglycemia on admission   Patient appears noncompliant with diabetic medication   This clearly contributes to infection development  Blood sugar management per Medicine Service      8   PCN allergy on allergy list   Patient does not recall reaction  [facility] states that this was when he was a young child  [facility] is tolerating cephalosporin well      Discussed with the patient and his daughter in detail regarding the above     Discussed with Dr Rene Barragan from [address] [place] [address] service earlier      Antibiotics:  Cefazolin/Flagyl  POD # 10          Discharge Plan:   ANTICIPATE DISCHARGE TO SHORT TERM INPATIENT REHAB  WHEN MEDICALLY CLEARED    PER PT TODAY 2/16/18  Plan   Treatment/Interventions Functional transfer training;LE strengthening/ROM; Elevations;Cognitive reorientation; Endurance training; Therapeutic exercise;Patient/family training;Equipment eval/education; Bed mobility;Gait training; Compensatory technique education;Continued evaluation;Spoke to nursing;Spoke to case management;OT   PT Frequency (6x/wk)   Recommendation   Recommendation Post acute IP rehab     CASE MANAGEMENT FOLLOWING CLOSELY FOR ALL DISCHARGE NEEDS

## 2018-02-16 NOTE — SOCIAL WORK
CM met with the Pt and family at bedside to discuss D/C plan  CM spoke with Pt's daughter Estefania Samson, who stated 1st choice for STR is [facility], Referral sent  CICI explained criteria for [facility] and requested SNF backup choices

## 2018-02-16 NOTE — PROGRESS NOTES
Progress Note - Podiatry  Alexandra Rand 75 y o  male MRN: [id number]  Unit/Bed#: [facility] 931-01 Encounter: [id number]    Assessment:  1  S/p STSG w wound vac application to right foot (DOS: 2/15/18), S/p Right TMA with wound vac application (DOS: 5/02/55), and S/p I&D of the right hallux (DOS: 2/6/18) secondary to Wet gangrene of right hallux and medial forefoot with now resolved cellulitis  2  Progressive dry gangrene 2nd digit right foot  3  PAD, status post angiogram with stenting  4  DM with neuropathy, A1c 9 7, per endo  5  NSTEMI, type 2     Plan:  - pt seen at bedside, nontoxic in appearance  -patient is now POD #1 from split-thickness skin graft application to right foot with wound vac application  -continue Ancef/Flagyl per ID recommendations  -wound VAC left intact, will change on 2/20/18  Wound VAC noted to be at 100 mm of mercury low continuous with minimal leaks noted, minimal drainage in canister  Outer dressing is clean dry and intact  Posterior splint intact  -pain meds per primary; taking gabapentin, lyrica, Tylenol and oxycodone  -patient to be nonweightbearing to right foot, PT/OT have been consulted however patient has not been evaluated by either teams yet, awaiting recommendations  -rest of care per primary    Subjective/Objective   Chief Complaint:   Chief Complaint   Patient presents with    Foot Ulcer     Pt presents with diabetic ulcers on right foot and increased pain and swelling       Subjective: 75 y o  male was seen and evaluated at bedside  No acute events overnight  NAD  Lying comfortably in bed  Denies recent nausea, fever, shortness of breath, chest pain, chills, fever  Complaints of some pain at donor site and just took a tylenol    Blood pressure 158/76, pulse 89, temperature 98 5 °F (36 9 °C), temperature source Oral, resp  rate 17, height 5' 8" (1 727 m), weight 61 3 kg (135 lb 2 3 oz), SpO2 98 %  ,Body mass index is 20 55 kg/m²      Invasive Devices     Peripheral Intravenous Line            Peripheral IV 02/13/18 Left Antecubital 2 days          Drain            External Urinary Catheter Medium 6 days    Negative Pressure Wound Therapy (V A C ) Foot Right 3 days                Physical Exam:   General: Alert, cooperative and no distress  Lungs: Non labored breathing  Heart: Positive S1, S2  Abdomen: Soft, non-tender  Extremity:  Gross motor function and neurovascular status at baseline  No calf tenderness bilaterally  Wound that noted to be running at 100 millimeters of mercury low continuous with minimal leaks  Minimal drainage in canister  Outer dressings clean dry and intact without signs of strike through  Posterior splint intact          Lab, Imaging and other studies:   I have personally reviewed pertinent lab results  , CBC: No results found for: WBC, HGB, HCT, MCV, PLT, ADJUSTEDWBC, MCH, MCHC, RDW, MPV, NRBC, CMP: No results found for: NA, K, CL, CO2, ANIONGAP, BUN, CREATININE, GLUCOSE, CALCIUM, AST, ALT, ALKPHOS, PROT, ALBUMIN, BILITOT, EGFR    Imaging: I have personally reviewed pertinent films in PACS  EKG, Pathology, and Other Studies: I have personally reviewed pertinent reports    VTE Pharmacologic Prophylaxis: Heparin

## 2018-02-16 NOTE — ASSESSMENT & PLAN NOTE
Patient is status transmetatarsal amputation on 2/12/2018  Note Podiatry input regarding plans for flap  Continue IV antibiotics as per ID  Ancef and Flagyl  Note surgery input from yesterday

## 2018-02-16 NOTE — PROGRESS NOTES
Cardiology Progress Note - Lucio Calloway 75 y o  male MRN: [id number]    Unit/Bed#: [facility] 931-01 Encounter: [id number]    Assessment and plan  1   Non STEMI type 2 in the setting of sepsis  2   Preop for transmetatarsal amputation  3   Right foot gangrene  4   Hypertension  5   Diabetes  6   Coronary artery disease as evidenced by heavy coronary calcifications on CT  7   Chronic diastolic heart failure      Recommendations:    Resting comfortably with no cardiac complaints     Continue current cardiac medications   I had a long talk with the patient and the family  Tosha Carrizales does have coronary artery disease as evidenced by heavy coronary calcifications on a CT scan from 2016   Continue aspirin, statin, beta-blocker   No anginal symptoms   No angina or signs of heart failure will continue to follow  Continue oral Lasix at 40 mg p o  b i d     Replete potassium keep above 4  No further cardiac workup at this point time  Continue aggressive medical therapy  He could follow up with his primary cardiologist and consider an outpatient Jaclyn Tavera to look for ischemic burden once he has recovered  Call if any questions  Subjective:    No significant events overnight  Denies chest pain, shortness of breath, palpitations  ROS    Objective:   Vitals: Blood pressure 158/76, pulse 89, temperature 98 5 °F (36 9 °C), temperature source Oral, resp  rate 17, height 5' 8" (1 727 m), weight 61 3 kg (135 lb 2 3 oz), SpO2 98 %  , Body mass index is 20 55 kg/m² , Orthostatic Blood Pressures    Flowsheet Row Most Recent Value   Blood Pressure  158/76 filed at 02/16/2018 0755   Patient Position - Orthostatic VS  Lying filed at 02/16/2018 9773         Systolic (49AEE), NYO:437 , Min:117 , NIF:260     Diastolic (13LKX), ITS:93, Min:56, Max:79      Intake/Output Summary (Last 24 hours) at 02/16/18 0858  Last data filed at 02/16/18 0700   Gross per 24 hour   Intake              450 ml   Output             2555 ml   Net            -2105 ml Weight (last 2 days)     Date/Time   Weight    02/15/18 0600  61 3 (135 14)    02/14/18 0638  60 9 (134 2)                Telemetry Review: No significant arrhythmias seen on telemetry review  EKG personally reviewed by Yanet Arnett DO  Physical Exam   Constitutional: He is oriented to person, place, and time  He appears well-nourished  No distress  HENT:   Head: Atraumatic  Eyes: Conjunctivae are normal  Pupils are equal, round, and reactive to light  Neck: Neck supple  Cardiovascular: Normal rate, regular rhythm and S1 normal   Exam reveals no friction rub  Murmur heard  Systolic murmur is present with a grade of 1/6   Pulmonary/Chest: Effort normal and breath sounds normal  No respiratory distress  He has no wheezes  He has no rales  Abdominal: Bowel sounds are normal  He exhibits no distension  There is no tenderness  There is no rebound  Musculoskeletal: Normal range of motion  He exhibits no edema or deformity  Neurological: He is alert and oriented to person, place, and time  No cranial nerve deficit  Skin: Skin is warm and dry  No erythema  Nursing note and vitals reviewed          Laboratory Results:    Results from last 7 days  Lab Units 02/10/18  1250 02/10/18  0409   TROPONIN I ng/mL 5 18* 5 84*       CBC with diff:   Results from last 7 days  Lab Units 02/15/18  0526 02/14/18  0547 02/13/18  0517 02/11/18  0503 02/10/18  0409   WBC Thousand/uL 12 47* 21 64* 11 35* 11 46* 13 01*   HEMOGLOBIN g/dL 9 5* 11 1* 10 6* 11 6* 10 8*   HEMATOCRIT % 28 2* 33 7* 32 3* 34 1* 30 9*   MCV fL 90 91 91 90 89   PLATELETS Thousands/uL 289 325 305 350 359   MCH pg 30 3 30 1 29 9 30 6 30 9   MCHC g/dL 33 7 32 9 32 8 34 0 35 0   RDW % 14 1 14 0 13 8 13 4 13 0   MPV fL 10 1 10 5 10 4 10 1 10 1   NRBC AUTO /100 WBCs 0  --   --   --  1         CMP:  Results from last 7 days  Lab Units 02/15/18  0526 02/14/18  0547 02/13/18  0517 02/11/18  0503 02/10/18  1311 02/10/18  0409   SODIUM mmol/L 136 132* 133* 141 140 142   POTASSIUM mmol/L 3 0* 3 3* 3 3* 3 3* 3 7 2 7*   CHLORIDE mmol/L 98* 93* 91* 99* 100 101   CO2 mmol/L 34* 32 33* 35* 27 35*   ANION GAP mmol/L 4 7 9 7 13 6   BUN mg/dL 14 18 29* 29* 26* 24   CREATININE mg/dL 0 76 0 94 0 93 0 79 0 91 0 88   GLUCOSE RANDOM mg/dL 67 145* 238* 89 148* 70   CALCIUM mg/dL 7 8* 8 5 8 2* 8 1* 8 1* 8 0*   AST U/L  --   --   --   --   --  96*   ALT U/L  --   --   --   --   --  87*   ALK PHOS U/L  --   --   --   --   --  112   TOTAL PROTEIN g/dL  --   --   --   --   --  6 9   BILIRUBIN TOTAL mg/dL  --   --   --   --   --  0 39   EGFR ml/min/1 73sq m 94 83 84 92 86 88         BMP:  Results from last 7 days  Lab Units 02/15/18  0526 18  0547 18  0517 18  0503 02/10/18  1311 02/10/18  0409   SODIUM mmol/L 136 132* 133* 141 140 142   POTASSIUM mmol/L 3 0* 3 3* 3 3* 3 3* 3 7 2 7*   CHLORIDE mmol/L 98* 93* 91* 99* 100 101   CO2 mmol/L 34* 32 33* 35* 27 35*   BUN mg/dL 14 18 29* 29* 26* 24   CREATININE mg/dL 0 76 0 94 0 93 0 79 0 91 0 88   GLUCOSE RANDOM mg/dL 67 145* 238* 89 148* 70   CALCIUM mg/dL 7 8* 8 5 8 2* 8 1* 8 1* 8 0*       BNP: No results for input(s): BNP in the last 72 hours      Magnesium:   Results from last 7 days  Lab Units 18  0503   MAGNESIUM mg/dL 2 0       Coags:   Results from last 7 days  Lab Units 18  0503 02/10/18  0823 02/10/18  0205 18  1737   PTT seconds 72* 70* 85* 93*       TSH:        Hemoglobin A1C       Lipid Profile:       Cardiac testing:   Results for orders placed during the hospital encounter of 18   Echo complete with contrast if indicated    Narrative [address]  [address] [place] [address]  [phone]    Transthoracic Echocardiogram  2D, M-mode, Doppler, and Color Doppler    Study date:  2018    Patient: Amilcar Cardenas  MR number: [id number]  Account number: [de-identified]  : [id number]  Age: 75 years  Gender: Male  Status: Inpatient  Location: Bedside  Height: 68 in  Weight: 133 lb  BP: 161/ 80 mmHg    Indications: Dyspnea    Diagnoses: R06 00 - Dyspnea, unspecified    Sonographer:  SAM De Jesus, RDCS  Primary Physician:  Jim Cali MD  Referring Physician:  Liza Marrufo DO  Group:  [facility]  Cardiology Fellow:  Mildred Braun MD  Interpreting Physician:  Juana Gaona MD    SUMMARY    PROCEDURE INFORMATION:  This was a technically difficult study  LEFT VENTRICLE:  Systolic function was normal  Ejection fraction was estimated to be 55 %  Although no diagnostic regional wall motion abnormality was identified, this possibility cannot be completely excluded on the basis of this study  LEFT ATRIUM:  The atrium was mildly dilated  MITRAL VALVE:  There was moderate regurgitation  Regurgitation grade was 2+ on a scale of 0 to 4+  AORTIC VALVE:  There was trace regurgitation  PULMONIC VALVE:  There was trace regurgitation  PERICARDIUM:  There was a left pleural effusion  HISTORY: PRIOR HISTORY: Altered mental status; Sepsis; Diabetes Mellitus type II; Hypertension    PROCEDURE: The procedure was performed at the bedside  This was a routine study  The transthoracic approach was used  The study included complete 2D imaging, M-mode, complete spectral Doppler, and color Doppler  The heart rate was 94 bpm,  at the start of the study  Images were obtained from the parasternal, apical, subcostal, and suprasternal notch acoustic windows  Echocardiographic views were limited due to restricted patient mobility, poor acoustic window availability,  and lung interference  This was a technically difficult study  LEFT VENTRICLE: Size was normal  Systolic function was normal  Ejection fraction was estimated to be 55 %  Although no diagnostic regional wall motion abnormality was identified, this possibility cannot be completely excluded on the basis  of this study   Wall thickness was normal  There was no evidence of concentric hypertrophy  DOPPLER: Due to tachycardia, there was fusion of early and atrial contributions to ventricular filling  The study was not technically sufficient to  allow evaluation of LV diastolic function  RIGHT VENTRICLE: The size was normal  Systolic function was normal  Wall thickness was normal     LEFT ATRIUM: The atrium was mildly dilated  RIGHT ATRIUM: Size was normal     MITRAL VALVE: Valve structure was normal  There was normal leaflet separation  DOPPLER: The transmitral velocity was within the normal range  There was no evidence for stenosis  There was moderate regurgitation  Regurgitation grade was 2+  on a scale of 0 to 4+  AORTIC VALVE: The valve was trileaflet  Leaflets exhibited normal cuspal separation and sclerosis  DOPPLER: Transaortic velocity was within the normal range  There was no evidence for stenosis  There was trace regurgitation  TRICUSPID VALVE: The valve structure was normal  There was normal leaflet separation  DOPPLER: The transtricuspid velocity was within the normal range  There was no evidence for stenosis  There was no regurgitation  PULMONIC VALVE: Leaflets exhibited normal thickness, no calcification, and normal cuspal separation  DOPPLER: The transpulmonic velocity was within the normal range  There was trace regurgitation  PERICARDIUM: There was no pericardial effusion  There was a left pleural effusion  The pericardium was normal in appearance  AORTA: The root exhibited normal size      SYSTEM MEASUREMENT TABLES    2D  %FS: 22 23 %  Ao Diam: 3 27 cm  EDV(Teich): 78 14 ml  EF Biplane: 47 97 %  EF(Teich): 45 23 %  ESV(Teich): 42 8 ml  IVSd: 1 01 cm  LA Area: 17 83 cm2  LA Diam: 3 91 cm  LVEDV MOD A2C: 80 32 ml  LVEDV MOD A4C: 73 97 ml  LVEDV MOD BP: 78 31 ml  LVEF MOD A2C: 47 09 %  LVEF MOD A4C: 51 51 %  LVESV MOD A2C: 42 5 ml  LVESV MOD A4C: 35 87 ml  LVESV MOD BP: 40 75 ml  LVIDd: 4 19 cm  LVIDs: 3 26 cm  LVLd A2C: 7 06 cm  LVLd A4C: 7 36 cm  LVLs A2C: 6 35 cm  LVLs A4C: 6 66 cm  LVPWd: 0 95 cm  RA Area: 10 35 cm2  RVIDd: 3 82 cm  SV MOD A2C: 37 82 ml  SV MOD A4C: 38 1 ml  SV(Teich): 35 34 ml    MM  TAPSE: 1 85 cm    PW  E': 0 11 m/s    [facility]    Prepared and electronically signed by    Constantine Márquez MD  Signed 2018 17:12:05       No results found for this or any previous visit  No results found for this or any previous visit  No results found for this or any previous visit      Meds/Allergies   all current active meds have been reviewed  Prescriptions Prior to Admission   Medication    Alpha-Lipoic Acid 600 MG CAPS    aspirin 81 MG tablet    Blood Glucose Monitoring Suppl (ONE TOUCH ULTRA MINI) w/Device KIT    [] cephalexin (KEFLEX) 500 mg capsule    gabapentin (NEURONTIN) 300 mg capsule    glipiZIDE-metFORMIN (METAGLIP) 5-500 MG per tablet    glucose blood (ACCU-CHEK ACTIVE STRIPS) test strip    losartan-hydrochlorothiazide (HYZAAR) 100-25 MG per tablet    metoprolol tartrate (LOPRESSOR) 25 mg tablet    metoprolol tartrate (LOPRESSOR) 50 mg tablet    pregabalin (LYRICA) 75 mg capsule    TRADJENTA 5 MG TABS         lactated ringers 100 mL/hr Last Rate: Stopped (18 0145)     Assessment:  Principal Problem:    Gangrenous toe (HCC)  Active Problems:    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 (HCC)    Hypertension    Gangrene of toe of right foot (HCC)    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

## 2018-02-16 NOTE — PROGRESS NOTES
Progress Note - Melissa Gonzalez 75 y o  male MRN: [id number]    Unit/Bed#: [facility] 931-01 Encounter: [id number]      CC: diabetes f/u    Subjective:   Melissa Gonzalez is a 75y o  year old male with type  diabetes  Very poor appetite, denies any nausea or vomiting  Was hypoglycemic this morning    Objective:     Vitals: Blood pressure 149/69, pulse 87, temperature 98 5 °F (36 9 °C), temperature source Oral, resp  rate 17, height 5' 8" (1 727 m), weight 61 3 kg (135 lb 2 3 oz), SpO2 99 %  ,Body mass index is 20 55 kg/m²  Intake/Output Summary (Last 24 hours) at 02/16/18 1639  Last data filed at 02/16/18 1438   Gross per 24 hour   Intake             1230 ml   Output             2750 ml   Net            -1520 ml       Physical Exam:  General Appearance: awake, appears stated age and cooperative  Head: Normocephalic, without obvious abnormality, atraumatic  Extremities: moves all extremities  Skin: Skin color and temperature normal    Pulm: no labored breathing    Lab, Imaging and other studies: I have personally reviewed pertinent reports  Results from last 7 days  Lab Units 02/16/18  1146   SODIUM mmol/L 133*   POTASSIUM mmol/L 4 0   CHLORIDE mmol/L 92*   CO2 mmol/L 33*   BUN mg/dL 13   CREATININE mg/dL 0 79   GLUCOSE RANDOM mg/dL 106   CALCIUM mg/dL 8 6       POC Glucose (mg/dl)   Date Value   02/16/2018 101   02/16/2018 53 (L)   02/15/2018 114   02/15/2018 84   02/15/2018 81   02/15/2018 98   02/15/2018 65   02/15/2018 68   02/14/2018 208 (H)   02/14/2018 169 (H)       Assessment:  Type 2 diabetes with hypoglycemia  Peripheral arterial disease  Status post STSG with wound VAC application  Status post right TMA      Plan:  Sugars very tightly controlled with hypoglycemia secondary to very poor oral intake  For now will discontinue pre meal insulin  Decrease Humalog to 8 units at bedtime    Continue to monitor blood sugars before meals and bedtime adjust accordingly      Portions of the record may have been created with voice recognition software

## 2018-02-16 NOTE — OCCUPATIONAL THERAPY NOTE
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 (HCC)    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 of foot [facility]    PVD (peripheral vascular disease) [facility]    Hypertension    [facility] foot [facility]    Diabetic neuropathy associated with type 2 diabetes mellitus ([facility]    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)    Cellulitis of right foot    Abnormal CT of the head    Elevated brain natriuretic peptide (BNP) level    Acute respiratory failure (HCC)    Transaminitis    Hypophosphatemia    Popliteal artery stenosis, right (HCC)    Great toe amputation status, right (HCC)    Coagulopathy (HCC)    Meningioma (HCC)    Hypokalemia    Moderate mitral regurgitation    Gangrenous toe ([facility]    Coronary artery disease involving native coronary artery    (HFpEF) heart failure with preserved ejection fraction (HCC)    Pain in right foot     Past Medical History  Past Medical History:   Diagnosis Date    Diabetes mellitus ([facility]     Diabetic neuropathy associated with type 2 diabetes mellitus ([facility]     DM (diabetes mellitus), type 2 with peripheral vascular complications (HCC)     Gangrene of toe of right foot ([facility]     Hypertension     PVD (peripheral vascular disease) ([facility] )      Past Surgical History  Past Surgical History:   Procedure Laterality Date    EYE SURGERY      cataract- bilateral    INCISION AND DRAINAGE OF WOUND Right 2/6/2018    Procedure: INCISION AND DRAINAGE (I&D) EXTREMITY, right great toe amputation;  Surgeon: Garland Bence, DPM;  [facility];  Service: Podiatry    NO PAST SURGERIES      PA AMPUTATION FOOT,TRANSMETATARSAL Right 2/12/2018    Procedure: AMPUTATION TRANSMETATARSAL (TMA);  Surgeon: Catalina Maher DPM;  [facility];  Service: Podiatry    SPLIT THICKNESS SKIN GRAFT Right 2/15/2018    Procedure: SKIN GRAFT SPLIT THICKNESS (STSG)  EXTREMITY;  Surgeon: Catalina Maher DPM;  [facility];  Service: Podiatry    VAC DRESSING APPLICATION Right 1/18/6220    Procedure: APPLICATION VAC DRESSING;  Surgeon: Catalina Maher DPM;  [facility];  Service: Podiatry    WOUND DEBRIDEMENT Right 2/12/2018    Procedure: DEBRIDEMENT WOUND [facility] OUT); Surgeon: Catalina Maher DPM;  [facility];  Service: Podiatry      02/16/18 1600   Note Type   Note type Eval/Treat   Restrictions/Precautions   Weight Bearing Precautions Per Order Yes   RLE Weight Bearing Per Order NWB   Other Precautions Cognitive; Impulsive;WBS;Fall Risk;Telemetry   Pain Assessment   Pain Assessment No/denies pain   Pain Score No Pain   Home Living   Type of Home House   Home Layout Performs ADLs on one level;Stairs to enter with rails  (1 ERIC)   Bathroom Shower/Tub Tub/shower unit   Bathroom Toilet Standard   Bathroom Equipment (denies)   [address] (denies)   Prior Function   Level of [place] Independent with ADLs and functional mobility   Lives With Spouse   Receives Help From Family   ADL Assistance Independent   IADLs Independent   Falls in the last 6 months 0   Vocational Retired   Lifestyle   Autonomy I in ADL/IADL   Reciprocal Relationships supportive wife who is also retired and able to be with pt  as needed   Service to Others retired   Intrinsic Gratification enjoys watching TV   Psychosocial   Psychosocial (WDL) WDL   Subjective   Subjective "what are we going to do again?    ADL   Eating Assistance 5  Supervision/Setup  (s/u)   Grooming Assistance 5  Supervision/Setup  (s/u)   UB Dressing Assistance 4  Minimal Assistance   LB Dressing Assistance 1  Total Assistance   LB Dressing Deficit Don/doff L sock   Bed Mobility   Supine to Sit 4 Minimal assistance   Additional items HOB elevated; Bedrails; Increased time required;Verbal cues;LE management   Sit to Supine 4  Minimal assistance   Additional items HOB elevated; Bedrails; Increased time required;Verbal cues;LE management   Transfers   Sit to Stand 2  Maximal assistance   Additional items Assist x 2; Increased time required;Verbal cues   Stand to Sit 2  Maximal assistance   Additional items Assist x 2; Increased time required;Verbal cues   Functional Mobility   Functional Mobility (unable to assess)   Balance   Static Sitting Fair   Dynamic Sitting Fair -   Static Standing Poor +   Dynamic Standing Poor   Activity Tolerance   Activity Tolerance Patient limited by fatigue   Nurse Made Aware yes   RUE Assessment   RUE Assessment WFL   LUE Assessment   LUE Assessment WFL   Hand Function   Gross Motor Coordination Functional   Fine Motor Coordination Functional   Sensation   Light Touch No apparent deficits  (BUE)   Cognition   Overall Cognitive Status Impaired   Arousal/Participation Alert   Attention Attends with cues to redirect   Orientation Level Oriented X4   Memory Decreased short term memory   Following Commands Follows multistep commands without difficulty   Assessment   Limitation Decreased ADL status; Decreased cognition;Decreased Safe judgement during ADL;Decreased endurance;Decreased self-care trans;Decreased high-level ADLs   Prognosis Good   Assessment Pt  is a 75 y o  male who was [facility] on 2/5/2018 with gangrenous toe  Pt  S/p R great toe amputation and STSG  Pt  w/a significant PMH of DM, peripheral neuropathy, HTN, PVD  Pt  currently lives in a home with FF s/u with wife  PTA, pt  (I) in all ADLs/IADLs  Pt did not use AD for functional mobility  Currently, pt  requires min A for bed mobility, max A x2 for functional mobility and transfers, min A-s/u for UB ADLs and total A for LB ADLs   A is needed d/t the following impairments: decreased functional activity tolerance, deconditioning, decreased seated/standing balance, increased impulsivity, increased distractibility, decreased problem solving and sequencing (noted during STS transfer), decreased STM, poor safety/insight/judgment and awareness into deficits, and additional time required for delayed processing  Additionally, pt  requires max v c  during functional activity 2* to cognitive deficits noted  Therefore, pt  will benefit from skilled OT services to maximize functional gains, monitor status and prevent decline  Will continue to follow pt  3-5x/week to meet the goals described below in 10-14 days  Recommend STR once medically stable  Goals   Patient Goals none stated   LTG Time Frame 10-14   Plan   Treatment Interventions ADL retraining;Functional transfer training; Endurance training;Patient/family training;Equipment evaluation/education; Compensatory technique education;Continued evaluation; Energy conservation; Activityengagement   Goal Expiration Date 02/26/18   OT Frequency 3-5x/wk   Recommendation   OT Discharge Recommendation Short Term Rehab   OT - OK to Discharge Yes   Barthel Index   Feeding 10   Bathing 0   Grooming Score 5   Dressing Score 5   Bladder Score 0   Bowels Score 10   Toilet Use Score 5   Transfers (Bed/Chair) Score 5   Mobility (Level Surface) Score 0   Stairs Score 0   Barthel Index Score 40   Modified [place] Scale   Modified [place] Scale 4     GOALS  Patient will perform all functional mobility and transfers at a min A level with use of the least restrictive device  Pt  will perform bed mobility at a mod I level with G balance and activity tolerance  Pt  will complete LB/UB dressing at a mod I level with AE as needed  Pt  will complete all grooming activities at a mod I level  Pt  will complete a toileting tasks at a mod I level  Pt will complete LB/UB bathing at a min A level with the use of AE as needed      Patient will participate in 30 minutes of functional activity without any overt signs of fatigue or abnormal vitals to demonstrate G endurance as it is required for ADLs/functional activity       Isidro Mcneil, MOT, OTR/L

## 2018-02-16 NOTE — PLAN OF CARE
Problem: PHYSICAL THERAPY ADULT  Goal: Performs mobility at highest level of function for planned discharge setting  See evaluation for individualized goals  Treatment/Interventions: Functional transfer training, LE strengthening/ROM, Elevations, Cognitive reorientation, Endurance training, Therapeutic exercise, Patient/family training, Equipment eval/education, Bed mobility, Gait training, Compensatory technique education, Continued evaluation, Spoke to nursing, Spoke to case management, OT  Equipment Recommended: Radha Garcia, Wheelchair       See flowsheet documentation for full assessment, interventions and recommendations    Prognosis: Good  Problem List: Decreased strength, Decreased range of motion, Decreased endurance, Impaired balance, Decreased mobility, Decreased coordination, Decreased cognition, Decreased safety awareness, Impaired judgement, Pain, Orthopedic restrictions, Decreased skin integrity, Impaired sensation  Assessment: Pt is a 75 y o  male admitted to [facility] on 2/5/2018 w/ Gangrenous toe ([address] ); is s/p TMA and STSG NWB w  Wound vac in place Pt exhibits significant impairments with weakness, decreased ROM, impaired balance, decreased endurance, impaired coordination, gait deviations, pain, decreased activity tolerance, decreased functional mobility tolerance, altered sensation, decreased safety awareness, fall risk, orthopedic restrictions, decreased skin integrity and decreased cognition; these impact independence with mobility, ADLs, and IADLs; requires max assist x2 w stand pivot transf bed-chair on 3rd trial; pt has difficulty w comprehension and execution of motor tasks for effective WB through RW; objective measures on the Barthel Index also reveal limitations;  therapy prognosis is impacted by relevant co morbidities as noted in evaluation; clinical presentation is currently unstable/unpredictable - pt is on cont pulse oximetry, pain inadequately controlled, has wound vac in place, presents w abnormal labs, phys impairments as noted- a regression from baseline ; PTA, pt was Independent with mobility lives in single level setup 1 ERIC skilled PT is indicated to to optimize functional independence and discharge planning; pending functional progress, PT recommendation at discharge is IP rehab         Recommendation: Post acute IP rehab          See flowsheet documentation for full assessment

## 2018-02-16 NOTE — RESTORATIVE TECHNICIAN NOTE
Restorative Specialist Mobility Note       Activity: Dangle, Stand at bedside (Assisted OT, please refer to OT notes for all information )     Assistive Device: Front wheel walker     Ambulation Response:  Tolerated fairly well  Repositioned: Supine           Range of Motion: Active, All extremities

## 2018-02-16 NOTE — PROGRESS NOTES
Progress Note Apollo Cardoza 1950, 75 y o  male MRN: [id number]    Unit/Bed#: [facility] 931-01 Encounter: [id number]    Primary Care Provider: Kyle Elaine MD   Date and time admitted to hospital: 2/5/2018  4:18 PM        Gangrene of toe of right foot [facility])   Assessment & Plan    Patient is status transmetatarsal amputation on 2/12/2018  Note Podiatry input regarding plans for flap  Continue IV antibiotics as per ID  Ancef and Flagyl  Note surgery input from yesterday  Pain in right foot   Assessment & Plan    Following surgery  Possibly of phantom pain  Also patient does have neuropathy and takes gabapentin and Lyrica both at home  This CC pain meds  Norco for mild pain  Scheduled Tylenol  P r n  Oxycodone  Lyrica  Neurontin          (HFpEF) heart failure with preserved ejection fraction [facility])   Assessment & Plan    Patient switch to p o  Lasix today as per Cardiology  Appears euvolemic  Continue to monitor  Great toe amputation status, right [facility])   Assessment & Plan    Antibiotics as per Infectious Disease  Wound care as per surgery  Note Podiatry input  Cellulitis of right foot   Assessment & Plan    · Antibiotics as per ID        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)   Assessment & Plan    Blood sugar still elevated more than 200 most of the times  Endocrine following  Appreciate recommendations  Hypertension   Assessment & Plan    Continue Lopressor for now  Cameron Mirza VTE Pharmacologic Prophylaxis:   Pharmacologic: Heparin  Mechanical VTE Prophylaxis in Place: Yes    Patient Centered Rounds: I have performed bedside rounds with nursing staff today  Need    Time Spent for Care: 15 minutes  More than 50% of total time spent on counseling and coordination of care as described above      Current Length of Stay: 11 day(s)    Current Patient Status: Inpatient   Certification Statement: The patient will continue to require additional inpatient hospital stay due to Need to monitor symptoms        Code Status: Level 1 - Full Code      Subjective:   Patient comfortable    Objective:     Vitals:   Temp (24hrs), Av 8 °F (36 6 °C), Min:96 9 °F (36 1 °C), Max:98 5 °F (36 9 °C)    HR:  [83-89] 89  Resp:  [12-18] 17  BP: (117-190)/(56-79) 158/76  SpO2:  [95 %-99 %] 98 %  Body mass index is 20 55 kg/m²  Input and Output Summary (last 24 hours): Intake/Output Summary (Last 24 hours) at 18 1120  Last data filed at 18 0700   Gross per 24 hour   Intake              450 ml   Output             2555 ml   Net            -2105 ml       Physical Exam:     Physical Exam   Constitutional: He is oriented to person, place, and time  He appears well-developed and well-nourished  HENT:   Head: Normocephalic and atraumatic  Eyes: Conjunctivae are normal  Pupils are equal, round, and reactive to light  Neck: Normal range of motion  Neck supple  No tracheal deviation present  No thyromegaly present  Cardiovascular: Normal rate and regular rhythm  Pulmonary/Chest: Effort normal and breath sounds normal  No respiratory distress  He has no wheezes  Abdominal: Soft  He exhibits no distension  There is no tenderness  Musculoskeletal: Normal range of motion  He exhibits no edema  Neurological: He is alert and oriented to person, place, and time  Skin: Skin is warm and dry  No erythema  Psychiatric: He has a normal mood and affect         Additional Data:     Labs:      Results from last 7 days  Lab Units 02/15/18  0526   WBC Thousand/uL 12 47*   HEMOGLOBIN g/dL 9 5*   HEMATOCRIT % 28 2*   PLATELETS Thousands/uL 289   NEUTROS PCT % 82*   LYMPHS PCT % 12*   MONOS PCT % 5   EOS PCT % 1       Results from last 7 days  Lab Units 02/15/18  0526  02/10/18  0409   SODIUM mmol/L 136  < > 142   POTASSIUM mmol/L 3 0*  < > 2 7*   CHLORIDE mmol/L 98*  < > 101   CO2 mmol/L 34*  < > 35*   BUN mg/dL 14  < > 24   CREATININE mg/dL 0 76  < > 0  88   CALCIUM mg/dL 7 8*  < > 8 0*   TOTAL PROTEIN g/dL  --   --  6 9   BILIRUBIN TOTAL mg/dL  --   --  0 39   ALK PHOS U/L  --   --  112   ALT U/L  --   --  87*   AST U/L  --   --  96*   GLUCOSE RANDOM mg/dL 67  < > 70   < > = values in this interval not displayed  * I Have Reviewed All Lab Data Listed Above  * Additional Pertinent Lab Tests Reviewed:  All Labs Within Last 24 Hours Reviewed      Recent Cultures (last 7 days):           Last 24 Hours Medication List:     Current Facility-Administered Medications:  acetaminophen 650 mg Oral Q8H [address] Hetul Cruz, DO    aspirin 81 mg Oral Daily ARMANDO Schmidt    atorvastatin 80 mg Oral Daily With [address] [facility], MD    cefazolin 2,000 mg Intravenous Q8H Lynette Means MD Last Rate: Stopped (02/16/18 6201)   furosemide 40 mg Oral BID (diuretic) Kaley Villalobos DO    gabapentin 300 mg Oral TID Rock Crane MD    heparin (porcine) 5,000 Units Subcutaneous [facility] Martino DPM    hydrALAZINE 10 mg Intravenous Q6H PRN ARMANDO Calderon    HYDROcodone-acetaminophen 1 tablet Oral Q6H PRN Jorge Cruz DO    insulin glargine 14 Units Subcutaneous HS Ferrel Riedel, MD    insulin lispro 1-5 Units Subcutaneous TID AC Octavio Wilkerson, DO    insulin lispro 1-5 Units Subcutaneous HS Octavio Wilkerson DO    insulin lispro 8 Units Subcutaneous TID With Meals Ferrel Riedel, MD    lactated ringers 100 mL/hr Intravenous Continuous Reyes Seitz, MD Last Rate: Stopped (02/16/18 0145)   melatonin 3 mg Oral HS Roman Mai PA-C    metoprolol tartrate 25 mg Oral Q12H Joanna Rodriguez MD    metroNIDAZOLE 500 mg Intravenous Q8H Zahraa Venegas MD Last Rate: 500 mg (02/16/18 9858)   oxyCODONE 10 mg Oral Q4H PRN Rock Crane MD    oxyCODONE 5 mg Oral Q4H PRN ARMANDO Calderon    pregabalin 75 mg Oral Daily Rock Crane MD         Today, Patient Was Seen By: Mely Burciaga DO    ** Please Note: Dictation voice to text software may have been used in the creation of this document   **

## 2018-02-16 NOTE — RESTORATIVE TECHNICIAN NOTE
Restorative Specialist Mobility Note       Activity: Chair Assisted PT, please refer to PT notes for all information  Assistive Device: Front wheel walker     Ambulation Response: Tolerated fairly well  Repositioned: Sitting, Up in chair      Pt left resting comfortably in bedside recliner, with chair alarm activated and call bell within reach

## 2018-02-16 NOTE — PLAN OF CARE
DISCHARGE PLANNING     Discharge to home or other facility with appropriate resources Progressing        DISCHARGE PLANNING - CARE MANAGEMENT     Discharge to post-acute care or home with appropriate resources Progressing        INFECTION - ADULT     Absence or prevention of progression during hospitalization Progressing        Knowledge Deficit     Patient/family/caregiver demonstrates understanding of disease process, treatment plan, medications, and discharge instructions Progressing        METABOLIC, FLUID AND ELECTROLYTES - ADULT     Electrolytes maintained within normal limits Progressing     Fluid balance maintained Progressing     Glucose maintained within target range Progressing        MUSCULOSKELETAL - ADULT     Maintain or return mobility to safest level of function Progressing     Maintain proper alignment of affected body part Progressing        Nutrition/Hydration-ADULT     Nutrient/Hydration intake appropriate for improving, restoring or maintaining nutritional needs Progressing        PAIN - ADULT     Verbalizes/displays adequate comfort level or baseline comfort level Progressing        Potential for Falls     Patient will remain free of falls Progressing        Prexisting or High Potential for Compromised Skin Integrity     Skin integrity is maintained or improved Progressing        RESPIRATORY - ADULT     Achieves optimal ventilation and oxygenation Progressing        SAFETY ADULT     Maintain or return to baseline ADL function Progressing     Maintain or return mobility status to optimal level Progressing        SKIN/TISSUE INTEGRITY - ADULT     Skin integrity remains intact Progressing     Incision(s), wounds(s) or drain site(s) healing without S/S of infection Progressing

## 2018-02-17 LAB
ANION GAP SERPL CALCULATED.3IONS-SCNC: 5 MMOL/L (ref 4–13)
BASOPHILS # BLD AUTO: 0.01 THOUSANDS/ΜL (ref 0–0.1)
BASOPHILS NFR BLD AUTO: 0 % (ref 0–1)
BUN SERPL-MCNC: 14 MG/DL (ref 5–25)
CALCIUM SERPL-MCNC: 8.5 MG/DL (ref 8.3–10.1)
CHLORIDE SERPL-SCNC: 94 MMOL/L (ref 100–108)
CO2 SERPL-SCNC: 34 MMOL/L (ref 21–32)
CREAT SERPL-MCNC: 0.83 MG/DL (ref 0.6–1.3)
EOSINOPHIL # BLD AUTO: 0.1 THOUSAND/ΜL (ref 0–0.61)
EOSINOPHIL NFR BLD AUTO: 1 % (ref 0–6)
ERYTHROCYTE [DISTWIDTH] IN BLOOD BY AUTOMATED COUNT: 14.7 % (ref 11.6–15.1)
GFR SERPL CREATININE-BSD FRML MDRD: 90 ML/MIN/1.73SQ M
GLUCOSE SERPL-MCNC: 208 MG/DL (ref 65–140)
GLUCOSE SERPL-MCNC: 216 MG/DL (ref 65–140)
GLUCOSE SERPL-MCNC: 236 MG/DL (ref 65–140)
GLUCOSE SERPL-MCNC: 265 MG/DL (ref 65–140)
GLUCOSE SERPL-MCNC: 350 MG/DL (ref 65–140)
HCT VFR BLD AUTO: 31.5 % (ref 36.5–49.3)
HGB BLD-MCNC: 10.4 G/DL (ref 12–17)
LYMPHOCYTES # BLD AUTO: 1.58 THOUSANDS/ΜL (ref 0.6–4.47)
LYMPHOCYTES NFR BLD AUTO: 18 % (ref 14–44)
MCH RBC QN AUTO: 30 PG (ref 26.8–34.3)
MCHC RBC AUTO-ENTMCNC: 33 G/DL (ref 31.4–37.4)
MCV RBC AUTO: 91 FL (ref 82–98)
MONOCYTES # BLD AUTO: 0.76 THOUSAND/ΜL (ref 0.17–1.22)
MONOCYTES NFR BLD AUTO: 9 % (ref 4–12)
NEUTROPHILS # BLD AUTO: 6.19 THOUSANDS/ΜL (ref 1.85–7.62)
NEUTS SEG NFR BLD AUTO: 72 % (ref 43–75)
NRBC BLD AUTO-RTO: 0 /100 WBCS
PLATELET # BLD AUTO: 377 THOUSANDS/UL (ref 149–390)
PMV BLD AUTO: 10 FL (ref 8.9–12.7)
POTASSIUM SERPL-SCNC: 4.2 MMOL/L (ref 3.5–5.3)
RBC # BLD AUTO: 3.47 MILLION/UL (ref 3.88–5.62)
SODIUM SERPL-SCNC: 133 MMOL/L (ref 136–145)
WBC # BLD AUTO: 8.67 THOUSAND/UL (ref 4.31–10.16)

## 2018-02-17 PROCEDURE — 97110 THERAPEUTIC EXERCISES: CPT

## 2018-02-17 PROCEDURE — 97530 THERAPEUTIC ACTIVITIES: CPT

## 2018-02-17 PROCEDURE — 99232 SBSQ HOSP IP/OBS MODERATE 35: CPT | Performed by: INTERNAL MEDICINE

## 2018-02-17 PROCEDURE — 99233 SBSQ HOSP IP/OBS HIGH 50: CPT | Performed by: INTERNAL MEDICINE

## 2018-02-17 PROCEDURE — 80048 BASIC METABOLIC PNL TOTAL CA: CPT | Performed by: INTERNAL MEDICINE

## 2018-02-17 PROCEDURE — 82948 REAGENT STRIP/BLOOD GLUCOSE: CPT

## 2018-02-17 PROCEDURE — 85025 COMPLETE CBC W/AUTO DIFF WBC: CPT | Performed by: INTERNAL MEDICINE

## 2018-02-17 RX ORDER — INSULIN GLARGINE 100 [IU]/ML
10 INJECTION, SOLUTION SUBCUTANEOUS
Status: DISCONTINUED | OUTPATIENT
Start: 2018-02-17 | End: 2018-02-18

## 2018-02-17 RX ADMIN — FUROSEMIDE 40 MG: 40 TABLET ORAL at 09:19

## 2018-02-17 RX ADMIN — DOCUSATE SODIUM 100 MG: 100 CAPSULE, LIQUID FILLED ORAL at 16:46

## 2018-02-17 RX ADMIN — INSULIN LISPRO 2 UNITS: 100 INJECTION, SOLUTION INTRAVENOUS; SUBCUTANEOUS at 09:20

## 2018-02-17 RX ADMIN — ASPIRIN 81 MG 81 MG: 81 TABLET ORAL at 09:19

## 2018-02-17 RX ADMIN — CEPHALEXIN 500 MG: 500 CAPSULE ORAL at 05:49

## 2018-02-17 RX ADMIN — OXYCODONE HYDROCHLORIDE 10 MG: 10 TABLET ORAL at 17:15

## 2018-02-17 RX ADMIN — DOCUSATE SODIUM 100 MG: 100 CAPSULE, LIQUID FILLED ORAL at 09:19

## 2018-02-17 RX ADMIN — GABAPENTIN 300 MG: 300 CAPSULE ORAL at 16:45

## 2018-02-17 RX ADMIN — METOPROLOL TARTRATE 25 MG: 25 TABLET ORAL at 21:03

## 2018-02-17 RX ADMIN — INSULIN GLARGINE 10 UNITS: 100 INJECTION, SOLUTION SUBCUTANEOUS at 21:04

## 2018-02-17 RX ADMIN — INSULIN LISPRO 3 UNITS: 100 INJECTION, SOLUTION INTRAVENOUS; SUBCUTANEOUS at 21:04

## 2018-02-17 RX ADMIN — SENNOSIDES 8.6 MG: 8.6 TABLET, FILM COATED ORAL at 21:03

## 2018-02-17 RX ADMIN — METRONIDAZOLE 500 MG: 500 TABLET ORAL at 21:03

## 2018-02-17 RX ADMIN — GABAPENTIN 300 MG: 300 CAPSULE ORAL at 09:19

## 2018-02-17 RX ADMIN — METRONIDAZOLE 500 MG: 500 TABLET ORAL at 14:27

## 2018-02-17 RX ADMIN — PREGABALIN 75 MG: 75 CAPSULE ORAL at 09:19

## 2018-02-17 RX ADMIN — HEPARIN SODIUM 5000 UNITS: 5000 INJECTION, SOLUTION INTRAVENOUS; SUBCUTANEOUS at 14:27

## 2018-02-17 RX ADMIN — HEPARIN SODIUM 5000 UNITS: 5000 INJECTION, SOLUTION INTRAVENOUS; SUBCUTANEOUS at 21:04

## 2018-02-17 RX ADMIN — HEPARIN SODIUM 5000 UNITS: 5000 INJECTION, SOLUTION INTRAVENOUS; SUBCUTANEOUS at 05:49

## 2018-02-17 RX ADMIN — ACETAMINOPHEN 650 MG: 325 TABLET, FILM COATED ORAL at 14:27

## 2018-02-17 RX ADMIN — INSULIN LISPRO 2 UNITS: 100 INJECTION, SOLUTION INTRAVENOUS; SUBCUTANEOUS at 12:04

## 2018-02-17 RX ADMIN — MELATONIN TAB 3 MG 3 MG: 3 TAB at 21:03

## 2018-02-17 RX ADMIN — GABAPENTIN 300 MG: 300 CAPSULE ORAL at 21:03

## 2018-02-17 RX ADMIN — ACETAMINOPHEN 650 MG: 325 TABLET, FILM COATED ORAL at 05:49

## 2018-02-17 RX ADMIN — CEPHALEXIN 500 MG: 500 CAPSULE ORAL at 23:29

## 2018-02-17 RX ADMIN — ATORVASTATIN CALCIUM 80 MG: 80 TABLET, FILM COATED ORAL at 16:45

## 2018-02-17 RX ADMIN — CEPHALEXIN 500 MG: 500 CAPSULE ORAL at 16:46

## 2018-02-17 RX ADMIN — FUROSEMIDE 40 MG: 40 TABLET ORAL at 16:45

## 2018-02-17 RX ADMIN — ACETAMINOPHEN 650 MG: 325 TABLET, FILM COATED ORAL at 21:19

## 2018-02-17 RX ADMIN — METRONIDAZOLE 500 MG: 500 TABLET ORAL at 05:49

## 2018-02-17 RX ADMIN — METOPROLOL TARTRATE 25 MG: 25 TABLET ORAL at 09:19

## 2018-02-17 RX ADMIN — CEPHALEXIN 500 MG: 500 CAPSULE ORAL at 12:04

## 2018-02-17 RX ADMIN — INSULIN LISPRO 2 UNITS: 100 INJECTION, SOLUTION INTRAVENOUS; SUBCUTANEOUS at 16:56

## 2018-02-17 NOTE — PHYSICAL THERAPY NOTE
PT Progress Note     02/17/18 1130   Pain Assessment   Pain Assessment FLACC   Pain Score No Pain   Hospital Pain Intervention(s) Repositioned   Response to Interventions unchanged   Restrictions/Precautions   Weight Bearing Precautions Per Order Yes   RLE Weight Bearing Per Order NWB   Other Precautions Multiple lines;Cognitive;Pain   General   Chart Reviewed Yes   Response to Previous Treatment Patient with no complaints from previous session  Family/Caregiver Present Yes   Cognition   Attention Attends with cues to redirect   Orientation Level Oriented to person;Oriented to place   Following Commands Follows one step commands with increased time or repetition   Subjective   Subjective pt in bed agreeable for PT   Bed Mobility   Supine to Sit 4  Minimal assistance   Additional items HOB elevated; Bedrails; Increased time required;Verbal cues   Transfers   Sit to Stand 2  Maximal assistance   Additional items Assist x 2; Increased time required;Verbal cues   Stand to Sit 2  Maximal assistance   Additional items Assist x 2; Increased time required;Verbal cues   Endurance Deficit   Endurance Deficit Yes   Endurance Deficit Description cog and phys fatigue   Activity Tolerance   Activity Tolerance Patient limited by fatigue   Exercises   Neuro-Developmental Treatment (transf task breakdown )   Assessment   Prognosis Good   Problem List Decreased strength;Decreased range of motion;Decreased endurance; Impaired balance;Decreased mobility; Decreased coordination;Decreased cognition;Decreased safety awareness; Impaired judgement;Pain;Orthopedic restrictions;Decreased skin integrity; Impaired sensation   Assessment pt cont to exhibts profound funct mob deficits- requires min assist w sup-sit EOB w HOB elev, use of bed rail; 4 stand trials performed today; pt unable to rise from low  and pliant surface; requires max assist w transf sit-stand- difficulty achieving trunk and LLE full ext , maintaining RLE NWB status, unable to use UE effectively for WB and pivot shift LLE; pt cont to exhibit cog difficulty w motor task comprehension and execution; this session pt educated on sit-stand transf  w task breakdown activities  seated L foot pivot shift, chair dips, trunk fl COG over JASMIN; stand UE elbows locked; pt exhibits cog and phys fatigue readily and needs frequent rest intervals ; answers yes to feeling overwhelmed; d/w dtrs- pt has had mild memory issues PTA, however was completely indep w self-care and ADLs; dtr is a PMR MD and familiar w acute rehab - would pt to go to rehab prior to d/c home; rec cont PT IP rehab when med cleared   Goals   Patient Goals not expressed   STG Expiration Date 03/02/18   Plan   Treatment/Interventions Functional transfer training;LE strengthening/ROM; Elevations; Therapeutic exercise; Endurance training;Cognitive reorientation;Patient/family training;Equipment eval/education; Bed mobility; Compensatory technique education;Continued evaluation;Spoke to nursing;Spoke to case management; Family   Progress Progressing toward goals   Recommendation   Recommendation Post acute IP rehab   Equipment Recommended Walker; Wheelchair   PT - OK to Discharge Yes

## 2018-02-17 NOTE — ASSESSMENT & PLAN NOTE
Antibiotics as per Infectious Disease  Status post TMA  Antibiotics for next week  Wound care as per surgery  Note Podiatry input

## 2018-02-17 NOTE — PLAN OF CARE
Problem: PHYSICAL THERAPY ADULT  Goal: Performs mobility at highest level of function for planned discharge setting  See evaluation for individualized goals  Treatment/Interventions: Functional transfer training, LE strengthening/ROM, Elevations, Cognitive reorientation, Endurance training, Therapeutic exercise, Patient/family training, Equipment eval/education, Bed mobility, Gait training, Compensatory technique education, Continued evaluation, Spoke to nursing, Spoke to case management, OT  Equipment Recommended: Cathaleen Kick, Wheelchair       See flowsheet documentation for full assessment, interventions and recommendations  Prognosis: Good  Problem List: Decreased strength, Decreased range of motion, Decreased endurance, Impaired balance, Decreased mobility, Decreased coordination, Decreased cognition, Decreased safety awareness, Impaired judgement, Pain, Orthopedic restrictions, Decreased skin integrity, Impaired sensation  Assessment: pt cont to exhibts profound funct mob deficits- requires min assist w sup-sit EOB w HOB elev, use of bed rail; 4 stand trials performed today; pt unable to rise from low  and pliant surface; requires max assist w transf sit-stand- difficulty achieving trunk and LLE full ext , maintaining RLE NWB status, unable to use UE effectively for WB and pivot shift LLE; pt cont to exhibit cog difficulty w motor task comprehension and execution; this session pt educated on sit-stand transf  w task breakdown activities   seated L foot pivot shift, chair dips, trunk fl COG over JASMIN; stand UE elbows locked; pt exhibits cog and phys fatigue readily and needs frequent rest intervals ; answers yes to feeling overwhelmed; d/w dtrs- pt has had mild memory issues PTA, however was completely indep w self-care and ADLs; 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

## 2018-02-17 NOTE — PROGRESS NOTES
Progress Note - Susannah Modi 75 y o  male MRN: [id number]    Unit/Bed#: [facility] 931-01 Encounter: [id number]      CC: diabetes f/u    Subjective:   Susannah Modi is a 75y o  year old male with type 2 diabetes  Feels well  No complaints  No hypoglycemia  Appetite is not back to normal yet  Sugars high at bedtime and this morning  Objective:     Vitals: Blood pressure 151/72, pulse 77, temperature 99 7 °F (37 6 °C), temperature source Oral, resp  rate 18, height 5' 8" (1 727 m), weight 61 3 kg (135 lb 2 3 oz), SpO2 97 %  ,Body mass index is 20 55 kg/m²  Intake/Output Summary (Last 24 hours) at 02/17/18 0917  Last data filed at 02/17/18 8784   Gross per 24 hour   Intake             1080 ml   Output             1650 ml   Net             -570 ml       Physical Exam:  General: No acute distress  Alert, awake, and oriented x3  HEENT: Normocephalic, atraumatic  Heart: Regular rate and rhythm  Lungs: Clear  No respiratory distress  Extremities: No edema  Right TMA  Skin: Warm, dry  No rash  Neuro: Moves all 4 extremities spontaneously  Psych: Appropriate mood and affect  Cooperative  Lab, Imaging and other studies: I have personally reviewed pertinent reports  POC Glucose (mg/dl)   Date Value   02/17/2018 236 (H)   02/16/2018 238 (H)   02/16/2018 221 (H)   02/16/2018 101   02/16/2018 53 (L)   02/15/2018 114   02/15/2018 84   02/15/2018 81   02/15/2018 98   02/15/2018 65       Assessment:  Type 2 diabetes with hyperglycemia and hypoglycemia  Peripheral artery disease  Status post TMA    Plan:  Sugar has morning and last night at bedtime  Increase Lantus slightly back to 10 units at bedtime  Continue to hold off on mealtime insulin  Continue scale for correction  Continue to monitor and adjust as needed to monitor for hypoglycemia  Upon discharge, follows with Endocrinology at [facility]  Has been the climbing insulin upon discharge    Would likely discharge on home regimen with close follow up with his endocrinologist

## 2018-02-17 NOTE — PROGRESS NOTES
Progress Note Lars Ellis 1950, 75 y o  male MRN: [id number]    Unit/Bed#: [facility] 931-01 Encounter: [id number]    Primary Care Provider: Melanie Figueredo MD   Date and time admitted to hospital: 2018  4:18 PM        Great toe amputation status, right [facility])   Assessment & Plan    Antibiotics as per Infectious Disease  Status post TMA  Antibiotics for next week  Wound care as per surgery  Note Podiatry input  (HFpEF) heart failure with preserved ejection fraction [facility])   Assessment & Plan    Patient switch to p o  Lasix today as per Cardiology  Appears euvolemic  Continue to monitor  Acute respiratory failure (HCC)   Assessment & Plan    Continue with respiratory support        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)   Assessment & Plan    Blood sugar still elevated more than 200 most of the times  Endocrine following  Appreciate recommendations  VTE Pharmacologic Prophylaxis:   Pharmacologic: Heparin  Mechanical VTE Prophylaxis in Place: Yes    Patient Centered Rounds: I have performed bedside rounds with nursing staff today  Time Spent for Care: 15 minutes  More than 50% of total time spent on counseling and coordination of care as described above  Current Length of Stay: 12 day(s)    Current Patient Status: Inpatient   Certification Statement: The patient will continue to require additional inpatient hospital stay due to Need to monitor symptoms        Code Status: Level 1 - Full Code      Subjective:   Patient comfortable  Objective:     Vitals:   Temp (24hrs), Av 7 °F (37 1 °C), Min:97 8 °F (36 6 °C), Max:99 7 °F (37 6 °C)    HR:  [] 77  Resp:  [17-18] 18  BP: (116-170)/(55-85) 151/72  SpO2:  [96 %-99 %] 97 %  Body mass index is 20 55 kg/m²  Input and Output Summary (last 24 hours):        Intake/Output Summary (Last 24 hours) at 18 1016  Last data filed at 18 0601   Gross per 24 hour   Intake 580 ml   Output             1650 ml   Net            -1070 ml       Physical Exam:     Physical Exam   Constitutional: He is oriented to person, place, and time  He appears well-developed and well-nourished  HENT:   Head: Normocephalic and atraumatic  Eyes: Conjunctivae are normal  Pupils are equal, round, and reactive to light  Neck: Normal range of motion  Neck supple  No JVD present  No tracheal deviation present  No thyromegaly present  Cardiovascular: Normal rate  No murmur heard  Pulmonary/Chest: Effort normal and breath sounds normal    Abdominal: Soft  Bowel sounds are normal  He exhibits no distension  There is no tenderness  Neurological: He is alert and oriented to person, place, and time  Skin: Skin is warm and dry  No erythema  VAC wound to right lower extremity   Psychiatric: He has a normal mood and affect  Additional Data:     Labs:      Results from last 7 days  Lab Units 02/17/18  0558   WBC Thousand/uL 8 67   HEMOGLOBIN g/dL 10 4*   HEMATOCRIT % 31 5*   PLATELETS Thousands/uL 377   NEUTROS PCT % 72   LYMPHS PCT % 18   MONOS PCT % 9   EOS PCT % 1       Results from last 7 days  Lab Units 02/17/18  0558   SODIUM mmol/L 133*   POTASSIUM mmol/L 4 2   CHLORIDE mmol/L 94*   CO2 mmol/L 34*   BUN mg/dL 14   CREATININE mg/dL 0 83   CALCIUM mg/dL 8 5   GLUCOSE RANDOM mg/dL 208*           * I Have Reviewed All Lab Data Listed Above  * Additional Pertinent Lab Tests Reviewed:  All Labs Within Last 24 Hours Reviewed      Recent Cultures (last 7 days):           Last 24 Hours Medication List:     Current Facility-Administered Medications:  acetaminophen 650 mg Oral Q8H [facility] Jorge Cruz DO    aspirin 81 mg Oral Daily ARMANDO Schmidt    atorvastatin 80 mg Oral Daily With Hannah Jensen MD    cephalexin 500 mg Oral Q6H [facility] Chevy Shanks MD    docusate sodium 100 mg Oral BID Serene Mims PA-C    furosemide 40 mg Oral BID (diuretic) Monico Sommers DO gabapentin 300 mg Oral TID Velvet Russo MD    heparin (porcine) 5,000 Units Subcutaneous [facility] Adilson Jennings, DPM    hydrALAZINE 10 mg Intravenous Q6H PRN ARMANDO Hernández    HYDROcodone-acetaminophen 1 tablet Oral Q6H PRN Hetlatia Cruz, DO    insulin glargine 10 Units Subcutaneous HS Princess Sy, DO    insulin lispro 1-5 Units Subcutaneous TID AC Octavio Joyara, DO    insulin lispro 1-5 Units Subcutaneous HS Octavio Joyara, DO    lactated ringers 100 mL/hr Intravenous Continuous Chantaltheo Roberson MD Last Rate: Stopped (02/16/18 0145)   melatonin 3 mg Oral HS Franky Alvarez PA-C    metoprolol tartrate 25 mg Oral Q12H [address] Sandy Colon MD    metroNIDAZOLE 500 mg Oral [facility] Rosalio Kocher, MD    oxyCODONE 10 mg Oral Q4H PRN Velvet Russo MD    oxyCODONE 5 mg Oral Q4H PRN ARMANDO Hernández    polyethylene glycol 17 g Oral Daily PRN Adam Jefferson PA-C    pregabalin 75 mg Oral Daily Velvet Russo MD    senna 1 tablet Oral HS Adam Jefferson PA-C         Today, Patient Was Seen By: Candance Dales, DO    ** Please Note: Dictation voice to text software may have been used in the creation of this document   **

## 2018-02-17 NOTE — PROGRESS NOTES
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   Patient is status post open 1st toe amputation  Judd Esters, he has gangrenous changes in his other toes also  Xenia Isabel is now status post TMA   He is also now status post STSG  Antibiotic plan as in above  Serial foot exams       3   Severe sepsis   Source of sepsis is most likely right foot infection   Patient is clinically improved with 1st toe amputation and IV antibiotic  Xenia Isabel is now status post TMA   He should do well   Blood cultures remain negative  Antibiotic plan as in above  Monitor hemodynamics      4   PVD    Patient is status post arteriogram with successful intervention   Circulatory status has been optimized  Vascular surgery following      5   Leukocytosis   WBC increased postop   It is normal again, as expected   Patient remains clinically and systemically well  Monitor WBC      6  Acute hypoxic respiratory failure   I suspect this is secondary to sepsis and pulmonary edema   No obvious pneumonia on CXR    Patient's respiratory status now improved, currently on room air  O2 support per primary service      7  DM with hyperglycemia on admission   Patient appears noncompliant with diabetic medication   This clearly contributes to infection development    Blood sugar management per Medicine Service      8   PCN allergy on allergy list   Patient does not recall reaction  Xenia Isabel states that this was when he was a young child  Xenia Isabel is tolerating cephalosporin well      Discussed with the patient and his daughter in detail regarding the above       Antibiotics:  Keflex/Flagyl  POD #       Subjective:  Patient feels well  No further foot pain  No dyspnea  Temperature stays down   No chills  He is tolerating antibiotics well   No nausea, vomiting or diarrhea  Objective:  Vitals:  HR:  [] 77  Resp:  [17-18] 18  BP: (116-170)/(55-85) 151/72  SpO2:  [96 %-99 %] 97 %  Temp (24hrs), Av 7 °F (37 1 °C), Min:97 8 °F (36 6 °C), Max:99 7 °F (37 6 °C)  Current: Temperature: 99 7 °F (37 6 °C)    Physical Exam:     General: Awake, alert, cooperative, no distress  Lungs: Expansion symmetric, no rales, no wheezing, respirations unlabored  Heart[de-identified]  Regular rate and rhythm, S1 and S2 normal, no murmur  Abdomen: Soft, nondistended, non-tender, bowel sounds active all four quadrants,        no masses, no organomegaly  Extremities: No edema  Foot wound with VAC in place  Sparse serous drainage  No erythema  No tenderness  Skin:  No rash  Invasive Devices     Peripheral Intravenous Line            Peripheral IV 18 Right;Upper Arm less than 1 day          Drain            External Urinary Catheter Medium 7 days    Negative Pressure Wound Therapy (V A C ) Foot Right 4 days                Labs studies:   I have personally reviewed pertinent labs      Results from last 7 days  Lab Units 18  0558 18  1146 02/15/18  0526   SODIUM mmol/L 133* 133* 136   POTASSIUM mmol/L 4 2 4 0 3 0*   CHLORIDE mmol/L 94* 92* 98*   CO2 mmol/L 34* 33* 34*   ANION GAP mmol/L 5 8 4   BUN mg/dL 14 13 14   CREATININE mg/dL 0 83 0 79 0 76   EGFR ml/min/1 73sq m 90 92 94   GLUCOSE RANDOM mg/dL 208* 106 67   CALCIUM mg/dL 8 5 8 6 7 8*       Results from last 7 days  Lab Units 18  0558 18  1146 02/15/18  0526 18  0547   WBC Thousand/uL 8 67  --  12 47* 21 64*   HEMOGLOBIN g/dL 10 4*  --  9 5* 11 1*   PLATELETS Thousands/uL 377 347 289 325 Imaging Studies:   I have personally reviewed pertinent imaging study reports and images in PACS  EKG, Pathology, and Other Studies:   I have personally reviewed pertinent reports

## 2018-02-17 NOTE — PLAN OF CARE
Problem: Potential for Falls  Goal: Patient will remain free of falls  INTERVENTIONS:  - Assess patient frequently for physical needs  -  Identify cognitive and physical deficits and behaviors that affect risk of falls  -  [place] fall precautions as indicated by assessment   - Educate patient/family on patient safety including physical limitations  - Instruct patient to call for assistance with activity based on assessment  - Modify environment to reduce risk of injury  - Consider OT/PT consult to assist with strengthening/mobility   Outcome: Progressing      Problem: Nutrition/Hydration-ADULT  Goal: Nutrient/Hydration intake appropriate for improving, restoring or maintaining nutritional needs  Monitor and assess patient's nutrition/hydration status for malnutrition (ex- brittle hair, bruises, dry skin, pale skin and conjunctiva, muscle wasting, smooth red tongue, and disorientation)  Collaborate with interdisciplinary team and initiate plan and interventions as ordered  Monitor patient's weight and dietary intake as ordered or per policy  Utilize nutrition screening tool and intervene per policy  Determine patient's food preferences and provide high-protein, high-caloric foods as appropriate       INTERVENTIONS:  - Monitor oral intake, urinary output, labs, and treatment plans  - Assess nutrition and hydration status and recommend course of action  - Evaluate amount of meals eaten  - Assist patient with eating if necessary   - Allow adequate time for meals  - Recommend/ encourage appropriate diets, oral nutritional supplements, and vitamin/mineral supplements  - Order, calculate, and assess calorie counts as needed  - Recommend, monitor, and adjust tube feedings and TPN/PPN based on assessed needs  - Assess need for intravenous fluids  - Provide specific nutrition/hydration education as appropriate  - Include patient/family/caregiver in decisions related to nutrition   Outcome: Progressing      Problem: Prexisting or High Potential for Compromised Skin Integrity  Goal: Skin integrity is maintained or improved  INTERVENTIONS:  - Identify patients at risk for skin breakdown  - Assess and monitor skin integrity  - Assess and monitor nutrition and hydration status  - Monitor labs (i e  albumin)  - Assess for incontinence   - Turn and reposition patient  - Assist with mobility/ambulation  - Relieve pressure over bony prominences  - Avoid friction and shearing  - Provide appropriate hygiene as needed including keeping skin clean and dry  - Evaluate need for skin moisturizer/barrier cream  - Collaborate with interdisciplinary team (i e  Nutrition, Rehabilitation, etc )   - Patient/family teaching   Outcome: Progressing      Problem: DISCHARGE PLANNING - CARE MANAGEMENT  Goal: Discharge to post-acute care or home with appropriate resources  INTERVENTIONS:  - Conduct assessment to determine patient/family and health care team treatment goals, and need for post-acute services based on payer coverage, community resources, and patient preferences, and barriers to discharge  - Address psychosocial, clinical, and financial barriers to discharge as identified in assessment in conjunction with the patient/family and health care team  - Arrange appropriate level of post-acute services according to patient's   needs and preference and payer coverage in collaboration with the physician and health care team  - Communicate with and update the patient/family, physician, and health care team regarding progress on the discharge plan  - Arrange appropriate transportation to post-acute venues  - Anticipated TMA on Friday  CM continues to follow for DC needs post procedure      Outcome: Progressing      Problem: RESPIRATORY - ADULT  Goal: Achieves optimal ventilation and oxygenation  INTERVENTIONS:  - Assess for changes in respiratory status  - Assess for changes in mentation and behavior  - Position to facilitate oxygenation and minimize respiratory effort  - Oxygen administration by appropriate delivery method based on oxygen saturation (per order) or ABGs  - Initiate smoking cessation education as indicated  - Encourage broncho-pulmonary hygiene including cough, deep breathe, Incentive Spirometry  - Assess the need for suctioning and aspirate as needed  - Assess and instruct to report SOB or any respiratory difficulty  - Respiratory Therapy support as indicated   Outcome: Progressing      Problem: METABOLIC, FLUID AND ELECTROLYTES - ADULT  Goal: Electrolytes maintained within normal limits  INTERVENTIONS:  - Monitor labs and assess patient for signs and symptoms of electrolyte imbalances  - Administer electrolyte replacement as ordered  - Monitor response to electrolyte replacements, including repeat lab results as appropriate  - Instruct patient on fluid and nutrition as appropriate   Outcome: Progressing    Goal: Fluid balance maintained  INTERVENTIONS:  - Monitor labs and assess for signs and symptoms of volume excess or deficit  - Monitor I/O and WT  - Instruct patient on fluid and nutrition as appropriate   Outcome: Progressing    Goal: Glucose maintained within target range  INTERVENTIONS:  - Monitor Blood Glucose as ordered  - Assess for signs and symptoms of hyperglycemia and hypoglycemia  - Administer ordered medications to maintain glucose within target range  - Assess nutritional intake and initiate nutrition service referral as needed   Outcome: Progressing      Problem: PAIN - ADULT  Goal: Verbalizes/displays adequate comfort level or baseline comfort level  Interventions:  - Encourage patient to monitor pain and request assistance  - Assess pain using appropriate pain scale  - Administer analgesics based on type and severity of pain and evaluate response  - Implement non-pharmacological measures as appropriate and evaluate response  - Consider cultural and social influences on pain and pain management  - Notify physician/advanced practitioner if interventions unsuccessful or patient reports new pain   Outcome: Progressing      Problem: INFECTION - ADULT  Goal: Absence or prevention of progression during hospitalization  INTERVENTIONS:  - Assess and monitor for signs and symptoms of infection  - Monitor lab/diagnostic results  - Monitor all insertion sites, i e  indwelling lines, tubes, and drains  - Monitor endotracheal (as able) and nasal secretions for changes in amount and color  - [place] appropriate cooling/warming therapies per order  - Administer medications as ordered  - Instruct and encourage patient and family to use good hand hygiene technique  - Identify and instruct in appropriate isolation precautions for identified infection/condition   Outcome: Progressing    Goal: Absence of fever/infection during neutropenic period  INTERVENTIONS:  - Monitor WBC  - Implement neutropenic guidelines  Outcome: Progressing      Problem: SAFETY ADULT  Goal: Maintain or return to baseline ADL function  INTERVENTIONS:  -  Assess patient's ability to carry out ADLs; assess patient's baseline for ADL function and identify physical deficits which impact ability to perform ADLs (bathing, care of mouth/teeth, toileting, grooming, dressing, etc )  - Assess/evaluate cause of self-care deficits   - Assess range of motion  - Assess patient's mobility; develop plan if impaired  - Assess patient's need for assistive devices and provide as appropriate  - Encourage maximum independence but intervene and supervise when necessary  ¯ Involve family in performance of ADLs  ¯ Assess for home care needs following discharge   ¯ Request OT consult to assist with ADL evaluation and planning for discharge  ¯ Provide patient education as appropriate   Outcome: Progressing    Goal: Maintain or return mobility status to optimal level  INTERVENTIONS:  - Assess patient's baseline mobility status (ambulation, transfers, stairs, etc )    - Identify cognitive and physical deficits and behaviors that affect mobility  - Identify mobility aids required to assist with transfers and/or ambulation (gait belt, sit-to-stand, lift, walker, cane, etc )  - [place] fall precautions as indicated by assessment  - Record patient progress and toleration of activity level on Mobility SBAR; progress patient to next Phase/Stage  - Instruct patient to call for assistance with activity based on assessment  - Request Rehabilitation consult to assist with strengthening/weightbearing, etc    Outcome: Progressing      Problem: DISCHARGE PLANNING  Goal: Discharge to home or other facility with appropriate resources  INTERVENTIONS:  - Identify barriers to discharge w/patient and caregiver  - Arrange for needed discharge resources and transportation as appropriate  - Identify discharge learning needs (meds, wound care, etc )  - Arrange for interpretive services to assist at discharge as needed  - Refer to Case Management Department for coordinating discharge planning if the patient needs post-hospital services based on physician/advanced practitioner order or complex needs related to functional status, cognitive ability, or social support system   Outcome: Progressing      Problem: Knowledge Deficit  Goal: Patient/family/caregiver demonstrates understanding of disease process, treatment plan, medications, and discharge instructions  Complete learning assessment and assess knowledge base    Interventions:  - Provide teaching at level of understanding  - Provide teaching via preferred learning methods   Outcome: Progressing      Problem: SKIN/TISSUE INTEGRITY - ADULT  Goal: Skin integrity remains intact  INTERVENTIONS  - Identify patients at risk for skin breakdown  - Assess and monitor skin integrity  - Assess and monitor nutrition and hydration status  - Monitor labs (i e  albumin)  - Assess for incontinence   - Turn and reposition patient  - Assist with mobility/ambulation  - Relieve pressure over bony prominences  - Avoid friction and shearing  - Provide appropriate hygiene as needed including keeping skin clean and dry  - Evaluate need for skin moisturizer/barrier cream  - Collaborate with interdisciplinary team (i e  Nutrition, Rehabilitation, etc )   - Patient/family teaching   Outcome: Progressing    Goal: Incision(s), wounds(s) or drain site(s) healing without S/S of infection  INTERVENTIONS  - Assess and document risk factors for skin impairment   - Assess and document dressing, incision, wound bed, drain sites and surrounding tissue  - Initiate Nutrition services consult and/or wound management as needed   Outcome: Progressing      Problem: MUSCULOSKELETAL - ADULT  Goal: Maintain or return mobility to safest level of function  INTERVENTIONS:  - Assess patient's ability to carry out ADLs; assess patient's baseline for ADL function and identify physical deficits which impact ability to perform ADLs (bathing, care of mouth/teeth, toileting, grooming, dressing, etc )  - Assess/evaluate cause of self-care deficits   - Assess range of motion  - Assess patient's mobility; develop plan if impaired  - Assess patient's need for assistive devices and provide as appropriate  - Encourage maximum independence but intervene and supervise when necessary  - Involve family in performance of ADLs  - Assess for home care needs following discharge   - Request OT consult to assist with ADL evaluation and planning for discharge  - Provide patient education as appropriate   Outcome: Progressing    Goal: Maintain proper alignment of affected body part  INTERVENTIONS:  - Support, maintain and protect limb and body alignment  - Provide pt/fam with appropriate education   Outcome: Progressing

## 2018-02-17 NOTE — PHYSICAL THERAPY NOTE
PT Note  PT attempted but asked to return "I just had breakfast, I can't do therapy"; PT will f/u as able

## 2018-02-18 LAB
GLUCOSE SERPL-MCNC: 216 MG/DL (ref 65–140)
GLUCOSE SERPL-MCNC: 257 MG/DL (ref 65–140)
GLUCOSE SERPL-MCNC: 275 MG/DL (ref 65–140)
GLUCOSE SERPL-MCNC: 313 MG/DL (ref 65–140)

## 2018-02-18 PROCEDURE — 99232 SBSQ HOSP IP/OBS MODERATE 35: CPT | Performed by: INTERNAL MEDICINE

## 2018-02-18 PROCEDURE — 82948 REAGENT STRIP/BLOOD GLUCOSE: CPT

## 2018-02-18 PROCEDURE — 97112 NEUROMUSCULAR REEDUCATION: CPT

## 2018-02-18 PROCEDURE — 97530 THERAPEUTIC ACTIVITIES: CPT

## 2018-02-18 PROCEDURE — 99233 SBSQ HOSP IP/OBS HIGH 50: CPT | Performed by: INTERNAL MEDICINE

## 2018-02-18 PROCEDURE — 97535 SELF CARE MNGMENT TRAINING: CPT

## 2018-02-18 RX ORDER — INSULIN GLARGINE 100 [IU]/ML
15 INJECTION, SOLUTION SUBCUTANEOUS
Status: DISCONTINUED | OUTPATIENT
Start: 2018-02-18 | End: 2018-02-19

## 2018-02-18 RX ORDER — PREGABALIN 75 MG/1
75 CAPSULE ORAL
Status: DISCONTINUED | OUTPATIENT
Start: 2018-02-18 | End: 2018-02-21 | Stop reason: HOSPADM

## 2018-02-18 RX ADMIN — DOCUSATE SODIUM 100 MG: 100 CAPSULE, LIQUID FILLED ORAL at 18:04

## 2018-02-18 RX ADMIN — FUROSEMIDE 40 MG: 40 TABLET ORAL at 18:04

## 2018-02-18 RX ADMIN — PREGABALIN 75 MG: 75 CAPSULE ORAL at 21:32

## 2018-02-18 RX ADMIN — DOCUSATE SODIUM 100 MG: 100 CAPSULE, LIQUID FILLED ORAL at 09:41

## 2018-02-18 RX ADMIN — ACETAMINOPHEN 650 MG: 325 TABLET, FILM COATED ORAL at 21:32

## 2018-02-18 RX ADMIN — METRONIDAZOLE 500 MG: 500 TABLET ORAL at 14:32

## 2018-02-18 RX ADMIN — ACETAMINOPHEN 650 MG: 325 TABLET, FILM COATED ORAL at 14:32

## 2018-02-18 RX ADMIN — METRONIDAZOLE 500 MG: 500 TABLET ORAL at 06:46

## 2018-02-18 RX ADMIN — CEPHALEXIN 500 MG: 500 CAPSULE ORAL at 18:04

## 2018-02-18 RX ADMIN — ACETAMINOPHEN 650 MG: 325 TABLET, FILM COATED ORAL at 06:47

## 2018-02-18 RX ADMIN — CEPHALEXIN 500 MG: 500 CAPSULE ORAL at 11:52

## 2018-02-18 RX ADMIN — INSULIN LISPRO 1 UNITS: 100 INJECTION, SOLUTION INTRAVENOUS; SUBCUTANEOUS at 21:32

## 2018-02-18 RX ADMIN — ATORVASTATIN CALCIUM 80 MG: 80 TABLET, FILM COATED ORAL at 18:04

## 2018-02-18 RX ADMIN — INSULIN LISPRO 2 UNITS: 100 INJECTION, SOLUTION INTRAVENOUS; SUBCUTANEOUS at 18:05

## 2018-02-18 RX ADMIN — ASPIRIN 81 MG 81 MG: 81 TABLET ORAL at 09:41

## 2018-02-18 RX ADMIN — HEPARIN SODIUM 5000 UNITS: 5000 INJECTION, SOLUTION INTRAVENOUS; SUBCUTANEOUS at 06:45

## 2018-02-18 RX ADMIN — INSULIN LISPRO 3 UNITS: 100 INJECTION, SOLUTION INTRAVENOUS; SUBCUTANEOUS at 11:53

## 2018-02-18 RX ADMIN — GABAPENTIN 300 MG: 300 CAPSULE ORAL at 21:32

## 2018-02-18 RX ADMIN — INSULIN LISPRO 3 UNITS: 100 INJECTION, SOLUTION INTRAVENOUS; SUBCUTANEOUS at 11:52

## 2018-02-18 RX ADMIN — INSULIN LISPRO 3 UNITS: 100 INJECTION, SOLUTION INTRAVENOUS; SUBCUTANEOUS at 18:05

## 2018-02-18 RX ADMIN — HEPARIN SODIUM 5000 UNITS: 5000 INJECTION, SOLUTION INTRAVENOUS; SUBCUTANEOUS at 14:32

## 2018-02-18 RX ADMIN — FUROSEMIDE 40 MG: 40 TABLET ORAL at 09:41

## 2018-02-18 RX ADMIN — CEPHALEXIN 500 MG: 500 CAPSULE ORAL at 06:45

## 2018-02-18 RX ADMIN — METOPROLOL TARTRATE 25 MG: 25 TABLET ORAL at 09:41

## 2018-02-18 RX ADMIN — METRONIDAZOLE 500 MG: 500 TABLET ORAL at 21:32

## 2018-02-18 RX ADMIN — INSULIN GLARGINE 15 UNITS: 100 INJECTION, SOLUTION SUBCUTANEOUS at 21:49

## 2018-02-18 RX ADMIN — METOPROLOL TARTRATE 25 MG: 25 TABLET ORAL at 21:32

## 2018-02-18 RX ADMIN — SENNOSIDES 8.6 MG: 8.6 TABLET, FILM COATED ORAL at 21:32

## 2018-02-18 RX ADMIN — MELATONIN TAB 3 MG 3 MG: 3 TAB at 21:32

## 2018-02-18 RX ADMIN — HEPARIN SODIUM 5000 UNITS: 5000 INJECTION, SOLUTION INTRAVENOUS; SUBCUTANEOUS at 21:33

## 2018-02-18 RX ADMIN — INSULIN LISPRO 2 UNITS: 100 INJECTION, SOLUTION INTRAVENOUS; SUBCUTANEOUS at 09:41

## 2018-02-18 NOTE — PLAN OF CARE
Problem: Potential for Falls  Goal: Patient will remain free of falls  INTERVENTIONS:  - Assess patient frequently for physical needs  -  Identify cognitive and physical deficits and behaviors that affect risk of falls  -  [place] fall precautions as indicated by assessment   - Educate patient/family on patient safety including physical limitations  - Instruct patient to call for assistance with activity based on assessment  - Modify environment to reduce risk of injury  - Consider OT/PT consult to assist with strengthening/mobility   Outcome: Progressing      Problem: Nutrition/Hydration-ADULT  Goal: Nutrient/Hydration intake appropriate for improving, restoring or maintaining nutritional needs  Monitor and assess patient's nutrition/hydration status for malnutrition (ex- brittle hair, bruises, dry skin, pale skin and conjunctiva, muscle wasting, smooth red tongue, and disorientation)  Collaborate with interdisciplinary team and initiate plan and interventions as ordered  Monitor patient's weight and dietary intake as ordered or per policy  Utilize nutrition screening tool and intervene per policy  Determine patient's food preferences and provide high-protein, high-caloric foods as appropriate       INTERVENTIONS:  - Monitor oral intake, urinary output, labs, and treatment plans  - Assess nutrition and hydration status and recommend course of action  - Evaluate amount of meals eaten  - Assist patient with eating if necessary   - Allow adequate time for meals  - Recommend/ encourage appropriate diets, oral nutritional supplements, and vitamin/mineral supplements  - Order, calculate, and assess calorie counts as needed  - Recommend, monitor, and adjust tube feedings and TPN/PPN based on assessed needs  - Assess need for intravenous fluids  - Provide specific nutrition/hydration education as appropriate  - Include patient/family/caregiver in decisions related to nutrition   Outcome: Progressing      Problem: Prexisting or High Potential for Compromised Skin Integrity  Goal: Skin integrity is maintained or improved  INTERVENTIONS:  - Identify patients at risk for skin breakdown  - Assess and monitor skin integrity  - Assess and monitor nutrition and hydration status  - Monitor labs (i e  albumin)  - Assess for incontinence   - Turn and reposition patient  - Assist with mobility/ambulation  - Relieve pressure over bony prominences  - Avoid friction and shearing  - Provide appropriate hygiene as needed including keeping skin clean and dry  - Evaluate need for skin moisturizer/barrier cream  - Collaborate with interdisciplinary team (i e  Nutrition, Rehabilitation, etc )   - Patient/family teaching   Outcome: Progressing      Problem: DISCHARGE PLANNING - CARE MANAGEMENT  Goal: Discharge to post-acute care or home with appropriate resources  INTERVENTIONS:  - Conduct assessment to determine patient/family and health care team treatment goals, and need for post-acute services based on payer coverage, community resources, and patient preferences, and barriers to discharge  - Address psychosocial, clinical, and financial barriers to discharge as identified in assessment in conjunction with the patient/family and health care team  - Arrange appropriate level of post-acute services according to patient's   needs and preference and payer coverage in collaboration with the physician and health care team  - Communicate with and update the patient/family, physician, and health care team regarding progress on the discharge plan  - Arrange appropriate transportation to post-acute venues  - Anticipated TMA on Friday  CM continues to follow for DC needs post procedure      Outcome: Progressing      Problem: RESPIRATORY - ADULT  Goal: Achieves optimal ventilation and oxygenation  INTERVENTIONS:  - Assess for changes in respiratory status  - Assess for changes in mentation and behavior  - Position to facilitate oxygenation and minimize respiratory effort  - Oxygen administration by appropriate delivery method based on oxygen saturation (per order) or ABGs  - Initiate smoking cessation education as indicated  - Encourage broncho-pulmonary hygiene including cough, deep breathe, Incentive Spirometry  - Assess the need for suctioning and aspirate as needed  - Assess and instruct to report SOB or any respiratory difficulty  - Respiratory Therapy support as indicated   Outcome: Progressing      Problem: METABOLIC, FLUID AND ELECTROLYTES - ADULT  Goal: Electrolytes maintained within normal limits  INTERVENTIONS:  - Monitor labs and assess patient for signs and symptoms of electrolyte imbalances  - Administer electrolyte replacement as ordered  - Monitor response to electrolyte replacements, including repeat lab results as appropriate  - Instruct patient on fluid and nutrition as appropriate   Outcome: Progressing    Goal: Fluid balance maintained  INTERVENTIONS:  - Monitor labs and assess for signs and symptoms of volume excess or deficit  - Monitor I/O and WT  - Instruct patient on fluid and nutrition as appropriate   Outcome: Progressing    Goal: Glucose maintained within target range  INTERVENTIONS:  - Monitor Blood Glucose as ordered  - Assess for signs and symptoms of hyperglycemia and hypoglycemia  - Administer ordered medications to maintain glucose within target range  - Assess nutritional intake and initiate nutrition service referral as needed   Outcome: Progressing      Problem: PAIN - ADULT  Goal: Verbalizes/displays adequate comfort level or baseline comfort level  Interventions:  - Encourage patient to monitor pain and request assistance  - Assess pain using appropriate pain scale  - Administer analgesics based on type and severity of pain and evaluate response  - Implement non-pharmacological measures as appropriate and evaluate response  - Consider cultural and social influences on pain and pain management  - Notify physician/advanced practitioner if interventions unsuccessful or patient reports new pain   Outcome: Progressing      Problem: INFECTION - ADULT  Goal: Absence or prevention of progression during hospitalization  INTERVENTIONS:  - Assess and monitor for signs and symptoms of infection  - Monitor lab/diagnostic results  - Monitor all insertion sites, i e  indwelling lines, tubes, and drains  - Monitor endotracheal (as able) and nasal secretions for changes in amount and color  - [place] appropriate cooling/warming therapies per order  - Administer medications as ordered  - Instruct and encourage patient and family to use good hand hygiene technique  - Identify and instruct in appropriate isolation precautions for identified infection/condition   Outcome: Progressing    Goal: Absence of fever/infection during neutropenic period  INTERVENTIONS:  - Monitor WBC  - Implement neutropenic guidelines   Outcome: Progressing      Problem: SAFETY ADULT  Goal: Maintain or return to baseline ADL function  INTERVENTIONS:  -  Assess patient's ability to carry out ADLs; assess patient's baseline for ADL function and identify physical deficits which impact ability to perform ADLs (bathing, care of mouth/teeth, toileting, grooming, dressing, etc )  - Assess/evaluate cause of self-care deficits   - Assess range of motion  - Assess patient's mobility; develop plan if impaired  - Assess patient's need for assistive devices and provide as appropriate  - Encourage maximum independence but intervene and supervise when necessary  ¯ Involve family in performance of ADLs  ¯ Assess for home care needs following discharge   ¯ Request OT consult to assist with ADL evaluation and planning for discharge  ¯ Provide patient education as appropriate   Outcome: Progressing    Goal: Maintain or return mobility status to optimal level  INTERVENTIONS:  - Assess patient's baseline mobility status (ambulation, transfers, stairs, etc )    - Identify cognitive and physical deficits and behaviors that affect mobility  - Identify mobility aids required to assist with transfers and/or ambulation (gait belt, sit-to-stand, lift, walker, cane, etc )  - [place] fall precautions as indicated by assessment  - Record patient progress and toleration of activity level on Mobility SBAR; progress patient to next Phase/Stage  - Instruct patient to call for assistance with activity based on assessment  - Request Rehabilitation consult to assist with strengthening/weightbearing, etc    Outcome: Progressing      Problem: DISCHARGE PLANNING  Goal: Discharge to home or other facility with appropriate resources  INTERVENTIONS:  - Identify barriers to discharge w/patient and caregiver  - Arrange for needed discharge resources and transportation as appropriate  - Identify discharge learning needs (meds, wound care, etc )  - Arrange for interpretive services to assist at discharge as needed  - Refer to Case Management Department for coordinating discharge planning if the patient needs post-hospital services based on physician/advanced practitioner order or complex needs related to functional status, cognitive ability, or social support system   Outcome: Progressing      Problem: Knowledge Deficit  Goal: Patient/family/caregiver demonstrates understanding of disease process, treatment plan, medications, and discharge instructions  Complete learning assessment and assess knowledge base    Interventions:  - Provide teaching at level of understanding  - Provide teaching via preferred learning methods   Outcome: Progressing      Problem: SKIN/TISSUE INTEGRITY - ADULT  Goal: Skin integrity remains intact  INTERVENTIONS  - Identify patients at risk for skin breakdown  - Assess and monitor skin integrity  - Assess and monitor nutrition and hydration status  - Monitor labs (i e  albumin)  - Assess for incontinence   - Turn and reposition patient  - Assist with mobility/ambulation  - Relieve pressure over bony prominences  - Avoid friction and shearing  - Provide appropriate hygiene as needed including keeping skin clean and dry  - Evaluate need for skin moisturizer/barrier cream  - Collaborate with interdisciplinary team (i e  Nutrition, Rehabilitation, etc )   - Patient/family teaching   Outcome: Progressing    Goal: Incision(s), wounds(s) or drain site(s) healing without S/S of infection  INTERVENTIONS  - Assess and document risk factors for skin impairment   - Assess and document dressing, incision, wound bed, drain sites and surrounding tissue  - Initiate Nutrition services consult and/or wound management as needed   Outcome: Progressing      Problem: MUSCULOSKELETAL - ADULT  Goal: Maintain or return mobility to safest level of function  INTERVENTIONS:  - Assess patient's ability to carry out ADLs; assess patient's baseline for ADL function and identify physical deficits which impact ability to perform ADLs (bathing, care of mouth/teeth, toileting, grooming, dressing, etc )  - Assess/evaluate cause of self-care deficits   - Assess range of motion  - Assess patient's mobility; develop plan if impaired  - Assess patient's need for assistive devices and provide as appropriate  - Encourage maximum independence but intervene and supervise when necessary  - Involve family in performance of ADLs  - Assess for home care needs following discharge   - Request OT consult to assist with ADL evaluation and planning for discharge  - Provide patient education as appropriate   Outcome: Progressing    Goal: Maintain proper alignment of affected body part  INTERVENTIONS:  - Support, maintain and protect limb and body alignment  - Provide pt/fam with appropriate education   Outcome: Progressing

## 2018-02-18 NOTE — RESTORATIVE TECHNICIAN NOTE
Restorative Specialist Mobility Note       Activity: Other (Comment) (Assisted pt back to bed )     Assistive Device: Other (Comment) (HHAx2)     Ambulation Response: Tolerated fairly well  Repositioned: Supine           Range of Motion: Active, All extremities      Pt left resting comfortably in bed, with bed alarm activated and call bell within reach

## 2018-02-18 NOTE — PROGRESS NOTES
Progress Note Melyssa Booker 1950, 75 y o  male MRN: [id number]    Unit/Bed#: [facility] 931-01 Encounter: [id number]    Primary Care Provider: Mark Birmingham MD   Date and time admitted to hospital: 2/5/2018  4:18 PM        Great toe amputation status, right [facility])   Assessment & Plan    Antibiotics as per Infectious Disease  Status post TMA  Antibiotics for next week  Wound care as per surgery  Note Podiatry input  Gangrene of toe of right foot [facility])   Assessment & Plan    Patient is status transmetatarsal amputation on 2/12/2018  Note Podiatry input regarding plans for flap  Continue IV antibiotics as per ID  Note surgery input from yesterday  Coronary artery disease involving native coronary artery   Assessment & Plan    As evidence by the dense coronary calcifications seen on CT  Cardiology following  Will need evaluation after he has recovered from his amputation  Acute respiratory failure (HCC)   Assessment & Plan    Continue with respiratory support        Cellulitis of right foot   Assessment & Plan    · Antibiotics as per ID        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)   Assessment & Plan    Blood sugar still elevated more than 200 most of the times  Endocrine following  Appreciate recommendations  VTE Pharmacologic Prophylaxis:   Pharmacologic: Heparin  Mechanical VTE Prophylaxis in Place: Yes    Patient Centered Rounds: I have performed bedside rounds with nursing staff today  Time Spent for Care: 15 minutes  More than 50% of total time spent on counseling and coordination of care as described above      Current Length of Stay: 13 day(s)    Current Patient Status: Inpatient   Certification Statement: The patient will continue to require additional inpatient hospital stay due to Need to monitor symptoms        Code Status: Level 1 - Full Code      Subjective:   Patient comfortable    Objective:     Vitals:   Temp (24hrs), Av 9 °F (36 6 °C), Min:97 7 °F (36 5 °C), Max:98 1 °F (36 7 °C)    HR:  [86-92] 89  Resp:  [18] 18  BP: (136-159)/(70-84) 153/70  SpO2:  [97 %-100 %] 100 %  Body mass index is 20 55 kg/m²  Input and Output Summary (last 24 hours): Intake/Output Summary (Last 24 hours) at 18 1109  Last data filed at 18 8685   Gross per 24 hour   Intake              360 ml   Output             3100 ml   Net            -2740 ml       Physical Exam:     Physical Exam   Neurological: He is alert  Additional Data:     Labs:      Results from last 7 days  Lab Units 18  0558   WBC Thousand/uL 8 67   HEMOGLOBIN g/dL 10 4*   HEMATOCRIT % 31 5*   PLATELETS Thousands/uL 377   NEUTROS PCT % 72   LYMPHS PCT % 18   MONOS PCT % 9   EOS PCT % 1       Results from last 7 days  Lab Units 18  0558   SODIUM mmol/L 133*   POTASSIUM mmol/L 4 2   CHLORIDE mmol/L 94*   CO2 mmol/L 34*   BUN mg/dL 14   CREATININE mg/dL 0 83   CALCIUM mg/dL 8 5   GLUCOSE RANDOM mg/dL 208*           * I Have Reviewed All Lab Data Listed Above  * Additional Pertinent Lab Tests Reviewed:  All Labs Within Last 24 Hours Reviewed      Recent Cultures (last 7 days):           Last 24 Hours Medication List:     Current Facility-Administered Medications:  acetaminophen 650 mg Oral Q8H [address] Hetlatia Cruz, DO    aspirin 81 mg Oral Daily ARMANDO Schmidt    atorvastatin 80 mg Oral Daily With Anne Arora MD    cephalexin 500 mg Oral Q6H [address] Ying Mckeon MD    docusate sodium 100 mg Oral BID Amilcar Roche, PAAnyC    furosemide 40 mg Oral BID (diuretic) Swapnil Loera DO    gabapentin 300 mg Oral TID Smita Ledezma MD    heparin (porcine) 5,000 Units Subcutaneous [facility] Keira Hui DPM    hydrALAZINE 10 mg Intravenous Q6H PRN ARMANDO Leyva    HYDROcodone-acetaminophen 1 tablet Oral Q6H PRN Jorge Cruz, DO    insulin glargine 10 Units Subcutaneous HS Stef Cohen DO    insulin lispro 1-5 Units Subcutaneous TID AC Octavio Wilkerson DO    insulin lispro 1-5 Units Subcutaneous HS Octavio Wilkerson DO    lactated ringers 100 mL/hr Intravenous Continuous Jesusita Winters MD Last Rate: Stopped (02/16/18 0145)   melatonin 3 mg Oral HS Sherolyn Duverney, PA-C    metoprolol tartrate 25 mg Oral Q12H Joanna Rodriguez MD    metroNIDAZOLE 500 mg Oral [facility] Armin Arango MD    oxyCODONE 10 mg Oral Q4H PRN Pramod Pitt MD    oxyCODONE 5 mg Oral Q4H PRN ARMANDO Purvis    polyethylene glycol 17 g Oral Daily PRN Brian Almazan PA-C    pregabalin 75 mg Oral Daily Pramod Pitt MD    senna 1 tablet Oral HS Brian Almazan PA-C         Today, Patient Was Seen By: Luana Aguilar DO    ** Please Note: Dictation voice to text software may have been used in the creation of this document   **

## 2018-02-18 NOTE — PROGRESS NOTES
Progress Note - Infectious Disease   Alec Rosenthal 75 y o  male MRN: [id number]  Unit/Bed#: [facility] 931-01 Encounter: [id number]      Impression/Recommendations:  1  Right distal foot gangrene   Patient finally consented to Asael Quinn is now status post open TMA with apparent surgical cure   No bone margin pathology was requested   He is now clinically and systemically well   Patient is now status post STSG  Continue Keflex/Flagyl  VAC per Podiatry  Treat x 7 days post STSG, another 4 days      2  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   Patient is status post open 1st toe amputation  Grace Delarosa, he has gangrenous changes in his other toes also  Martin Ang is now status post TMA   He is also now status post STSG  Antibiotic plan as in above  Serial foot exams       3   Severe sepsis   Source of sepsis is most likely right foot infection   Patient is clinically improved with 1st toe amputation and IV antibiotic  Martin Ang is now status post TMA   He should do well   Blood cultures remain negative  Antibiotic plan as in above  Monitor hemodynamics      4   PVD    Patient is status post arteriogram with successful intervention   Circulatory status has been optimized  Vascular surgery following      5   Leukocytosis   WBC increased postop   It is normal again, as expected   Patient remains clinically and systemically well  Monitor WBC      6  Acute hypoxic respiratory failure   I suspect this is secondary to sepsis and pulmonary edema   No obvious pneumonia on CXR    Patient's respiratory status now improved, currently on room air  O2 support per primary service      7  DM with hyperglycemia on admission   Patient appears noncompliant with diabetic medication   This clearly contributes to infection development    Blood sugar management per Medicine Service      8   PCN allergy on allergy list   Patient does not recall reaction  Martin nAg states that this was when he was a young child  Martin Ang is tolerating cephalosporin well      Discussed with the patient and his family in detail regarding the above  Discharge plan per primary service      Antibiotics:  Keflex/Flagyl  POD # 12/6/3      Subjective:  Patient feels well  No foot pain  No dyspnea  Temperature stays down   No chills  He is tolerating antibiotics well   No nausea, vomiting or diarrhea  Objective:  Vitals:  HR:  [85-89] 85  Resp:  [18] 18  BP: (136-153)/(70-82) 149/78  SpO2:  [98 %-100 %] 98 %  Temp (24hrs), Av 3 °F (36 3 °C), Min:96 9 °F (36 1 °C), Max:97 7 °F (36 5 °C)  Current: Temperature: (!) 96 9 °F (36 1 °C)    Physical Exam:     General: Awake, alert, cooperative, no distress  Lungs: Expansion symmetric, no rales, no wheezing, respirations unlabored  Heart[de-identified]  Regular rate and rhythm, S1 and S2 normal, no murmur  Abdomen: Soft, nondistended, non-tender, bowel sounds active all four quadrants,        no masses, no organomegaly  Extremities: Right foot with VAC in place  Sparse serous drainage  No edema  No erythema  No tenderness  Skin:  No rash  Invasive Devices     Peripheral Intravenous Line            Peripheral IV 18 Right;Upper Arm 1 day          Drain            External Urinary Catheter Small 8 days    Negative Pressure Wound Therapy (V A C ) Foot Right 6 days                Labs studies:   I have personally reviewed pertinent labs      Results from last 7 days  Lab Units 18  0558 18  1146 02/15/18  0526   SODIUM mmol/L 133* 133* 136   POTASSIUM mmol/L 4 2 4 0 3 0*   CHLORIDE mmol/L 94* 92* 98*   CO2 mmol/L 34* 33* 34*   ANION GAP mmol/L 5 8 4   BUN mg/dL 14 13 14   CREATININE mg/dL 0 83 0 79 0 76   EGFR ml/min/1 73sq m 90 92 94   GLUCOSE RANDOM mg/dL 208* 106 67   CALCIUM mg/dL 8 5 8 6 7 8*       Results from last 7 days  Lab Units 18  0558 18  1146 02/15/18  0526 18  0547   WBC Thousand/uL 8 67  --  12 47* 21 64*   HEMOGLOBIN g/dL 10 4*  --  9 5* 11 1*   PLATELETS Thousands/uL 377 347 289 325           Imaging Studies:   I have personally reviewed pertinent imaging study reports and images in PACS  EKG, Pathology, and Other Studies:   I have personally reviewed pertinent reports

## 2018-02-18 NOTE — PROGRESS NOTES
Spoke with Dr Karli Justice with [facility], plan for today is continue with wound VAC and looking for rehab facilities, continue to monitor pt

## 2018-02-18 NOTE — PLAN OF CARE
Problem: OCCUPATIONAL THERAPY ADULT  Goal: Performs self-care activities at highest level of function for planned discharge setting  See evaluation for individualized goals  Treatment Interventions: ADL retraining, Functional transfer training, Endurance training, Patient/family training, Equipment evaluation/education, Compensatory technique education, Continued evaluation, Energy conservation, Activityengagement          See flowsheet documentation for full assessment, interventions and recommendations  Outcome: Progressing  Limitation: Decreased ADL status, Decreased cognition, Decreased Safe judgement during ADL, Decreased endurance, Decreased self-care trans, Decreased high-level ADLs  Prognosis: Good  Assessment: Pt participated in OT tx session focusing on morning ADL routine performed in chair (please see above for details)  Pt performed grooming with set up, UB bathing w/ S, UB dressing w/ min A, LB bathing w/ mod A and LB dressing w/ max A  Pt requires total A for toileting  Pt participated in functional transfer trials w/ PT present during therapy session  Pt performed sit to stand x4 trials and stand pivot transfer x1 trial w/ max A x2 for all attempts  Pt remains limited 2* decreased cognition, decreased command following, impaired processing, decreased attention, decreased endurance/activity tolerance, impaired balance, deconditioning, decreased maintenance of precautions, decreased ADL stauts, and decreased mobility status  Pt requires significantly increased time and cues to perform all tasks  Continue to recommend inpatient rehab upon D/C  Will continue to follow and address previously stated goals        OT Discharge Recommendation: Short Term Rehab  OT - OK to Discharge: Yes (to STR when medically stable)    Margarita Barker MS, OTR/L

## 2018-02-18 NOTE — PHYSICAL THERAPY NOTE
Physical Therapy Progress Note     02/18/18 [facility]   Pain Assessment   Pain Assessment 0-10   Pain Score No Pain   Restrictions/Precautions   Weight Bearing Precautions Per Order Yes   RLE Weight Bearing Per Order NWB   Braces or Orthoses Splint  (rt ankle )   Other Precautions Cognitive;WBS;Fall Risk   General   Chart Reviewed Yes   Response to Previous Treatment Patient with no complaints from previous session  Family/Caregiver Present Yes   Cognition   Arousal/Participation Alert; Cooperative   Following Commands Follows one step commands with increased time or repetition   Subjective   Subjective has  no c/o   Bed Mobility   Supine to Sit 5  Supervision   Additional items HOB elevated; Bedrails; Increased time required;Verbal cues   Transfers   Sit to Stand 2  Maximal assistance   Additional items Assist x 2; Increased time required;Verbal cues   Stand to Sit 2  Maximal assistance   Additional items Assist x 2; Increased time required;Verbal cues   Stand pivot 2  Maximal assistance   Additional items Assist x 2; Increased time required;Verbal cues   Ambulation/Elevation   Gait pattern Not appropriate   Balance   Static Sitting Fair   Static Standing Poor   Endurance Deficit   Endurance Deficit Yes   Endurance Deficit Description NWB, muscle weakness   Activity Tolerance   Activity Tolerance Patient limited by fatigue   Nurse Made Aware yes   Exercises   THR Sitting;20 reps;AROM; Bilateral  (no  ankle  rom on rt )   Assessment   Prognosis Good   Problem List Decreased strength;Decreased range of motion;Decreased endurance; Impaired balance;Decreased mobility; Impaired judgement;Decreased safety awareness;Orthopedic restrictions   Assessment pt  is  showing  slight improvement  in mob   he is able to complete  bed  mob  c superv  (HOB raised)  pt  did  sit on the EOB unsupported   trialed stnading  x3 using  rw   pt  needs max A to maintain nwb on  rt leg   pt has difficulty attaining  full upright  posture    he does not effectively use  rw  for  suppport   constant  verbal and tacile instruction needed fro all phases of  mob     he did trnasfer to  chr  c max A x2  c B armhold assist and use of bed  pad  (OT present)     once in chr pt  did complete  therex arom BLE   arom   pt is  pleasant cooperative  and willing to particiapte inn tx session   will cont  to  see     Goals   Patient Goals none stated   STG Expiration Date 03/02/18   Treatment Day 2   Plan   Treatment/Interventions Functional transfer training;LE strengthening/ROM; Patient/family training;Bed mobility;Spoke to nursing   Progress Progressing toward goals   PT Frequency (6x wk)   Recommendation   Recommendation Post acute IP rehab   Equipment Recommended Kel Patterson; Wheelchair   PT - OK to Discharge Yes     Anupam Swenson, PTA

## 2018-02-18 NOTE — OCCUPATIONAL THERAPY NOTE
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 (HCC)    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 of foot [facility]    PVD (peripheral vascular disease) ([facility] )    Hypertension    Gangrene of toe of right foot ([facility]    Diabetic neuropathy associated with type 2 diabetes mellitus ([facility] )    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)    Cellulitis of right foot    Abnormal CT of the head    Elevated brain natriuretic peptide (BNP) level    Acute respiratory failure (HCC)    Transaminitis    Hypophosphatemia    Popliteal artery stenosis, right (HCC)    Great toe amputation status, right (HCC)    Coagulopathy (HCC)    Meningioma (HCC)    Hypokalemia    Moderate mitral regurgitation    Gangrenous toe ([facility]    Coronary artery disease involving native coronary artery    (HFpEF) heart failure with preserved ejection fraction (HCC)    Pain in right foot           02/18/18 1059   Restrictions/Precautions   Weight Bearing Precautions Per Order Yes   RLE Weight Bearing Per Order NWB   Other Precautions Cognitive;Multiple lines; Fall Risk;WBS   Pain Assessment   Pain Assessment No/denies pain   Pain Score No Pain   ADL   Where Assessed Chair   Grooming Assistance 5  Supervision/Setup   Grooming Deficit Setup; Wash/dry hands; Teeth care   Grooming Comments Pt performs oral and face hygiene w/ set up while seated in chair    UB Bathing Assistance 5  Supervision/Setup   UB Bathing Deficit Setup; Increased time to complete;Verbal cueing   UB Bathing Comments Pt performed UB bathing w/ set up and verbal cues to initiate and for thoroughness    LB Bathing Assistance 3  Moderate Assistance   LB Bathing Deficit Setup; Increased time to complete; Buttocks; Left lower leg including foot;Verbal cueing   LB Bathing Comments Pt performed LB bathing while seated in chair w/ mod A requiring A for posterior body parts, A for L foot and verbal cuing to initiate and for thoroughness    UB Dressing Assistance 4  Minimal Assistance   UB Dressing Deficit Setup; Increased time to complete;Verbal cueing; Fasteners   UB Dressing Comments Pt performd UB dressing of back open hosital gown requiring min A for verbal cuing, to manage gown (front vs/ back, sleeve, etc ) and for fasteners    LB Dressing Assistance 2  Maximal Assistance   LB Dressing Deficit Don/doff L sock; Increased time to complete;Verbal cueing;Setup   LB Dressing Comments Pt performed LB dressing of L SOCK ONLY while seated in chair using cross over method w/ vebral cues to complete  At this time, pt would require max A to don/doff pants at seated level requiring A to pull up to and over hips, assist to thread R LE and assist for steadying and to pull up pants in standing  Toileting Assistance  1  Total Assistance   Toileting Comments Pt required total A to manage catheter during therapy session today    Bed Mobility   Supine to Sit 4  Minimal assistance   Additional items Assist x 1;HOB elevated; Increased time required;Verbal cues   Transfers   Sit to Stand 2  Maximal assistance   Additional items Assist x 2; Increased time required;Verbal cues   Stand to Sit 2  Maximal assistance   Additional items Assist x 2; Increased time required;Verbal cues   Stand pivot 2  Maximal assistance   Additional items Assist x 2; Increased time required;Verbal cues   Additional Comments Pt performed functional transfers w/ max A x2 for force production, steadying/balance, assist to maintain NWB L LE (A x3rd person), A for L knee blocking, A for hand placement on walker and A for constant cues t/o transfer for initiation, sequencing and technique  Trialed sit to stand transfer w/ pt x4 w/ PT  Pt unable to reach full stand x3 trials   Pt able to reach full stand one 4th trial, but fatigues quickly and unable to maintain full stand  Therefore, utilized stand pivot transfer from bed to chair (transferring to L) w/ max A x2  Cognition   Overall Cognitive Status Impaired   Arousal/Participation Alert; Responsive   Attention Attends with cues to redirect   Memory Decreased recall of precautions   Following Commands Follows one step commands with increased time or repetition   Comments Pt is alert and relatively cooperative  Pt presents w/ impaired processing and impaired command following requiring constant verbal cues, visual demonstrations and physical assist t/o session to maintain precautions and properly perform transfers and ADL tasks  Activity Tolerance   Activity Tolerance Patient tolerated treatment well;Patient limited by fatigue;Patient limited by pain   Medical Staff Made Aware MARGIE Bello [place]   Assessment   Assessment Pt participated in OT tx session focusing on morning ADL routine performed in chair (please see above for details)  Pt performed grooming with set up, UB bathing w/ S, UB dressing w/ min A, LB bathing w/ mod A and LB dressing w/ max A  Pt requires total A for toileting  Pt participated in functional transfer trials w/ PT present during therapy session  Pt performed sit to stand x4 trials and stand pivot transfer x1 trial w/ max A x2 for all attempts  Pt remains limited 2* decreased cognition, decreased command following, impaired processing, decreased attention, decreased endurance/activity tolerance, impaired balance, deconditioning, decreased maintenance of precautions, decreased ADL stauts, and decreased mobility status  Pt requires significantly increased time and cues to perform all tasks  Continue to recommend inpatient rehab upon D/C  Will continue to follow and address previously stated goals  Plan   Treatment Interventions ADL retraining;Functional transfer training; Endurance training;Cognitive reorientation;Patient/family training;Equipment evaluation/education; Compensatory technique education;Continued evaluation; Energy conservation; Activityengagement   Goal Expiration Date 02/26/18   OT Frequency 3-5x/wk   Recommendation   OT Discharge Recommendation Short Term Rehab   OT - OK to Discharge Yes  (to STR when medically stable)   Barthel Index   Feeding 10   Bathing 0   Grooming Score 5   Dressing Score 5   Bladder Score 0   Bowels Score 10   Toilet Use Score 5   Transfers (Bed/Chair) Score 5   Mobility (Level Surface) Score 0   Stairs Score 0   Barthel Index Score 40   Modified [place] Scale   Modified [place] Scale 4         Myrna Garcia MS, OTR/L

## 2018-02-18 NOTE — ASSESSMENT & PLAN NOTE
Patient is status transmetatarsal amputation on 2/12/2018  Note Podiatry input regarding plans for flap  Continue IV antibiotics as per ID  Note surgery input from yesterday

## 2018-02-18 NOTE — PROGRESS NOTES
Patient working with therapy this morning  Reviewed sugars and clinical progress  Will suggest adding back a small dose of prandial insulin with Humalog 3 ac  Continue lantus 10 qhs and current scale      Recent Results (from the past 24 hour(s))   Fingerstick Glucose (POCT)    Collection Time: 02/17/18 11:59 AM   Result Value Ref Range    POC Glucose 216 (H) 65 - 140 mg/dl   Fingerstick Glucose (POCT)    Collection Time: 02/17/18  4:54 PM   Result Value Ref Range    POC Glucose 265 (H) 65 - 140 mg/dl   Fingerstick Glucose (POCT)    Collection Time: 02/17/18  8:52 PM   Result Value Ref Range    POC Glucose 350 (H) 65 - 140 mg/dl   Fingerstick Glucose (POCT)    Collection Time: 02/18/18  8:43 AM   Result Value Ref Range    POC Glucose 257 (H) 65 - 140 mg/dl

## 2018-02-18 NOTE — RESTORATIVE TECHNICIAN NOTE
Restorative Specialist Mobility Note       Activity: Stand at bedside, Dangle, Chair (Assisted PT/OT in transferring pt to chair, please see PT/OT notes for all information )     Assistive Device: Front wheel walker, Other (Comment) (RW trial, then HHAx2)     Ambulation Response: Tolerated fairly well (Pt compliant c RLE NWB with assistance)  Repositioned: Sitting, Up in chair        Range of Motion: Active, All extremities     Anti-Embolism Device Off: Out of Bed    Pt left resting in bedside recliner, with PT present in room for end of session

## 2018-02-18 NOTE — PLAN OF CARE
Problem: PHYSICAL THERAPY ADULT  Goal: Performs mobility at highest level of function for planned discharge setting  See evaluation for individualized goals  Treatment/Interventions: Functional transfer training, LE strengthening/ROM, Elevations, Cognitive reorientation, Endurance training, Therapeutic exercise, Patient/family training, Equipment eval/education, Bed mobility, Gait training, Compensatory technique education, Continued evaluation, Spoke to nursing, Spoke to case management, OT  Equipment Recommended: Tamara Cutting, Wheelchair       See flowsheet documentation for full assessment, interventions and recommendations  Outcome: Progressing  Prognosis: Good  Problem List: Decreased strength, Decreased range of motion, Decreased endurance, Impaired balance, Decreased mobility, Impaired judgement, Decreased safety awareness, Orthopedic restrictions  Assessment: pt  is  showing  slight improvement  in mob   he is able to complete  bed  mob  c superv  (HOB raised)  pt  did  sit on the EOB unsupported   trialed stnading  x3 using  rw   pt  needs max A to maintain nwb on  rt leg   pt has difficulty attaining  full upright  posture   he does not effectively use  rw  for  suppport   constant  verbal and tacile instruction needed fro all phases of  mob     he did trnasfer to  chr  c max A x2  c B armhold assist and use of bed  pad  (OT present)     once in chr pt  did complete  therex arom BLE   arom   pt is  pleasant cooperative  and willing to particiapte inn tx session   will cont  to  see     Recommendation: Post acute IP rehab     PT - OK to Discharge: Yes    See flowsheet documentation for full assessment

## 2018-02-19 LAB
ANION GAP SERPL CALCULATED.3IONS-SCNC: 6 MMOL/L (ref 4–13)
BASOPHILS # BLD AUTO: 0.01 THOUSANDS/ΜL (ref 0–0.1)
BASOPHILS NFR BLD AUTO: 0 % (ref 0–1)
BUN SERPL-MCNC: 15 MG/DL (ref 5–25)
CALCIUM SERPL-MCNC: 8.6 MG/DL (ref 8.3–10.1)
CHLORIDE SERPL-SCNC: 93 MMOL/L (ref 100–108)
CO2 SERPL-SCNC: 33 MMOL/L (ref 21–32)
CREAT SERPL-MCNC: 0.77 MG/DL (ref 0.6–1.3)
EOSINOPHIL # BLD AUTO: 0.08 THOUSAND/ΜL (ref 0–0.61)
EOSINOPHIL NFR BLD AUTO: 1 % (ref 0–6)
ERYTHROCYTE [DISTWIDTH] IN BLOOD BY AUTOMATED COUNT: 15.2 % (ref 11.6–15.1)
GFR SERPL CREATININE-BSD FRML MDRD: 93 ML/MIN/1.73SQ M
GLUCOSE SERPL-MCNC: 205 MG/DL (ref 65–140)
GLUCOSE SERPL-MCNC: 212 MG/DL (ref 65–140)
GLUCOSE SERPL-MCNC: 238 MG/DL (ref 65–140)
GLUCOSE SERPL-MCNC: 263 MG/DL (ref 65–140)
GLUCOSE SERPL-MCNC: 325 MG/DL (ref 65–140)
HCT VFR BLD AUTO: 31.6 % (ref 36.5–49.3)
HGB BLD-MCNC: 10.6 G/DL (ref 12–17)
LYMPHOCYTES # BLD AUTO: 1.86 THOUSANDS/ΜL (ref 0.6–4.47)
LYMPHOCYTES NFR BLD AUTO: 24 % (ref 14–44)
MCH RBC QN AUTO: 30.5 PG (ref 26.8–34.3)
MCHC RBC AUTO-ENTMCNC: 33.5 G/DL (ref 31.4–37.4)
MCV RBC AUTO: 91 FL (ref 82–98)
MONOCYTES # BLD AUTO: 0.59 THOUSAND/ΜL (ref 0.17–1.22)
MONOCYTES NFR BLD AUTO: 8 % (ref 4–12)
NEUTROPHILS # BLD AUTO: 5.11 THOUSANDS/ΜL (ref 1.85–7.62)
NEUTS SEG NFR BLD AUTO: 67 % (ref 43–75)
NRBC BLD AUTO-RTO: 0 /100 WBCS
PLATELET # BLD AUTO: 387 THOUSANDS/UL (ref 149–390)
PMV BLD AUTO: 9.9 FL (ref 8.9–12.7)
POTASSIUM SERPL-SCNC: 3.4 MMOL/L (ref 3.5–5.3)
RBC # BLD AUTO: 3.48 MILLION/UL (ref 3.88–5.62)
SODIUM SERPL-SCNC: 132 MMOL/L (ref 136–145)
WBC # BLD AUTO: 7.68 THOUSAND/UL (ref 4.31–10.16)

## 2018-02-19 PROCEDURE — 99232 SBSQ HOSP IP/OBS MODERATE 35: CPT | Performed by: INTERNAL MEDICINE

## 2018-02-19 PROCEDURE — 97530 THERAPEUTIC ACTIVITIES: CPT

## 2018-02-19 PROCEDURE — 99233 SBSQ HOSP IP/OBS HIGH 50: CPT | Performed by: INTERNAL MEDICINE

## 2018-02-19 PROCEDURE — 82948 REAGENT STRIP/BLOOD GLUCOSE: CPT

## 2018-02-19 PROCEDURE — 80048 BASIC METABOLIC PNL TOTAL CA: CPT | Performed by: INTERNAL MEDICINE

## 2018-02-19 PROCEDURE — 99238 HOSP IP/OBS DSCHRG MGMT 30/<: CPT | Performed by: INTERNAL MEDICINE

## 2018-02-19 PROCEDURE — 97110 THERAPEUTIC EXERCISES: CPT

## 2018-02-19 PROCEDURE — 85025 COMPLETE CBC W/AUTO DIFF WBC: CPT | Performed by: INTERNAL MEDICINE

## 2018-02-19 RX ORDER — FUROSEMIDE 40 MG/1
40 TABLET ORAL
Status: CANCELLED | OUTPATIENT
Start: 2018-02-19

## 2018-02-19 RX ORDER — ACETAMINOPHEN 325 MG/1
650 TABLET ORAL EVERY 8 HOURS SCHEDULED
Status: CANCELLED | OUTPATIENT
Start: 2018-02-19

## 2018-02-19 RX ORDER — ATORVASTATIN CALCIUM 80 MG/1
80 TABLET, FILM COATED ORAL
Status: CANCELLED | OUTPATIENT
Start: 2018-02-19

## 2018-02-19 RX ORDER — INSULIN GLARGINE 100 [IU]/ML
15 INJECTION, SOLUTION SUBCUTANEOUS
Status: CANCELLED | OUTPATIENT
Start: 2018-02-19

## 2018-02-19 RX ORDER — METRONIDAZOLE 500 MG/1
500 TABLET ORAL EVERY 8 HOURS SCHEDULED
Status: CANCELLED | OUTPATIENT
Start: 2018-02-19 | End: 2018-02-23

## 2018-02-19 RX ORDER — DOCUSATE SODIUM 100 MG/1
100 CAPSULE, LIQUID FILLED ORAL 2 TIMES DAILY
Status: CANCELLED | OUTPATIENT
Start: 2018-02-19

## 2018-02-19 RX ORDER — PREGABALIN 75 MG/1
75 CAPSULE ORAL
Status: CANCELLED | OUTPATIENT
Start: 2018-02-19

## 2018-02-19 RX ORDER — GABAPENTIN 300 MG/1
300 CAPSULE ORAL 3 TIMES DAILY
Status: CANCELLED | OUTPATIENT
Start: 2018-02-19

## 2018-02-19 RX ORDER — OXYCODONE HYDROCHLORIDE 5 MG/1
5 TABLET ORAL EVERY 4 HOURS PRN
Status: CANCELLED | OUTPATIENT
Start: 2018-02-19

## 2018-02-19 RX ORDER — POLYETHYLENE GLYCOL 3350 17 G/17G
17 POWDER, FOR SOLUTION ORAL DAILY PRN
Status: CANCELLED | OUTPATIENT
Start: 2018-02-19

## 2018-02-19 RX ORDER — HYDROCODONE BITARTRATE AND ACETAMINOPHEN 5; 325 MG/1; MG/1
1 TABLET ORAL EVERY 6 HOURS PRN
Status: CANCELLED | OUTPATIENT
Start: 2018-02-19

## 2018-02-19 RX ORDER — INSULIN GLARGINE 100 [IU]/ML
18 INJECTION, SOLUTION SUBCUTANEOUS
Status: DISCONTINUED | OUTPATIENT
Start: 2018-02-19 | End: 2018-02-21 | Stop reason: HOSPADM

## 2018-02-19 RX ORDER — LANOLIN ALCOHOL/MO/W.PET/CERES
3 CREAM (GRAM) TOPICAL
Status: CANCELLED | OUTPATIENT
Start: 2018-02-19

## 2018-02-19 RX ORDER — ASPIRIN 81 MG/1
81 TABLET, CHEWABLE ORAL DAILY
Status: CANCELLED | OUTPATIENT
Start: 2018-02-20

## 2018-02-19 RX ORDER — CEPHALEXIN 500 MG/1
500 CAPSULE ORAL EVERY 6 HOURS SCHEDULED
Status: CANCELLED | OUTPATIENT
Start: 2018-02-19 | End: 2018-02-23

## 2018-02-19 RX ORDER — OXYCODONE HYDROCHLORIDE 10 MG/1
10 TABLET ORAL EVERY 4 HOURS PRN
Status: CANCELLED | OUTPATIENT
Start: 2018-02-19

## 2018-02-19 RX ORDER — SENNOSIDES 8.6 MG
1 TABLET ORAL
Status: CANCELLED | OUTPATIENT
Start: 2018-02-19

## 2018-02-19 RX ADMIN — INSULIN LISPRO 3 UNITS: 100 INJECTION, SOLUTION INTRAVENOUS; SUBCUTANEOUS at 12:55

## 2018-02-19 RX ADMIN — ASPIRIN 81 MG 81 MG: 81 TABLET ORAL at 09:11

## 2018-02-19 RX ADMIN — FUROSEMIDE 40 MG: 40 TABLET ORAL at 09:11

## 2018-02-19 RX ADMIN — ACETAMINOPHEN 650 MG: 325 TABLET, FILM COATED ORAL at 06:12

## 2018-02-19 RX ADMIN — GABAPENTIN 300 MG: 300 CAPSULE ORAL at 21:16

## 2018-02-19 RX ADMIN — HEPARIN SODIUM 5000 UNITS: 5000 INJECTION, SOLUTION INTRAVENOUS; SUBCUTANEOUS at 06:12

## 2018-02-19 RX ADMIN — CEPHALEXIN 500 MG: 500 CAPSULE ORAL at 18:33

## 2018-02-19 RX ADMIN — METOPROLOL TARTRATE 25 MG: 25 TABLET ORAL at 09:11

## 2018-02-19 RX ADMIN — SENNOSIDES 8.6 MG: 8.6 TABLET, FILM COATED ORAL at 21:16

## 2018-02-19 RX ADMIN — HEPARIN SODIUM 5000 UNITS: 5000 INJECTION, SOLUTION INTRAVENOUS; SUBCUTANEOUS at 21:17

## 2018-02-19 RX ADMIN — CEPHALEXIN 500 MG: 500 CAPSULE ORAL at 23:50

## 2018-02-19 RX ADMIN — GABAPENTIN 300 MG: 300 CAPSULE ORAL at 09:11

## 2018-02-19 RX ADMIN — METRONIDAZOLE 500 MG: 500 TABLET ORAL at 15:07

## 2018-02-19 RX ADMIN — METRONIDAZOLE 500 MG: 500 TABLET ORAL at 21:16

## 2018-02-19 RX ADMIN — METRONIDAZOLE 500 MG: 500 TABLET ORAL at 06:12

## 2018-02-19 RX ADMIN — CEPHALEXIN 500 MG: 500 CAPSULE ORAL at 00:09

## 2018-02-19 RX ADMIN — CEPHALEXIN 500 MG: 500 CAPSULE ORAL at 06:11

## 2018-02-19 RX ADMIN — CEPHALEXIN 500 MG: 500 CAPSULE ORAL at 12:55

## 2018-02-19 RX ADMIN — INSULIN LISPRO 2 UNITS: 100 INJECTION, SOLUTION INTRAVENOUS; SUBCUTANEOUS at 09:11

## 2018-02-19 RX ADMIN — PREGABALIN 75 MG: 75 CAPSULE ORAL at 21:15

## 2018-02-19 RX ADMIN — ATORVASTATIN CALCIUM 80 MG: 80 TABLET, FILM COATED ORAL at 18:34

## 2018-02-19 RX ADMIN — MELATONIN TAB 3 MG 3 MG: 3 TAB at 21:16

## 2018-02-19 RX ADMIN — ACETAMINOPHEN 650 MG: 325 TABLET, FILM COATED ORAL at 15:07

## 2018-02-19 RX ADMIN — FUROSEMIDE 40 MG: 40 TABLET ORAL at 18:34

## 2018-02-19 RX ADMIN — DOCUSATE SODIUM 100 MG: 100 CAPSULE, LIQUID FILLED ORAL at 18:34

## 2018-02-19 RX ADMIN — INSULIN LISPRO 2 UNITS: 100 INJECTION, SOLUTION INTRAVENOUS; SUBCUTANEOUS at 18:34

## 2018-02-19 RX ADMIN — INSULIN LISPRO 3 UNITS: 100 INJECTION, SOLUTION INTRAVENOUS; SUBCUTANEOUS at 09:11

## 2018-02-19 RX ADMIN — INSULIN GLARGINE 18 UNITS: 100 INJECTION, SOLUTION SUBCUTANEOUS at 21:16

## 2018-02-19 RX ADMIN — GABAPENTIN 300 MG: 300 CAPSULE ORAL at 18:34

## 2018-02-19 RX ADMIN — OXYCODONE HYDROCHLORIDE 5 MG: 5 TABLET ORAL at 23:49

## 2018-02-19 RX ADMIN — INSULIN LISPRO 3 UNITS: 100 INJECTION, SOLUTION INTRAVENOUS; SUBCUTANEOUS at 18:34

## 2018-02-19 RX ADMIN — INSULIN LISPRO 2 UNITS: 100 INJECTION, SOLUTION INTRAVENOUS; SUBCUTANEOUS at 12:55

## 2018-02-19 RX ADMIN — INSULIN LISPRO 3 UNITS: 100 INJECTION, SOLUTION INTRAVENOUS; SUBCUTANEOUS at 21:21

## 2018-02-19 RX ADMIN — METOPROLOL TARTRATE 25 MG: 25 TABLET ORAL at 21:15

## 2018-02-19 RX ADMIN — HEPARIN SODIUM 5000 UNITS: 5000 INJECTION, SOLUTION INTRAVENOUS; SUBCUTANEOUS at 15:07

## 2018-02-19 RX ADMIN — DOCUSATE SODIUM 100 MG: 100 CAPSULE, LIQUID FILLED ORAL at 09:11

## 2018-02-19 NOTE — ASSESSMENT & PLAN NOTE
Lasix as per Cardiology  Will need further workup and occluding possible cardiac catheterization as an outpatient  This Plan obviously is deferred pending clearance of infection  Plan will be to transfer to [facility]  Plan will be to complete course of antibiotics  Will need outpatient follow-up with Cardiology for further investigation of cardiac disease  Appears euvolemic  Continue to monitor

## 2018-02-19 NOTE — DISCHARGE INSTRUCTIONS
Follow-up with Neurosurgery regarding meningioma  Follow-up with PMR regarding rehab needs  Follow-up with Podiatry regarding wound care/wound VAC care  ARTERIOGRAM    WHAT YOU SHOULD KNOW:   An angiogram is a procedure to look at arteries in your body  Arteries are the blood vessels that carry blood from your heart to your body  AFTER YOU LEAVE:     Self-care:   · Limit activity: Rest for the remainder of the day of your procedure  Have some one with you until the next morning  Keep your arm or leg straight as much as possible  Rest as much as possible, sitting lying or reclining  Walk only to go to the bathroom, to bed or to eat  If the angiogram catheter was put in your leg, use the stairs as little as possible  No driving  · Keep your wound clean and dry  You may shower 24 hours after your procedure  The bandage you have on should fall off in 2-3 days  If there is any drainage from the puncture site, you should put on a clean bandage  · Watch for bleeding and bruising: It is normal to have a bruise and soreness where the angiogram catheter went in  · Diet:   · You may resume your regular diet, Sips of flat soda will help with mild nausea  · Drink more liquids than usual for the next 24 hours      · IMMEDIATELY Contact Interventional Radiology at [phone] Frank PATIENTS: Contact Interventional Radiology at [phone]) Dwayne Rao PATIENTS: Contact Interventional Radiology at [phone]) if any of the following occur:  · If your bruise gets larger or if you notice any active bleeding  APPLY DIRECT PRESSURE TO THE BLEEDING SITE  · If you notice increased swelling or have increased pain at the puncture site   · If you have any numbness or pain in the extremity of the puncture site   · If that extremity seems cold or pale      · You have fever greater than 101  · Persistent nausea or vomitting    Follow up with your primary healthcare provider  as directed: Write down your questions so you remember to ask them during your visits        Right foot care:  1  The graft harvest site should be changed every 3 days  Gently clean with NSS, dry, and dress with [address] and tegaderm  Cover with ACE bandage  2  The right foot dressing should be changed every 3 days  CAREFULLY remove the xeroform, gently rinse with NSS and redress with xeroform, DSD and ace wrap  Do not over-compress the foot however it should be tight against the graft  3  Strict NWB to the RLE with splint intact

## 2018-02-19 NOTE — PHYSICAL THERAPY NOTE
PT Progress Note     02/19/18 4636   Pain Assessment   Pain Assessment 0-10   Pain Score No Pain   Hospital Pain Intervention(s) Repositioned   Response to Interventions unchanged   Restrictions/Precautions   Weight Bearing Precautions Per Order Yes   RLE Weight Bearing Per Order NWB   General   Chart Reviewed Yes   Response to Previous Treatment Patient with no complaints from previous session  Family/Caregiver Present Yes   Cognition   Arousal/Participation Alert   Attention Attends with cues to redirect   Orientation Level Oriented to person;Oriented to place   Following Commands Follows one step commands with increased time or repetition   Subjective   Subjective pt in bed, agreeable for PT   Bed Mobility   Supine to Sit 5  Supervision   Additional items HOB elevated; Increased time required;Verbal cues   Transfers   Sit to Stand 3  Moderate assistance   Additional items Increased time required;Verbal cues   Stand to Sit 3  Moderate assistance   Additional items Increased time required;Verbal cues   Stand pivot 3  Moderate assistance   Additional items Increased time required;Verbal cues; Assist x 2  (RW)   Balance   Dynamic Sitting Fair  (forward reach)   Static Standing Poor +  (RW)   Dynamic Standing Poor  (RW)   Endurance Deficit   Endurance Deficit Yes   Endurance Deficit Description fatigue LE instability   Activity Tolerance   Activity Tolerance Patient limited by fatigue   Nurse Made Aware yes   Assessment   Prognosis Good   Problem List Decreased strength;Decreased range of motion;Decreased endurance; Impaired balance;Decreased mobility; Decreased coordination;Decreased cognition;Decreased safety awareness; Impaired judgement;Pain;Orthopedic restrictions;Decreased skin integrity; Impaired sensation   Assessment pt progressed w mob and funct activity tolerance; requires supervision w sup-sit EOB; pt able to maintain EOB seated and rech multidirectionally outside JASMIN wo LOB; this session focused on sit-stand trials using RW; requires mod assist w transf sit-stand+ standby assist for safety; in standing w RW support pt performed R LE hip fl knee ext to facilitate indep maintaining NWB; pt educated on bilat elbows locked in ext to improve UE WB; pt fatigued w exertion and required multiple  rest intervals bw trials; with progressive trails pt able to maintain elbows locked and NWB; pt further initiated pregait training w sidestep attempts EOB, however unable to coordinate movement w maintaining NWB on 3 trials; initiated wc mob; pt able to propel w min assist required on turns and around environ obstacles; pt appeared happy w therapy this session, motivated to cont to progress; rec cont PT IP rehab when med cleared   Goals   Patient Goals get better   STG Expiration Date 03/02/18   Plan   Treatment/Interventions Functional transfer training;LE strengthening/ROM; Therapeutic exercise;Cognitive reorientation;Patient/family training; Endurance training;Equipment eval/education; Bed mobility; Compensatory technique education;Continued evaluation;Spoke to nursing;Spoke to advanced practitioner;Family   Progress Progressing toward goals   PT Frequency (6x/wk)   Recommendation   Recommendation Post acute IP rehab   Equipment Recommended [employer]; Wheelchair   PT - OK to Discharge Yes

## 2018-02-19 NOTE — RESTORATIVE TECHNICIAN NOTE
Restorative Specialist Mobility Note       Activity: Dangle, Stand at bedside, Ambulate in room, Chair (Assisted PT in transferring patient into [place] [address], please refer to PT notes for all information )     Assistive Device: Front wheel walker     Ambulation Response: Tolerated well  Repositioned: Sitting, Up in chair     Range of Motion: Active, All extremities     Anti-Embolism Device Off: Out of Bed    Pt left ambulating hallway in [place] [address], with family by side

## 2018-02-19 NOTE — SOCIAL WORK
Cm reviewed patient during care coordination rounds  Cm confirmed that patient was [facility]  Cm later informed that patient's insurance company denied admission stating that patient's needs could be met at a skilled rehab  Cm informed patient's family who is requesting that peer to peer be completed  Cm provided medical team with requested information for peer to peer, [phone]   CM awaiting results of peer to peer

## 2018-02-19 NOTE — PROGRESS NOTES
Progress Note - Wanda Craig 75 y o  male MRN: [id number]    Unit/Bed#: PPHP 931-01 Encounter: [id number]      CC: diabetes f/u    Subjective:   Wanda Craig is a 75y o  year old male with type 2  diabetes  Feels well  No complaints  No hypoglycemia  Sugars have been high over the weekend-patient's appetite has improved    Objective:     Vitals: Blood pressure 100/65, pulse 76, temperature 97 8 °F (36 6 °C), temperature source Oral, resp  rate 17, height 5' 8" (1 727 m), weight 61 3 kg (135 lb 2 3 oz), SpO2 96 %  ,Body mass index is 20 55 kg/m²  Intake/Output Summary (Last 24 hours) at 02/19/18 1827  Last data filed at 02/19/18 1259   Gross per 24 hour   Intake              320 ml   Output             2750 ml   Net            -2430 ml       Physical Exam:  General Appearance: awake, appears stated age and cooperative  Head: Normocephalic, without obvious abnormality, atraumatic  Extremities: moves all extremities  Skin: Skin color and temperature normal    Pulm: no labored breathing    Lab, Imaging and other studies: I have personally reviewed pertinent reports  Results from last 7 days  Lab Units 02/19/18  0524   SODIUM mmol/L 132*   POTASSIUM mmol/L 3 4*   CHLORIDE mmol/L 93*   CO2 mmol/L 33*   BUN mg/dL 15   CREATININE mg/dL 0 77   GLUCOSE RANDOM mg/dL 205*   CALCIUM mg/dL 8 6       POC Glucose (mg/dl)   Date Value   02/19/2018 263 (H)   02/19/2018 238 (H)   02/19/2018 212 (H)   02/18/2018 216 (H)   02/18/2018 275 (H)   02/18/2018 313 (H)   02/18/2018 257 (H)   02/17/2018 350 (H)   02/17/2018 265 (H)   02/17/2018 216 (H)       Assessment:  Type 2 diabetes with hyperglycemia  Peripheral arterial disease  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

## 2018-02-19 NOTE — PROGRESS NOTES
Progress Note Yuki Gallego 1950, 75 y o  male MRN: [id number]    Unit/Bed#: [facility] 931-01 Encounter: [id number]    Primary Care Provider: Francisco Lopez MD   Date and time admitted to hospital: 2/5/2018  4:18 PM        Great toe amputation status, right [facility])   Assessment & Plan    Antibiotics as per Infectious Disease  Status post TMA  Antibiotics for next 4 days  Wound care as per surgery  Note Podiatry input  Gangrene of toe of right foot [facility])   Assessment & Plan    Patient is status transmetatarsal amputation on 2/12/2018  Note Podiatry input regarding plans for flap  Continue IV antibiotics as per ID  Note surgery input from yesterday  (HFpEF) heart failure with preserved ejection fraction (HCC)   Assessment & Plan    Lasix as per Cardiology  Will need further workup and occluding possible cardiac catheterization as an outpatient  This Plan obviously is deferred pending clearance of infection  Plan will be to transfer to [facility]  Plan will be to complete course of antibiotics  Will need outpatient follow-up with Cardiology for further investigation of cardiac disease  Appears euvolemic  Continue to monitor  Coronary artery disease involving native coronary artery   Assessment & Plan    As evidence by the dense coronary calcifications seen on CT  Cardiology following  Will need evaluation after he has recovered from his amputation  Meningioma [facility])   Assessment & Plan    Will need outpatient follow-up with Neurosurgery once infection has stabilized and cleared        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 ([facility] [address] )   Assessment & Plan    Blood sugar still elevated more than 200 most of the times  Endocrine following  Appreciate recommendations  Hypertension   Assessment & Plan    Continue Lopressor for now  Claudia Soto             VTE Pharmacologic Prophylaxis:   Pharmacologic: Heparin  Mechanical VTE Prophylaxis in Place: Yes    Patient Centered Rounds: I have performed bedside rounds with nursing staff today  Time Spent for Care: 15 minutes  More than 50% of total time spent on counseling and coordination of care as described above  Current Length of Stay: 14 day(s)    Current Patient Status: Inpatient   Certification Statement: The patient will continue to require additional inpatient hospital stay due to Need to monitor symptoms    Discharge Plan:  Awaiting rehab    Code Status: Level 1 - Full Code      Subjective:   Patient comfortable    Objective:     Vitals:   Temp (24hrs), Av 9 °F (36 6 °C), Min:96 9 °F (36 1 °C), Max:98 8 °F (37 1 °C)    HR:  [85-99] 99  Resp:  [18] 18  BP: (108-149)/(52-78) 108/52  SpO2:  [98 %] 98 %  Body mass index is 20 55 kg/m²  Input and Output Summary (last 24 hours): Intake/Output Summary (Last 24 hours) at 18 1429  Last data filed at 18 1259   Gross per 24 hour   Intake              320 ml   Output             2750 ml   Net            -2430 ml       Physical Exam:     Physical Exam   Constitutional: He is oriented to person, place, and time  HENT:   Head: Normocephalic and atraumatic  Eyes: Conjunctivae are normal  Pupils are equal, round, and reactive to light  Neck: Normal range of motion  Neck supple  No tracheal deviation present  No thyromegaly present  Cardiovascular: Normal rate and regular rhythm  No murmur heard  Pulmonary/Chest: Breath sounds normal  No respiratory distress  He has no wheezes  Abdominal: Soft  Bowel sounds are normal  He exhibits no distension  There is no tenderness  Musculoskeletal: Normal range of motion  He exhibits no edema  Neurological: He is oriented to person, place, and time  No cranial nerve deficit  Skin: Skin is warm and dry  No erythema  Psychiatric: He has a normal mood and affect   His behavior is normal        Additional Data:     Labs:      Results from last 7 days  Lab Units 18  0524   WBC Thousand/uL 7 68 HEMOGLOBIN g/dL 10 6*   HEMATOCRIT % 31 6*   PLATELETS Thousands/uL 387   NEUTROS PCT % 67   LYMPHS PCT % 24   MONOS PCT % 8   EOS PCT % 1       Results from last 7 days  Lab Units 02/19/18  0524   SODIUM mmol/L 132*   POTASSIUM mmol/L 3 4*   CHLORIDE mmol/L 93*   CO2 mmol/L 33*   BUN mg/dL 15   CREATININE mg/dL 0 77   CALCIUM mg/dL 8 6   GLUCOSE RANDOM mg/dL 205*           * I Have Reviewed All Lab Data Listed Above  * Additional Pertinent Lab Tests Reviewed:  All Labs Within Last 24 Hours Reviewed      Recent Cultures (last 7 days):           Last 24 Hours Medication List:     Current Facility-Administered Medications:  acetaminophen 650 mg Oral Q8H [address] Jorge Roota, DO    aspirin 81 mg Oral Daily ARMANDO Schmidt    atorvastatin 80 mg Oral Daily With Chris Stephen MD    cephalexin 500 mg Oral Q6H [address] Armin Arango MD    docusate sodium 100 mg Oral BID Brian Almazan PA-C    furosemide 40 mg Oral BID (diuretic) Colton Shepard DO    gabapentin 300 mg Oral TID Pramod Pitt MD    heparin (porcine) 5,000 Units Subcutaneous [facility] Maxim Murillo, DPM    hydrALAZINE 10 mg Intravenous Q6H PRN ARMANDO Purvis    HYDROcodone-acetaminophen 1 tablet Oral Q6H PRN Hetlatia Cruz, DO    insulin glargine 15 Units Subcutaneous HS Hetul Cruz, DO    insulin lispro 1-5 Units Subcutaneous TID AC Octavio Wilkerson, DO    insulin lispro 1-5 Units Subcutaneous HS Octavio Wilkerson, DO    insulin lispro 3 Units Subcutaneous TID With Meals Glennda Friends, DO    lactated ringers 100 mL/hr Intravenous Continuous Jesusita Winters MD Last Rate: Stopped (02/16/18 0145)   melatonin 3 mg Oral HS Sherolyn Duverney, PA-C    metoprolol tartrate 25 mg Oral Q12H [address] Nicholas Reece MD    metroNIDAZOLE 500 mg Oral [facility] Armin Arango MD    oxyCODONE 10 mg Oral Q4H PRN Pramod Pitt MD    oxyCODONE 5 mg Oral Q4H PRN ARMANDO Schmidt    polyethylene glycol 17 g Oral Daily PRN Brian Almazan PA-C pregabalin 75 mg Oral HS Jorge Cruz DO    senna 1 tablet Oral HS Mike Avery PA-C         Today, Patient Was Seen By: Kelley Basilio DO    ** Please Note: Dictation voice to text software may have been used in the creation of this document   **

## 2018-02-19 NOTE — PLAN OF CARE
Problem: Potential for Falls  Goal: Patient will remain free of falls  INTERVENTIONS:  - Assess patient frequently for physical needs  -  Identify cognitive and physical deficits and behaviors that affect risk of falls  -  [place] fall precautions as indicated by assessment   - Educate patient/family on patient safety including physical limitations  - Instruct patient to call for assistance with activity based on assessment  - Modify environment to reduce risk of injury  - Consider OT/PT consult to assist with strengthening/mobility   Outcome: Progressing      Problem: Nutrition/Hydration-ADULT  Goal: Nutrient/Hydration intake appropriate for improving, restoring or maintaining nutritional needs  Monitor and assess patient's nutrition/hydration status for malnutrition (ex- brittle hair, bruises, dry skin, pale skin and conjunctiva, muscle wasting, smooth red tongue, and disorientation)  Collaborate with interdisciplinary team and initiate plan and interventions as ordered  Monitor patient's weight and dietary intake as ordered or per policy  Utilize nutrition screening tool and intervene per policy  Determine patient's food preferences and provide high-protein, high-caloric foods as appropriate       INTERVENTIONS:  - Monitor oral intake, urinary output, labs, and treatment plans  - Assess nutrition and hydration status and recommend course of action  - Evaluate amount of meals eaten  - Assist patient with eating if necessary   - Allow adequate time for meals  - Recommend/ encourage appropriate diets, oral nutritional supplements, and vitamin/mineral supplements  - Order, calculate, and assess calorie counts as needed  - Recommend, monitor, and adjust tube feedings and TPN/PPN based on assessed needs  - Assess need for intravenous fluids  - Provide specific nutrition/hydration education as appropriate  - Include patient/family/caregiver in decisions related to nutrition   Outcome: Progressing      Problem: Prexisting or High Potential for Compromised Skin Integrity  Goal: Skin integrity is maintained or improved  INTERVENTIONS:  - Identify patients at risk for skin breakdown  - Assess and monitor skin integrity  - Assess and monitor nutrition and hydration status  - Monitor labs (i e  albumin)  - Assess for incontinence   - Turn and reposition patient  - Assist with mobility/ambulation  - Relieve pressure over bony prominences  - Avoid friction and shearing  - Provide appropriate hygiene as needed including keeping skin clean and dry  - Evaluate need for skin moisturizer/barrier cream  - Collaborate with interdisciplinary team (i e  Nutrition, Rehabilitation, etc )   - Patient/family teaching   Outcome: Progressing      Problem: DISCHARGE PLANNING - CARE MANAGEMENT  Goal: Discharge to post-acute care or home with appropriate resources  INTERVENTIONS:  - Conduct assessment to determine patient/family and health care team treatment goals, and need for post-acute services based on payer coverage, community resources, and patient preferences, and barriers to discharge  - Address psychosocial, clinical, and financial barriers to discharge as identified in assessment in conjunction with the patient/family and health care team  - Arrange appropriate level of post-acute services according to patient's   needs and preference and payer coverage in collaboration with the physician and health care team  - Communicate with and update the patient/family, physician, and health care team regarding progress on the discharge plan  - Arrange appropriate transportation to post-acute venues  - Anticipated TMA on Friday  CM continues to follow for DC needs post procedure      Outcome: Progressing      Problem: RESPIRATORY - ADULT  Goal: Achieves optimal ventilation and oxygenation  INTERVENTIONS:  - Assess for changes in respiratory status  - Assess for changes in mentation and behavior  - Position to facilitate oxygenation and minimize respiratory effort  - Oxygen administration by appropriate delivery method based on oxygen saturation (per order) or ABGs  - Initiate smoking cessation education as indicated  - Encourage broncho-pulmonary hygiene including cough, deep breathe, Incentive Spirometry  - Assess the need for suctioning and aspirate as needed  - Assess and instruct to report SOB or any respiratory difficulty  - Respiratory Therapy support as indicated   Outcome: Progressing      Problem: METABOLIC, FLUID AND ELECTROLYTES - ADULT  Goal: Electrolytes maintained within normal limits  INTERVENTIONS:  - Monitor labs and assess patient for signs and symptoms of electrolyte imbalances  - Administer electrolyte replacement as ordered  - Monitor response to electrolyte replacements, including repeat lab results as appropriate  - Instruct patient on fluid and nutrition as appropriate   Outcome: Progressing    Goal: Fluid balance maintained  INTERVENTIONS:  - Monitor labs and assess for signs and symptoms of volume excess or deficit  - Monitor I/O and WT  - Instruct patient on fluid and nutrition as appropriate   Outcome: Progressing    Goal: Glucose maintained within target range  INTERVENTIONS:  - Monitor Blood Glucose as ordered  - Assess for signs and symptoms of hyperglycemia and hypoglycemia  - Administer ordered medications to maintain glucose within target range  - Assess nutritional intake and initiate nutrition service referral as needed   Outcome: Progressing      Problem: PAIN - ADULT  Goal: Verbalizes/displays adequate comfort level or baseline comfort level  Interventions:  - Encourage patient to monitor pain and request assistance  - Assess pain using appropriate pain scale  - Administer analgesics based on type and severity of pain and evaluate response  - Implement non-pharmacological measures as appropriate and evaluate response  - Consider cultural and social influences on pain and pain management  - Notify physician/advanced practitioner if interventions unsuccessful or patient reports new pain   Outcome: Progressing      Problem: INFECTION - ADULT  Goal: Absence or prevention of progression during hospitalization  INTERVENTIONS:  - Assess and monitor for signs and symptoms of infection  - Monitor lab/diagnostic results  - Monitor all insertion sites, i e  indwelling lines, tubes, and drains  - Monitor endotracheal (as able) and nasal secretions for changes in amount and color  - [place] appropriate cooling/warming therapies per order  - Administer medications as ordered  - Instruct and encourage patient and family to use good hand hygiene technique  - Identify and instruct in appropriate isolation precautions for identified infection/condition   Outcome: Progressing    Goal: Absence of fever/infection during neutropenic period  INTERVENTIONS:  - Monitor WBC  - Implement neutropenic guidelines   Outcome: Progressing      Problem: SAFETY ADULT  Goal: Maintain or return to baseline ADL function  INTERVENTIONS:  -  Assess patient's ability to carry out ADLs; assess patient's baseline for ADL function and identify physical deficits which impact ability to perform ADLs (bathing, care of mouth/teeth, toileting, grooming, dressing, etc )  - Assess/evaluate cause of self-care deficits   - Assess range of motion  - Assess patient's mobility; develop plan if impaired  - Assess patient's need for assistive devices and provide as appropriate  - Encourage maximum independence but intervene and supervise when necessary  ¯ Involve family in performance of ADLs  ¯ Assess for home care needs following discharge   ¯ Request OT consult to assist with ADL evaluation and planning for discharge  ¯ Provide patient education as appropriate   Outcome: Progressing    Goal: Maintain or return mobility status to optimal level  INTERVENTIONS:  - Assess patient's baseline mobility status (ambulation, transfers, stairs, etc )    - Identify cognitive and physical deficits and behaviors that affect mobility  - Identify mobility aids required to assist with transfers and/or ambulation (gait belt, sit-to-stand, lift, walker, cane, etc )  - [place] fall precautions as indicated by assessment  - Record patient progress and toleration of activity level on Mobility SBAR; progress patient to next Phase/Stage  - Instruct patient to call for assistance with activity based on assessment  - Request Rehabilitation consult to assist with strengthening/weightbearing, etc    Outcome: Progressing      Problem: DISCHARGE PLANNING  Goal: Discharge to home or other facility with appropriate resources  INTERVENTIONS:  - Identify barriers to discharge w/patient and caregiver  - Arrange for needed discharge resources and transportation as appropriate  - Identify discharge learning needs (meds, wound care, etc )  - Arrange for interpretive services to assist at discharge as needed  - Refer to Case Management Department for coordinating discharge planning if the patient needs post-hospital services based on physician/advanced practitioner order or complex needs related to functional status, cognitive ability, or social support system   Outcome: Progressing      Problem: Knowledge Deficit  Goal: Patient/family/caregiver demonstrates understanding of disease process, treatment plan, medications, and discharge instructions  Complete learning assessment and assess knowledge base    Interventions:  - Provide teaching at level of understanding  - Provide teaching via preferred learning methods   Outcome: Progressing      Problem: SKIN/TISSUE INTEGRITY - ADULT  Goal: Skin integrity remains intact  INTERVENTIONS  - Identify patients at risk for skin breakdown  - Assess and monitor skin integrity  - Assess and monitor nutrition and hydration status  - Monitor labs (i e  albumin)  - Assess for incontinence   - Turn and reposition patient  - Assist with mobility/ambulation  - Relieve pressure over bony prominences  - Avoid friction and shearing  - Provide appropriate hygiene as needed including keeping skin clean and dry  - Evaluate need for skin moisturizer/barrier cream  - Collaborate with interdisciplinary team (i e  Nutrition, Rehabilitation, etc )   - Patient/family teaching   Outcome: Progressing    Goal: Incision(s), wounds(s) or drain site(s) healing without S/S of infection  INTERVENTIONS  - Assess and document risk factors for skin impairment   - Assess and document dressing, incision, wound bed, drain sites and surrounding tissue  - Initiate Nutrition services consult and/or wound management as needed   Outcome: Progressing      Problem: MUSCULOSKELETAL - ADULT  Goal: Maintain or return mobility to safest level of function  INTERVENTIONS:  - Assess patient's ability to carry out ADLs; assess patient's baseline for ADL function and identify physical deficits which impact ability to perform ADLs (bathing, care of mouth/teeth, toileting, grooming, dressing, etc )  - Assess/evaluate cause of self-care deficits   - Assess range of motion  - Assess patient's mobility; develop plan if impaired  - Assess patient's need for assistive devices and provide as appropriate  - Encourage maximum independence but intervene and supervise when necessary  - Involve family in performance of ADLs  - Assess for home care needs following discharge   - Request OT consult to assist with ADL evaluation and planning for discharge  - Provide patient education as appropriate   Outcome: Progressing    Goal: Maintain proper alignment of affected body part  INTERVENTIONS:  - Support, maintain and protect limb and body alignment  - Provide pt/fam with appropriate education   Outcome: Progressing

## 2018-02-19 NOTE — PROGRESS NOTES
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   Patient is status post open 1st toe amputation  Luz Aragon, he has gangrenous changes in his other toes also  Marry Goncalves is now status post TMA   He is also now status post STSG  Antibiotic plan as in above  Serial foot exams       3   Severe sepsis   Source of sepsis is most likely right foot infection   Patient is clinically improved with 1st toe amputation and IV antibiotic  Marry Goncalves is now status post TMA   He should do well   Blood cultures remain negative  Antibiotic plan as in above  Monitor hemodynamics      4   PVD    Patient is status post arteriogram with successful intervention   Circulatory status has been optimized  Vascular surgery following      5   Leukocytosis   WBC increased postop   It is normal again, as expected   Patient remains clinically and systemically well  Monitor WBC      6  Acute hypoxic respiratory failure   I suspect this is secondary to sepsis and pulmonary edema   No obvious pneumonia on CXR    Patient's respiratory status now improved, currently on room air  O2 support per primary service      7  DM with hyperglycemia on admission   Patient appears noncompliant with diabetic medication   This clearly contributes to infection development    Blood sugar management per Medicine Service      8   PCN allergy on allergy list   Patient does not recall reaction  Marry Goncalves states that this was when he was a young child  Marry Goncalves is tolerating cephalosporin well      Discussed with the patient and his family in detail regarding the above  Discharge plan per primary service      Antibiotics:  Keflex/Flagyl  POD #       Subjective:  Patient feels well  No foot pain  No dyspnea  Temperature stays down   No chills  He is tolerating antibiotics well   No nausea, vomiting or diarrhea  Objective:  Vitals:  HR:  [85-99] 99  Resp:  [18] 18  BP: (108-149)/(52-78) 108/52  SpO2:  [98 %] 98 %  Temp (24hrs), Av 9 °F (36 6 °C), Min:96 9 °F (36 1 °C), Max:98 8 °F (37 1 °C)  Current: Temperature: 98 8 °F (37 1 °C)    Physical Exam:     General: Awake, alert, cooperative, no distress  Lungs: Expansion symmetric, no rales, no wheezing, respirations unlabored  Heart[de-identified]  Tachycardic with regular rhythm, S1 and S2 normal, no murmur  Abdomen: Soft, nondistended, non-tender, bowel sounds active all four quadrants,        no masses, no organomegaly  Extremities: Foot wound with VAC  No purulence  Minimal serous drainage  No edema  No erythema  No tenderness  Skin:  No rash  Invasive Devices     Peripheral Intravenous Line            Peripheral IV 18 Right;Upper Arm 2 days          Drain            External Urinary Catheter Small 9 days    Negative Pressure Wound Therapy (V A C ) Foot Right 6 days                Labs studies:   I have personally reviewed pertinent labs      Results from last 7 days  Lab Units 18  0524 18  0558 18  1146   SODIUM mmol/L 132* 133* 133*   POTASSIUM mmol/L 3 4* 4 2 4 0   CHLORIDE mmol/L 93* 94* 92*   CO2 mmol/L 33* 34* 33*   ANION GAP mmol/L 6 5 8   BUN mg/dL 15 14 13   CREATININE mg/dL 0 77 0 83 0 79   EGFR ml/min/1 73sq m 93 90 92   GLUCOSE RANDOM mg/dL 205* 208* 106   CALCIUM mg/dL 8 6 8 5 8 6       Results from last 7 days  Lab Units 18  0524 18  0558 18  1146 02/15/18  0526   WBC Thousand/uL 7 68 8 67  --  12 47*   HEMOGLOBIN g/dL 10 6* 10 4*  --  9 5*   PLATELETS Thousands/uL [phone]           Imaging Studies:   I have personally reviewed pertinent imaging study reports and images in PACS  EKG, Pathology, and Other Studies:   I have personally reviewed pertinent reports

## 2018-02-19 NOTE — SOCIAL WORK
Cm informed by medical team, that patient's peer to peer was denied  Cm met with patient and family to discuss  Patient in agreement with sub-acute rehab and requested that a referral be placed to [facility]  Cm placed referral for review

## 2018-02-19 NOTE — PROGRESS NOTES
Progress Note - Podiatry  Dawn Breaux 75 y o  male MRN: [id number]  Unit/Bed#: [facility] 931-01 Encounter: [id number]    Assessment  1  Right foot gangrene s/p multiple limb salvage surgeries with recent STSG    Plan:  1  The skin graft appears very healthy the right TMA  Capillary refill to the flap is brisk  Overall the foot is stable with no sign of any infection  At this point the back and be discontinued patient is stable for local wound care  He is need of nonweightbearing to that right foot while the graft continues to remodel and heel  Splint was reapplied the posterior heel  The graft was dressed with Xeroform and a compressive dressing  He is stable for discharge at any point from my opinion  Again strict nonweightbearing to the right foot  I will put dressing structures in the patient's chart  2  Addendum: Wound VAc removed and DC today  I removed 1 piece of black sponge from the wound  Subjective/Objective   Chief Complaint:   Chief Complaint   Patient presents with    Foot Ulcer     Pt presents with diabetic ulcers on right foot and increased pain and swelling       Subjective: 75 y o  y/o male seen and evaluated at bedside  No acute events overnight  Denies N/F/V/SOB/CP/cough/diarrhea/constipation  Blood pressure 108/52, pulse 99, temperature 98 8 °F (37 1 °C), temperature source Oral, resp  rate 18, height 5' 8" (1 727 m), weight 61 3 kg (135 lb 2 3 oz), SpO2 98 %  ,Body mass index is 20 55 kg/m²      Lab Results   Component Value Date    WBC 7 68 02/19/2018    HGB 10 6 (L) 02/19/2018    HCT 31 6 (L) 02/19/2018    MCV 91 02/19/2018     02/19/2018     Lab Results   Component Value Date    GLUCOSE 205 (H) 02/19/2018    CALCIUM 8 6 02/19/2018     (L) 02/19/2018    K 3 4 (L) 02/19/2018    CO2 33 (H) 02/19/2018    CL 93 (L) 02/19/2018    BUN 15 02/19/2018    CREATININE 0 77 02/19/2018         Invasive Devices     Peripheral Intravenous Line            Peripheral IV 02/17/18 Right; Upper Arm 2 days          Drain            External Urinary Catheter Small 9 days    Negative Pressure Wound Therapy (V A C ) Foot Right 6 days                Physical Exam:   General: alert, cooperative and no distress  Lungs: Normal respiratory effort, LCTABL  Heart: regular rate and rhythm   Abdomen: soft, non-tender  Extremity: Right calf STSG harvest site stable  No complications, see pic below  Right dorsal foot STSG has good take  No evidence of infection or rejection  No contraction of graft  Graft is well adhered to foot  Stable appearance of foot                    Lab, Imaging and other studies:     Imaging: I have personally reviewed pertinent films in PACS  EKG, Pathology, and Other Studies: I have personally reviewed pertinent reports  Portions of the record may have been created with voice recognition software  Occasional wrong word or "sound a like" substitutions may have occurred due to the inherent limitations of voice recognition software  Read the chart carefully and recognize, using context, where substitutions have occurred

## 2018-02-19 NOTE — DISCHARGE SUMMARY
Discharge- Francisca Morris 1950, 75 y o  male MRN: [id number]    Unit/Bed#: [facility] 931-01 Encounter: [id number]    Primary Care Provider: Kimmy Rico MD   Date and time admitted to hospital: 2/5/2018  4:18 PM        Great toe amputation status, right [facility])   Assessment & Plan    Antibiotics as per Infectious Disease  Status post TMA  Antibiotics for next 4 days  Wound care as per surgery  Note Podiatry input  Gangrene of toe of right foot [facility])   Assessment & Plan    Patient is status transmetatarsal amputation on 2/12/2018  Note Podiatry input regarding plans for flap  Continue IV antibiotics as per ID  Note surgery input from yesterday  (HFpEF) heart failure with preserved ejection fraction (HCC)   Assessment & Plan    Lasix as per Cardiology  Will need further workup and occluding possible cardiac catheterization as an outpatient  This Plan obviously is deferred pending clearance of infection  Plan will be to transfer to [facility]  Plan will be to complete course of antibiotics  Will need outpatient follow-up with Cardiology for further investigation of cardiac disease  Appears euvolemic  Continue to monitor  Acute respiratory failure (HCC)   Assessment & Plan    Continue with respiratory support        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)   Assessment & Plan    Blood sugar still elevated more than 200 most of the times  Endocrine following  Appreciate recommendations  Hypertension   Assessment & Plan    Continue Lopressor for now                         Resolved Problems  Date Reviewed: 2/15/2018          Resolved    Hyponatremia 2/8/2018     Resolved by  Jose Nixon DO    Gangrenous toe ([address] ) 2/7/2018     Resolved by  Eugune Libman, MD    Overview Signed 2/6/2018  1:40 PM by Michelle Maria DPM     Added automatically from request for surgery [id number]         Sepsis [facility]) 2/13/2018     Resolved by  Eleazar Morales MD Metabolic acidosis 9/8/7065     Resolved by  ARMANDO Herrera    Acute metabolic encephalopathy 9/05/7918     Resolved by  Jose Maxwell MD          Admission Date: 2/5/2018     Admitting Diagnosis: Foot pain [M79 673]  Hyponatremia [E87 1]  Gangrenous toe ([address] ) [I96]  Cellulitis of right foot [A26 617]         This is a 54-year-old gentleman with known history of diabetes peripheral vascular disease who presents hospital with chronic ulceration of right foot  Patient is followed by Podiatry and vascular surgery as an outpatient presents to the hospital with worsening swelling redness and gangrenous changes over the ulcer  On presentation the patient's leukocytosis and low-grade fever was noted  The patient was empirically started antibiotics  He ultimately was evaluated by surgery who recommended a TMA intervention  Presentation was note worthy  for meningioma found incidentally  Patient to follow up with Neurosurgery as an outpatient  No urgent intervention required at this particular juncture  Will likely need outpatient MRI for follow-up with Neurosurgery  Complications:  No    Condition at Discharge: fair     Discharge instructions/Information to patient and family:   See after visit summary for information provided to patient and family  Provisions for Follow-Up Care:  See after visit summary for information related to follow-up care and any pertinent home health orders  Disposition: Home    Planned Readmission: No    Discharge Statement   I spent 35 minutes discharging the patient  This time was spent on the day of discharge  I had direct contact with the patient on the day of discharge  Additional documentation is required if more than 30 minutes were spent on discharge  Discharge Medications:  See after visit summary for reconciled discharge medications provided to patient and family

## 2018-02-19 NOTE — ASSESSMENT & PLAN NOTE
Lasix as per Cardiology  Will need further workup and occluding possible cardiac catheterization as an outpatient  This Plan obviously is deferred pending clearance of infection  Plan will be to transfer to [facility]  Plan will be to complete course of antibiotics  Will need outpatient follow-up with Cardiology for further investigation of cardiac disease  Appears euvolemic  Continue to monitor

## 2018-02-19 NOTE — PLAN OF CARE
Problem: PHYSICAL THERAPY ADULT  Goal: Performs mobility at highest level of function for planned discharge setting  See evaluation for individualized goals  Treatment/Interventions: Functional transfer training, LE strengthening/ROM, Elevations, Cognitive reorientation, Endurance training, Therapeutic exercise, Patient/family training, Equipment eval/education, Bed mobility, Gait training, Compensatory technique education, Continued evaluation, Spoke to nursing, Spoke to case management, OT  Equipment Recommended: [place] Sil[place], Wheelchair       See flowsheet documentation for full assessment, interventions and recommendations     Prognosis: Good  Problem List: Decreased strength, Decreased range of motion, Decreased endurance, Impaired balance, Decreased mobility, Decreased coordination, Decreased cognition, Decreased safety awareness, Impaired judgement, Pain, Orthopedic restrictions, Decreased skin integrity, Impaired sensation  Assessment: pt progressed w mob and funct activity tolerance; requires supervision w sup-sit EOB; pt able to maintain EOB seated and rech multidirectionally outside JASMIN wo LOB; this session focused on sit-stand trials using RW; requires mod assist w transf sit-stand+ standby assist for safety; in standing w RW support pt performed R LE hip fl knee ext to facilitate indep maintaining NWB; pt educated on bilat elbows locked in ext to improve UE WB; pt fatigued w exertion and required multiple  rest intervals bw trials; with progressive trails pt able to maintain elbows locked and NWB; pt further initiated pregait training w sidestep attempts EOB, however unable to coordinate movement w maintaining NWB on 3 trials; initiated wc mob; pt able to propel w min assist required on turns and around environ obstacles; pt appeared happy w therapy this session, motivated to cont to progress; rec cont PT IP rehab when med cleared        Recommendation: Post acute IP rehab     PT - OK to Discharge: Yes    See flowsheet documentation for full assessment

## 2018-02-19 NOTE — ASSESSMENT & PLAN NOTE
Antibiotics as per Infectious Disease  Status post TMA  Antibiotics for next 4 days  Wound care as per surgery  Note Podiatry input

## 2018-02-20 LAB
GLUCOSE SERPL-MCNC: 111 MG/DL (ref 65–140)
GLUCOSE SERPL-MCNC: 140 MG/DL (ref 65–140)
GLUCOSE SERPL-MCNC: 168 MG/DL (ref 65–140)
GLUCOSE SERPL-MCNC: 178 MG/DL (ref 65–140)

## 2018-02-20 PROCEDURE — 82948 REAGENT STRIP/BLOOD GLUCOSE: CPT

## 2018-02-20 PROCEDURE — 99233 SBSQ HOSP IP/OBS HIGH 50: CPT | Performed by: INTERNAL MEDICINE

## 2018-02-20 PROCEDURE — 99232 SBSQ HOSP IP/OBS MODERATE 35: CPT | Performed by: INTERNAL MEDICINE

## 2018-02-20 RX ADMIN — ASPIRIN 81 MG 81 MG: 81 TABLET ORAL at 09:09

## 2018-02-20 RX ADMIN — HYDROCODONE BITARTRATE AND ACETAMINOPHEN 1 TABLET: 5; 325 TABLET ORAL at 17:37

## 2018-02-20 RX ADMIN — METRONIDAZOLE 500 MG: 500 TABLET ORAL at 13:11

## 2018-02-20 RX ADMIN — PREGABALIN 75 MG: 75 CAPSULE ORAL at 22:37

## 2018-02-20 RX ADMIN — SENNOSIDES 8.6 MG: 8.6 TABLET, FILM COATED ORAL at 22:37

## 2018-02-20 RX ADMIN — ATORVASTATIN CALCIUM 80 MG: 80 TABLET, FILM COATED ORAL at 17:33

## 2018-02-20 RX ADMIN — MELATONIN TAB 3 MG 3 MG: 3 TAB at 22:38

## 2018-02-20 RX ADMIN — CEPHALEXIN 500 MG: 500 CAPSULE ORAL at 05:47

## 2018-02-20 RX ADMIN — INSULIN LISPRO 1 UNITS: 100 INJECTION, SOLUTION INTRAVENOUS; SUBCUTANEOUS at 13:12

## 2018-02-20 RX ADMIN — ACETAMINOPHEN 650 MG: 325 TABLET, FILM COATED ORAL at 13:11

## 2018-02-20 RX ADMIN — DOCUSATE SODIUM 100 MG: 100 CAPSULE, LIQUID FILLED ORAL at 09:08

## 2018-02-20 RX ADMIN — GABAPENTIN 300 MG: 300 CAPSULE ORAL at 22:37

## 2018-02-20 RX ADMIN — METOPROLOL TARTRATE 25 MG: 25 TABLET ORAL at 22:37

## 2018-02-20 RX ADMIN — GABAPENTIN 300 MG: 300 CAPSULE ORAL at 09:08

## 2018-02-20 RX ADMIN — POLYETHYLENE GLYCOL 3350 17 G: 17 POWDER, FOR SOLUTION ORAL at 10:40

## 2018-02-20 RX ADMIN — HEPARIN SODIUM 5000 UNITS: 5000 INJECTION, SOLUTION INTRAVENOUS; SUBCUTANEOUS at 22:37

## 2018-02-20 RX ADMIN — FUROSEMIDE 40 MG: 40 TABLET ORAL at 17:33

## 2018-02-20 RX ADMIN — CEPHALEXIN 500 MG: 500 CAPSULE ORAL at 17:33

## 2018-02-20 RX ADMIN — METOPROLOL TARTRATE 25 MG: 25 TABLET ORAL at 09:08

## 2018-02-20 RX ADMIN — INSULIN GLARGINE 18 UNITS: 100 INJECTION, SOLUTION SUBCUTANEOUS at 22:37

## 2018-02-20 RX ADMIN — CEPHALEXIN 500 MG: 500 CAPSULE ORAL at 23:03

## 2018-02-20 RX ADMIN — ACETAMINOPHEN 650 MG: 325 TABLET, FILM COATED ORAL at 05:47

## 2018-02-20 RX ADMIN — HEPARIN SODIUM 5000 UNITS: 5000 INJECTION, SOLUTION INTRAVENOUS; SUBCUTANEOUS at 13:11

## 2018-02-20 RX ADMIN — INSULIN LISPRO 1 UNITS: 100 INJECTION, SOLUTION INTRAVENOUS; SUBCUTANEOUS at 09:08

## 2018-02-20 RX ADMIN — ACETAMINOPHEN 650 MG: 325 TABLET, FILM COATED ORAL at 22:37

## 2018-02-20 RX ADMIN — FUROSEMIDE 40 MG: 40 TABLET ORAL at 09:08

## 2018-02-20 RX ADMIN — METRONIDAZOLE 500 MG: 500 TABLET ORAL at 22:37

## 2018-02-20 RX ADMIN — DOCUSATE SODIUM 100 MG: 100 CAPSULE, LIQUID FILLED ORAL at 17:33

## 2018-02-20 RX ADMIN — HEPARIN SODIUM 5000 UNITS: 5000 INJECTION, SOLUTION INTRAVENOUS; SUBCUTANEOUS at 05:47

## 2018-02-20 RX ADMIN — CEPHALEXIN 500 MG: 500 CAPSULE ORAL at 13:11

## 2018-02-20 RX ADMIN — INSULIN LISPRO 5 UNITS: 100 INJECTION, SOLUTION INTRAVENOUS; SUBCUTANEOUS at 17:49

## 2018-02-20 RX ADMIN — METRONIDAZOLE 500 MG: 500 TABLET ORAL at 05:47

## 2018-02-20 NOTE — PLAN OF CARE
Problem: Potential for Falls  Goal: Patient will remain free of falls  INTERVENTIONS:  - Assess patient frequently for physical needs  -  Identify cognitive and physical deficits and behaviors that affect risk of falls  -  [place] fall precautions as indicated by assessment   - Educate patient/family on patient safety including physical limitations  - Instruct patient to call for assistance with activity based on assessment  - Modify environment to reduce risk of injury  - Consider OT/PT consult to assist with strengthening/mobility   Outcome: Progressing      Problem: Nutrition/Hydration-ADULT  Goal: Nutrient/Hydration intake appropriate for improving, restoring or maintaining nutritional needs  Monitor and assess patient's nutrition/hydration status for malnutrition (ex- brittle hair, bruises, dry skin, pale skin and conjunctiva, muscle wasting, smooth red tongue, and disorientation)  Collaborate with interdisciplinary team and initiate plan and interventions as ordered  Monitor patient's weight and dietary intake as ordered or per policy  Utilize nutrition screening tool and intervene per policy  Determine patient's food preferences and provide high-protein, high-caloric foods as appropriate       INTERVENTIONS:  - Monitor oral intake, urinary output, labs, and treatment plans  - Assess nutrition and hydration status and recommend course of action  - Evaluate amount of meals eaten  - Assist patient with eating if necessary   - Allow adequate time for meals  - Recommend/ encourage appropriate diets, oral nutritional supplements, and vitamin/mineral supplements  - Order, calculate, and assess calorie counts as needed  - Recommend, monitor, and adjust tube feedings and TPN/PPN based on assessed needs  - Assess need for intravenous fluids  - Provide specific nutrition/hydration education as appropriate  - Include patient/family/caregiver in decisions related to nutrition   Outcome: Progressing      Problem: Prexisting or High Potential for Compromised Skin Integrity  Goal: Skin integrity is maintained or improved  INTERVENTIONS:  - Identify patients at risk for skin breakdown  - Assess and monitor skin integrity  - Assess and monitor nutrition and hydration status  - Monitor labs (i e  albumin)  - Assess for incontinence   - Turn and reposition patient  - Assist with mobility/ambulation  - Relieve pressure over bony prominences  - Avoid friction and shearing  - Provide appropriate hygiene as needed including keeping skin clean and dry  - Evaluate need for skin moisturizer/barrier cream  - Collaborate with interdisciplinary team (i e  Nutrition, Rehabilitation, etc )   - Patient/family teaching   Outcome: Progressing      Problem: DISCHARGE PLANNING - CARE MANAGEMENT  Goal: Discharge to post-acute care or home with appropriate resources  INTERVENTIONS:  - Conduct assessment to determine patient/family and health care team treatment goals, and need for post-acute services based on payer coverage, community resources, and patient preferences, and barriers to discharge  - Address psychosocial, clinical, and financial barriers to discharge as identified in assessment in conjunction with the patient/family and health care team  - Arrange appropriate level of post-acute services according to patient's   needs and preference and payer coverage in collaboration with the physician and health care team  - Communicate with and update the patient/family, physician, and health care team regarding progress on the discharge plan  - Arrange appropriate transportation to post-acute venues  - Anticipated TMA on Friday  CM continues to follow for DC needs post procedure      Outcome: Progressing      Problem: RESPIRATORY - ADULT  Goal: Achieves optimal ventilation and oxygenation  INTERVENTIONS:  - Assess for changes in respiratory status  - Assess for changes in mentation and behavior  - Position to facilitate oxygenation and minimize respiratory effort  - Oxygen administration by appropriate delivery method based on oxygen saturation (per order) or ABGs  - Initiate smoking cessation education as indicated  - Encourage broncho-pulmonary hygiene including cough, deep breathe, Incentive Spirometry  - Assess the need for suctioning and aspirate as needed  - Assess and instruct to report SOB or any respiratory difficulty  - Respiratory Therapy support as indicated   Outcome: Progressing      Problem: METABOLIC, FLUID AND ELECTROLYTES - ADULT  Goal: Electrolytes maintained within normal limits  INTERVENTIONS:  - Monitor labs and assess patient for signs and symptoms of electrolyte imbalances  - Administer electrolyte replacement as ordered  - Monitor response to electrolyte replacements, including repeat lab results as appropriate  - Instruct patient on fluid and nutrition as appropriate   Outcome: Progressing    Goal: Fluid balance maintained  INTERVENTIONS:  - Monitor labs and assess for signs and symptoms of volume excess or deficit  - Monitor I/O and WT  - Instruct patient on fluid and nutrition as appropriate   Outcome: Progressing    Goal: Glucose maintained within target range  INTERVENTIONS:  - Monitor Blood Glucose as ordered  - Assess for signs and symptoms of hyperglycemia and hypoglycemia  - Administer ordered medications to maintain glucose within target range  - Assess nutritional intake and initiate nutrition service referral as needed   Outcome: Progressing      Problem: PAIN - ADULT  Goal: Verbalizes/displays adequate comfort level or baseline comfort level  Interventions:  - Encourage patient to monitor pain and request assistance  - Assess pain using appropriate pain scale  - Administer analgesics based on type and severity of pain and evaluate response  - Implement non-pharmacological measures as appropriate and evaluate response  - Consider cultural and social influences on pain and pain management  - Notify physician/advanced practitioner if interventions unsuccessful or patient reports new pain   Outcome: Progressing      Problem: INFECTION - ADULT  Goal: Absence or prevention of progression during hospitalization  INTERVENTIONS:  - Assess and monitor for signs and symptoms of infection  - Monitor lab/diagnostic results  - Monitor all insertion sites, i e  indwelling lines, tubes, and drains  - Monitor endotracheal (as able) and nasal secretions for changes in amount and color  - [place] appropriate cooling/warming therapies per order  - Administer medications as ordered  - Instruct and encourage patient and family to use good hand hygiene technique  - Identify and instruct in appropriate isolation precautions for identified infection/condition   Outcome: Progressing    Goal: Absence of fever/infection during neutropenic period  INTERVENTIONS:  - Monitor WBC  - Implement neutropenic guidelines   Outcome: Progressing      Problem: SAFETY ADULT  Goal: Maintain or return to baseline ADL function  INTERVENTIONS:  -  Assess patient's ability to carry out ADLs; assess patient's baseline for ADL function and identify physical deficits which impact ability to perform ADLs (bathing, care of mouth/teeth, toileting, grooming, dressing, etc )  - Assess/evaluate cause of self-care deficits   - Assess range of motion  - Assess patient's mobility; develop plan if impaired  - Assess patient's need for assistive devices and provide as appropriate  - Encourage maximum independence but intervene and supervise when necessary  ¯ Involve family in performance of ADLs  ¯ Assess for home care needs following discharge   ¯ Request OT consult to assist with ADL evaluation and planning for discharge  ¯ Provide patient education as appropriate   Outcome: Progressing    Goal: Maintain or return mobility status to optimal level  INTERVENTIONS:  - Assess patient's baseline mobility status (ambulation, transfers, stairs, etc )    - Identify cognitive and physical deficits and behaviors that affect mobility  - Identify mobility aids required to assist with transfers and/or ambulation (gait belt, sit-to-stand, lift, walker, cane, etc )  - [place] fall precautions as indicated by assessment  - Record patient progress and toleration of activity level on Mobility SBAR; progress patient to next Phase/Stage  - Instruct patient to call for assistance with activity based on assessment  - Request Rehabilitation consult to assist with strengthening/weightbearing, etc    Outcome: Progressing      Problem: DISCHARGE PLANNING  Goal: Discharge to home or other facility with appropriate resources  INTERVENTIONS:  - Identify barriers to discharge w/patient and caregiver  - Arrange for needed discharge resources and transportation as appropriate  - Identify discharge learning needs (meds, wound care, etc )  - Arrange for interpretive services to assist at discharge as needed  - Refer to Case Management Department for coordinating discharge planning if the patient needs post-hospital services based on physician/advanced practitioner order or complex needs related to functional status, cognitive ability, or social support system   Outcome: Progressing      Problem: Knowledge Deficit  Goal: Patient/family/caregiver demonstrates understanding of disease process, treatment plan, medications, and discharge instructions  Complete learning assessment and assess knowledge base    Interventions:  - Provide teaching at level of understanding  - Provide teaching via preferred learning methods   Outcome: Progressing      Problem: SKIN/TISSUE INTEGRITY - ADULT  Goal: Skin integrity remains intact  INTERVENTIONS  - Identify patients at risk for skin breakdown  - Assess and monitor skin integrity  - Assess and monitor nutrition and hydration status  - Monitor labs (i e  albumin)  - Assess for incontinence   - Turn and reposition patient  - Assist with mobility/ambulation  - Relieve pressure over bony prominences  - Avoid friction and shearing  - Provide appropriate hygiene as needed including keeping skin clean and dry  - Evaluate need for skin moisturizer/barrier cream  - Collaborate with interdisciplinary team (i e  Nutrition, Rehabilitation, etc )   - Patient/family teaching   Outcome: Progressing    Goal: Incision(s), wounds(s) or drain site(s) healing without S/S of infection  INTERVENTIONS  - Assess and document risk factors for skin impairment   - Assess and document dressing, incision, wound bed, drain sites and surrounding tissue  - Initiate Nutrition services consult and/or wound management as needed   Outcome: Progressing      Problem: MUSCULOSKELETAL - ADULT  Goal: Maintain or return mobility to safest level of function  INTERVENTIONS:  - Assess patient's ability to carry out ADLs; assess patient's baseline for ADL function and identify physical deficits which impact ability to perform ADLs (bathing, care of mouth/teeth, toileting, grooming, dressing, etc )  - Assess/evaluate cause of self-care deficits   - Assess range of motion  - Assess patient's mobility; develop plan if impaired  - Assess patient's need for assistive devices and provide as appropriate  - Encourage maximum independence but intervene and supervise when necessary  - Involve family in performance of ADLs  - Assess for home care needs following discharge   - Request OT consult to assist with ADL evaluation and planning for discharge  - Provide patient education as appropriate   Outcome: Progressing    Goal: Maintain proper alignment of affected body part  INTERVENTIONS:  - Support, maintain and protect limb and body alignment  - Provide pt/fam with appropriate education   Outcome: Progressing

## 2018-02-20 NOTE — SOCIAL WORK
[facility] Extended Stay  Optimal GLOS: 3  Hospital Day: *15 days  Identified barriers for extended stay:   Mental status change  Extended stay beyond goal length of stay for primary condition may be needed until ALL of the following are present:  Underlying medical etiology of mental status change is absent, or has been established and adequately treated  Danger to self or others is absent or manageable at lower level of care  Behavior crisis management, including physical or chemical restraints, is not required or is available at lower level of care  Substance or alcohol withdrawal is absent or manageable at lower level of care  Behavioral symptoms (eg, agitation, somnolence, inappropriate behavior) are absent or manageable at lower level of care  Surgical intervention needed (eg, beyond superficial debridement)  Surgical intervention may include drainage procedure, fracture fixation, removal of prosthetic device, vascular restoration, or amputation  Bony defects left after debridement may require surgical replacement with tissue or bone grafts, or with antibiotic-impregnated spacers  External fixation devices may be required  Expect brief to moderate stay extension  Discussion Date (Time): 02/20/18 with SLIM

## 2018-02-20 NOTE — PROGRESS NOTES
Rounds done with Dr Marly Lugo of [facility]  Patient is pending rehab placement  Will be able to d/c to [facility]  No no orders, patient resting comfortably

## 2018-02-20 NOTE — PROGRESS NOTES
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   Patient is status post open 1st toe amputation  Layla Crook, he has gangrenous changes in his other toes also  Juliosarah Johnson is now status post TMA   He is also now status post STSG  Antibiotic plan as in above  Serial foot exams       3   Severe sepsis   Source of sepsis is most likely right foot infection   Patient is clinically improved with 1st toe amputation and IV antibiotic  Juliosarah Johnson is now status post TMA   He should do well   Blood cultures remain negative  Antibiotic plan as in above  Monitor hemodynamics      4   PVD    Patient is status post arteriogram with successful intervention   Circulatory status has been optimized  Vascular surgery following      5   Leukocytosis   WBC increased postop   It is normal again, as expected   Patient remains clinically and systemically well  Monitor WBC      6  Acute hypoxic respiratory failure   I suspect this is secondary to sepsis and pulmonary edema   No obvious pneumonia on CXR    Patient's respiratory status now improved, currently on room air      7  DM with hyperglycemia on admission   Patient appears noncompliant with diabetic medication   This clearly contributes to infection development    Blood sugar management per Medicine Service      8   PCN allergy on allergy list   Patient does not recall reaction  Julio Johnson states that this was when he was a young child  Julio Johnson is tolerating cephalosporin well      Discussed with the patient and his family in detail regarding the above  Discharge plan per primary service  At this point, I will sign off  Thank you for the consultation  Please do not hesitate to reconsult us for any questions or issues      Antibiotics:  Keflex/Flagyl  POD #       Subjective:  VAC was removed yesterday  Patient feels well  No foot pain  No dyspnea  Temperature stays down   No chills  He is tolerating antibiotics well   No nausea, vomiting or diarrhea  Objective:  Vitals:  HR:  [76-85] 85  Resp:  [16-17] 16  BP: (100-137)/(65) 137/65  SpO2:  [95 %-97 %] 95 %  Temp (24hrs), Av 7 °F (36 5 °C), Min:97 7 °F (36 5 °C), Max:97 8 °F (36 6 °C)  Current: Temperature: 97 7 °F (36 5 °C)    Physical Exam:     General: Awake, alert, cooperative, no distress  Lungs: Expansion symmetric, no rales, no wheezing, respirations unlabored  Heart[de-identified]  Regular rate and rhythm, S1 and S2 normal, no murmur  Abdomen: Soft, nondistended, non-tender, bowel sounds active all four quadrants,        no masses, no organomegaly  Extremities: No leg edema  Wound with dressing in place  Dressing is dry  Nontender  Photo from Podiatry evaluation noted  STSG took well  No purulence  No erythema  Skin:  No rash  Invasive Devices     Peripheral Intravenous Line            Peripheral IV 18 Right;Upper Arm 3 days          Drain            External Urinary Catheter Small 10 days                Labs studies:   I have personally reviewed pertinent labs      Results from last 7 days  Lab Units 18  0524 18  0558 18  1146   SODIUM mmol/L 132* 133* 133*   POTASSIUM mmol/L 3 4* 4 2 4 0   CHLORIDE mmol/L 93* 94* 92*   CO2 mmol/L 33* 34* 33*   ANION GAP mmol/L 6 5 8   BUN mg/dL 15 14 13   CREATININE mg/dL 0 77 0 83 0 79   EGFR ml/min/1 73sq m 93 90 92   GLUCOSE RANDOM mg/dL 205* 208* 106   CALCIUM mg/dL 8 6 8 5 8 6       Results from last 7 days  Lab Units 18  0524 02/17/18  0558 02/16/18  1146 02/15/18  0526   WBC Thousand/uL 7 68 8 67  --  12 47*   HEMOGLOBIN g/dL 10 6* 10 4*  --  9 5*   PLATELETS Thousands/uL [phone]           Imaging Studies:   I have personally reviewed pertinent imaging study reports and images in PACS  EKG, Pathology, and Other Studies:   I have personally reviewed pertinent reports

## 2018-02-20 NOTE — SOCIAL WORK
Cm reviewed patient during care coordination rounds  Cm continues to work on identifying an accepting facility  Cm sent additional referrals ot [facility], [facility], [facility] and [facility]  [facility] stated that they will have a bed for the patient tomorrow pending bed availability  Cm will need updated PT/OT notes for insurance authorization

## 2018-02-21 VITALS
HEIGHT: 68 IN | TEMPERATURE: 98.5 F | BODY MASS INDEX: 20.65 KG/M2 | OXYGEN SATURATION: 99 % | RESPIRATION RATE: 18 BRPM | HEART RATE: 101 BPM | DIASTOLIC BLOOD PRESSURE: 59 MMHG | WEIGHT: 136.24 LBS | SYSTOLIC BLOOD PRESSURE: 122 MMHG

## 2018-02-21 LAB
GLUCOSE SERPL-MCNC: 197 MG/DL (ref 65–140)
GLUCOSE SERPL-MCNC: 199 MG/DL (ref 65–140)
GLUCOSE SERPL-MCNC: 95 MG/DL (ref 65–140)

## 2018-02-21 PROCEDURE — 82948 REAGENT STRIP/BLOOD GLUCOSE: CPT

## 2018-02-21 PROCEDURE — 99239 HOSP IP/OBS DSCHRG MGMT >30: CPT | Performed by: INTERNAL MEDICINE

## 2018-02-21 PROCEDURE — TCMPR

## 2018-02-21 PROCEDURE — 97530 THERAPEUTIC ACTIVITIES: CPT

## 2018-02-21 PROCEDURE — 97112 NEUROMUSCULAR REEDUCATION: CPT

## 2018-02-21 PROCEDURE — 97535 SELF CARE MNGMENT TRAINING: CPT

## 2018-02-21 RX ORDER — FUROSEMIDE 40 MG/1
40 TABLET ORAL DAILY
Qty: 30 TABLET | Refills: 0
Start: 2018-02-21 | End: 2018-08-14 | Stop reason: ALTCHOICE

## 2018-02-21 RX ORDER — HYDROCODONE BITARTRATE AND ACETAMINOPHEN 5; 325 MG/1; MG/1
1 TABLET ORAL EVERY 6 HOURS PRN
Qty: 20 TABLET | Refills: 0 | Status: SHIPPED | OUTPATIENT
Start: 2018-02-21 | End: 2018-03-03

## 2018-02-21 RX ORDER — GABAPENTIN 300 MG/1
300 CAPSULE ORAL 3 TIMES DAILY
Qty: 20 CAPSULE | Refills: 0
Start: 2018-02-21 | End: 2018-08-14 | Stop reason: ALTCHOICE

## 2018-02-21 RX ORDER — OXYCODONE HYDROCHLORIDE 5 MG/1
5 TABLET ORAL EVERY 4 HOURS PRN
Qty: 10 TABLET | Refills: 0 | Status: SHIPPED | OUTPATIENT
Start: 2018-02-21 | End: 2018-03-03

## 2018-02-21 RX ORDER — ATORVASTATIN CALCIUM 80 MG/1
80 TABLET, FILM COATED ORAL
Qty: 30 TABLET | Refills: 0
Start: 2018-02-21 | End: 2018-08-14 | Stop reason: ALTCHOICE

## 2018-02-21 RX ORDER — ACETAMINOPHEN 325 MG/1
650 TABLET ORAL 3 TIMES DAILY
Qty: 30 TABLET | Refills: 0
Start: 2018-02-21

## 2018-02-21 RX ORDER — INSULIN GLARGINE 100 [IU]/ML
18 INJECTION, SOLUTION SUBCUTANEOUS
Qty: 10 ML | Refills: 0 | Status: SHIPPED | OUTPATIENT
Start: 2018-02-21 | End: 2018-08-14 | Stop reason: ALTCHOICE

## 2018-02-21 RX ORDER — CEPHALEXIN 500 MG/1
500 CAPSULE ORAL EVERY 6 HOURS SCHEDULED
Qty: 8 CAPSULE | Refills: 0
Start: 2018-02-21 | End: 2018-02-23

## 2018-02-21 RX ORDER — DOCUSATE SODIUM 100 MG/1
100 CAPSULE, LIQUID FILLED ORAL 2 TIMES DAILY
Qty: 10 CAPSULE | Refills: 0 | Status: SHIPPED | OUTPATIENT
Start: 2018-02-21

## 2018-02-21 RX ORDER — METRONIDAZOLE 500 MG/1
500 TABLET ORAL EVERY 8 HOURS SCHEDULED
Qty: 6 TABLET | Refills: 0
Start: 2018-02-21 | End: 2018-02-23

## 2018-02-21 RX ORDER — SENNOSIDES 8.6 MG
1 TABLET ORAL
Qty: 120 EACH | Refills: 0 | Status: SHIPPED | OUTPATIENT
Start: 2018-02-21 | End: 2018-08-14 | Stop reason: ALTCHOICE

## 2018-02-21 RX ORDER — LANOLIN ALCOHOL/MO/W.PET/CERES
3 CREAM (GRAM) TOPICAL
Qty: 10 TABLET | Refills: 0
Start: 2018-02-21

## 2018-02-21 RX ORDER — POLYETHYLENE GLYCOL 3350 17 G/17G
17 POWDER, FOR SOLUTION ORAL DAILY PRN
Qty: 14 EACH | Refills: 0
Start: 2018-02-21 | End: 2018-08-14 | Stop reason: ALTCHOICE

## 2018-02-21 RX ADMIN — INSULIN LISPRO 1 UNITS: 100 INJECTION, SOLUTION INTRAVENOUS; SUBCUTANEOUS at 18:04

## 2018-02-21 RX ADMIN — ATORVASTATIN CALCIUM 80 MG: 80 TABLET, FILM COATED ORAL at 18:03

## 2018-02-21 RX ADMIN — CEPHALEXIN 500 MG: 500 CAPSULE ORAL at 12:58

## 2018-02-21 RX ADMIN — METOPROLOL TARTRATE 25 MG: 25 TABLET ORAL at 09:17

## 2018-02-21 RX ADMIN — HEPARIN SODIUM 5000 UNITS: 5000 INJECTION, SOLUTION INTRAVENOUS; SUBCUTANEOUS at 15:44

## 2018-02-21 RX ADMIN — FUROSEMIDE 40 MG: 40 TABLET ORAL at 09:17

## 2018-02-21 RX ADMIN — FUROSEMIDE 40 MG: 40 TABLET ORAL at 18:04

## 2018-02-21 RX ADMIN — GABAPENTIN 300 MG: 300 CAPSULE ORAL at 18:04

## 2018-02-21 RX ADMIN — CEPHALEXIN 500 MG: 500 CAPSULE ORAL at 18:03

## 2018-02-21 RX ADMIN — ASPIRIN 81 MG 81 MG: 81 TABLET ORAL at 09:17

## 2018-02-21 RX ADMIN — METRONIDAZOLE 500 MG: 500 TABLET ORAL at 06:14

## 2018-02-21 RX ADMIN — OXYCODONE HYDROCHLORIDE 5 MG: 5 TABLET ORAL at 01:06

## 2018-02-21 RX ADMIN — METRONIDAZOLE 500 MG: 500 TABLET ORAL at 15:45

## 2018-02-21 RX ADMIN — CEPHALEXIN 500 MG: 500 CAPSULE ORAL at 06:14

## 2018-02-21 RX ADMIN — DOCUSATE SODIUM 100 MG: 100 CAPSULE, LIQUID FILLED ORAL at 09:17

## 2018-02-21 RX ADMIN — ACETAMINOPHEN 650 MG: 325 TABLET, FILM COATED ORAL at 06:14

## 2018-02-21 RX ADMIN — INSULIN LISPRO 1 UNITS: 100 INJECTION, SOLUTION INTRAVENOUS; SUBCUTANEOUS at 12:57

## 2018-02-21 RX ADMIN — GABAPENTIN 300 MG: 300 CAPSULE ORAL at 09:17

## 2018-02-21 RX ADMIN — HEPARIN SODIUM 5000 UNITS: 5000 INJECTION, SOLUTION INTRAVENOUS; SUBCUTANEOUS at 06:14

## 2018-02-21 NOTE — PROGRESS NOTES
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, per Endo  3  PAD    Plan:  - patient seen and examined at bedside with Dr Tavo Lee, nontoxic appearance with vital signs stable  - dressing was changed today; the right foot was gently rinsed with normal sterile, then dressed with adaptic, maxorb, DSD  The right graft harvest site was dressed with Maxorb and Tegaderm, and Ace bandage  - the graft continues to take and there was less sanguinous drainage to graft harvest site  -the posterior splint was reapplied with ACE wrap, patient is to be strict nonweightbearing to the right lower extremity  -patient is stable for discharge podiatry point of view, awaiting placement  Case management following      Subjective/Objective   Chief Complaint:   Chief Complaint   Patient presents with    Foot Ulcer     Pt presents with diabetic ulcers on right foot and increased pain and swelling       Subjective: 75 y o  male was seen and evaluated at bedside  No acute events overnight  NAD  Sitting comfortably in chair  No new complaints at this time    Blood pressure 135/64, pulse 91, temperature 97 5 °F (36 4 °C), temperature source Oral, resp  rate 18, height 5' 8" (1 727 m), weight 61 8 kg (136 lb 3 9 oz), SpO2 100 %  ,Body mass index is 20 72 kg/m²      Invasive Devices     Peripheral Intravenous Line            Peripheral IV 02/17/18 Right;Upper Arm 4 days          Drain            External Urinary Catheter Small 11 days                Physical Exam:   General: Alert, cooperative and no distress  Lungs: Non labored breathing  Heart: Positive S1, S2  Abdomen: Soft, non-tender  Extremity:  Gross motor function and neurovascular status at baseline  No calf tenderness bilaterally  Right calf graft harvest site appears stable without clinical signs of infection, drainage or malodor  The right foot shows good take of the graft, no evidence of infection or rejection noted  Overall tan appearance noted  Graft is not contracted at this time and continues to be adhered to foot  No drainage, no signs of cellulitis, malodor noted  Lab, Imaging and other studies:   I have personally reviewed pertinent lab results  , CBC: No results found for: WBC, HGB, HCT, MCV, PLT, ADJUSTEDWBC, MCH, MCHC, RDW, MPV, NRBC    Imaging: I have personally reviewed pertinent films in PACS  EKG, Pathology, and Other Studies: I have personally reviewed pertinent reports    VTE Pharmacologic Prophylaxis: Heparin

## 2018-02-21 NOTE — PLAN OF CARE
Problem: OCCUPATIONAL THERAPY ADULT  Goal: Performs self-care activities at highest level of function for planned discharge setting  See evaluation for individualized goals  Treatment Interventions: ADL retraining, Functional transfer training, Endurance training, Patient/family training, Equipment evaluation/education, Compensatory technique education, Continued evaluation, Energy conservation, Activityengagement          See flowsheet documentation for full assessment, interventions and recommendations  Outcome: Progressing  Limitation: Decreased ADL status, Decreased cognition, Decreased Safe judgement during ADL, Decreased endurance, Decreased self-care trans, Decreased high-level ADLs  Prognosis: Good  Assessment: pt participated in am ot session and was seen focusing on bed mobility, ub and lb adls, grooming, functional transfers, activity tolerence and education on safety techniques  pt demosntrates poor recall of newly instructed techniques and previous activities attempted in previous sessions ie using w/c with p t    pts wife is present this session  pt requries mod asst x 1 for lb dressing min asst ub dressing and mod asst x 2 to perform spt towards his left    pt unable this transfer to maintain nwbing r le without physical asst     OT Discharge Recommendation: Short Term Rehab  OT - OK to Discharge: Yes (to STR when medically stable)  Leonor Blair

## 2018-02-21 NOTE — PROGRESS NOTES
Progress Note Melyssa Booker 1950, 75 y o  male MRN: [id number]    Unit/Bed#: [facility] 931-01 Encounter: [id number]    Primary Care Provider: Mark Birmingham MD   Date and time admitted to hospital: 2018  4:18 PM        Gangrene of toe of right foot [facility])   Assessment & Plan    Patient is status transmetatarsal amputation on 2018  Continue IV antibiotics as per ID  OP FU with Podiatry        (HFpEF) heart failure with preserved ejection fraction (HCC)   Assessment & Plan    Appears euvolemic  Continue to monitor  OP Cardio FU for further workup  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)   Assessment & Plan    Blood sugar still elevated more than 200 most of the times  Endocrine following  Appreciate recommendations  Hypertension   Assessment & Plan    Continue Lopressor for now  Elidia Cooper VTE Pharmacologic Prophylaxis:   Pharmacologic: Heparin  Mechanical VTE Prophylaxis in Place: Yes    Patient Centered Rounds: I have performed bedside rounds with nursing staff today  Discussions with Specialists or Other Care Team Provider:     Education and Discussions with Family / Patient: patient, wife and daughter    Time Spent for Care: 30 minutes  More than 50% of total time spent on counseling and coordination of care as described above  Current Length of Stay: 15 day(s)    Current Patient Status: Inpatient   Certification Statement: The patient will continue to require additional inpatient hospital stay due to above    Discharge Plan: to STR pending placement    Code Status: Level 1 - Full Code      Subjective:   Pt seen and examined by me this morning  Pt denied any complains  Pain better controlled  Objective:     Vitals:   Temp (24hrs), Av 8 °F (36 6 °C), Min:97 7 °F (36 5 °C), Max:98 1 °F (36 7 °C)    HR:  [83-85] 85  Resp:  [16-18] 18  BP: (133-137)/(65-66) 133/66  SpO2:  [95 %-100 %] 100 %  Body mass index is 20 55 kg/m²       Input and Output Summary (last 24 hours): Intake/Output Summary (Last 24 hours) at 02/20/18 2301  Last data filed at 02/20/18 2139   Gross per 24 hour   Intake              780 ml   Output             1700 ml   Net             -920 ml       Physical Exam:     Physical Exam    Constitutional: Pt appears well-developed and well-nourished  Not in any acute distress  Cardiovascular: Normal rate, regular rhythm, normal heart sounds and intact distal pulses  Exam reveals no gallop and no friction rub  No murmur heard  Pulmonary/Chest: Effort normal and breath sounds normal  No respiratory distress  Pt has no wheezes or rales  Abdominal: Soft  Non-distended, Non-tender  Bowel sounds are normal    Musculoskeletal: Normal range of motion  Rt foot - dressed  Neurological: alert and oriented to person, place, and time  Normal strength and sensations  Psychiatric: normal mood and affect  Additional Data:     Labs:      Results from last 7 days  Lab Units 02/19/18  0524   WBC Thousand/uL 7 68   HEMOGLOBIN g/dL 10 6*   HEMATOCRIT % 31 6*   PLATELETS Thousands/uL 387   NEUTROS PCT % 67   LYMPHS PCT % 24   MONOS PCT % 8   EOS PCT % 1       Results from last 7 days  Lab Units 02/19/18  0524   SODIUM mmol/L 132*   POTASSIUM mmol/L 3 4*   CHLORIDE mmol/L 93*   CO2 mmol/L 33*   BUN mg/dL 15   CREATININE mg/dL 0 77   CALCIUM mg/dL 8 6   GLUCOSE RANDOM mg/dL 205*           * I Have Reviewed All Lab Data Listed Above  * Additional Pertinent Lab Tests Reviewed:  Crystal Knight Admission Reviewed    Imaging:    Imaging Reports Reviewed Today Include:   Imaging Personally Reviewed by Myself Includes:      Recent Cultures (last 7 days):           Last 24 Hours Medication List:     Current Facility-Administered Medications:  acetaminophen 650 mg Oral Q8H [address] Hetul Cruz, DO    aspirin 81 mg Oral Daily ARMANDO Schmidt    atorvastatin 80 mg Oral Daily With Massimo Mathew MD    cephalexin 500 mg Oral Q6H Emperatriz Huang MD    docusate sodium 100 mg Oral BID Tuyet Woodruff PA-C    furosemide 40 mg Oral BID (diuretic) Emmanuel Murray,     gabapentin 300 mg Oral TID Soniya Hayden MD    heparin (porcine) 5,000 Units Subcutaneous [facility] Nehemiah Lay DPM    hydrALAZINE 10 mg Intravenous Q6H PRN ARMANDO Wiggins    HYDROcodone-acetaminophen 1 tablet Oral Q6H PRN Jorge Cruz, DO    insulin glargine 18 Units Subcutaneous HS Lela Farias MD    insulin lispro 1-5 Units Subcutaneous TID AC Octavio Joyara, DO    insulin lispro 1-5 Units Subcutaneous HS Octavio Wilkerson, DO    insulin lispro 7 Units Subcutaneous TID With Meals Lela Farias MD    lactated ringers 100 mL/hr Intravenous Continuous Sadie Boles MD Last Rate: Stopped (02/16/18 0145)   melatonin 3 mg Oral HS Jairo Jasmine PA-C    metoprolol tartrate 25 mg Oral Q12H [facility] Medico, MD    metroNIDAZOLE 500 mg Oral [facility] Genna De La Cruz MD    oxyCODONE 10 mg Oral Q4H PRN Soniya Hayden MD    oxyCODONE 5 mg Oral Q4H PRN ARMANDO Wiggins    polyethylene glycol 17 g Oral Daily PRN Tuyet Woodruff PA-C    pregabalin 75 mg Oral HS Jorge Cruz,     senna 1 tablet Oral HS Tuyet Woodruff PA-C         Today, Patient Was Seen By: Soniya Hayden MD    ** Please Note: Dictation voice to text software may have been used in the creation of this document   **

## 2018-02-21 NOTE — SOCIAL WORK
Cm reviewed patient during care coordination rounds  Patient is medically stable for d/c today  Cm informed in AM that patient would not have a bed for today  Cm met with patient and wife who were disappointed but in agreement with a referral to [facility]  Cm sent referral  While awaiting response, Cm confirmed later in afternoon, that patient will have a bed at [address] [facility]  Cm was awaiting confirmation of insurance verfication from [facility], but when Cm contacted facility, no authorization was obtained  Cm contacted insurance contact herself, and was able to obtain insurance authorization, [id number]  Cm informed that facility wanted transfer to be after 5pm  Cm arranged [facility] with [facility] [place] [id number] [facility]  Cm informed patient, wife, medical team, and facility  All parties in agreement

## 2018-02-21 NOTE — PHYSICAL THERAPY NOTE
PT TREATMENT     02/21/18 1107   Pain Assessment   Pain Assessment No/denies pain   Pain Score No Pain   Hospital Pain Intervention(s) Repositioned; Ambulation/increased activity   Restrictions/Precautions   Weight Bearing Precautions Per Order Yes   RLE Weight Bearing Per Order NWB   Braces or Orthoses Splint  (R foot)   Other Precautions Fall Risk;Cognitive   General   Chart Reviewed Yes   Response to Previous Treatment Patient with no complaints from previous session  Family/Caregiver Present Yes   Cognition   Overall Cognitive Status Impaired   Arousal/Participation Alert   Attention Attends with cues to redirect   Memory Decreased recall of precautions;Decreased short term memory;Decreased recall of recent events   Following Commands Follows multistep commands without difficulty   Comments Pt is relatively cooperative  Pt required encouragement throughout treatment session to participate  Pt had difficulty recalling use of WC during previous treatment session  Subjective   Subjective Pt agreeable to participate with encouragement   Bed Mobility   Additional Comments Pt sitting EOB with OT upon arrival   Transfers   Sit to Stand 3  Moderate assistance   Additional items Assist x 2; Increased time required;Verbal cues   Stand to Sit 3  Moderate assistance   Additional items Assist x 2; Increased time required;Verbal cues   Additional Comments First sit to stand trial performed from EOB required Mod Ax2 with Rw  Pt required verbal and tactile cues for proper hand placement, NWB R Le, and force production of UE and L Le  Pt required Mod Ax2 for hop forward with RW and verbal and tactile cues for RW use and maintenance of NWB status from EOB to chair  Once in chair second sit to stand trial attempted with RW and Mod Ax2  Pt again required verbal and tactile cues for hand placement, proper force production, and NWB status   Pt able to tolerate standing with Max Ax1 for support and second therapist stabilizing L LE as pt unable to fully extend knee 2* quadriceps weakness for approximately 2 minutes  Once seated, pt complained of dizziness and fatigue  LE elevated and BP taken in this position reading 166/75  RN notified  Pt declining further standing trials  Ambulation/Elevation   Gait pattern Poor UE support;L Knee Racheal; Antalgic; Excessively slow   Gait Assistance 3  Moderate assist   Additional items Assist x 2;Verbal cues; Tactile cues   Assistive Device Rolling walker   Distance 3' OOB to chair   Balance   Static Sitting Fair   Dynamic Sitting Fair   Static Standing Poor   Ambulatory Poor   Endurance Deficit   Endurance Deficit Yes   Endurance Deficit Description fatigue, weakness, decreased activity tolerance   Activity Tolerance   Activity Tolerance Patient limited by fatigue   Nurse Made Aware appropriate to see per RN   Exercises   Hip Abduction Sitting;20 reps;PROM; Right  (long sit in chair; 2x10)   Neuro re-ed Pt educated on importance of quadriceps activity for proper knee extension during standing  Pt shown and educated on proper form and frequency of seated knee extension as well as long sit quad sets  Pt wife expressed significant concerns regarding patients lack of eating and decreased activity  Pt educated on proper nutrition and importance on continued mobilization  Assessment   Prognosis Good   Problem List Decreased strength;Decreased range of motion;Decreased endurance; Impaired balance;Decreased mobility; Decreased coordination;Decreased cognition;Decreased safety awareness;Orthopedic restrictions;Pain   Assessment Pt seen for PT treatment session  Pt sitting EOB with OT upon arrival and was agreeable to transfer OOB to chair  PT was Mod Ax2 for sit to stand with verbal and tactile cues to maintain NWB status  Pt able to hop forward with Mod Ax2 and verbal and tactile cues for proper RW management and maintenance of NWB  Once in chair therex performed for R Le   Pt agreeable for sit to stand and standing tolerance trial see above for more details  Pt currently presenting with a problem list that includes NWB status, decreased strength, impaired balance, decreased functional mobility and endurace, and decreased activity tolerance  PT recommendation for rehab remains appropriate as patient is below baseline  PT to continue to follow patient throughout course of stay  Goals   Patient Goals to rest   STG Expiration Date 03/02/18   Treatment Day 4   Plan   Treatment/Interventions Functional transfer training;LE strengthening/ROM; Therapeutic exercise;Elevations; Endurance training;Cognitive reorientation;Patient/family training;Equipment eval/education; Bed mobility;Gait training;Spoke to nursing   Progress Progressing toward goals   PT Frequency (6x week)   Recommendation   Recommendation Post acute IP rehab   Equipment Recommended Wheelchair;Walker   PT - OK to Discharge Yes  (to rehab when medically appropriate)   Additional Comments Pt left OOB in chair with all needs within reach   Family in room     [place], [place]

## 2018-02-21 NOTE — PLAN OF CARE
Problem: Potential for Falls  Goal: Patient will remain free of falls  INTERVENTIONS:  - Assess patient frequently for physical needs  -  Identify cognitive and physical deficits and behaviors that affect risk of falls  -  [place] fall precautions as indicated by assessment   - Educate patient/family on patient safety including physical limitations  - Instruct patient to call for assistance with activity based on assessment  - Modify environment to reduce risk of injury  - Consider OT/PT consult to assist with strengthening/mobility   Outcome: Progressing      Problem: Nutrition/Hydration-ADULT  Goal: Nutrient/Hydration intake appropriate for improving, restoring or maintaining nutritional needs  Monitor and assess patient's nutrition/hydration status for malnutrition (ex- brittle hair, bruises, dry skin, pale skin and conjunctiva, muscle wasting, smooth red tongue, and disorientation)  Collaborate with interdisciplinary team and initiate plan and interventions as ordered  Monitor patient's weight and dietary intake as ordered or per policy  Utilize nutrition screening tool and intervene per policy  Determine patient's food preferences and provide high-protein, high-caloric foods as appropriate       INTERVENTIONS:  - Monitor oral intake, urinary output, labs, and treatment plans  - Assess nutrition and hydration status and recommend course of action  - Evaluate amount of meals eaten  - Assist patient with eating if necessary   - Allow adequate time for meals  - Recommend/ encourage appropriate diets, oral nutritional supplements, and vitamin/mineral supplements  - Order, calculate, and assess calorie counts as needed  - Recommend, monitor, and adjust tube feedings and TPN/PPN based on assessed needs  - Assess need for intravenous fluids  - Provide specific nutrition/hydration education as appropriate  - Include patient/family/caregiver in decisions related to nutrition   Outcome: Progressing      Problem: Prexisting or High Potential for Compromised Skin Integrity  Goal: Skin integrity is maintained or improved  INTERVENTIONS:  - Identify patients at risk for skin breakdown  - Assess and monitor skin integrity  - Assess and monitor nutrition and hydration status  - Monitor labs (i e  albumin)  - Assess for incontinence   - Turn and reposition patient  - Assist with mobility/ambulation  - Relieve pressure over bony prominences  - Avoid friction and shearing  - Provide appropriate hygiene as needed including keeping skin clean and dry  - Evaluate need for skin moisturizer/barrier cream  - Collaborate with interdisciplinary team (i e  Nutrition, Rehabilitation, etc )   - Patient/family teaching   Outcome: Progressing      Problem: DISCHARGE PLANNING - CARE MANAGEMENT  Goal: Discharge to post-acute care or home with appropriate resources  INTERVENTIONS:  - Conduct assessment to determine patient/family and health care team treatment goals, and need for post-acute services based on payer coverage, community resources, and patient preferences, and barriers to discharge  - Address psychosocial, clinical, and financial barriers to discharge as identified in assessment in conjunction with the patient/family and health care team  - Arrange appropriate level of post-acute services according to patient's   needs and preference and payer coverage in collaboration with the physician and health care team  - Communicate with and update the patient/family, physician, and health care team regarding progress on the discharge plan  - Arrange appropriate transportation to post-acute venues  - Anticipated TMA on Friday  CM continues to follow for DC needs post procedure      Outcome: Progressing      Problem: RESPIRATORY - ADULT  Goal: Achieves optimal ventilation and oxygenation  INTERVENTIONS:  - Assess for changes in respiratory status  - Assess for changes in mentation and behavior  - Position to facilitate oxygenation and minimize respiratory effort  - Oxygen administration by appropriate delivery method based on oxygen saturation (per order) or ABGs  - Initiate smoking cessation education as indicated  - Encourage broncho-pulmonary hygiene including cough, deep breathe, Incentive Spirometry  - Assess the need for suctioning and aspirate as needed  - Assess and instruct to report SOB or any respiratory difficulty  - Respiratory Therapy support as indicated   Outcome: Progressing      Problem: METABOLIC, FLUID AND ELECTROLYTES - ADULT  Goal: Electrolytes maintained within normal limits  INTERVENTIONS:  - Monitor labs and assess patient for signs and symptoms of electrolyte imbalances  - Administer electrolyte replacement as ordered  - Monitor response to electrolyte replacements, including repeat lab results as appropriate  - Instruct patient on fluid and nutrition as appropriate   Outcome: Progressing    Goal: Fluid balance maintained  INTERVENTIONS:  - Monitor labs and assess for signs and symptoms of volume excess or deficit  - Monitor I/O and WT  - Instruct patient on fluid and nutrition as appropriate   Outcome: Progressing    Goal: Glucose maintained within target range  INTERVENTIONS:  - Monitor Blood Glucose as ordered  - Assess for signs and symptoms of hyperglycemia and hypoglycemia  - Administer ordered medications to maintain glucose within target range  - Assess nutritional intake and initiate nutrition service referral as needed   Outcome: Progressing      Problem: PAIN - ADULT  Goal: Verbalizes/displays adequate comfort level or baseline comfort level  Interventions:  - Encourage patient to monitor pain and request assistance  - Assess pain using appropriate pain scale  - Administer analgesics based on type and severity of pain and evaluate response  - Implement non-pharmacological measures as appropriate and evaluate response  - Consider cultural and social influences on pain and pain management  - Notify physician/advanced practitioner if interventions unsuccessful or patient reports new pain   Outcome: Progressing      Problem: INFECTION - ADULT  Goal: Absence or prevention of progression during hospitalization  INTERVENTIONS:  - Assess and monitor for signs and symptoms of infection  - Monitor lab/diagnostic results  - Monitor all insertion sites, i e  indwelling lines, tubes, and drains  - Monitor endotracheal (as able) and nasal secretions for changes in amount and color  - [place] appropriate cooling/warming therapies per order  - Administer medications as ordered  - Instruct and encourage patient and family to use good hand hygiene technique  - Identify and instruct in appropriate isolation precautions for identified infection/condition   Outcome: Progressing    Goal: Absence of fever/infection during neutropenic period  INTERVENTIONS:  - Monitor WBC  - Implement neutropenic guidelines   Outcome: Progressing      Problem: SAFETY ADULT  Goal: Maintain or return to baseline ADL function  INTERVENTIONS:  -  Assess patient's ability to carry out ADLs; assess patient's baseline for ADL function and identify physical deficits which impact ability to perform ADLs (bathing, care of mouth/teeth, toileting, grooming, dressing, etc )  - Assess/evaluate cause of self-care deficits   - Assess range of motion  - Assess patient's mobility; develop plan if impaired  - Assess patient's need for assistive devices and provide as appropriate  - Encourage maximum independence but intervene and supervise when necessary  ¯ Involve family in performance of ADLs  ¯ Assess for home care needs following discharge   ¯ Request OT consult to assist with ADL evaluation and planning for discharge  ¯ Provide patient education as appropriate   Outcome: Progressing    Goal: Maintain or return mobility status to optimal level  INTERVENTIONS:  - Assess patient's baseline mobility status (ambulation, transfers, stairs, etc )    - Identify cognitive and physical deficits and behaviors that affect mobility  - Identify mobility aids required to assist with transfers and/or ambulation (gait belt, sit-to-stand, lift, walker, cane, etc )  - [place] fall precautions as indicated by assessment  - Record patient progress and toleration of activity level on Mobility SBAR; progress patient to next Phase/Stage  - Instruct patient to call for assistance with activity based on assessment  - Request Rehabilitation consult to assist with strengthening/weightbearing, etc    Outcome: Progressing      Problem: DISCHARGE PLANNING  Goal: Discharge to home or other facility with appropriate resources  INTERVENTIONS:  - Identify barriers to discharge w/patient and caregiver  - Arrange for needed discharge resources and transportation as appropriate  - Identify discharge learning needs (meds, wound care, etc )  - Arrange for interpretive services to assist at discharge as needed  - Refer to Case Management Department for coordinating discharge planning if the patient needs post-hospital services based on physician/advanced practitioner order or complex needs related to functional status, cognitive ability, or social support system   Outcome: Progressing      Problem: Knowledge Deficit  Goal: Patient/family/caregiver demonstrates understanding of disease process, treatment plan, medications, and discharge instructions  Complete learning assessment and assess knowledge base    Interventions:  - Provide teaching at level of understanding  - Provide teaching via preferred learning methods   Outcome: Progressing      Problem: SKIN/TISSUE INTEGRITY - ADULT  Goal: Skin integrity remains intact  INTERVENTIONS  - Identify patients at risk for skin breakdown  - Assess and monitor skin integrity  - Assess and monitor nutrition and hydration status  - Monitor labs (i e  albumin)  - Assess for incontinence   - Turn and reposition patient  - Assist with mobility/ambulation  - Relieve pressure over bony prominences  - Avoid friction and shearing  - Provide appropriate hygiene as needed including keeping skin clean and dry  - Evaluate need for skin moisturizer/barrier cream  - Collaborate with interdisciplinary team (i e  Nutrition, Rehabilitation, etc )   - Patient/family teaching   Outcome: Progressing    Goal: Incision(s), wounds(s) or drain site(s) healing without S/S of infection  INTERVENTIONS  - Assess and document risk factors for skin impairment   - Assess and document dressing, incision, wound bed, drain sites and surrounding tissue  - Initiate Nutrition services consult and/or wound management as needed   Outcome: Progressing      Problem: MUSCULOSKELETAL - ADULT  Goal: Maintain or return mobility to safest level of function  INTERVENTIONS:  - Assess patient's ability to carry out ADLs; assess patient's baseline for ADL function and identify physical deficits which impact ability to perform ADLs (bathing, care of mouth/teeth, toileting, grooming, dressing, etc )  - Assess/evaluate cause of self-care deficits   - Assess range of motion  - Assess patient's mobility; develop plan if impaired  - Assess patient's need for assistive devices and provide as appropriate  - Encourage maximum independence but intervene and supervise when necessary  - Involve family in performance of ADLs  - Assess for home care needs following discharge   - Request OT consult to assist with ADL evaluation and planning for discharge  - Provide patient education as appropriate   Outcome: Progressing    Goal: Maintain proper alignment of affected body part  INTERVENTIONS:  - Support, maintain and protect limb and body alignment  - Provide pt/fam with appropriate education   Outcome: Progressing

## 2018-02-21 NOTE — ASSESSMENT & PLAN NOTE
Patient is status transmetatarsal amputation on 2/12/2018  Continue IV antibiotics as per ID      OP FU with Podiatry

## 2018-02-21 NOTE — CASE MANAGEMENT
Continued Stay Review    Date:     Vital Signs: Temp (24hrs), Av 8 °F (36 6 °C), Min:97 7 °F (36 5 °C), Max:98 1 °F (36 7 °C)     HR:  [83-85] 85  Resp:  [16-18] 18  BP: (133-137)/(65-66) 133/66  SpO2:  [95 %-100 %] 100 %  Body mass index is 20 55 kg/m²  Medications:   Scheduled Meds:   Current Facility-Administered Medications:  acetaminophen 650 mg Oral Q8H VIVIENNE    aspirin 81 mg Oral Daily    atorvastatin 80 mg Oral Daily With Dinner    cephalexin 500 mg Oral Q6H [address]    docusate sodium 100 mg Oral BID    furosemide 40 mg Oral BID (diuretic)    gabapentin 300 mg Oral TID    heparin (porcine) 5,000 Units Subcutaneous Q8H [address]    insulin glargine 18 Units Subcutaneous HS    insulin lispro 1-5 Units Subcutaneous TID AC    insulin lispro 1-5 Units Subcutaneous HS    insulin lispro 5 Units Subcutaneous TID With Meals    melatonin 3 mg Oral HS    metoprolol tartrate 25 mg Oral Q12H VIVIENNE    metroNIDAZOLE 500 mg Oral Q8H [address]    pregabalin 75 mg Oral HS    senna 1 tablet Oral HS      Continuous Infusions:   lactated ringers 100 mL/hr Last Rate: Stopped (18 0145)     PRN Meds: hydrALAZINE    HYDROcodone-acetaminophen    oxyCODONE    Abnormal Labs/Diagnostic Results:   No new    Age/Sex: 75 y o  male     Assessment/Plan:   Gangrene of toe of right foot [facility])   Assessment & Plan     Patient is status transmetatarsal amputation on 2018  Continue IV antibiotics as per ID  OP FU with Podiatry          (HFpEF) heart failure with preserved ejection fraction (HCC)   Assessment & Plan     Appears euvolemic  Continue to monitor  OP Cardio FU for further workup           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)   Assessment & Plan     Blood sugar still elevated more than 200 most of the times  Endocrine following  Appreciate recommendations            Hypertension   Assessment & Plan     Continue Lopressor for now                     VTE Pharmacologic Prophylaxis: Pharmacologic: Heparin  Mechanical VTE Prophylaxis in Place: Yes     Current Length of Stay: 15 day(s)     Current Patient Status: Inpatient   Certification Statement: The patient will continue to require additional inpatient hospital stay due to above     Discharge Plan: Referrals to [facility]    ---------------------------------------------------------------------------------------------------------------------    2/20 Infectious Disease progress notes:    1  Right distal foot gangrene   Patient finally consented to Ro Clarkeidalmis is now status post open TMA with apparent surgical cure   No bone margin pathology was requested   He is now clinically and systemically well   Patient is now status post STSG  VAC is off  Wound is clean  Continue Keflex/Flagyl  Wound care per Podiatry  Treat x 7 days post STSG, another 2 days      2  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   Patient is status post open 1st toe amputation  Ki Cavazos, he has gangrenous changes in his other toes also  Dillon Shell is now status post TMA   He is also now status post STSG  Antibiotic plan as in above  Serial foot exams       3   Severe sepsis   Source of sepsis is most likely right foot infection   Patient is clinically improved with 1st toe amputation and IV antibiotic  Dillon Shell is now status post TMA   He should do well   Blood cultures remain negative  Antibiotic plan as in above  Monitor hemodynamics      4   PVD    Patient is status post arteriogram with successful intervention   Circulatory status has been optimized  Vascular surgery following      5   Leukocytosis   WBC increased postop   It is normal again, as expected   Patient remains clinically and systemically well  Monitor WBC      6  Acute hypoxic respiratory failure   I suspect this is secondary to sepsis and pulmonary edema   No obvious pneumonia on CXR    Patient's respiratory status now improved, currently on room air      7  DM with hyperglycemia on admission   Patient appears noncompliant with diabetic medication   This clearly contributes to infection development    Blood sugar management per Medicine Service      8   PCN allergy on allergy list   Patient does not recall reaction  Julio Johnson states that this was when he was a young child  Julio Johnson is tolerating cephalosporin well

## 2018-02-21 NOTE — PLAN OF CARE
Problem: PHYSICAL THERAPY ADULT  Goal: Performs mobility at highest level of function for planned discharge setting  See evaluation for individualized goals  Treatment/Interventions: Functional transfer training, LE strengthening/ROM, Elevations, Cognitive reorientation, Endurance training, Therapeutic exercise, Patient/family training, Equipment eval/education, Bed mobility, Gait training, Compensatory technique education, Continued evaluation, Spoke to nursing, Spoke to case management, OT  Equipment Recommended: Tamara Cutting, Wheelchair       See flowsheet documentation for full assessment, interventions and recommendations  Outcome: Progressing  Prognosis: Good  Problem List: Decreased strength, Decreased range of motion, Decreased endurance, Impaired balance, Decreased mobility, Decreased coordination, Decreased cognition, Decreased safety awareness, Orthopedic restrictions, Pain  Assessment: Pt seen for PT treatment session  Pt sitting EOB with OT upon arrival and was agreeable to transfer OOB to chair  PT was Mod Ax2 for sit to stand with verbal and tactile cues to maintain NWB status  Pt able to hop forward with Mod Ax2 and verbal and tactile cues for proper RW management and maintenance of NWB  Once in chair therex performed for R Le  Pt agreeable for sit to stand and standing tolerance trial see above for more details  Pt currently presenting with a problem list that includes NWB status, decreased strength, impaired balance, decreased functional mobility and endurace, and decreased activity tolerance  PT recommendation for rehab remains appropriate as patient is below baseline  PT to continue to follow patient throughout course of stay  Recommendation: Post acute IP rehab     PT - OK to Discharge: (S) Yes (to rehab when medically appropriate)    See flowsheet documentation for full assessment

## 2018-02-21 NOTE — OCCUPATIONAL THERAPY NOTE
Occupational Therapy Treatment Note:       02/21/18 1050   Restrictions/Precautions   RLE Weight Bearing Per Order NWB   Pain Assessment   Pain Assessment FLACC   Pain Rating: FLACC (Rest) - Face 0   Pain Rating: FLACC (Rest) - Legs 0   Pain Rating: FLACC (Rest) - Activity 0   Pain Rating: FLACC (Rest) - Cry 0   Pain Rating: FLACC (Rest) - Consolability 0   Score: FLACC (Rest) 0   Pain Rating: FLACC (Activity) - Face 0   Pain Rating: FLACC (Activity) - Legs 0   Pain Rating: FLACC (Activity) - Activity 0   Pain Rating: FLACC (Activity) - Cry 0   Pain Rating: FLACC (Activity) - Consolability 0   Score: FLACC (Activity) 0   ADL   Grooming Assistance 5  Supervision/Setup   Grooming Comments seated in chair after set up to brush teeth   UB Dressing Assistance 4  Minimal Assistance   LB Dressing Assistance 3  Moderate Assistance  (seated eob)   Toileting Assistance  3  Moderate Assistance   Toileting Comments asst for gown clothing management   Transfers   Stand pivot 3  Moderate assistance   Additional items Assist x 2  (rw)   Cognition   Overall Cognitive Status Impaired   Memory Decreased recall of precautions;Decreased recall of recent events;Decreased short term memory   Activity Tolerance   Activity Tolerance Patient tolerated treatment well   Assessment   Assessment pt participated in am ot session and was seen focusing on bed mobility, ub and lb adls, grooming, functional transfers, activity tolerence and education on safety techniques  pt demosntrates poor recall of newly instructed techniques and previous activities attempted in previous sessions ie using w/c with p t    pts wife is present this session  pt requries mod asst x 1 for lb dressing min asst ub dressing and mod asst x 2 to perform spt towards his left  pt unable this transfer to maintain nwbing r le without physical asst   Plan   Treatment Interventions ADL retraining;Functional transfer training; Activityengagement   Goal Expiration Date 02/26/18   OT Frequency 3-5x/wk   Recommendation   OT Discharge Recommendation Short Term Rehab   B[place]hel Index   Feeding 10   Bathing 5   Grooming Score 5   Dressing Score 5   Bladder Score 0  (condom catheter)   Bowels Score 10   Toilet Use Score 5   Transfers (Bed/Chair) Score 5   Mobility (Level Surface) Score 0   Stairs Score 0   [place] Index Score 45   Modified [place] Scale   Modified [place] Scale 4   April A YOLETTE Marr

## 2018-02-22 NOTE — DISCHARGE SUMMARY
Discharge Summary - [facility] Internal Medicine    Patient Information: Francisco Diaz 75 y o  male MRN: [id number]  Unit/Bed#: [facility] 931-01 Encounter: [id number]    Discharging Physician / Practitioner: Jeana Miguel MD  PCP: Shoshana Brower MD  Admission Date: 2/5/2018  Discharge Date: 02/21/18    Reason for Admission: Rt toe gangrene    Discharge Diagnoses:     Principal Problem:    Gangrenous toe ([address] )  Active Problems:    Gangrene of toe of right foot ([address] )    Cellulitis of right foot    Pain in right foot    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 ([address] )    Hypertension    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)    Abnormal CT of the head    Acute respiratory failure (HCC)    Transaminitis    Hypophosphatemia    Popliteal artery stenosis, right (HCC)    Great toe amputation status, right (HCC)    Coagulopathy (HCC)    Meningioma (HCC)    Hypokalemia    Moderate mitral regurgitation    Coronary artery disease involving native coronary artery    (HFpEF) heart failure with preserved ejection fraction ([address] )  Resolved Problems:    Hyponatremia    Gangrenous toe ([address] )    Sepsis ([address] )    Metabolic acidosis    Acute metabolic encephalopathy      Consultations During Hospital Stay:  · Vascular Sx, Podiatry, Infectious Disease, Endocrinology, Cardiology    Procedures Performed:     Rt great toe amputation on 2/6/2018  Rt foot TMA with wound debridement on 2/12/2018  SKIN GRAFT SPLIT THICKNESS (STSG)  EXTREMITY (right) on 2/15/2018  All above procedures done by Podiatry    IR Abdominal Angiography on 2/6/2018 - High grade right popliteal artery stenosis treated with atherectomy and STEW PTA  High grade distal right PT artery treated with PTA      Significant Findings / Test Results:     XR foot 3+ views RIGHT   There is permeation noted within the 1st distal phalanx with associated erosive changes and foci of air in the soft tissue compatible with osteomyelitis    CT head wo contrast  Small hyperdensity in the parafalcine right frontal lobe with surrounding mild serpiginous hyperdensity which likely reflects parenchymal contusion and associated subarachnoid hemorrhage  No significant mass effect or midline shift  If there is no history of trauma, further evaluation with MRI may be obtained to evaluate for underlying lesion  2D Transthoracic Echo:  EF 55%, No WMA, Mod MR    Incidental Findings:   · nonce    Test Results Pending at Discharge (will require follow up):   · none     Outpatient Tests Requested:  · MRI brain prior to appointment with Neurosurgery    Complications:  none    Hospital Course:     Angie Martines is a 75 y o  male patient who originally presented to the hospital on 2/5/2018 due to Rt foot gangrene  He has known h/o sig PVD  He was evaluated by Vascular surgery and had Angiogram with intravascular intervention done  Later he had Rt great toe amputation initially by Podiatry and then later transmetatarsal amputation  He was treated with antibiotics as per ID's recommendations  During hospitalization, Pt became unresponsive on 2/7/2018 for which a RRT was called and pt was given Flumazenil after which he became more awake and alert  He was started on briad spectrum antibiotics for possible sepsis  But on 2/8/2016 - pt again became unresponsive and had severe resp distress requiring BiPAP - so was transferred to critical care unit  Patient woke up after norcon x1 and was placed on  BiPAP  The patient's troponins were elevated and he was diagnosed with a non STEMI type 2 due to increased demand in the setting of acute diastolic heart failure exacerbation and pulmonary edema, both issues were addressed with IV diuresis and pt was transitioned to room air   He was also septic with persistent lactic acidosis, infectious Disease team felt the patient will not get much better until his sepsis source was removed  with TMA     Pt was evaluated by Cardiology and it was felt that might have underlying CAD given significant Coronary art calcifications seen on CT chest done in 2014  Patient will eventually require ischemic evaluation - for which he should follow up with the Cardiologist that he prefers after discharge  For diastolic CHF pt will be discharged on lasix PO and he should discuss this with his Cardiologist/PCP  Also for altered mental status a CT head was done that showed a small hyperdensity in the right frontal lobe - possible parenchymal contusion and associated subarachnoid hemorrhage  His mental status has been stable throughout the admission after that  He will benefit from outpatient MRI brain w/wo contrast and Neurosurgery evaluation after that  His blood glucose also have not been well conrtolled  He was seen by Endocrinology  He was on oral anti-diabetics which will be stopped on discharge and he will ne discharged on Insulin  He will need close monitoring of his blood glucose and adjustment of his Insulin dosing  He is medically stable today so he will be discharged to rehab  He should continue taking Keflex and Flagyl for 2 more days  Condition at Discharge: good     Discharge Day Visit / Exam:     Subjective:  Pt seen and examined by me this morning  Pt denied any complains  Pain well controlled with current meds  Vitals: Blood Pressure: 122/59 (02/21/18 1553)  Pulse: 101 (02/21/18 1553)  Temperature: 98 5 °F (36 9 °C) (02/21/18 1553)  Temp Source: Oral (02/21/18 1553)  Respirations: 18 (02/21/18 1553)  Height: 5' 8" (172 7 cm) (02/05/18 1942)  Weight - Scale: 61 8 kg (136 lb 3 9 oz) (02/21/18 0600)  SpO2: 99 % (02/21/18 1553)     Exam:   Physical Exam  Constitutional: Pt appears well-developed and well-nourished  Not in any acute distress  Cardiovascular: Normal rate, regular rhythm, normal heart sounds and intact distal pulses  Exam reveals no gallop and no friction rub    No murmur heard   Pulmonary/Chest: Effort normal and breath sounds normal  No respiratory distress  Pt has no wheezes or rales  Abdominal: Soft  Non-distended, Non-tender  Bowel sounds are normal    Musculoskeletal: Normal range of motion  Rt foot - dressed  Neurological: alert and oriented to person, place, and time  Normal strength and sensations  Psychiatric: normal mood and affect  Discussion with Family: patient and wife at bedside    Discharge instructions/Information to patient and family:   See after visit summary for information provided to patient and family  Provisions for Follow-Up Care:  See after visit summary for information related to follow-up care and any pertinent home health orders  Disposition:     Other: [place]    For Discharges to [facility] SNF:   · [facility] / Radha Hart at [place] [id number] to reach physician and no message left  Planned Readmission: no     Discharge Statement:  I spent 45 minutes discharging the patient  This time was spent on the day of discharge  I had direct contact with the patient on the day of discharge  Greater than 50% of the total time was spent examining patient, answering all patient questions, arranging and discussing plan of care with patient as well as directly providing post-discharge instructions  Additional time then spent on discharge activities  Discharge Medications:  See after visit summary for reconciled discharge medications provided to patient and family        ** Please Note: This note has been constructed using a voice recognition system **

## 2018-02-23 ENCOUNTER — TRANSITIONAL CARE MANAGEMENT (OUTPATIENT)
Dept: INTERNAL MEDICINE CLINIC | Facility: CLINIC | Age: 68
End: 2018-02-23

## 2018-02-27 NOTE — CASE MANAGEMENT
Notification of Discharge  This is a Notification of Discharge from our facility [address]  Please be advised that this patient has been discharge from our facility  Below you will find the admission and discharge date and time including the patients disposition  PRESENTATION DATE: 2/5/2018  4:18 PM  IP ADMISSION DATE: 2/5/18 1836  DISCHARGE DATE: 2/21/2018  7:32 PM  DISPOSITION: Released to SNF/TCU/SNU [address] [place] [address] in the [facility] by [employer] [facility]  for 2017  Network Utilization Review Department  Phone: [phone]; Fax [phone]  ATTENTION: The Network Utilization Review Department is now centralized for our 7 Facilities  Make a note that we have a new phone and fax numbers for our Department  Please call with any questions or concerns to [phone] and carefully follow the prompts so that you are directed to the right person  All voicemails are confidential  Fax any determinations, approvals, denials, and requests for initial or continue stay review clinical to [phone]  Due to HIGH CALL volume, it would be easier if you could please send faxed requests to expedite your requests and in part, help us provide discharge notifications faster

## 2018-03-04 ENCOUNTER — DOCUMENTATION (OUTPATIENT)
Dept: GERIATRICS | Facility: CLINIC | Age: 68
End: 2018-03-04

## 2018-03-06 ENCOUNTER — APPOINTMENT (OUTPATIENT)
Dept: WOUND CARE | Facility: HOSPITAL | Age: 68
End: 2018-03-06
Payer: COMMERCIAL

## 2018-03-06 PROCEDURE — 99213 OFFICE O/P EST LOW 20 MIN: CPT | Performed by: PODIATRIST

## 2018-03-08 ENCOUNTER — OFFICE VISIT (OUTPATIENT)
Dept: UROLOGY | Facility: MEDICAL CENTER | Age: 68
End: 2018-03-08
Payer: COMMERCIAL

## 2018-03-08 DIAGNOSIS — R33.8 ACUTE URINARY RETENTION: Primary | ICD-10-CM

## 2018-03-08 PROCEDURE — 99204 OFFICE O/P NEW MOD 45 MIN: CPT | Performed by: UROLOGY

## 2018-03-08 RX ORDER — TAMSULOSIN HYDROCHLORIDE 0.4 MG/1
0.4 CAPSULE ORAL 2 TIMES DAILY
COMMUNITY
End: 2018-03-08 | Stop reason: SDUPTHER

## 2018-03-08 RX ORDER — OXYCODONE HYDROCHLORIDE 5 MG/1
5 TABLET ORAL EVERY 4 HOURS PRN
COMMUNITY
End: 2018-08-14 | Stop reason: ALTCHOICE

## 2018-03-08 RX ORDER — TAMSULOSIN HYDROCHLORIDE 0.4 MG/1
0.4 CAPSULE ORAL 2 TIMES DAILY
Qty: 180 CAPSULE | Refills: 3 | Status: SHIPPED | OUTPATIENT
Start: 2018-03-08 | End: 2018-08-14 | Stop reason: ALTCHOICE

## 2018-03-08 NOTE — LETTER
March 8, 2018     Mark Birmingham MD  [address]  [address] [facility]    Patient: Ninoska Carrington   YOB: 1950   Date of Visit: 3/8/2018       Dear Dr Bartolo Pruitt: Thank you for referring Ninoska Carrington to me for evaluation  Below are my notes for this consultation  If you have questions, please do not hesitate to call me  I look forward to following your patient along with you  Sincerely,        Sheryle Chambers, MD        CC: No Recipients  Sheryle Chambers, MD  3/8/2018  2:49 PM  Sign at close encounter  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  The patient notes no premorbid or pre surgical voiding problems denying nocturia frequency urgency weakened stream hesitancy intermittency double voiding or frequent urinary infections  He denied gross hematuria  Patient has failed at least 1 voiding trial after this episode of retention which occurred in the postoperative  After transmetatarsal amputation  2   BPH-it is unclear as to whether the bladder outlet obstruction is the cause of this patient's urinary retention or whether neuropathic diabetic issues with the bladder in the postoperative  Cause retention  The patient will return to the office in 2 weeks after an adequate time for bladder decompression and will undergo a voiding trial as well as cystourethroscopy  If the patient fails cystourethroscopy consideration towards TURP with placement of a suprapubic tube will take place  In the meantime Flomax will be increased to 0 4 mg p o  b i d  by patient request   He is aware of the possibility of hypotension orthostasis or other side effects including nasal stuffiness more common with higher dose Flomax  No problem-specific Assessment & Plan notes found for this encounter         Diagnoses and all orders for this visit:    Acute urinary retention  -     Cystoscopy; Future  -     Urine culture; Future  -     tamsulosin (FLOMAX) 0 4 mg; Take 1 capsule (0 4 mg total) by mouth 2 (two) times a day    Other orders  -     Discontinue: tamsulosin (FLOMAX) 0 4 mg; Take 0 4 mg by mouth 2 (two) times a day   -     oxyCODONE (ROXICODONE) 5 mg immediate release tablet; Take 5 mg by mouth every 4 (four) hours as needed for moderate pain        Subjective:      Patient ID: Laura Patrick is a 75 y o  male  HPI 54-year-old male with no previous voiding symptomatology presented to [facility] for surgery to perform a transmetatarsal right foot amputation  Postoperatively the patient notes that he was voiding adequately but also noted that he requested a condom catheter because of his immobility secondary to his operation  In any event the patient used a condom catheter throughout his hospitalization the when he was transferred to the rehabilitation facility he was noted to be distended with regard to his bladder  A Piedra catheter was inserted with the residual amount not documented  Three or 4 days later the patient apparently was given a voiding trial and failed to urinate requiring replacement of the Piedra catheter per urethra  The patient presents today for evaluation  The following portions of the patient's history were reviewed and updated as appropriate: allergies, current medications, past family history, past medical history, past social history, past surgical history and problem list     Review of Systems   Constitutional: Positive for activity change, appetite change and fatigue  HENT: Negative  Eyes: Negative  Respiratory: Negative  Cardiovascular: Negative  Gastrointestinal: Positive for constipation  Endocrine:        Insulin-dependent diabetic   Genitourinary: Positive for difficulty urinating and frequency  Neurological: Negative  Psychiatric/Behavioral: Negative  Objective: There were no vitals taken for this visit  Physical Exam   Constitutional: He is oriented to person, place, and time  No distress  HENT:   Head: Normocephalic and atraumatic  Eyes: Conjunctivae and EOM are normal    Wears glasses   Neck: Normal range of motion  Neck supple  Cardiovascular: Normal rate, regular rhythm and normal heart sounds  Pulmonary/Chest: Effort normal and breath sounds normal  No respiratory distress  He has no wheezes  Abdominal: Soft  Bowel sounds are normal  He exhibits no distension  There is no tenderness  There is no rebound  Genitourinary:   Genitourinary Comments: Phallus normal uncircumcised without cutaneous lesions  Meatus patent and normally placed with Piedra catheter draining clear urine without discharge  Scrotum normal without cutaneous lesions  Testes and adnexa palpably normal without masses edema induration or fluid collections  Perineum is normal IRAIDA reveals decreased anal sphincter tone with a 30 g a nodular nontender prostate that is fully symmetric  Neurological: He is alert and oriented to person, place, and time  Psychiatric: He has a normal mood and affect   His behavior is normal  Judgment and thought content normal

## 2018-03-08 NOTE — PROGRESS NOTES
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  The patient notes no premorbid or pre surgical voiding problems denying nocturia frequency urgency weakened stream hesitancy intermittency double voiding or frequent urinary infections  He denied gross hematuria  Patient has failed at least 1 voiding trial after this episode of retention which occurred in the postoperative  After transmetatarsal amputation  2   BPH-it is unclear as to whether the bladder outlet obstruction is the cause of this patient's urinary retention or whether neuropathic diabetic issues with the bladder in the postoperative  Cause retention  The patient will return to the office in 2 weeks after an adequate time for bladder decompression and will undergo a voiding trial as well as cystourethroscopy  If the patient fails cystourethroscopy consideration towards TURP with placement of a suprapubic tube will take place  In the meantime Flomax will be increased to 0 4 mg p o  b i d  by patient request   He is aware of the possibility of hypotension orthostasis or other side effects including nasal stuffiness more common with higher dose Flomax  No problem-specific Assessment & Plan notes found for this encounter  Diagnoses and all orders for this visit:    Acute urinary retention  -     Cystoscopy; Future  -     Urine culture; Future  -     tamsulosin (FLOMAX) 0 4 mg; Take 1 capsule (0 4 mg total) by mouth 2 (two) times a day    Other orders  -     Discontinue: tamsulosin (FLOMAX) 0 4 mg; Take 0 4 mg by mouth 2 (two) times a day   -     oxyCODONE (ROXICODONE) 5 mg immediate release tablet; Take 5 mg by mouth every 4 (four) hours as needed for moderate pain        Subjective:      Patient ID: Narciso Wilkins is a 75 y o  male      HPI 48-year-old male with no previous voiding symptomatology presented to [facility] for surgery to perform a transmetatarsal right foot amputation  Postoperatively the patient notes that he was voiding adequately but also noted that he requested a condom catheter because of his immobility secondary to his operation  In any event the patient used a condom catheter throughout his hospitalization the when he was transferred to the rehabilitation facility he was noted to be distended with regard to his bladder  A Piedra catheter was inserted with the residual amount not documented  Three or 4 days later the patient apparently was given a voiding trial and failed to urinate requiring replacement of the Piedra catheter per urethra  The patient presents today for evaluation  The following portions of the patient's history were reviewed and updated as appropriate: allergies, current medications, past family history, past medical history, past social history, past surgical history and problem list     Review of Systems   Constitutional: Positive for activity change, appetite change and fatigue  HENT: Negative  Eyes: Negative  Respiratory: Negative  Cardiovascular: Negative  Gastrointestinal: Positive for constipation  Endocrine:        Insulin-dependent diabetic   Genitourinary: Positive for difficulty urinating and frequency  Neurological: Negative  Psychiatric/Behavioral: Negative  Objective: There were no vitals taken for this visit  Physical Exam   Constitutional: He is oriented to person, place, and time  No distress  HENT:   Head: Normocephalic and atraumatic  Eyes: Conjunctivae and EOM are normal    Wears glasses   Neck: Normal range of motion  Neck supple  Cardiovascular: Normal rate, regular rhythm and normal heart sounds  Pulmonary/Chest: Effort normal and breath sounds normal  No respiratory distress  He has no wheezes  Abdominal: Soft   Bowel sounds are normal  He exhibits no distension  There is no tenderness  There is no rebound  Genitourinary:   Genitourinary Comments: Phallus normal uncircumcised without cutaneous lesions  Meatus patent and normally placed with Piedra catheter draining clear urine without discharge  Scrotum normal without cutaneous lesions  Testes and adnexa palpably normal without masses edema induration or fluid collections  Perineum is normal IRAIDA reveals decreased anal sphincter tone with a 30 g a nodular nontender prostate that is fully symmetric  Neurological: He is alert and oriented to person, place, and time  Psychiatric: He has a normal mood and affect   His behavior is normal  Judgment and thought content normal

## 2018-03-09 ENCOUNTER — TELEPHONE (OUTPATIENT)
Dept: UROLOGY | Facility: CLINIC | Age: 68
End: 2018-03-09

## 2018-03-09 NOTE — TELEPHONE ENCOUNTER
Left message on machine to call and cancel 3/21 appt and change to am of 3/22  Wife does not answer phone when call returned   Pt  needs am for voiding trial

## 2018-03-12 LAB — FUNGUS SPEC CULT: NORMAL

## 2018-03-13 ENCOUNTER — APPOINTMENT (OUTPATIENT)
Dept: WOUND CARE | Facility: HOSPITAL | Age: 68
End: 2018-03-13
Payer: COMMERCIAL

## 2018-03-13 PROCEDURE — 11043 DBRDMT MUSC&/FSCA 1ST 20/<: CPT | Performed by: PODIATRIST

## 2018-03-13 PROCEDURE — 11046 DBRDMT MUSC&/FSCA EA ADDL: CPT | Performed by: PODIATRIST

## 2018-03-22 ENCOUNTER — TELEPHONE (OUTPATIENT)
Dept: UROLOGY | Facility: AMBULATORY SURGERY CENTER | Age: 68
End: 2018-03-22

## 2018-03-22 ENCOUNTER — OFFICE VISIT (OUTPATIENT)
Dept: UROLOGY | Facility: MEDICAL CENTER | Age: 68
End: 2018-03-22
Payer: COMMERCIAL

## 2018-03-22 VITALS — HEIGHT: 68 IN | SYSTOLIC BLOOD PRESSURE: 122 MMHG | DIASTOLIC BLOOD PRESSURE: 60 MMHG

## 2018-03-22 DIAGNOSIS — N40.1 BPH WITH OBSTRUCTION/LOWER URINARY TRACT SYMPTOMS: ICD-10-CM

## 2018-03-22 DIAGNOSIS — N13.8 BPH WITH OBSTRUCTION/LOWER URINARY TRACT SYMPTOMS: ICD-10-CM

## 2018-03-22 DIAGNOSIS — R33.8 ACUTE URINARY RETENTION: Primary | ICD-10-CM

## 2018-03-22 PROCEDURE — 99214 OFFICE O/P EST MOD 30 MIN: CPT | Performed by: UROLOGY

## 2018-03-22 PROCEDURE — 52000 CYSTOURETHROSCOPY: CPT | Performed by: UROLOGY

## 2018-03-22 NOTE — LETTER
March 22, 2018     Anup Golden MD  [address]  [address] [facility]    Patient: Jacinto Plummer   YOB: 1950   Date of Visit: 3/22/2018       Dear Dr Se Bradley: Thank you for referring Jacinto Plummer to me for evaluation  Below are my notes for this consultation  If you have questions, please do not hesitate to call me  I look forward to following your patient along with you  Sincerely,        Kayden Pulido MD        CC: No Recipients  Kayden Pulido MD  3/22/2018 11:19 AM  Sign at close encounter  Assessment/Plan:   1  Acute urinary retention-the patient was given a voiding trial today with Piedra catheter being removed  Cystoscopy was performed and fluid was left in the patient's bladder  He however had no urge to void and could not void after the procedure  He will be contacted by the end of the day as to his voiding pattern  If he continues in urinary retention a Piedra catheter will again be replaced and the patient will be seen for further discussion concerning TURP and placement of a suprapubic tube  2   BPH with bladder outlet obstruction  The patient again reiterated that prior to this episode of acute urinary retention and subsequent failures to avoid the patient was voiding without obstructive symptoms  We discussed possible etiologies including BPH as well as diabetic affects on the urinary bladder  Diagnoses and all orders for this visit:    Acute urinary retention    BPH with obstruction/lower urinary tract symptoms          Subjective:     Patient ID: Jacinto Plummer is a 75 y o  male  HPI    Review of Systems   Constitutional: Positive for activity change  Respiratory: Negative  Cardiovascular: Negative  Gastrointestinal: Negative  Genitourinary: Positive for penile pain  Piedra catheter discomfort   Psychiatric/Behavioral: Negative  Objective:     Physical Exam   Constitutional: He is oriented to person, place, and time   He appears well-nourished  HENT:   Head: Atraumatic  Eyes: EOM are normal    Neck: Neck supple  Pulmonary/Chest: Effort normal  No respiratory distress  Abdominal: Soft  He exhibits no distension  Genitourinary: Penis normal    Neurological: He is alert and oriented to person, place, and time  Psychiatric: He has a normal mood and affect  His behavior is normal  Judgment and thought content normal          Cystoscopy  Date/Time: 3/22/2018 11:17 AM  Performed by: Roque Campbell  Authorized by: Roque Campbell     Procedure details: cystoscopy    Patient tolerance: Patient tolerated the procedure well with no immediate complications    Additional Procedure Details: The patient was placed on the cystoscopy table in the male lithotomy position and his Piedra catheter removed  The penis and urethral meatus were then prepped with Betadine and 2% lidocaine lubricant was instilled in the urethra  After 5 minutes the flexible cystoscope was introduced per urethra into the urinary bladder and cystourethroscopy accomplished  The urethra was within normal limits without stricture or lesion  The prostatic urethra revealed bilobar enlargement of the lateral lobes of the prostate with visual occlusion of the bladder outlet  Approximate resection volume of prostate 30 g  The urinary bladder was moderately trabeculated without intrinsic lesions or evidence of extrinsic mass compression and the ureteral orifices were normal position and configuration bilaterally with clear efflux bilaterally  The patient tolerated the procedure well  He will be contacted this afternoon to see if he is voiding adequately and in any event could come back to the office for bladder scanning for postvoid urinary residual tomorrow and follow up with me 3 or 4 weeks down the line if voiding adequately for follow-up and plan for the future

## 2018-03-22 NOTE — TELEPHONE ENCOUNTER
Pt unable to make 9:30 appt today, still requesting to come in  Spoke with Dr Edilia Edmonds, will see pt at 10:30  Daughter notified

## 2018-03-22 NOTE — PROGRESS NOTES
Assessment/Plan:   1  Acute urinary retention-the patient was given a voiding trial today with Piedra catheter being removed  Cystoscopy was performed and fluid was left in the patient's bladder  He however had no urge to void and could not void after the procedure  He will be contacted by the end of the day as to his voiding pattern  If he continues in urinary retention a Piedra catheter will again be replaced and the patient will be seen for further discussion concerning TURP and placement of a suprapubic tube  2   BPH with bladder outlet obstruction  The patient again reiterated that prior to this episode of acute urinary retention and subsequent failures to avoid the patient was voiding without obstructive symptoms  We discussed possible etiologies including BPH as well as diabetic affects on the urinary bladder  Diagnoses and all orders for this visit:    Acute urinary retention    BPH with obstruction/lower urinary tract symptoms          Subjective:     Patient ID: Adriana Shah is a 75 y o  male  HPI    Review of Systems   Constitutional: Positive for activity change  Respiratory: Negative  Cardiovascular: Negative  Gastrointestinal: Negative  Genitourinary: Positive for penile pain  Piedra catheter discomfort   Psychiatric/Behavioral: Negative  Objective:     Physical Exam   Constitutional: He is oriented to person, place, and time  He appears well-nourished  HENT:   Head: Atraumatic  Eyes: EOM are normal    Neck: Neck supple  Pulmonary/Chest: Effort normal  No respiratory distress  Abdominal: Soft  He exhibits no distension  Genitourinary: Penis normal    Neurological: He is alert and oriented to person, place, and time  Psychiatric: He has a normal mood and affect   His behavior is normal  Judgment and thought content normal          Cystoscopy  Date/Time: 3/22/2018 11:17 AM  Performed by: Kimberly Breath by: Dina Dumont     Procedure details: cystoscopy    Patient tolerance: Patient tolerated the procedure well with no immediate complications    Additional Procedure Details: The patient was placed on the cystoscopy table in the male lithotomy position and his Piedra catheter removed  The penis and urethral meatus were then prepped with Betadine and 2% lidocaine lubricant was instilled in the urethra  After 5 minutes the flexible cystoscope was introduced per urethra into the urinary bladder and cystourethroscopy accomplished  The urethra was within normal limits without stricture or lesion  The prostatic urethra revealed bilobar enlargement of the lateral lobes of the prostate with visual occlusion of the bladder outlet  Approximate resection volume of prostate 30 g  The urinary bladder was moderately trabeculated without intrinsic lesions or evidence of extrinsic mass compression and the ureteral orifices were normal position and configuration bilaterally with clear efflux bilaterally  The patient tolerated the procedure well  He will be contacted this afternoon to see if he is voiding adequately and in any event could come back to the office for bladder scanning for postvoid urinary residual tomorrow and follow up with me 3 or 4 weeks down the line if voiding adequately for follow-up and plan for the future

## 2018-03-22 NOTE — TELEPHONE ENCOUNTER
Gaudencio Mcghee patient's daughter called would like to r/s appt she can be reached [phone]   I did not

## 2018-03-22 NOTE — PROGRESS NOTES
Procedures-  Patient was placed in a supine position  Piedra catheter was removed without difficulty  Patient to increase fluids and avoid caffeine  If unable to void or trouble voiding call the office by 3p      Cipro 500 mg  [facility]-[id number]  [place] [facility]-[id number]  EXP-11/2019

## 2018-03-22 NOTE — PATIENT INSTRUCTIONS
Call office if unable to void by the end of office hours, go to the emergency room if unable to urinate after close of office hours  In any event report to this office tomorrow for a scan of the bladder   Follow-up appointment with Dr Gretchen Benites will take place thereafter

## 2018-03-26 ENCOUNTER — TELEPHONE (OUTPATIENT)
Dept: UROLOGY | Facility: AMBULATORY SURGERY CENTER | Age: 68
End: 2018-03-26

## 2018-03-26 NOTE — TELEPHONE ENCOUNTER
Encourage continue to take tamsulosin  If feeling of incomplete bladder emptying, pain, pressure, etc indicating urinary retention, he should have RN visit for PVR and possible catheter placement  Had recent voiding trial in office with Dr Jennifer Hewitt the other week  Cystoscopy had shown BPH

## 2018-03-26 NOTE — TELEPHONE ENCOUNTER
Called patient left message  Dr Newby opened up his hours for tomorrow  Was going to schedule patient for tomorrow since he is having trouble voiding and had strong removed at Dr Magdaleno Jean office last week  Patient is to call the office back

## 2018-03-26 NOTE — TELEPHONE ENCOUNTER
What would be the appropriate time frame for this patient to be seen  Patient use to see Nikkie Moreno  Was last seen on 3/22/18 for acute urinary retention, and BPH with bladder outlet obstruction  His strong was removed at when he saw Dr Alyssa Pastor on 3/22/18

## 2018-03-26 NOTE — TELEPHONE ENCOUNTER
Pt called  Stating stream was fine for a day, now has freq and stream "is not so good" Pt transferred care to Dr Brandon Patel  [facility] is much closer to home  appt with Dr Brandon Patel on 4/9  Instructed pt to call to speak to Nursing Staff re: urinary issues

## 2018-03-27 ENCOUNTER — APPOINTMENT (OUTPATIENT)
Dept: WOUND CARE | Facility: HOSPITAL | Age: 68
End: 2018-03-27
Payer: COMMERCIAL

## 2018-03-27 ENCOUNTER — TELEPHONE (OUTPATIENT)
Dept: UROLOGY | Facility: CLINIC | Age: 68
End: 2018-03-27

## 2018-03-27 LAB
MYCOBACTERIUM SPEC CULT: NORMAL
RHODAMINE-AURAMINE STN SPEC: NORMAL

## 2018-03-27 PROCEDURE — 11045 DBRDMT SUBQ TISS EACH ADDL: CPT | Performed by: PODIATRIST

## 2018-03-29 NOTE — TELEPHONE ENCOUNTER
Prescriptions and further follow-up for urinary retention should be managed by the patient's primary urologist, Dr Dana Townsend  Spoke with patient regarding same

## 2018-04-02 ENCOUNTER — LAB REQUISITION (OUTPATIENT)
Dept: LAB | Facility: HOSPITAL | Age: 68
End: 2018-04-02
Payer: COMMERCIAL

## 2018-04-02 DIAGNOSIS — I50.30 DIASTOLIC CONGESTIVE HEART FAILURE (HCC): ICD-10-CM

## 2018-04-02 LAB
BACTERIA UR QL AUTO: ABNORMAL /HPF
BILIRUB UR QL STRIP: NEGATIVE
CLARITY UR: ABNORMAL
COLOR UR: YELLOW
GLUCOSE UR STRIP-MCNC: NEGATIVE MG/DL
HGB UR QL STRIP.AUTO: ABNORMAL
KETONES UR STRIP-MCNC: NEGATIVE MG/DL
LEUKOCYTE ESTERASE UR QL STRIP: ABNORMAL
NITRITE UR QL STRIP: NEGATIVE
NON-SQ EPI CELLS URNS QL MICRO: ABNORMAL /HPF
OTHER STN SPEC: ABNORMAL
PH UR STRIP.AUTO: 7 [PH] (ref 4.5–8)
PROT UR STRIP-MCNC: ABNORMAL MG/DL
RBC #/AREA URNS AUTO: ABNORMAL /HPF
SP GR UR STRIP.AUTO: 1.01 (ref 1–1.03)
UROBILINOGEN UR QL STRIP.AUTO: 0.2 E.U./DL
WBC #/AREA URNS AUTO: ABNORMAL /HPF

## 2018-04-02 PROCEDURE — 81001 URINALYSIS AUTO W/SCOPE: CPT | Performed by: FAMILY MEDICINE

## 2018-04-02 PROCEDURE — 87186 SC STD MICRODIL/AGAR DIL: CPT | Performed by: FAMILY MEDICINE

## 2018-04-02 PROCEDURE — 87086 URINE CULTURE/COLONY COUNT: CPT | Performed by: FAMILY MEDICINE

## 2018-04-02 PROCEDURE — 87147 CULTURE TYPE IMMUNOLOGIC: CPT | Performed by: FAMILY MEDICINE

## 2018-04-04 LAB — BACTERIA UR CULT: ABNORMAL

## 2018-04-06 RX ORDER — LOSARTAN POTASSIUM AND HYDROCHLOROTHIAZIDE 25; 100 MG/1; MG/1
1 TABLET ORAL
COMMUNITY
Start: 2018-01-26

## 2018-04-06 RX ORDER — PREGABALIN 75 MG/1
75 CAPSULE ORAL
COMMUNITY
Start: 2016-09-27 | End: 2018-08-14 | Stop reason: ALTCHOICE

## 2018-04-08 NOTE — PROGRESS NOTES
Problem List Items Addressed This Visit     Acute urinary retention - Primary     Patient with a history of acute urinary retention after undergoing right transmetatarsal amputation of his foot due to diabetes  Piedra catheter has been removed, his postvoid residual urine volume today is 224 milliliters indicating incomplete bladder emptying  See plan for BPH with obstruction lower urinary tract symptoms below         Relevant Medications    nitrofurantoin (MACROBID) 100 mg capsule    finasteride (PROSCAR) 5 mg tablet    Other Relevant Orders    POCT urine dip (Completed)    BPH with obstruction/lower urinary tract symptoms     Alec and his wife and I had a productive consultation today regarding his lower urinary tract symptoms  We discussed the normal anatomy of the bladder, prostate, bladder neck, and urethra, as well as the sphincter mechanism and the normal sequence of relaxation of the external urinary sphincter followed by contraction of the detrusor muscle and evacuation of urinary bladder  We then discussed treatment of lower urinary tract symptoms in the form of medical therapy, behavioral changes and fluid modification to decrease symptoms, the use of minimally invasive therapies for bladder outlet obstruction such as the Urolift procedure, and the use of Transurethral resection of prostate and simple prostatectomy in patients with severe bladder outlet obstruction  He states that he is very much not interested any procedures at this time  Nevertheless, I did tell him about TURP and about the Uro lift procedure and gave him literature on this as part of his education on this disease process  With regard to medical therapies we discussed alpha blockers such as tamsulosin as well as 5 alpha reductase inhibitors such as finasteride  He is already taking double dose Flomax, and requires more medication as he is not voiding properly     We discussed the mechanism of action of each and the potential risks and side effects including dizziness, weakness, hypotension, retrograde ejaculation, potential for allergic reaction, decreased in semen volume, decreased male pattern baldness for 5 alpha reductase inhibitors, and potential for decrease in libido or erectile dysfunction in less than 10% of men taking 5 alpha reductase inhibitors  He is amenable to taking finasteride, and he is hopeful that as his mobility improves that he will be able to void, more effectively  Plan:  Start finasteride, continue double-dose Flomax, follow-up in 3 months for a symptom check and postvoid residual urine determination  Relevant Medications    nitrofurantoin (MACROBID) 100 mg capsule    finasteride (PROSCAR) 5 mg tablet    Other Relevant Orders    POCT urine dip (Completed)              Assessment and plan:       Please see problem oriented charting for the assessment plan of today's urological complaints    Sheila Hope MD      Chief Complaint     Chief Complaint   Patient presents with    Benign Prostatic Hypertrophy    Urinary Retention         History of Present Illness     Alec Gleason is a 75 y o  man with a PMH as listed below including DM with end organ damage (recent right TMA) who has been seen by Dr Palma Rhoades for urinary retention  He underwent a cysto which showed bilateral lateral lobe hypertrophy of the prostate  His catheter was removed and he presents for follow-up after strong removal   He still feels that he is not voiding well, he awakens 2-3 times at night to void, he has hesitancy of urination but has to double void to feel empty  His postvoid residual urine volume today is 224 milliliters indicating some degree of incomplete emptying  He was only able to void 21 milliliters into the uroflow machine which gives misleading results, as such his peak urinary flow of 3 milliliters/seconds artificially low      On his urinalysis today he has no leukocytes, nitrites, protein, ketones, or glucose but does have some blood, this is consistent with his history of urinary retention and instrumentation of the lower urinary tract and his cystoscopy showed no bladder tumors  The patient was seen to have a 30 gram nontender prostate on his last prostate exam on the 8th March 2018  Detailed Urologic History     - please refer to HPI    Review of Systems     Review of Systems   Constitutional: Negative  HENT: Negative  Eyes: Negative  Respiratory: Negative  Cardiovascular: Negative  Gastrointestinal: Negative  Endocrine: Negative  Genitourinary: Positive for decreased urine volume, difficulty urinating and frequency  Musculoskeletal: Negative  Skin: Negative  Allergic/Immunologic: Negative  Neurological: Negative  Hematological: Negative  Psychiatric/Behavioral: Negative  Allergies     Allergies   Allergen Reactions    Penicillins      Pt stated that he had a reaction to it a long time ago  Childhood history       Physical Exam     Physical Exam   Constitutional: He is oriented to person, place, and time  He appears well-developed and well-nourished  No distress  The patient is somewhat emotional and irritable today, however after some initial difficulty he opened up   HENT:   Head: Normocephalic and atraumatic  Right Ear: External ear normal    Left Ear: External ear normal    Nose: Nose normal    Mouth/Throat: Oropharynx is clear and moist  No oropharyngeal exudate  Eyes: EOM are normal  Pupils are equal, round, and reactive to light  Right eye exhibits no discharge  Left eye exhibits no discharge  Neck: Normal range of motion  Neck supple  No tracheal deviation present  No thyromegaly present  Cardiovascular: Normal rate, regular rhythm and intact distal pulses  Pulmonary/Chest: Effort normal  No stridor  No respiratory distress  He has no wheezes  He exhibits no tenderness  Abdominal: Soft   He exhibits no distension and no mass  There is no tenderness  There is no rebound and no guarding  No hernia  Genitourinary:   Genitourinary Comments: Deferred at today's visit   Musculoskeletal: Normal range of motion  He exhibits no edema, tenderness or deformity  Lymphadenopathy:     He has no cervical adenopathy  Neurological: He is alert and oriented to person, place, and time  No cranial nerve deficit or sensory deficit  He exhibits normal muscle tone  Coordination normal    Skin: Skin is warm and dry  No rash noted  He is not diaphoretic  No erythema  No pallor  Psychiatric: He has a normal mood and affect  His behavior is normal  Judgment and thought content normal    Nursing note and vitals reviewed            Vital Signs  Vitals:    04/09/18 1057   BP: 122/78   Pulse: 80   Weight: 54 4 kg (120 lb)   Height: 5' 8" (1 727 m)         Current Medications       Current Outpatient Prescriptions:     acetaminophen (TYLENOL) 325 mg tablet, Take 2 tablets (650 mg total) by mouth 3 (three) times a day, Disp: 30 tablet, Rfl: 0    Alpha-Lipoic Acid 600 MG CAPS, Take by mouth, Disp: , Rfl:     aspirin 81 MG tablet, Take 81 mg by mouth, Disp: , Rfl:     Blood Glucose Monitoring Suppl (ONE TOUCH ULTRA MINI) w/Device KIT, by Does not apply route, Disp: , Rfl:     gabapentin (NEURONTIN) 300 mg capsule, Take 1 capsule (300 mg total) by mouth 3 (three) times a day, Disp: 20 capsule, Rfl: 0    glipiZIDE-metFORMIN (METAGLIP) 5-500 MG per tablet, , Disp: , Rfl:     losartan-hydrochlorothiazide (HYZAAR) 100-25 MG per tablet, Take 1 tablet by mouth, Disp: , Rfl:     metoprolol tartrate (LOPRESSOR) 25 mg tablet, Take 1 tablet (25 mg total) by mouth every 12 (twelve) hours, Disp: 30 tablet, Rfl: 0    mirtazapine (REMERON) 15 mg tablet, Take 15 mg by mouth, Disp: , Rfl:     ONETOUCH DELICA LANCETS FINE MISC, , Disp: , Rfl:     ONETOUCH VERIO test strip, , Disp: , Rfl:     pregabalin (LYRICA) 75 mg capsule, Take 75 mg by mouth daily at bedtime , Disp: , Rfl:     TRADJENTA 5 MG TABS, , Disp: , Rfl:     atorvastatin (LIPITOR) 80 mg tablet, Take 1 tablet (80 mg total) by mouth daily with dinner, Disp: 30 tablet, Rfl: 0    docusate sodium (COLACE) 100 mg capsule, Take 1 capsule (100 mg total) by mouth 2 (two) times a day, Disp: 10 capsule, Rfl: 0    finasteride (PROSCAR) 5 mg tablet, Take 1 tablet (5 mg total) by mouth daily for 90 days, Disp: 90 tablet, Rfl: 3    furosemide (LASIX) 40 mg tablet, Take 1 tablet (40 mg total) by mouth daily, Disp: 30 tablet, Rfl: 0    insulin glargine (LANTUS) 100 units/mL subcutaneous injection, Inject 18 Units under the skin daily at bedtime, Disp: 10 mL, Rfl: 0    insulin lispro (HumaLOG) 100 units/mL injection, Inject 5 Units under the skin 3 (three) times a day with meals, Disp: 10 mL, Rfl: 0    losartan-hydrochlorothiazide (HYZAAR) 100-25 MG per tablet, TAKE ONE TABLET BY MOUTH ONCE DAILY, Disp: , Rfl:     melatonin 3 mg, Take 1 tablet (3 mg total) by mouth daily at bedtime, Disp: 10 tablet, Rfl: 0    nitrofurantoin (MACROBID) 100 mg capsule, , Disp: , Rfl:     oxyCODONE (ROXICODONE) 5 mg immediate release tablet, Take 5 mg by mouth every 4 (four) hours as needed for moderate pain, Disp: , Rfl:     polyethylene glycol (MIRALAX) 17 g packet, Take 17 g by mouth daily as needed (constipation), Disp: 14 each, Rfl: 0    pregabalin (LYRICA) 75 mg capsule, Take 75 mg by mouth, Disp: , Rfl:     pregabalin (LYRICA) 75 mg capsule, Take 75 mg by mouth, Disp: , Rfl:     senna (SENOKOT) 8 6 mg, Take 1 tablet (8 6 mg total) by mouth daily at bedtime, Disp: 120 each, Rfl: 0    tamsulosin (FLOMAX) 0 4 mg, Take 1 capsule (0 4 mg total) by mouth 2 (two) times a day, Disp: 180 capsule, Rfl: 3      Active Problems     Patient Active Problem List   Diagnosis    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 (HCC)    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 of foot ([place] [address] )    PVD (peripheral vascular disease) ([place] [address] )    Hypertension    Gangrene of toe of right foot ([place] [address] )    Diabetic neuropathy associated with type 2 diabetes mellitus ([place] [address] )    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)    Cellulitis of right foot    Abnormal CT of the head    Elevated brain natriuretic peptide (BNP) level    Acute respiratory failure (HCC)    Transaminitis    Hypophosphatemia    Popliteal artery stenosis, right (HCC)    Great toe amputation status, right (HCC)    Coagulopathy (HCC)    Meningioma (HCC)    Hypokalemia    Moderate mitral regurgitation    Gangrenous toe ([place] [address] )    Coronary artery disease involving native coronary artery    (HFpEF) heart failure with preserved ejection fraction (HCC)    Pain in right foot    Acute urinary retention    BPH with obstruction/lower urinary tract symptoms         Past Medical History     Past Medical History:   Diagnosis Date    Diabetes mellitus ([place] [address] )     Diabetic neuropathy associated with type 2 diabetes mellitus ([place] [address] )     DM (diabetes mellitus), type 2 with peripheral vascular complications (HCC)     Gangrene of toe of right foot ([place] [address] )     Hypertension     PVD (peripheral vascular disease) ([place] [address] )          Surgical History     Past Surgical History:   Procedure Laterality Date    EYE SURGERY      cataract- bilateral    INCISION AND DRAINAGE OF WOUND Right 2/6/2018    Procedure: INCISION AND DRAINAGE (I&D) EXTREMITY, right great toe amputation;  Surgeon: Jerry Almaguer DPM;  [facility]: [facility];  Service: Podiatry    NO PAST SURGERIES      AR AMPUTATION FOOT,TRANSMETATARSAL Right 2/12/2018    Procedure: AMPUTATION TRANSMETATARSAL (TMA);  Surgeon: Jerry Almaguer DPM;  [facility];  Service: Podiatry    SPLIT THICKNESS SKIN GRAFT Right 2/15/2018    Procedure: SKIN GRAFT SPLIT THICKNESS (STSG)  EXTREMITY;  Surgeon: Jerry Almaguer DPM; [facility];  Service: Podiatry    VAC DRESSING APPLICATION Right 4/02/0673    Procedure: APPLICATION VAC DRESSING;  Surgeon: Corinna Tracy DPM;  [facility];  Service: Podiatry    WOUND DEBRIDEMENT Right 2/12/2018    Procedure: DEBRIDEMENT WOUND [facility] OUT);   Surgeon: Corinna Tracy DPM;  [facility];  Service: Podiatry         Family History     Family History   Problem Relation Age of Onset    Diabetes Mother     No Known Problems Father          Social History     Social History     History   Smoking Status    Never Smoker   Smokeless Tobacco    Never Used         Pertinent Lab Values     Lab Results   Component Value Date    CREATININE 0 77 02/19/2018       No results found for: PSA          Pertinent Imaging      There is no pertinent imaging for my review

## 2018-04-08 NOTE — PATIENT INSTRUCTIONS
Urinary Retention in Men   WHAT YOU NEED TO KNOW:   What is urinary retention? Urinary retention is a condition that develops when your bladder does not empty completely when you urinate  What causes urinary retention? · An enlarged prostate    · Blockages, such as a stone, growth, or narrowing of your urethra    · A weak bladder muscle    · Nerve damage from diabetes, stroke, or spinal cord injury    · Bladder diverticula, which are pockets of urine that form in your bladder and do not empty    · Certain medicines, such as narcotics, antihistamines, or antidepressants  What are the signs and symptoms of urinary retention? · Frequent urination, or the urge to urinate right after you finish    · An urge to urinate, but your urine does not come out or dribbles out slowly and weakly    · Frequent urine leaks that happen during the day or while you sleep    · Pain or pressure when you urinate    · Pain or stiffness in your abdomen, lower back, hips, or upper thighs    · Blood in your urine  How is urinary retention diagnosed? Your healthcare provider will ask about your health history and the medicines you take  He will press or tap on your lower abdomen  You may need any of the following tests:  · A digital rectal exam  is when healthcare providers carefully feel the size of your prostate  · A post void residual  test will show how much urine is left in your bladder after you urinate  You will be asked to urinate and then healthcare providers will use a small ultrasound machine to check how much urine is left in your bladder  · Blood or urine tests  may show infection or prostate specific antigen (PSA) levels  PSA may be elevated in prostate cancer  · An ultrasound  uses sound waves to show pictures on a monitor  An ultrasound may be done to show bladder stones, infection, or other problems  · A CT scan , or CAT scan, is a type of x-ray that is taken of your prostate, kidneys, and bladder   The pictures may show what is causing your urinary retention  You may be given a dye before the pictures are taken to help healthcare providers see the pictures better  Tell the healthcare provider if you have ever had an allergic reaction to contrast dye  How is urinary retention treated? · A Piedra catheter  is a tube put into your bladder to drain urine into a bag  Keep the bag below your waist  This will prevent urine from flowing back into your bladder and causing an infection or other problems  Also, keep the tube free of kinks so the urine will drain properly  Do not pull on the catheter  This can cause pain and bleeding, and may cause the catheter to come out  · Medicines  can help decrease the size of your prostate, fight infection, and help you urinate more easily  · Surgery  may be needed to treat the condition that is causing your urinary retention  When should I contact my healthcare provider? · You have a fever  · You have pain when you urinate  · You have blood in your urine  · You have problems with your catheter  · You have questions or concerns about your condition or care  When should I seek immediate care or call 911? · You have severe abdominal pain  · You are breathing faster than usual     · Your heartbeat is faster than usual     · Your face, hands, feet, or ankles are swollen  CARE AGREEMENT:   You have the right to help plan your care  Learn about your health condition and how it may be treated  Discuss treatment options with your caregivers to decide what care you want to receive  You always have the right to refuse treatment  The above information is an  only  It is not intended as medical advice for individual conditions or treatments  Talk to your doctor, nurse or pharmacist before following any medical regimen to see if it is safe and effective for you    © 2017 [employer] Information is for End User's use only and may not be sold, redistributed or otherwise used for commercial purposes  All illustrations and images included in CareNotes® are the copyrighted property of [employer]  or [employer]  Transurethral Prostatectomy   WHAT YOU NEED TO KNOW:   A transurethral prostatectomy is surgery that is done to remove part or all of your prostate gland  This surgery is also called transurethral resection of the prostate (TURP)  HOW TO PREPARE:   Before your surgery:   · Bring your medicine bottles or a list of your medicines when you see your caregiver  Tell your caregiver if you are allergic to any medicine  Tell your caregiver if you use any herbs, food supplements, or over-the-counter medicine  · Ask your caregiver if you need to stop using aspirin or any other prescribed or over-the-counter medicine before your procedure or surgery  · Tell your healthcare provider if you have heart disease or blood clotting problems  · Your healthcare provider may give you medicine to shrink the size of your prostate  You may also be given medicine to help prevent infection  Ask your healthcare provider for more information about the medicine that you need to take before surgery  · You may need blood and urine tests  You may also need a rectal exam to check the size of your prostate  Ask your healthcare provider for more information about tests that you may need  Write down the date, time, and location of each test      · Write down the correct date, time, and location of your surgery  The day before your surgery:  Ask caregivers about directions for eating and drinking  The day of your surgery:   · You or a close family member will be asked to sign a legal document called a consent form  It gives caregivers permission to do the procedure or surgery  It also explains the problems that may happen, and your choices  Make sure all your questions are answered before you sign this form      · Caregivers may insert an intravenous tube (IV) into your vein  A vein in the arm is usually chosen  Through the IV tube, you may be given liquids and medicine  · An anesthesiologist will talk to you before your surgery  You may need medicine to keep you asleep or numb an area of your body during surgery  Tell caregivers if you or anyone in your family has had a problem with anesthesia in the past   WHAT WILL HAPPEN:   What will happen:   · You may be given anesthesia medicine to make you lose feeling from the waist down, or to keep you asleep during this surgery  Healthcare providers will insert a resectoscope through your urethra  A resectoscope is a tube with a small monitor on the end  The monitor shows your prostate on a screen for healthcare providers while they do surgery  Your bladder may be filled with fluid during surgery  · Heat that is produced by the resectoscope will be used to remove part, or all, of your prostate  Heat will also be used to stop bleeding in the surgery area  Fluid is used to wash away extra tissue and blood  The resectoscope will be removed from your urethra  A catheter (soft tube) will be put through your urethra and into your bladder  The catheter will be left in place to drain urine out of your body and into a bag  After your surgery: You are taken to a room to rest  Do not get out of bed until healthcare providers say it is okay  When healthcare providers see that you are okay, you will be taken to your room  The catheter may need to stay in for 2 to 3 days after surgery, or longer if needed  CONTACT YOUR HEALTHCARE PROVIDER IF:   · You cannot get to surgery on time  · You have a fever  · Your symptoms, such as trouble urinating, get worse  · Your urine has blood in it  SEEK CARE IMMEDIATELY IF:   · You cannot urinate, or you are urinating very little or less often than usual     · You have severe abdominal or back pain    RISKS:   · During surgery, you may bleed more than expected, and need a blood transfusion  Your prostate, bladder, or urethra may be damaged  After surgery, your urethra or part of your bladder may grow narrow  This can make it difficult or painful to urinate  You may feel like you need to urinate often, or have trouble controlling when you urinate  You may get blood clots in your urine that can block your urethra  · After surgery, you may get a urinary tract infection, or an infection in the surgery area  You may have trouble getting an erection or ejaculating  You may develop TURP syndrome, which can cause dizziness, fatigue, stomach pain, and vomiting  If you had a partial resection of the prostate, the part of your prostate that was not removed may grow too large  This can cause your signs and symptoms to return, and you may need to have surgery again  · Without TURP surgery, your prostate may grow larger, and your symptoms may get worse  Urine may not be able to flow through your urethra as it should  The urine may remain in your bladder, and cause an infection  Stones may form in your bladder and kidneys, and you may have blood in your urine  These problems can damage your bladder or urethra and over time may cause your kidneys to stop working  If you are unable to urinate on your own, a Piedra catheter may need to stay in place to drain your urine  CARE AGREEMENT:   You have the right to help plan your care  Learn about your health condition and how it may be treated  Discuss treatment options with your caregivers to decide what care you want to receive  You always have the right to refuse treatment  © 2017 [employer] Information is for End User's use only and may not be sold, redistributed or otherwise used for commercial purposes  All illustrations and images included in CareNotes® are the copyrighted property of [employer]  or [employer]  The above information is an  only   It is not intended as medical advice for individual conditions or treatments  Talk to your doctor, nurse or pharmacist before following any medical regimen to see if it is safe and effective for you

## 2018-04-09 ENCOUNTER — OFFICE VISIT (OUTPATIENT)
Dept: UROLOGY | Facility: CLINIC | Age: 68
End: 2018-04-09
Payer: COMMERCIAL

## 2018-04-09 VITALS
HEIGHT: 68 IN | SYSTOLIC BLOOD PRESSURE: 122 MMHG | HEART RATE: 80 BPM | DIASTOLIC BLOOD PRESSURE: 78 MMHG | WEIGHT: 120 LBS | BODY MASS INDEX: 18.19 KG/M2

## 2018-04-09 DIAGNOSIS — N40.1 BPH WITH OBSTRUCTION/LOWER URINARY TRACT SYMPTOMS: ICD-10-CM

## 2018-04-09 DIAGNOSIS — R33.8 ACUTE URINARY RETENTION: Primary | ICD-10-CM

## 2018-04-09 DIAGNOSIS — N13.8 BPH WITH OBSTRUCTION/LOWER URINARY TRACT SYMPTOMS: ICD-10-CM

## 2018-04-09 LAB
SL AMB  POCT GLUCOSE, UA: ABNORMAL
SL AMB LEUKOCYTE ESTERASE,UA: ABNORMAL
SL AMB POCT BILIRUBIN,UA: ABNORMAL
SL AMB POCT BLOOD,UA: ABNORMAL
SL AMB POCT CLARITY,UA: ABNORMAL
SL AMB POCT COLOR,UA: ABNORMAL
SL AMB POCT KETONES,UA: ABNORMAL
SL AMB POCT NITRITE,UA: ABNORMAL
SL AMB POCT PH,UA: 5
SL AMB POCT SPECIFIC GRAVITY,UA: 1.02
SL AMB POCT URINE PROTEIN: ABNORMAL
SL AMB POCT UROBILINOGEN: ABNORMAL

## 2018-04-09 PROCEDURE — 81002 URINALYSIS NONAUTO W/O SCOPE: CPT | Performed by: UROLOGY

## 2018-04-09 PROCEDURE — 51798 US URINE CAPACITY MEASURE: CPT | Performed by: UROLOGY

## 2018-04-09 PROCEDURE — 99214 OFFICE O/P EST MOD 30 MIN: CPT | Performed by: UROLOGY

## 2018-04-09 RX ORDER — LINAGLIPTIN 5 MG/1
TABLET, FILM COATED ORAL
COMMUNITY
Start: 2018-04-05

## 2018-04-09 RX ORDER — LOSARTAN POTASSIUM AND HYDROCHLOROTHIAZIDE 25; 100 MG/1; MG/1
TABLET ORAL
COMMUNITY
Start: 2018-04-02 | End: 2018-08-14 | Stop reason: SDUPTHER

## 2018-04-09 RX ORDER — PREGABALIN 75 MG/1
75 CAPSULE ORAL
COMMUNITY
Start: 2018-04-06 | End: 2018-08-14 | Stop reason: ALTCHOICE

## 2018-04-09 RX ORDER — MIRTAZAPINE 15 MG/1
15 TABLET, FILM COATED ORAL
COMMUNITY
Start: 2018-04-06 | End: 2018-08-14 | Stop reason: ALTCHOICE

## 2018-04-09 RX ORDER — NITROFURANTOIN 25; 75 MG/1; MG/1
CAPSULE ORAL
COMMUNITY
Start: 2018-04-04 | End: 2018-08-14 | Stop reason: ALTCHOICE

## 2018-04-09 RX ORDER — FINASTERIDE 5 MG/1
5 TABLET, FILM COATED ORAL DAILY
Qty: 90 TABLET | Refills: 3 | Status: SHIPPED | OUTPATIENT
Start: 2018-04-09 | End: 2018-07-12 | Stop reason: SDUPTHER

## 2018-04-09 NOTE — ASSESSMENT & PLAN NOTE
Kevan Bass and his wife and I had a productive consultation today regarding his lower urinary tract symptoms  We discussed the normal anatomy of the bladder, prostate, bladder neck, and urethra, as well as the sphincter mechanism and the normal sequence of relaxation of the external urinary sphincter followed by contraction of the detrusor muscle and evacuation of urinary bladder  We then discussed treatment of lower urinary tract symptoms in the form of medical therapy, behavioral changes and fluid modification to decrease symptoms, the use of minimally invasive therapies for bladder outlet obstruction such as the Urolift procedure, and the use of Transurethral resection of prostate and simple prostatectomy in patients with severe bladder outlet obstruction  He states that he is very much not interested any procedures at this time  Nevertheless, I did tell him about TURP and about the Uro lift procedure and gave him literature on this as part of his education on this disease process  With regard to medical therapies we discussed alpha blockers such as tamsulosin as well as 5 alpha reductase inhibitors such as finasteride  He is already taking double dose Flomax, and requires more medication as he is not voiding properly  We discussed the mechanism of action of each and the potential risks and side effects including dizziness, weakness, hypotension, retrograde ejaculation, potential for allergic reaction, decreased in semen volume, decreased male pattern baldness for 5 alpha reductase inhibitors, and potential for decrease in libido or erectile dysfunction in less than 10% of men taking 5 alpha reductase inhibitors  He is amenable to taking finasteride, and he is hopeful that as his mobility improves that he will be able to void, more effectively  Plan:  Start finasteride, continue double-dose Flomax, follow-up in 3 months for a symptom check and postvoid residual urine determination

## 2018-04-09 NOTE — ASSESSMENT & PLAN NOTE
Patient with a history of acute urinary retention after undergoing right transmetatarsal amputation of his foot due to diabetes  Piedra catheter has been removed, his postvoid residual urine volume today is 224 milliliters indicating incomplete bladder emptying      See plan for BPH with obstruction lower urinary tract symptoms below

## 2018-04-10 ENCOUNTER — APPOINTMENT (OUTPATIENT)
Dept: WOUND CARE | Facility: HOSPITAL | Age: 68
End: 2018-04-10
Payer: COMMERCIAL

## 2018-04-10 PROCEDURE — 11045 DBRDMT SUBQ TISS EACH ADDL: CPT | Performed by: PODIATRIST

## 2018-04-10 PROCEDURE — 11042 DBRDMT SUBQ TIS 1ST 20SQCM/<: CPT | Performed by: PODIATRIST

## 2018-04-24 ENCOUNTER — APPOINTMENT (OUTPATIENT)
Dept: WOUND CARE | Facility: HOSPITAL | Age: 68
End: 2018-04-24
Payer: COMMERCIAL

## 2018-04-24 PROCEDURE — 11042 DBRDMT SUBQ TIS 1ST 20SQCM/<: CPT | Performed by: PODIATRIST

## 2018-05-01 ENCOUNTER — APPOINTMENT (OUTPATIENT)
Dept: WOUND CARE | Facility: HOSPITAL | Age: 68
End: 2018-05-01
Payer: COMMERCIAL

## 2018-05-01 PROCEDURE — 11042 DBRDMT SUBQ TIS 1ST 20SQCM/<: CPT | Performed by: PODIATRIST

## 2018-05-04 ENCOUNTER — OFFICE VISIT (OUTPATIENT)
Dept: VASCULAR SURGERY | Facility: CLINIC | Age: 68
End: 2018-05-04
Payer: COMMERCIAL

## 2018-05-04 VITALS — HEIGHT: 68 IN | SYSTOLIC BLOOD PRESSURE: 150 MMHG | TEMPERATURE: 99.1 F | DIASTOLIC BLOOD PRESSURE: 80 MMHG

## 2018-05-04 DIAGNOSIS — I70.209 DIABETES TYPE 2 WITH ATHEROSCLEROSIS OF ARTERIES OF EXTREMITIES (HCC): Primary | Chronic | ICD-10-CM

## 2018-05-04 DIAGNOSIS — E11.51 DIABETES TYPE 2 WITH ATHEROSCLEROSIS OF ARTERIES OF EXTREMITIES (HCC): Primary | Chronic | ICD-10-CM

## 2018-05-04 PROCEDURE — 99213 OFFICE O/P EST LOW 20 MIN: CPT | Performed by: SURGERY

## 2018-05-04 NOTE — PROGRESS NOTES
Assessment/Plan:    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)  Diabetic foot infection with recent history of transmetatarsal amputation  Endovascular intervention with now palpable posterior tibial pulse excellent Doppler sounds biphasic  Planning continued podiatry surveillance with wound care silver alginate repeat lower extremity arterial study in 4 months       Diagnoses and all orders for this visit:    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)        Subjective:      Patient ID: Vickie Barton is a 75 y o  male      HPI    The following portions of the patient's history were reviewed and updated as appropriate: allergies, current medications, past family history, past medical history, past social history, past surgical history and problem list     Review of Systems      Objective:      /80 (BP Location: Right arm, Patient Position: Sitting, Cuff Size: Adult)   Temp 99 1 °F (37 3 °C) (Tympanic)   Ht 5' 8" (1 727 m)          Physical Exam      Evaluation forefoot amputation 2 small areas a nonhealing irrigated and silver alginate replaced, palpable posterior tibial pulse which biphasic sounds    Vitals:    05/04/18 1510   BP: 150/80   BP Location: Right arm   Patient Position: Sitting   Cuff Size: Adult   Temp: 99 1 °F (37 3 °C)   TempSrc: Tympanic   Height: 5' 8" (1 727 m)       Patient Active Problem List   Diagnosis    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 (HCC)    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 of foot ([address] )    PVD (peripheral vascular disease) ([address] )    Hypertension    Gangrene of toe of right foot ([address] )    Diabetic neuropathy associated with type 2 diabetes mellitus ([address] )    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)    Cellulitis of right foot    Abnormal CT of the head    Elevated brain natriuretic peptide (BNP) level    Acute respiratory failure (HCC)    Transaminitis    Hypophosphatemia    Popliteal artery stenosis, right (HCC)    Great toe amputation status, right (HCC)    Coagulopathy (HCC)    Meningioma (HCC)    Hypokalemia    Moderate mitral regurgitation    Gangrenous toe ([address] )    Coronary artery disease involving native coronary artery    (HFpEF) heart failure with preserved ejection fraction (HCC)    Pain in right foot    Acute urinary retention    BPH with obstruction/lower urinary tract symptoms       Past Surgical History:   Procedure Laterality Date    EYE SURGERY      cataract- bilateral    INCISION AND DRAINAGE OF WOUND Right 2/6/2018    Procedure: INCISION AND DRAINAGE (I&D) EXTREMITY, right great toe amputation;  Surgeon: Keshia Carnes DPM;  [facility];  Service: Podiatry    NO PAST SURGERIES      MT AMPUTATION FOOT,TRANSMETATARSAL Right 2/12/2018    Procedure: AMPUTATION TRANSMETATARSAL (TMA);  Surgeon: Keshia Carnes DPM;  [facility];  Service: Podiatry    SPLIT THICKNESS SKIN GRAFT Right 2/15/2018    Procedure: SKIN GRAFT SPLIT THICKNESS (STSG)  EXTREMITY;  Surgeon: Keshia Carnes DPM;  [facility];  Service: Podiatry    VAC DRESSING APPLICATION Right 1/59/0260    Procedure: APPLICATION VAC DRESSING;  Surgeon: Keshia Carnes DPM;  [facility];  Service: Podiatry    WOUND DEBRIDEMENT Right 2/12/2018    Procedure: DEBRIDEMENT WOUND [facility] OUT); Surgeon: Keshia Carnes DPM;  [facility];  Service: Podiatry       Family History   Problem Relation Age of Onset    Diabetes Mother     No Known Problems Father        Social History     Social History    Marital status: /Civil Union     Spouse name: N/A    Number of children: N/A    Years of education: N/A     Occupational History    Not on file       Social History Main Topics    Smoking status: Never Smoker    Smokeless tobacco: Never Used    Alcohol use No    Drug use: No    Sexual activity: Yes     Other Topics Concern    Not on file     Social History Narrative    No narrative on file       Allergies   Allergen Reactions    Penicillins      Pt stated that he had a reaction to it a long time ago    Childhood history         Current Outpatient Prescriptions:     acetaminophen (TYLENOL) 325 mg tablet, Take 2 tablets (650 mg total) by mouth 3 (three) times a day, Disp: 30 tablet, Rfl: 0    Alpha-Lipoic Acid 600 MG CAPS, Take by mouth, Disp: , Rfl:     aspirin 81 MG tablet, Take 81 mg by mouth, Disp: , Rfl:     atorvastatin (LIPITOR) 80 mg tablet, Take 1 tablet (80 mg total) by mouth daily with dinner, Disp: 30 tablet, Rfl: 0    Blood Glucose Monitoring Suppl (ONE TOUCH ULTRA MINI) w/Device KIT, by Does not apply route, Disp: , Rfl:     docusate sodium (COLACE) 100 mg capsule, Take 1 capsule (100 mg total) by mouth 2 (two) times a day, Disp: 10 capsule, Rfl: 0    finasteride (PROSCAR) 5 mg tablet, Take 1 tablet (5 mg total) by mouth daily for 90 days, Disp: 90 tablet, Rfl: 3    furosemide (LASIX) 40 mg tablet, Take 1 tablet (40 mg total) by mouth daily, Disp: 30 tablet, Rfl: 0    gabapentin (NEURONTIN) 300 mg capsule, Take 1 capsule (300 mg total) by mouth 3 (three) times a day, Disp: 20 capsule, Rfl: 0    glipiZIDE-metFORMIN (METAGLIP) 5-500 MG per tablet, , Disp: , Rfl:     insulin glargine (LANTUS) 100 units/mL subcutaneous injection, Inject 18 Units under the skin daily at bedtime, Disp: 10 mL, Rfl: 0    insulin lispro (HumaLOG) 100 units/mL injection, Inject 5 Units under the skin 3 (three) times a day with meals, Disp: 10 mL, Rfl: 0    losartan-hydrochlorothiazide (HYZAAR) 100-25 MG per tablet, Take 1 tablet by mouth, Disp: , Rfl:     losartan-hydrochlorothiazide (HYZAAR) 100-25 MG per tablet, TAKE ONE TABLET BY MOUTH ONCE DAILY, Disp: , Rfl:     melatonin 3 mg, Take 1 tablet (3 mg total) by mouth daily at bedtime, Disp: 10 tablet, Rfl: 0    metoprolol tartrate (LOPRESSOR) 25 mg tablet, Take 1 tablet (25 mg total) by mouth every 12 (twelve) hours, Disp: 30 tablet, Rfl: 0    mirtazapine (REMERON) 15 mg tablet, Take 15 mg by mouth, Disp: , Rfl:     nitrofurantoin (MACROBID) 100 mg capsule, , Disp: , Rfl:     ONETOUCH DELICA LANCETS FINE MISC, , Disp: , Rfl:     ONETOUCH VERIO test strip, , Disp: , Rfl:     oxyCODONE (ROXICODONE) 5 mg immediate release tablet, Take 5 mg by mouth every 4 (four) hours as needed for moderate pain, Disp: , Rfl:     polyethylene glycol (MIRALAX) 17 g packet, Take 17 g by mouth daily as needed (constipation), Disp: 14 each, Rfl: 0    pregabalin (LYRICA) 75 mg capsule, Take 75 mg by mouth daily at bedtime  , Disp: , Rfl:     pregabalin (LYRICA) 75 mg capsule, Take 75 mg by mouth, Disp: , Rfl:     pregabalin (LYRICA) 75 mg capsule, Take 75 mg by mouth, Disp: , Rfl:     senna (SENOKOT) 8 6 mg, Take 1 tablet (8 6 mg total) by mouth daily at bedtime, Disp: 120 each, Rfl: 0    tamsulosin (FLOMAX) 0 4 mg, Take 1 capsule (0 4 mg total) by mouth 2 (two) times a day, Disp: 180 capsule, Rfl: 3    TRADJENTA 5 MG TABS, , Disp: , Rfl:

## 2018-05-04 NOTE — ASSESSMENT & PLAN NOTE
Diabetic foot infection with recent history of transmetatarsal amputation  Endovascular intervention with now palpable posterior tibial pulse excellent Doppler sounds biphasic    Planning continued podiatry surveillance with wound care silver alginate repeat lower extremity arterial study in 4 months

## 2018-05-04 NOTE — PATIENT INSTRUCTIONS
Right diabetic foot infection with history of transmetatarsal amputation  Recent arteriogram in February with endovascular interventions he now has a palpable posterior tibial pulse  Forefoot amputation was evaluated and silver alginate replaced  Follow-up with podiatry and outpatient nursing visits

## 2018-05-15 ENCOUNTER — APPOINTMENT (OUTPATIENT)
Dept: WOUND CARE | Facility: HOSPITAL | Age: 68
End: 2018-05-15
Payer: COMMERCIAL

## 2018-05-15 PROCEDURE — 97597 DBRDMT OPN WND 1ST 20 CM/<: CPT | Performed by: PODIATRIST

## 2018-05-29 ENCOUNTER — APPOINTMENT (OUTPATIENT)
Dept: WOUND CARE | Facility: HOSPITAL | Age: 68
End: 2018-05-29
Payer: COMMERCIAL

## 2018-05-29 PROCEDURE — 99212 OFFICE O/P EST SF 10 MIN: CPT | Performed by: PODIATRIST

## 2018-06-28 ENCOUNTER — EVALUATION (OUTPATIENT)
Dept: PHYSICAL THERAPY | Facility: CLINIC | Age: 68
End: 2018-06-28
Payer: COMMERCIAL

## 2018-06-28 VITALS — DIASTOLIC BLOOD PRESSURE: 64 MMHG | HEART RATE: 74 BPM | SYSTOLIC BLOOD PRESSURE: 140 MMHG

## 2018-06-28 DIAGNOSIS — S98.311D AMPUTATION AT MIDFOOT, RIGHT, SUBSEQUENT ENCOUNTER (HCC): ICD-10-CM

## 2018-06-28 DIAGNOSIS — R26.89 IMBALANCE: Primary | ICD-10-CM

## 2018-06-28 DIAGNOSIS — R26.2 DIFFICULTY WALKING: ICD-10-CM

## 2018-06-28 PROCEDURE — 97162 PT EVAL MOD COMPLEX 30 MIN: CPT | Performed by: PHYSICAL THERAPIST

## 2018-06-28 PROCEDURE — G8979 MOBILITY GOAL STATUS: HCPCS | Performed by: PHYSICAL THERAPIST

## 2018-06-28 PROCEDURE — G8978 MOBILITY CURRENT STATUS: HCPCS | Performed by: PHYSICAL THERAPIST

## 2018-06-28 NOTE — PROGRESS NOTES
PT Evaluation     Today's date: 2018  Patient name: Abimael Pozo  :   MRN: [id number]  Referring provider: Gallito Quan MD  Dx:   Encounter Diagnosis     ICD-10-CM    1  Imbalance R26 89    2  Difficulty walking R26 2    3  Amputation at midfoot, right, subsequent encounter ([facility] [address] ) [id number]        Start Time: 0900  Stop Time: 1000  Total time in clinic (min): 60 minutes    Assessment  Impairments: abnormal gait, activity intolerance, impaired balance, impaired physical strength and safety issue    Assessment details: Pt is a 70year old male referred s/p R TMA  Pt presented today with impairments in decreased standing balance specifically with eyes closed, decreased BLE strength R>L, decreased endurance per report (will assess 6MWT NV), (+) fall risk per DGI score all which lead to functional limitations in gait, standing tolerance for home activities, and elevations  Pt also does not have a shoe insert at this time, will be working with orthotist to obtain  Pt will benefit from PT plan below needed to address above impairments and reduce fall risk  Understanding of Dx/Px/POC: good   Prognosis: good    Goals  STG  1  Pt will improve gait speed to at least  75 m/s without AD within 4 weeks  2  Pt will maintain balance on firm surface with eyes closed for 30 seconds within 4 weeks  3  Pt will improve strength of B hips abd and ext to at least a 4/5 within 4 weeks  4  Pt will demonstrate independence with HEP within 4 weeks  LTG  1  Pt will improve gait speed to at least a 1 0 m/s without AD within 6-8 weeks  2  Pt will be able to maintain balance on foam with eyes closed for 30 sec within 6-8 weeks  3  Pt will report returning to normal standing tolerance with home tasks within 6-8 weeks  4  Pt will demonstrate independence with HEP within 6-8 weeks  5  Pt will improve DGI to at least a 22/24 reducing fall risk within 6-8 weeks      Plan  Patient would benefit from: orthotics and skilled physical therapy  Referral necessary: No  Planned therapy interventions: balance, orthotic management and training, orthotic fitting/training, neuromuscular re-education, strengthening, therapeutic activities, therapeutic exercise, gait training, home exercise program and patient education  Frequency: 2x week  Duration in weeks: 8  Treatment plan discussed with: family and patient  Plan details: Certification period from today 6/28/18 through 8/23/18        Subjective Evaluation    History of Present Illness  Date of surgery: 2/6/2018  Mechanism of injury: surgery  Mechanism of injury: Got infection in big toe from an ulcer, has hx of diabetes, went to hospital, had amputation and stent put leg on 2/6/18  Biggest issue is balance  Had complications of wound healing, was not cleared for walking till a month ago  Was still up and mobile using knee scooter  Does not need cane or walker, able to go up and down stairs using rail, did not use rail prior  Has deficits with endurance, ability to stand for prolonged period of time  Has neuropathy, doesn't drive due to this    Was on rehab for one month in hospital     Social Support  Steps to enter house: yes  Stairs in house: yes   Lives in: multiple-level home  Lives with: spouse    Employment status: not working    Diagnostic Tests  No diagnostic tests performed  Patient Goals  Patient goals for therapy: increased strength, improved balance and increased motion          Objective     Strength/Myotome Testing     Left Hip   Planes of Motion   Flexion: 4  Extension: 4-  Abduction: 4-    Right Hip   Planes of Motion   Flexion: 4-  Extension: 4-  Abduction: 4-    Left Knee   Prone flexion: 4-  Extension: 5    Right Knee   Prone flexion: 4-  Extension: 4+    Left Ankle/Foot   Dorsiflexion: 4-  Inversion: 4+  Eversion: 4+    Right Ankle/Foot   Dorsiflexion: 5  Inversion: 5  Eversion: 5    Neuro Exam :     Functional Outcome Measures   eyes open firm surface: 30  eyes closed firm surface:  6  eyes open foam sirface:  16  eyes closed foam surface:  2  Single leg stance left eyes open:  24 (1 UE)  Single leg stance left eyes closed:  16 (1 UE)  Five times sit to stand (seconds): pt unable      Neurologic Exam  Flowsheet Rows      Most Recent Value   Dynamic Gait Index   Gait level surface   2   Change in gait speed  2   Gait with horizontal head turns   2   Gait with vertical head turns   2   Gait and pivot turn  1   Step over obstacle  1   Step around obstacle  2   Steps  2   Total score   14        Gait speed:  55 m/s without AD (pt reports being a slow walker however this is slower than his normal); deviations: (+) Trendelenburg on R, decreased step length B  **pt's wife has put foam into pt's shoe as a temporary filler    Precautions DMII, TMA on R, PVD    Specialty Daily Treatment Diary     Manual                                                     Exercise Diary                                                                                                                                                                             Modalities

## 2018-07-02 ENCOUNTER — OFFICE VISIT (OUTPATIENT)
Dept: PHYSICAL THERAPY | Facility: CLINIC | Age: 68
End: 2018-07-02
Payer: COMMERCIAL

## 2018-07-02 DIAGNOSIS — S98.311D AMPUTATION AT MIDFOOT, RIGHT, SUBSEQUENT ENCOUNTER (HCC): ICD-10-CM

## 2018-07-02 DIAGNOSIS — R26.2 DIFFICULTY WALKING: Primary | ICD-10-CM

## 2018-07-02 DIAGNOSIS — R26.89 IMBALANCE: ICD-10-CM

## 2018-07-02 PROCEDURE — 97110 THERAPEUTIC EXERCISES: CPT | Performed by: PHYSICAL THERAPIST

## 2018-07-02 PROCEDURE — 97112 NEUROMUSCULAR REEDUCATION: CPT | Performed by: PHYSICAL THERAPIST

## 2018-07-02 NOTE — PROGRESS NOTES
Daily Note     Today's date: 2018  Patient name: Hosea Cruz  : [birth year]  MRN: [id number]  Referring provider: Parris Pimentel MD  Dx:   Encounter Diagnosis     ICD-10-CM    1  Difficulty walking R26 2    2  Amputation at midfoot, right, [facility] S98 311D    3  Imbalance R26 89                   Subjective: No new complaints  Per wife Thursday will his last day  Going to stay with their family in [facility] for 2-3 months  Will do therapy there  Keeping appt tomorrow with [facility]       Objective: See treatment diary below    Precautions DMII, TMA on R, PVD    Specialty Daily Treatment Diary     Manual                                                     Exercise Diary         Step-ups with holds on R 6"  2x15       SLS on R EO  2x30s       Foam FT arms crossed EO  3x30s       Walking lunges 2 laps       4 way ankle Reviewed for HEP       Step-taps from foam 6"  x20                                                                                                                         Assessment: Initiated POC address balance and RLE strength deficits  Pt limited mostly by fatigue requiring frequent rest breaks  Issued and reviewed HEP, pt and wife communicated understanding  Plan: Review and update HEP as needed

## 2018-07-05 ENCOUNTER — OFFICE VISIT (OUTPATIENT)
Dept: PHYSICAL THERAPY | Facility: CLINIC | Age: 68
End: 2018-07-05
Payer: COMMERCIAL

## 2018-07-05 DIAGNOSIS — R26.2 DIFFICULTY WALKING: Primary | ICD-10-CM

## 2018-07-05 DIAGNOSIS — S98.311D AMPUTATION AT MIDFOOT, RIGHT, SUBSEQUENT ENCOUNTER (HCC): ICD-10-CM

## 2018-07-05 DIAGNOSIS — R26.89 IMBALANCE: ICD-10-CM

## 2018-07-05 PROCEDURE — 97112 NEUROMUSCULAR REEDUCATION: CPT | Performed by: PHYSICAL THERAPIST

## 2018-07-05 PROCEDURE — 97110 THERAPEUTIC EXERCISES: CPT | Performed by: PHYSICAL THERAPIST

## 2018-07-05 NOTE — PROGRESS NOTES
Daily Note     Today's date: 2018  Patient name: Bhavin Lentz  :   MRN: [id number]  Referring provider: Chris Martinez MD  Dx:   Encounter Diagnosis     ICD-10-CM    1  Difficulty walking R26 2    2  Amputation at midfoot, right, subsequent encounter ([facility] [address] ) S98 311D    3  Imbalance R26 89                   Subjective: Mateo from [address] [place] [address] present to discuss with pt AFO option with foot orthotic  Pt with no new complaints  Reports some compliance with HEP  Confirms today is last visit, going to visit pb  Objective: See treatment diary below    Precautions DMII, TMA on R, PVD    Specialty Daily Treatment Diary     Manual                                                     Exercise Diary        Step-ups with holds on R 6"  2x15 6"  2x15      SLS on R EO  2x30s EO  3x30s      Foam FT arms crossed EO  3x30s EO  3x30s      Walking lunges 2 laps 2 laps      4 way ankle Reviewed for HEP       Step-taps from foam 6"  x20       Treadmill  x5min  1 0mph      NuStep  L 5  x6 min                                                                                                        Assessment: Discussed with pt option of AFO with foot orthotic versus only foot orthotic and biomechanics of ankle joint and what AFO could offer that foot orthotic alone can not  Pt does not like the cosmetic look of brace and unsure if he wants it  Will start with just foot orthotic first   Goals not met for pt at this time due to unanticipated d/c  Will follow-up with PT as needed  Plan: Discharge with HEP

## 2018-07-11 ENCOUNTER — APPOINTMENT (OUTPATIENT)
Dept: PHYSICAL THERAPY | Facility: CLINIC | Age: 68
End: 2018-07-11
Payer: COMMERCIAL

## 2018-07-11 PROCEDURE — G8979 MOBILITY GOAL STATUS: HCPCS | Performed by: PHYSICAL THERAPIST

## 2018-07-11 PROCEDURE — G8980 MOBILITY D/C STATUS: HCPCS | Performed by: PHYSICAL THERAPIST

## 2018-07-12 ENCOUNTER — APPOINTMENT (OUTPATIENT)
Dept: PHYSICAL THERAPY | Facility: CLINIC | Age: 68
End: 2018-07-12
Payer: COMMERCIAL

## 2018-07-12 ENCOUNTER — OFFICE VISIT (OUTPATIENT)
Dept: UROLOGY | Facility: CLINIC | Age: 68
End: 2018-07-12
Payer: COMMERCIAL

## 2018-07-12 VITALS
HEART RATE: 72 BPM | HEIGHT: 68 IN | BODY MASS INDEX: 19.13 KG/M2 | DIASTOLIC BLOOD PRESSURE: 64 MMHG | WEIGHT: 126.2 LBS | SYSTOLIC BLOOD PRESSURE: 102 MMHG

## 2018-07-12 DIAGNOSIS — N13.8 BPH WITH OBSTRUCTION/LOWER URINARY TRACT SYMPTOMS: ICD-10-CM

## 2018-07-12 DIAGNOSIS — R33.8 ACUTE URINARY RETENTION: ICD-10-CM

## 2018-07-12 DIAGNOSIS — R33.9 RETENTION, URINE: Primary | ICD-10-CM

## 2018-07-12 DIAGNOSIS — N40.1 BPH WITH OBSTRUCTION/LOWER URINARY TRACT SYMPTOMS: ICD-10-CM

## 2018-07-12 LAB — POST-VOID RESIDUAL VOLUME, ML POC: 93 ML

## 2018-07-12 PROCEDURE — 99214 OFFICE O/P EST MOD 30 MIN: CPT | Performed by: UROLOGY

## 2018-07-12 PROCEDURE — 51798 US URINE CAPACITY MEASURE: CPT | Performed by: UROLOGY

## 2018-07-12 RX ORDER — FINASTERIDE 5 MG/1
5 TABLET, FILM COATED ORAL DAILY
Qty: 90 TABLET | Refills: 3 | Status: SHIPPED | OUTPATIENT
Start: 2018-07-12 | End: 2018-08-14

## 2018-07-12 NOTE — LETTER
July 12, 2018     Doug Martinez MD  [address]  [address] [facility]    Patient: Phu Sanchez   YOB: 1950   Date of Visit: 7/12/2018       Dear Dr Ciara Sauceda: Thank you for referring Phu Sanchez to me for evaluation  Below are my notes for this consultation  If you have questions, please do not hesitate to call me  I look forward to following your patient along with you  Sincerely,        Tommy Whitt MD        CC: No Recipients  Tommy Whitt MD  7/12/2018 11:29 AM  Sign at close encounter       Problem List Items Addressed This Visit     Acute urinary retention     The patient has had no recurrence of his urinary retention and feels that he is voiding well on finasteride  His put void residual urine volume today is half a but was previously at 93 milliliters  Plan:  Continue 5 alpha reductase inhibitor         Relevant Medications    finasteride (PROSCAR) 5 mg tablet    BPH with obstruction/lower urinary tract symptoms     The patient is doing quite well on finasteride monotherapy, his prostate has decreased to 15 grams, smooth, no nodules, his PVR is half of what it was at his last visit and currently 93 milliliters  While he states he is quite happy, his AUA symptom score is still moderate with a score of 19 and quality of life score of 3  Plan:  The patient wishes to continue with finasteride monotherapy at this time    He will follow up in 1 year for a symptom check, AUA symptom score, and PVR         Relevant Medications    finasteride (PROSCAR) 5 mg tablet      Other Visit Diagnoses     Retention, urine    -  Primary    Relevant Medications    finasteride (PROSCAR) 5 mg tablet    Other Relevant Orders    POCT Measure PVR (Completed)              Assessment and plan:       Please see problem oriented charting for the assessment plan of today's urological complaints    Tommy Whitt MD      Chief Complaint     Chief Complaint   Patient presents with   Faina Boyce Urinary Retention    Benign Prostatic Hypertrophy         History of Present Illness     Alec Ram is a 70 y o  gentleman with a history of multiple medical problems including diabetes who I have previously seen for urinary retention  His PVR at his last visit was 225 milliliters and today is 93 milliliters indicating that he is emptying his bladder more appropriately  He denies dysuria, incontinence, hematuria, urgency, he awakens once or twice at night to void, his stream quality is strong he occasionally has some slight urinary hesitancy  On review of systems he otherwise endorses poor sleep, but states that this is not due to his urinary symptoms  His AUA symptom score today is 19 with a bother score of 3  He is happy with medical therapy    Detailed Urologic History     - please refer to HPI    Review of Systems     Review of Systems   Constitutional: Negative  HENT: Negative  Eyes: Negative  Respiratory: Negative  Cardiovascular: Negative  Gastrointestinal: Negative  Endocrine: Negative  Genitourinary:        As per HPI   Musculoskeletal: Negative  Skin: Negative  Allergic/Immunologic: Negative  Neurological: Negative  Hematological: Negative  Psychiatric/Behavioral: Negative  AUA SYMPTOM SCORE      Most Recent Value   AUA SYMPTOM SCORE   How often have you had a sensation of not emptying your bladder completely after you finished urinating? 3   How often have you had to urinate again less than two hours after you finished urinating? 3   How often have you found you stopped and started again several times when you urinate? 3   How often have you found it difficult to postpone urination? 3   How often have you had a weak urinary stream?  3   How often have you had to push or strain to begin urination? 3   How many times did you most typically get up to urinate from the time you went to bed at night until the time you got up in the morning?   1   Quality of Life: If you were to spend the rest of your life with your urinary condition just the way it is now, how would you feel about that?  3   AUA SYMPTOM SCORE  19            Allergies     Allergies   Allergen Reactions    Penicillins      Pt stated that he had a reaction to it a long time ago  Childhood history       Physical Exam     Physical Exam   Constitutional: He is oriented to person, place, and time  He appears well-developed and well-nourished  No distress  HENT:   Head: Normocephalic and atraumatic  Eyes: EOM are normal  Pupils are equal, round, and reactive to light  Right eye exhibits no discharge  Left eye exhibits no discharge  Neck: Normal range of motion  Neck supple  Cardiovascular: Regular rhythm and intact distal pulses  Pulmonary/Chest: Effort normal  No stridor  No respiratory distress  He has no rales  Abdominal: Soft  He exhibits no distension and no mass  There is no tenderness  There is no rebound and no guarding  No hernia  Genitourinary: Rectal exam shows no external hemorrhoid, no internal hemorrhoid, no fissure, no mass, no tenderness and anal tone normal  Prostate is not enlarged (50 grams, smooth, no nodules) and not tender  Musculoskeletal: Normal range of motion  He exhibits no edema, tenderness or deformity  Neurological: He is alert and oriented to person, place, and time  No cranial nerve deficit or sensory deficit  He exhibits normal muscle tone  Coordination normal    Skin: Skin is warm and dry  No rash noted  He is not diaphoretic  No erythema  No pallor  Psychiatric: He has a normal mood and affect  His behavior is normal  Judgment and thought content normal    Nursing note and vitals reviewed            Vital Signs  Vitals:    07/12/18 1057   BP: 102/64   BP Location: Left arm   Patient Position: Sitting   Cuff Size: Standard   Pulse: 72   Weight: 57 2 kg (126 lb 3 2 oz)   Height: 5' 8" (1 727 m)         Current Medications       Current Outpatient Prescriptions:     Alpha-Lipoic Acid 600 MG CAPS, Take by mouth, Disp: , Rfl:     aspirin 81 MG tablet, Take 81 mg by mouth, Disp: , Rfl:     atorvastatin (LIPITOR) 80 mg tablet, Take 1 tablet (80 mg total) by mouth daily with dinner, Disp: 30 tablet, Rfl: 0    Blood Glucose Monitoring Suppl (ONE TOUCH ULTRA MINI) w/Device KIT, by Does not apply route, Disp: , Rfl:     furosemide (LASIX) 40 mg tablet, Take 1 tablet (40 mg total) by mouth daily, Disp: 30 tablet, Rfl: 0    gabapentin (NEURONTIN) 300 mg capsule, Take 1 capsule (300 mg total) by mouth 3 (three) times a day, Disp: 20 capsule, Rfl: 0    glipiZIDE-metFORMIN (METAGLIP) 5-500 MG per tablet, , Disp: , Rfl:     losartan-hydrochlorothiazide (HYZAAR) 100-25 MG per tablet, Take 1 tablet by mouth, Disp: , Rfl:     melatonin 3 mg, Take 1 tablet (3 mg total) by mouth daily at bedtime, Disp: 10 tablet, Rfl: 0    metoprolol tartrate (LOPRESSOR) 25 mg tablet, Take 1 tablet (25 mg total) by mouth every 12 (twelve) hours, Disp: 30 tablet, Rfl: 0    pregabalin (LYRICA) 75 mg capsule, Take 75 mg by mouth daily at bedtime  , Disp: , Rfl:     TRADJENTA 5 MG TABS, , Disp: , Rfl:     acetaminophen (TYLENOL) 325 mg tablet, Take 2 tablets (650 mg total) by mouth 3 (three) times a day, Disp: 30 tablet, Rfl: 0    docusate sodium (COLACE) 100 mg capsule, Take 1 capsule (100 mg total) by mouth 2 (two) times a day, Disp: 10 capsule, Rfl: 0    finasteride (PROSCAR) 5 mg tablet, Take 1 tablet (5 mg total) by mouth daily for 360 days, Disp: 90 tablet, Rfl: 3    insulin glargine (LANTUS) 100 units/mL subcutaneous injection, Inject 18 Units under the skin daily at bedtime, Disp: 10 mL, Rfl: 0    insulin lispro (HumaLOG) 100 units/mL injection, Inject 5 Units under the skin 3 (three) times a day with meals, Disp: 10 mL, Rfl: 0    losartan-hydrochlorothiazide (HYZAAR) 100-25 MG per tablet, TAKE ONE TABLET BY MOUTH ONCE DAILY, Disp: , Rfl:     mirtazapine (REMERON) 15 mg tablet, Take 15 mg by mouth, Disp: , Rfl:     nitrofurantoin (MACROBID) 100 mg capsule, , Disp: , Rfl:     ONETOUCH DELICA LANCETS FINE MISC, , Disp: , Rfl:     ONETOUCH VERIO test strip, , Disp: , Rfl:     oxyCODONE (ROXICODONE) 5 mg immediate release tablet, Take 5 mg by mouth every 4 (four) hours as needed for moderate pain, Disp: , Rfl:     polyethylene glycol (MIRALAX) 17 g packet, Take 17 g by mouth daily as needed (constipation), Disp: 14 each, Rfl: 0    pregabalin (LYRICA) 75 mg capsule, Take 75 mg by mouth, Disp: , Rfl:     pregabalin (LYRICA) 75 mg capsule, Take 75 mg by mouth, Disp: , Rfl:     senna (SENOKOT) 8 6 mg, Take 1 tablet (8 6 mg total) by mouth daily at bedtime, Disp: 120 each, Rfl: 0    tamsulosin (FLOMAX) 0 4 mg, Take 1 capsule (0 4 mg total) by mouth 2 (two) times a day, Disp: 180 capsule, Rfl: 3      Active Problems     Patient Active Problem List   Diagnosis    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 (HCC)    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 of foot (HCC)    PVD (peripheral vascular disease) ([address] )    Hypertension    [facility]rene of toe of right foot ([facility] [address] )    Diabetic neuropathy associated with type 2 diabetes mellitus ([facility] [address] )    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)    Cellulitis of right foot    Abnormal CT of the head    Elevated brain natriuretic peptide (BNP) level    Acute respiratory failure (HCC)    Transaminitis    Hypophosphatemia    Popliteal artery stenosis, right (HCC)    Great toe amputation status, right (HCC)    Coagulopathy (HCC)    Meningioma (HCC)    Hypokalemia    Moderate mitral regurgitation    Gangrenous toe ([address]    Coronary artery disease involving native coronary artery    (HFpEF) heart failure with preserved ejection fraction (HCC)    Pain in right foot    Acute urinary retention    BPH with obstruction/lower urinary tract symptoms         Past Medical History     Past Medical History:   Diagnosis Date    Diabetes mellitus ([facility] )     Diabetic neuropathy associated with type 2 diabetes mellitus ([place] [address] )     DM (diabetes mellitus), type 2 with peripheral vascular complications (HCC)     Gangrene of toe of right foot ([facility] )     Hypertension     PVD (peripheral vascular disease) ([place] [address] )          Surgical History     Past Surgical History:   Procedure Laterality Date    EYE SURGERY      cataract- bilateral    INCISION AND DRAINAGE OF WOUND Right 2/6/2018    Procedure: INCISION AND DRAINAGE (I&D) EXTREMITY, right great toe amputation;  Surgeon: Davy Torres DPM;  [facility];  Service: Podiatry    NO PAST SURGERIES      [place] AMPUTATION FOOT,TRANSMETATARSAL Right 2/12/2018    Procedure: AMPUTATION TRANSMETATARSAL (TMA);  Surgeon: Davy Torres DPM;  [facility];  Service: Podiatry    SPLIT THICKNESS SKIN GRAFT Right 2/15/2018    Procedure: SKIN GRAFT SPLIT THICKNESS (STSG)  EXTREMITY;  Surgeon: Davy Torres DPM;  [facility];  Service: Podiatry    VAC DRESSING APPLICATION Right 9/17/1849    Procedure: APPLICATION VAC DRESSING;  Surgeon: Davy Torres DPM;  [facility];  Service: Podiatry    WOUND DEBRIDEMENT Right 2/12/2018    Procedure: DEBRIDEMENT WOUND [facility] OUT);   Surgeon: Davy Torres DPM;  [facility];  Service: Podiatry         Family History     Family History   Problem Relation Age of Onset    Diabetes Mother     No Known Problems Father          Social History     Social History     History   Smoking Status    Never Smoker   Smokeless Tobacco    Never Used         Pertinent Lab Values     Lab Results   Component Value Date    CREATININE 0 77 02/19/2018       No results found for: PSA  This patient should not undergo prostate cancer screening given his comorbidities        Pertinent Imaging      There is no pertinent urological imaging for my review

## 2018-07-12 NOTE — ASSESSMENT & PLAN NOTE
The patient has had no recurrence of his urinary retention and feels that he is voiding well on finasteride  His put void residual urine volume today is half a but was previously at 93 milliliters      Plan:  Continue 5 alpha reductase inhibitor

## 2018-07-12 NOTE — PROGRESS NOTES
Problem List Items Addressed This Visit     Acute urinary retention     The patient has had no recurrence of his urinary retention and feels that he is voiding well on finasteride  His put void residual urine volume today is half a but was previously at 93 milliliters  Plan:  Continue 5 alpha reductase inhibitor         Relevant Medications    finasteride (PROSCAR) 5 mg tablet    BPH with obstruction/lower urinary tract symptoms     The patient is doing quite well on finasteride monotherapy, his prostate has decreased to 15 grams, smooth, no nodules, his PVR is half of what it was at his last visit and currently 93 milliliters  While he states he is quite happy, his AUA symptom score is still moderate with a score of 19 and quality of life score of 3  Plan:  The patient wishes to continue with finasteride monotherapy at this time  He will follow up in 1 year for a symptom check, AUA symptom score, and PVR         Relevant Medications    finasteride (PROSCAR) 5 mg tablet      Other Visit Diagnoses     Retention, urine    -  Primary    Relevant Medications    finasteride (PROSCAR) 5 mg tablet    Other Relevant Orders    POCT Measure PVR (Completed)              Assessment and plan:       Please see problem oriented charting for the assessment plan of today's urological complaints    Judi Pandya MD      Chief Complaint     Chief Complaint   Patient presents with    Urinary Retention    Benign Prostatic Hypertrophy         History of Present Illness     Alec Vidal is a 70 y o  gentleman with a history of multiple medical problems including diabetes who I have previously seen for urinary retention  His PVR at his last visit was 225 milliliters and today is 93 milliliters indicating that he is emptying his bladder more appropriately    He denies dysuria, incontinence, hematuria, urgency, he awakens once or twice at night to void, his stream quality is strong he occasionally has some slight urinary hesitancy  On review of systems he otherwise endorses poor sleep, but states that this is not due to his urinary symptoms  His AUA symptom score today is 19 with a bother score of 3  He is happy with medical therapy    Detailed Urologic History     - please refer to HPI    Review of Systems     Review of Systems   Constitutional: Negative  HENT: Negative  Eyes: Negative  Respiratory: Negative  Cardiovascular: Negative  Gastrointestinal: Negative  Endocrine: Negative  Genitourinary:        As per HPI   Musculoskeletal: Negative  Skin: Negative  Allergic/Immunologic: Negative  Neurological: Negative  Hematological: Negative  Psychiatric/Behavioral: Negative  AUA SYMPTOM SCORE      Most Recent Value   AUA SYMPTOM SCORE   How often have you had a sensation of not emptying your bladder completely after you finished urinating? 3   How often have you had to urinate again less than two hours after you finished urinating? 3   How often have you found you stopped and started again several times when you urinate? 3   How often have you found it difficult to postpone urination? 3   How often have you had a weak urinary stream?  3   How often have you had to push or strain to begin urination? 3   How many times did you most typically get up to urinate from the time you went to bed at night until the time you got up in the morning? 1   Quality of Life: If you were to spend the rest of your life with your urinary condition just the way it is now, how would you feel about that?  3   AUA SYMPTOM SCORE  19            Allergies     Allergies   Allergen Reactions    Penicillins      Pt stated that he had a reaction to it a long time ago  Childhood history       Physical Exam     Physical Exam   Constitutional: He is oriented to person, place, and time  He appears well-developed and well-nourished  No distress  HENT:   Head: Normocephalic and atraumatic     Eyes: EOM are normal  Pupils are equal, round, and reactive to light  Right eye exhibits no discharge  Left eye exhibits no discharge  Neck: Normal range of motion  Neck supple  Cardiovascular: Regular rhythm and intact distal pulses  Pulmonary/Chest: Effort normal  No stridor  No respiratory distress  He has no rales  Abdominal: Soft  He exhibits no distension and no mass  There is no tenderness  There is no rebound and no guarding  No hernia  Genitourinary: Rectal exam shows no external hemorrhoid, no internal hemorrhoid, no fissure, no mass, no tenderness and anal tone normal  Prostate is not enlarged (50 grams, smooth, no nodules) and not tender  Musculoskeletal: Normal range of motion  He exhibits no edema, tenderness or deformity  Neurological: He is alert and oriented to person, place, and time  No cranial nerve deficit or sensory deficit  He exhibits normal muscle tone  Coordination normal    Skin: Skin is warm and dry  No rash noted  He is not diaphoretic  No erythema  No pallor  Psychiatric: He has a normal mood and affect  His behavior is normal  Judgment and thought content normal    Nursing note and vitals reviewed            Vital Signs  Vitals:    07/12/18 1057   BP: 102/64   BP Location: Left arm   Patient Position: Sitting   Cuff Size: Standard   Pulse: 72   Weight: 57 2 kg (126 lb 3 2 oz)   Height: 5' 8" (1 727 m)         Current Medications       Current Outpatient Prescriptions:     Alpha-Lipoic Acid 600 MG CAPS, Take by mouth, Disp: , Rfl:     aspirin 81 MG tablet, Take 81 mg by mouth, Disp: , Rfl:     atorvastatin (LIPITOR) 80 mg tablet, Take 1 tablet (80 mg total) by mouth daily with dinner, Disp: 30 tablet, Rfl: 0    Blood Glucose Monitoring Suppl (ONE TOUCH ULTRA MINI) w/Device KIT, by Does not apply route, Disp: , Rfl:     furosemide (LASIX) 40 mg tablet, Take 1 tablet (40 mg total) by mouth daily, Disp: 30 tablet, Rfl: 0    gabapentin (NEURONTIN) 300 mg capsule, Take 1 capsule (300 mg total) by mouth 3 (three) times a day, Disp: 20 capsule, Rfl: 0    glipiZIDE-metFORMIN (METAGLIP) 5-500 MG per tablet, , Disp: , Rfl:     losartan-hydrochlorothiazide (HYZAAR) 100-25 MG per tablet, Take 1 tablet by mouth, Disp: , Rfl:     melatonin 3 mg, Take 1 tablet (3 mg total) by mouth daily at bedtime, Disp: 10 tablet, Rfl: 0    metoprolol tartrate (LOPRESSOR) 25 mg tablet, Take 1 tablet (25 mg total) by mouth every 12 (twelve) hours, Disp: 30 tablet, Rfl: 0    pregabalin (LYRICA) 75 mg capsule, Take 75 mg by mouth daily at bedtime  , Disp: , Rfl:     TRADJENTA 5 MG TABS, , Disp: , Rfl:     acetaminophen (TYLENOL) 325 mg tablet, Take 2 tablets (650 mg total) by mouth 3 (three) times a day, Disp: 30 tablet, Rfl: 0    docusate sodium (COLACE) 100 mg capsule, Take 1 capsule (100 mg total) by mouth 2 (two) times a day, Disp: 10 capsule, Rfl: 0    finasteride (PROSCAR) 5 mg tablet, Take 1 tablet (5 mg total) by mouth daily for 360 days, Disp: 90 tablet, Rfl: 3    insulin glargine (LANTUS) 100 units/mL subcutaneous injection, Inject 18 Units under the skin daily at bedtime, Disp: 10 mL, Rfl: 0    insulin lispro (HumaLOG) 100 units/mL injection, Inject 5 Units under the skin 3 (three) times a day with meals, Disp: 10 mL, Rfl: 0    losartan-hydrochlorothiazide (HYZAAR) 100-25 MG per tablet, TAKE ONE TABLET BY MOUTH ONCE DAILY, Disp: , Rfl:     mirtazapine (REMERON) 15 mg tablet, Take 15 mg by mouth, Disp: , Rfl:     nitrofurantoin (MACROBID) 100 mg capsule, , Disp: , Rfl:     ONETOUCH DELICA LANCETS FINE MISC, , Disp: , Rfl:     ONETOUCH VERIO test strip, , Disp: , Rfl:     oxyCODONE (ROXICODONE) 5 mg immediate release tablet, Take 5 mg by mouth every 4 (four) hours as needed for moderate pain, Disp: , Rfl:     polyethylene glycol (MIRALAX) 17 g packet, Take 17 g by mouth daily as needed (constipation), Disp: 14 each, Rfl: 0    pregabalin (LYRICA) 75 mg capsule, Take 75 mg by mouth, Disp: , Rfl:    pregabalin (LYRICA) 75 mg capsule, Take 75 mg by mouth, Disp: , Rfl:     senna (SENOKOT) 8 6 mg, Take 1 tablet (8 6 mg total) by mouth daily at bedtime, Disp: 120 each, Rfl: 0    tamsulosin (FLOMAX) 0 4 mg, Take 1 capsule (0 4 mg total) by mouth 2 (two) times a day, Disp: 180 capsule, Rfl: 3      Active Problems     Patient Active Problem List   Diagnosis    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 (HCC)    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 of foot ([facility] )    PVD (peripheral vascular disease) ([facility] )    Hypertension    [facility] right [facility]    Diabetic neuropathy associated with type 2 diabetes mellitus ([facility] )    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)    Cellulitis of right foot    Abnormal CT of the head    Elevated brain natriuretic peptide (BNP) level    Acute respiratory failure (HCC)    Transaminitis    Hypophosphatemia    Popliteal artery stenosis, right (HCC)    Great toe amputation status, right (HCC)    Coagulopathy (HCC)    Meningioma (HCC)    Hypokalemia    Moderate mitral regurgitation    Gangrenous toe ([facility]    Coronary artery disease involving native coronary artery    (HFpEF) heart failure with preserved ejection fraction (HCC)    Pain in right foot    Acute urinary retention    BPH with obstruction/lower urinary tract symptoms         Past Medical History     Past Medical History:   Diagnosis Date    Diabetes mellitus ([facility] )     Diabetic neuropathy associated with type 2 diabetes mellitus ([facility] )     DM (diabetes mellitus), type 2 with peripheral vascular complications (HCC)     Gangrene of toe of right foot ([facility] )     Hypertension     PVD (peripheral vascular disease) ([facility] )          Surgical History     Past Surgical History:   Procedure Laterality Date    EYE SURGERY      cataract- bilateral    INCISION AND DRAINAGE OF WOUND Right 2/6/2018    Procedure: INCISION AND DRAINAGE (I&D) EXTREMITY, right great toe amputation;  Surgeon: Loni Silva DPM;  [facility];  Service: Podiatry    NO PAST SURGERIES      WI AMPUTATION FOOT,TRANSMETATARSAL Right 2/12/2018    Procedure: AMPUTATION TRANSMETATARSAL (TMA);  Surgeon: Loni Silva DPM;  [facility];  Service: Podiatry    SPLIT THICKNESS SKIN GRAFT Right 2/15/2018    Procedure: SKIN GRAFT SPLIT THICKNESS (STSG)  EXTREMITY;  Surgeon: Loni Silva DPM;  [facility];  Service: Podiatry    VAC DRESSING APPLICATION Right 5/66/1981    Procedure: APPLICATION VAC DRESSING;  Surgeon: Loni Silva DPM;  [facility];  Service: Podiatry    WOUND DEBRIDEMENT Right 2/12/2018    Procedure: DEBRIDEMENT WOUND [facility] OUT);   Surgeon: Loni Silva DPM;  [facility];  Service: Podiatry         Family History     Family History   Problem Relation Age of Onset    Diabetes Mother     No Known Problems Father          Social History     Social History     History   Smoking Status    Never Smoker   Smokeless Tobacco    Never Used         Pertinent Lab Values     Lab Results   Component Value Date    CREATININE 0 77 02/19/2018       No results found for: PSA  This patient should not undergo prostate cancer screening given his comorbidities        Pertinent Imaging      There is no pertinent urological imaging for my review

## 2018-07-12 NOTE — ASSESSMENT & PLAN NOTE
The patient is doing quite well on finasteride monotherapy, his prostate has decreased to 15 grams, smooth, no nodules, his PVR is half of what it was at his last visit and currently 93 milliliters  While he states he is quite happy, his AUA symptom score is still moderate with a score of 19 and quality of life score of 3  Plan:  The patient wishes to continue with finasteride monotherapy at this time    He will follow up in 1 year for a symptom check, AUA symptom score, and PVR

## 2018-07-12 NOTE — PATIENT INSTRUCTIONS
Decision Aid for Benign Prostatic Hyperplasia   WHAT YOU NEED TO KNOW:   What do I need to know about decisions for benign prostatic hyperplasia (BPH)? You can work with your healthcare provider to make decisions about being screened or treated for BPH  Screening is a test done to find BPH early  Screening is different from diagnosis because screening is used before you first start to have signs or symptoms  This means management or treatment can start early  You can also help plan treatment if BPH is found with screening, or you develop it later on  Your treatment choices include nonsurgical options and surgery  Your healthcare provider may recommend nonsurgical treatments first  Learn about the benefits and risks of nonsurgical treatment and surgery so you can make an informed choice  What do I need to know about BPH?   · BPH is an enlarged prostate  The prostate is a small gland that is part of the reproductive system  It sits around your urethra (tube that carries urine out of your bladder)  The prostate is usually about the size of a walnut  An enlarged prostate will press on the urethra  This can cause problems with storing urine or emptying your bladder completely  · BPH is common in men older than 40 years  The risk increases with age  · Benign means it is not cancer  BPH is not a life-threatening condition, but it can cause problems with your daily activities  BPH usually gets worse over time  Left untreated, BPH can also lead to blood in your urine, bladder stones, or kidney failure  · Talk to your healthcare provider if you think you have signs or symptoms of BPH  Examples include problems starting your urine to flow, or an urgent need to urinate  You may be woken from sleep by the need to urinate  Am I a good candidate for BPH screening? Screening may be helpful for you if any of the following is true:  · You are 40 years or older      · You have a family history of BPH or other prostate problems  · You have symptoms of BPH that cause problems with your daily activities or quality of life  · You want to have treatment as early as possible if needed  · You have heart disease or take a beta-blocker medicine  How is screening done? · The International Prostate Symptom Score  is a set of questions about your ability to urinate over the past month  You will be asked how often you have any of the following:     ¨ A feeling of not fully emptying your bladder when you urinate    ¨ A need to urinate again within 2 hours after you last urinated    ¨ Urine that stops and starts several times when you urinate    ¨ An urgent need to urinate that you could not put off    ¨ A weak urine stream    ¨ Trouble starting your urine flow, or a need to push or strain to get it to start    ¨ Being woken from sleep because you needed to urinate    · A digital rectal exam  is used to check the size of your prostate  Your healthcare provider will insert a gloved finger into your rectum  The provider will be able to feel your prostate  The exam may be repeated over time to check the prostate size  · A PSA test  is used to measure the amount of a protein made by your prostate gland  A blood sample is taken for this test  A high PSA level can increase your risk for more severe urination problems or the need for surgery  What are the benefits and risks of screening? Talk with your healthcare provider about the risks and benefits of screening:  · Benefits  include finding BPH early  This means you can make more decisions about treatments  The PSA test can also find prostate cancer early  Treatment of prostate cancer is more successful when it starts early  · Risks  include a false belief that you will not develop BPH if your screening result is negative  Even though your screening result is negative, you may still develop it later on   You may also need more tests if you have problems urinating but screening shows you do not have BPH  What questions should I ask my healthcare provider to help me make decisions about screening? · How high is my risk for BPH? · How often do I need to have screening? · Where is the screening done? · Do I need to do anything to get ready to have screening? What happens after I have screening? You will meet with your healthcare provider to go over the results of your screening  You may need more tests to diagnose anything that showed up on the screening test  Common tests include a urine test to check for an infection or a biopsy (tissue sample) to check for cancer  You may also need tests to measure the amount of urine left in your bladder after you urinate  The force of your urine flow may also be measured  You and your healthcare provider can talk about your treatment options  Together you can decide which treatment is right for you  How is BPH treated, and what are the benefits of treatment? · Watchful waiting  means you do not receive treatment right away  Your signs and symptoms will be monitored over time to see if they get worse  Your healthcare provider may recommend ways to improve or track your symptoms during watchful waiting  Examples include drinking less liquid or urinating on a regular schedule  Your provider may also recommend lifestyle changes  For example, your symptoms may improve if you lose weight or have less caffeine if needed  You may be asked to keep a record of your symptoms and when you urinate  The record will include when you urinate, how easy or difficult it was, and any changes in urination  You will bring the record to follow-up visits to help you and your provider decide on treatment you may want to try  · Medicines  may be given to help your symptoms and to prevent BPH from getting worse  Medicines may help relax certain muscles to make it easier for you to urinate   You may also need medicine to make your prostate smaller or to relieve an overactive bladder  Medicines may start to relieve your symptoms quickly  Medicines can improve your quality of life  You may also be able to manage your symptoms without surgery if medicine keeps your BPH from getting worse  · Surgery  may be used to relieve your symptoms if other treatments do not work  An ablation is surgery that uses a needle to destroy extra tissue that is causing your symptoms  A laser may instead be used to destroy the tissue  Your prostate may be heated  A tool that gives off heat is inserted into your urethra  Prostate tissue is destroyed, and no other tissues are damaged  Parts of your prostate may be removed during another type of surgery  What are the risks of BPH treatment? · Watchful waiting  may allow BPH to get worse and be more difficult to treat than at an early stage  If you have a high PSA level, you may need to have BPH treated right away  · Medicines  can cause certain side effects, such as erectile dysfunction (ED) or a lowered sex drive  You may also develop urinary retention (trouble starting your urine to flow)  Some medicines can cause hypotension (low blood pressure) when you stand, or dizziness  · Surgery  can damage tissue around your prostate  Surgery can also increase your risk for trouble urinating, incontinence (leaking), or urinary retention  You may bleed more than expected during surgery or develop an infection  You may also need to be treated again if the surgery you have does not relieve your symptoms  What questions should I ask my provider to help me make decisions about treatment? · How will I feel if I continue to have these symptoms for the rest of my life? · Which medicines may work best to treat my symptoms? · What other steps can I take to decrease my symptoms? · Will I have to take medicine for the rest of my life? · What side effects might happen with each medicine I can try?     · Am I a good candidate for surgery? · Which surgery may work best to treat my symptoms? · Where is the surgery done? · How long is recovery after surgery? · What are the possible side effects of surgery? CARE AGREEMENT:   You have the right to help plan your care  Learn about your health condition and how it may be treated  Discuss treatment options with your caregivers to decide what care you want to receive  You always have the right to refuse treatment  The above information is an  only  It is not intended as medical advice for individual conditions or treatments  Talk to your doctor, nurse or pharmacist before following any medical regimen to see if it is safe and effective for you  © 2017 [employer] Information is for End User's use only and may not be sold, redistributed or otherwise used for commercial purposes  All illustrations and images included in CareNotes® are the copyrighted property of A [employer]  or [employer]

## 2018-07-16 ENCOUNTER — APPOINTMENT (OUTPATIENT)
Dept: PHYSICAL THERAPY | Facility: CLINIC | Age: 68
End: 2018-07-16
Payer: COMMERCIAL

## 2018-07-18 ENCOUNTER — APPOINTMENT (OUTPATIENT)
Dept: PHYSICAL THERAPY | Facility: CLINIC | Age: 68
End: 2018-07-18
Payer: COMMERCIAL

## 2018-07-23 ENCOUNTER — APPOINTMENT (OUTPATIENT)
Dept: PHYSICAL THERAPY | Facility: CLINIC | Age: 68
End: 2018-07-23
Payer: COMMERCIAL

## 2018-07-25 ENCOUNTER — APPOINTMENT (OUTPATIENT)
Dept: PHYSICAL THERAPY | Facility: CLINIC | Age: 68
End: 2018-07-25
Payer: COMMERCIAL

## 2018-07-30 ENCOUNTER — APPOINTMENT (OUTPATIENT)
Dept: PHYSICAL THERAPY | Facility: CLINIC | Age: 68
End: 2018-07-30
Payer: COMMERCIAL

## 2018-08-01 ENCOUNTER — OFFICE VISIT (OUTPATIENT)
Dept: PHYSICAL THERAPY | Facility: CLINIC | Age: 68
End: 2018-08-01

## 2018-08-07 ENCOUNTER — HOSPITAL ENCOUNTER (OUTPATIENT)
Dept: NON INVASIVE DIAGNOSTICS | Facility: CLINIC | Age: 68
Discharge: HOME | End: 2018-08-07
Payer: COMMERCIAL

## 2018-08-07 DIAGNOSIS — E11.51 DIABETES TYPE 2 WITH ATHEROSCLEROSIS OF ARTERIES OF EXTREMITIES (HCC): Chronic | ICD-10-CM

## 2018-08-07 DIAGNOSIS — I70.209 DIABETES TYPE 2 WITH ATHEROSCLEROSIS OF ARTERIES OF EXTREMITIES (HCC): Chronic | ICD-10-CM

## 2018-08-07 PROCEDURE — 93922 UPR/L XTREMITY ART 2 LEVELS: CPT | Performed by: SURGERY

## 2018-08-07 PROCEDURE — 93925 LOWER EXTREMITY STUDY: CPT

## 2018-08-07 PROCEDURE — 93925 LOWER EXTREMITY STUDY: CPT | Performed by: SURGERY

## 2018-08-07 PROCEDURE — 93923 UPR/LXTR ART STDY 3+ LVLS: CPT

## 2018-08-14 ENCOUNTER — OFFICE VISIT (OUTPATIENT)
Dept: VASCULAR SURGERY | Facility: CLINIC | Age: 68
End: 2018-08-14
Payer: COMMERCIAL

## 2018-08-14 VITALS
HEIGHT: 68 IN | DIASTOLIC BLOOD PRESSURE: 64 MMHG | SYSTOLIC BLOOD PRESSURE: 132 MMHG | HEART RATE: 72 BPM | RESPIRATION RATE: 18 BRPM | WEIGHT: 130 LBS | BODY MASS INDEX: 19.7 KG/M2

## 2018-08-14 DIAGNOSIS — I70.238 ATHEROSCLEROSIS OF NATIVE ARTERIES OF RIGHT LEG WITH ULCERATION OF OTHER PART OF LOWER RIGHT LEG: ICD-10-CM

## 2018-08-14 DIAGNOSIS — I70.209 DIABETES TYPE 2 WITH ATHEROSCLEROSIS OF ARTERIES OF EXTREMITIES (HCC): Primary | Chronic | ICD-10-CM

## 2018-08-14 DIAGNOSIS — E11.51 DIABETES TYPE 2 WITH ATHEROSCLEROSIS OF ARTERIES OF EXTREMITIES (HCC): Primary | Chronic | ICD-10-CM

## 2018-08-14 PROCEDURE — 99213 OFFICE O/P EST LOW 20 MIN: CPT | Performed by: SURGERY

## 2018-08-14 RX ORDER — ROSUVASTATIN CALCIUM 20 MG/1
20 TABLET, COATED ORAL
COMMUNITY
Start: 2018-07-25 | End: 2019-07-25

## 2018-08-14 NOTE — PROGRESS NOTES
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 (HCC)  -     VAS lower limb arterial duplex, complete bilateral; Future    Other orders  -     rosuvastatin (CRESTOR) 20 MG tablet; Take 20 mg by mouth        Subjective:      Patient ID: Pilo Escobar is a 70 y o  male  HPI asymptomatic at this time right foot  The following portions of the patient's history were reviewed and updated as appropriate: allergies, current medications, past family history, past medical history, past social history, past surgical history and problem list     Review of Systems  all systems negative    Objective:      /64 (BP Location: Left arm)   Pulse 72   Resp 18   Ht 5' 8" (1 727 m)   Wt 59 kg (130 lb)   BMI 19 77 kg/m²          Physical Exam      Well-healed transmetatarsal amputation site foot is warm excellent perfusion  Transmetatarsal pressures in the 90s      Vitals:    08/14/18 1313   BP: 132/64   BP Location: Left arm   Pulse: 72   Resp: 18   Weight: 59 kg (130 lb)   Height: 5' 8" (1 727 m)       Patient Active Problem List   Diagnosis    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 (HCC)    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 of foot (HCC)    PVD (peripheral vascular disease) (HCC)    Hypertension    Gangrene of toe of right foot ([facility] )    Diabetic neuropathy associated with type 2 diabetes mellitus ([facility] )    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)    Cellulitis of right foot    Abnormal CT of the head    Elevated brain natriuretic peptide (BNP) level    Acute respiratory failure (HCC)    Transaminitis    Hypophosphatemia    Popliteal artery stenosis, right (HCC)    Great toe amputation status, right (HCC)    Coagulopathy (HCC)    Meningioma (HCC)    Hypokalemia    Moderate mitral regurgitation    Gangrenous toe ([address] )    Coronary artery disease involving native coronary artery    (HFpEF) heart failure with preserved ejection fraction (HCC)    Pain in right foot    Acute urinary retention    BPH with obstruction/lower urinary tract symptoms       Past Surgical History:   Procedure Laterality Date    EYE SURGERY      cataract- bilateral    INCISION AND DRAINAGE OF WOUND Right 2/6/2018    Procedure: INCISION AND DRAINAGE (I&D) EXTREMITY, right great toe amputation;  Surgeon: Janie Walton DPM;  [facility];  Service: Podiatry    NO PAST SURGERIES      [place] AMPUTATION FOOT,TRANSMETATARSAL Right 2/12/2018    Procedure: AMPUTATION TRANSMETATARSAL (TMA);  Surgeon: Janie Walton DPM;  [facility];  Service: Podiatry    SPLIT THICKNESS SKIN GRAFT Right 2/15/2018    Procedure: SKIN GRAFT SPLIT THICKNESS (STSG)  EXTREMITY;  Surgeon: Janie Walton DPM;  [facility];  Service: Podiatry    VAC DRESSING APPLICATION Right 4/48/7589    Procedure: APPLICATION VAC DRESSING;  Surgeon: Janie Walton DPM;  [facility];  Service: Podiatry    WOUND DEBRIDEMENT Right 2/12/2018    Procedure: DEBRIDEMENT WOUND [facility] OUT); Surgeon: Janie Walton DPM;  [facility];  Service: Podiatry       Family History   Problem Relation Age of Onset    Diabetes Mother     No Known Problems Father        Social History     Social History    Marital status: /Civil Union     Spouse name: N/A    Number of children: N/A    Years of education: N/A     Occupational History    Not on file       Social History Main Topics    Smoking status: Never Smoker    Smokeless tobacco: Never Used    Alcohol use No    Drug use: No    Sexual activity: Yes     Other Topics Concern    Not on file     Social History Narrative    No narrative on file       Allergies   Allergen Reactions    Penicillins      Pt stated that he had a reaction to it a long time ago    Childhood history         Current Outpatient Prescriptions:     acetaminophen (TYLENOL) 325 mg tablet, Take 2 tablets (650 mg total) by mouth 3 (three) times a day, Disp: 30 tablet, Rfl: 0    Alpha-Lipoic Acid 600 MG CAPS, Take by mouth, Disp: , Rfl:     aspirin 81 MG tablet, Take 81 mg by mouth, Disp: , Rfl:     Blood Glucose Monitoring Suppl (ONE TOUCH ULTRA MINI) w/Device KIT, by Does not apply route, Disp: , Rfl:     docusate sodium (COLACE) 100 mg capsule, Take 1 capsule (100 mg total) by mouth 2 (two) times a day, Disp: 10 capsule, Rfl: 0    glipiZIDE-metFORMIN (METAGLIP) 5-500 MG per tablet, , Disp: , Rfl:     losartan-hydrochlorothiazide (HYZAAR) 100-25 MG per tablet, Take 1 tablet by mouth, Disp: , Rfl:     melatonin 3 mg, Take 1 tablet (3 mg total) by mouth daily at bedtime, Disp: 10 tablet, Rfl: 0    metoprolol tartrate (LOPRESSOR) 25 mg tablet, Take 1 tablet (25 mg total) by mouth every 12 (twelve) hours, Disp: 30 tablet, Rfl: 0    ONETOUCH DELICA LANCETS FINE MISC, , Disp: , Rfl:     ONETOUCH VERIO test strip, , Disp: , Rfl:     pregabalin (LYRICA) 75 mg capsule, Take 75 mg by mouth daily at bedtime  , Disp: , Rfl:     rosuvastatin (CRESTOR) 20 MG tablet, Take 20 mg by mouth, Disp: , Rfl:     TRADJENTA 5 MG TABS, , Disp: , Rfl:

## 2018-08-14 NOTE — PATIENT INSTRUCTIONS
Follow-up post lower extremity arterial study  His transmetatarsal amputation site is well healed he is not having any rest pain or tissue loss  He is having mild claudication symptoms at this time and there is a recurrence of the below the knee popliteal treated area  Because there is no limb threatening ischemia no further intervention at this time will be offered  Should he developed ischemic rest pain or tissue loss as discussed at length then an aggressive revascularization plan will be instituted

## 2018-08-14 NOTE — ASSESSMENT & PLAN NOTE
Well-healed transmetatarsal amputation right foot    No intervention at this time follow-up lower extremity arterial study in 6 months

## 2018-09-07 ENCOUNTER — TELEPHONE (OUTPATIENT)
Dept: VASCULAR SURGERY | Facility: CLINIC | Age: 68
End: 2018-09-07

## 2018-09-07 ENCOUNTER — OFFICE VISIT (OUTPATIENT)
Dept: VASCULAR SURGERY | Facility: CLINIC | Age: 68
End: 2018-09-07
Payer: COMMERCIAL

## 2018-09-07 VITALS — DIASTOLIC BLOOD PRESSURE: 66 MMHG | HEIGHT: 68 IN | TEMPERATURE: 97.5 F | SYSTOLIC BLOOD PRESSURE: 130 MMHG

## 2018-09-07 DIAGNOSIS — I73.9 CLAUDICATION OF RIGHT LOWER EXTREMITY (HCC): ICD-10-CM

## 2018-09-07 DIAGNOSIS — I73.9 CLAUDICATION OF LEFT LOWER EXTREMITY (HCC): Primary | ICD-10-CM

## 2018-09-07 PROCEDURE — 99213 OFFICE O/P EST LOW 20 MIN: CPT | Performed by: SURGERY

## 2018-09-07 NOTE — TELEPHONE ENCOUNTER
Patient called and demanded to speak to dr Jake Eddy  He was upset with staff members saying he could not speak to him because he was in surgery  I told him I would look for an appointment with dr Jake Eddy as soon as I could to address his pain

## 2018-09-07 NOTE — PATIENT INSTRUCTIONS
Presents with fasciculations his foot and short distance claudication  On exam he has excellent Doppler sounds with a weakly palpable posterior tibial pulse  Planning a CT angiogram of aorta and runoff  With follow-up appointment and possible planning of an endovascular  Re-treatment of the popliteal artery stenosis

## 2018-09-07 NOTE — PROGRESS NOTES
Assessment/Plan:    Claudication of right lower extremity ([address] )   New onset and worsening claudication right lower extremity with history of popliteal angioplasty atherectomy  Recent Doppler does show possible restenosis and in view of the symptoms will be planning a CT angiogram for further evaluation and possible planning  re- endovascular intervention       Diagnoses and all orders for this visit:    Claudication of left lower extremity ([address] )    Claudication of right lower extremity ([address] )  -     CTA abdominal w run off wo contrast; Future  -     Basic metabolic panel; Future        Subjective:      Patient ID: Meggan Huang is a 70 y o  male  HPI history of diabetic foot infection common aiding in arteriogram and transmetatarsal amputation which is healed nicely  Recent fasciculations in the foot and claudication symptoms presents with these with possible popliteal artery restenosis  The following portions of the patient's history were reviewed and updated as appropriate: allergies, current medications, past family history, past medical history, past social history, past surgical history and problem list     Review of Systems   Claudication right lower extremity all other systems are negative     Objective:      /66 (BP Location: Right arm, Patient Position: Sitting, Cuff Size: Adult)   Temp 97 5 °F (36 4 °C) (Tympanic)   Ht 5' 8" (1 727 m)          Physical Exam       pulses are palpable femoral decrease popliteal and decreased posterior tibial, excellent Doppler sounds at the posterior tibial and monophasic anterior tibial Doppler sounds  No obvious ischemia with no ischemic rest pain or tissue loss      Vitals:    09/07/18 1354   BP: 130/66   BP Location: Right arm   Patient Position: Sitting   Cuff Size: Adult   Temp: 97 5 °F (36 4 °C)   TempSrc: Tympanic   Height: 5' 8" (1 727 m)       Patient Active Problem List   Diagnosis    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 (HCC)    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 of foot [facility]    PVD (peripheral vascular disease) ([facility] )    Hypertension    Gangrene of toe of right foot ([facility] )    Diabetic neuropathy associated with type 2 diabetes mellitus ([facility] )    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)    Cellulitis of right foot    Abnormal CT of the head    Elevated brain natriuretic peptide (BNP) level    Acute respiratory failure (HCC)    Transaminitis    Hypophosphatemia    Popliteal artery stenosis, right (HCC)    Great toe amputation status, right (HCC)    Coagulopathy (HCC)    Meningioma (HCC)    Hypokalemia    Moderate mitral regurgitation    Gangrenous toe ([facility] )    Coronary artery disease involving native coronary artery    (HFpEF) heart failure with preserved ejection fraction (HCC)    Pain in right foot    Acute urinary retention    BPH with obstruction/lower urinary tract symptoms    Claudication of right lower extremity ([facility] )       Past Surgical History:   Procedure Laterality Date    EYE SURGERY      cataract- bilateral    INCISION AND DRAINAGE OF WOUND Right 2/6/2018    Procedure: INCISION AND DRAINAGE (I&D) EXTREMITY, right great toe amputation;  Surgeon: Mona Hernandez DPM;  [facility];  Service: Podiatry    NO PAST SURGERIES      [place] AMPUTATION FOOT,TRANSMETATARSAL Right 2/12/2018    Procedure: AMPUTATION TRANSMETATARSAL (TMA);  Surgeon: Mona Hernandez DPM;  [facility];  Service: Podiatry    SPLIT THICKNESS SKIN GRAFT Right 2/15/2018    Procedure: SKIN GRAFT SPLIT THICKNESS (STSG)  EXTREMITY;  Surgeon: Mona Hernandez DPM;  [facility];  Service: Podiatry    VAC DRESSING APPLICATION Right 9/55/2182    Procedure: APPLICATION VAC DRESSING;  Surgeon: Mona Hernandez DPM;  [facility];  Service: Podiatry    WOUND DEBRIDEMENT Right 2/12/2018    Procedure: DEBRIDEMENT WOUND [facility] OUT); Surgeon: Taurus Waldrop DPM;  [facility];  Service: Podiatry       Family History   Problem Relation Age of Onset    Diabetes Mother     No Known Problems Father        Social History     Social History    Marital status: /Civil Union     Spouse name: N/A    Number of children: N/A    Years of education: N/A     Occupational History    Not on file  Social History Main Topics    Smoking status: Never Smoker    Smokeless tobacco: Never Used    Alcohol use No    Drug use: No    Sexual activity: Yes     Other Topics Concern    Not on file     Social History Narrative    No narrative on file       Allergies   Allergen Reactions    Penicillins      Pt stated that he had a reaction to it a long time ago    Childhood history         Current Outpatient Prescriptions:     acetaminophen (TYLENOL) 325 mg tablet, Take 2 tablets (650 mg total) by mouth 3 (three) times a day, Disp: 30 tablet, Rfl: 0    Alpha-Lipoic Acid 600 MG CAPS, Take by mouth, Disp: , Rfl:     aspirin 81 MG tablet, Take 81 mg by mouth, Disp: , Rfl:     Blood Glucose Monitoring Suppl (ONE TOUCH ULTRA MINI) w/Device KIT, by Does not apply route, Disp: , Rfl:     docusate sodium (COLACE) 100 mg capsule, Take 1 capsule (100 mg total) by mouth 2 (two) times a day, Disp: 10 capsule, Rfl: 0    glipiZIDE-metFORMIN (METAGLIP) 5-500 MG per tablet, , Disp: , Rfl:     losartan-hydrochlorothiazide (HYZAAR) 100-25 MG per tablet, Take 1 tablet by mouth, Disp: , Rfl:     melatonin 3 mg, Take 1 tablet (3 mg total) by mouth daily at bedtime, Disp: 10 tablet, Rfl: 0    metoprolol tartrate (LOPRESSOR) 25 mg tablet, Take 1 tablet (25 mg total) by mouth every 12 (twelve) hours, Disp: 30 tablet, Rfl: 0    ONETOUCH DELICA LANCETS FINE MISC, , Disp: , Rfl:     ONETOUCH VERIO test strip, , Disp: , Rfl:     pregabalin (LYRICA) 75 mg capsule, Take 75 mg by mouth daily at bedtime  , Disp: , Rfl:     rosuvastatin (CRESTOR) 20 MG tablet, Take 20 mg by mouth, Disp: , Rfl:     TRADJENTA 5 MG TABS, , Disp: , Rfl:

## 2018-09-07 NOTE — ASSESSMENT & PLAN NOTE
New onset and worsening claudication right lower extremity with history of popliteal angioplasty atherectomy    Recent Doppler does show possible restenosis and in view of the symptoms will be planning a CT angiogram for further evaluation and possible planning  re- endovascular intervention

## 2018-09-14 LAB
BUN SERPL-MCNC: 19 MG/DL (ref 7–25)
BUN/CREAT SERPL: ABNORMAL (CALC) (ref 6–22)
CALCIUM SERPL-MCNC: 9.8 MG/DL (ref 8.6–10.3)
CHLORIDE SERPL-SCNC: 96 MMOL/L (ref 98–110)
CO2 SERPL-SCNC: 31 MMOL/L (ref 20–32)
CREAT SERPL-MCNC: 0.91 MG/DL (ref 0.7–1.25)
GLUCOSE SERPL-MCNC: 77 MG/DL (ref 65–99)
POTASSIUM SERPL-SCNC: 3.7 MMOL/L (ref 3.5–5.3)
SL AMB EGFR AFRICAN AMERICAN: 99 ML/MIN/1.73M2
SL AMB EGFR NON AFRICAN AMERICAN: 86 ML/MIN/1.73M2
SODIUM SERPL-SCNC: 135 MMOL/L (ref 135–146)

## 2018-09-19 ENCOUNTER — HOSPITAL ENCOUNTER (OUTPATIENT)
Dept: CT IMAGING | Facility: HOSPITAL | Age: 68
Discharge: HOME | End: 2018-09-19
Attending: SURGERY
Payer: COMMERCIAL

## 2018-09-19 DIAGNOSIS — I73.9 CLAUDICATION OF RIGHT LOWER EXTREMITY (HCC): ICD-10-CM

## 2018-09-19 PROCEDURE — 75635 CT ANGIO ABDOMINAL ARTERIES: CPT

## 2018-09-19 RX ADMIN — IOHEXOL 120 ML: 350 INJECTION, SOLUTION INTRAVENOUS at 17:07

## 2018-09-25 ENCOUNTER — OFFICE VISIT (OUTPATIENT)
Dept: VASCULAR SURGERY | Facility: CLINIC | Age: 68
End: 2018-09-25
Payer: COMMERCIAL

## 2018-09-25 ENCOUNTER — DOCUMENTATION (OUTPATIENT)
Dept: ADMINISTRATIVE | Facility: HOSPITAL | Age: 68
End: 2018-09-25

## 2018-09-25 VITALS
WEIGHT: 128 LBS | BODY MASS INDEX: 19.4 KG/M2 | DIASTOLIC BLOOD PRESSURE: 76 MMHG | TEMPERATURE: 97 F | SYSTOLIC BLOOD PRESSURE: 152 MMHG | HEIGHT: 68 IN

## 2018-09-25 DIAGNOSIS — I73.9 CLAUDICATION OF RIGHT LOWER EXTREMITY (HCC): ICD-10-CM

## 2018-09-25 DIAGNOSIS — E11.42 DIABETIC POLYNEUROPATHY ASSOCIATED WITH TYPE 2 DIABETES MELLITUS (HCC): Primary | Chronic | ICD-10-CM

## 2018-09-25 PROCEDURE — 99213 OFFICE O/P EST LOW 20 MIN: CPT | Performed by: SURGERY

## 2018-09-25 NOTE — ASSESSMENT & PLAN NOTE
Lab Results   Component Value Date    HGBA1C 9 7 (H) 02/05/2018       No results for input(s): POCGLU in the last 72 hours      Blood Sugar Average: Last 72 hrs:     Neurology consult for peripheral neuropathy and intermittent memory loss especially in the morning

## 2018-09-25 NOTE — ASSESSMENT & PLAN NOTE
Claudication right lower extremity recurrence of mid popliteal stenosis  Am recommending repeat intervention with laser atherectomy

## 2018-10-05 ENCOUNTER — TELEPHONE (OUTPATIENT)
Dept: VASCULAR SURGERY | Facility: CLINIC | Age: 68
End: 2018-10-05

## 2018-10-05 NOTE — TELEPHONE ENCOUNTER
Reviewed what is to be expected with patients upcoming surgery  Reviewed laser artherectomy surgery, and results of last test as resulted by Dr Yo Barajas

## 2018-10-09 ENCOUNTER — TELEPHONE (OUTPATIENT)
Dept: NEUROLOGY | Facility: CLINIC | Age: 68
End: 2018-10-09

## 2018-10-09 NOTE — TELEPHONE ENCOUNTER
MELYSSA- CALLED PATIENT TO OFFER ANOTHER APPT - HE STATED HE IS NOT INTERESTED AND DISCONNECTED LINE  I then canceled appt with Noelle Hollingsworth

## 2018-10-18 ENCOUNTER — TELEPHONE (OUTPATIENT)
Dept: RADIOLOGY | Facility: HOSPITAL | Age: 68
End: 2018-10-18

## 2018-10-24 ENCOUNTER — HOSPITAL ENCOUNTER (OUTPATIENT)
Dept: RADIOLOGY | Facility: HOSPITAL | Age: 68
Discharge: HOME | End: 2018-10-24
Attending: SURGERY | Admitting: RADIOLOGY
Payer: COMMERCIAL

## 2018-10-24 VITALS
RESPIRATION RATE: 18 BRPM | HEART RATE: 82 BPM | TEMPERATURE: 97.5 F | OXYGEN SATURATION: 100 % | DIASTOLIC BLOOD PRESSURE: 61 MMHG | SYSTOLIC BLOOD PRESSURE: 118 MMHG

## 2018-10-24 DIAGNOSIS — I73.9 CLAUDICATION OF RIGHT LOWER EXTREMITY (HCC): ICD-10-CM

## 2018-10-24 LAB
ERYTHROCYTE [DISTWIDTH] IN BLOOD BY AUTOMATED COUNT: 12.4 % (ref 11.6–15.1)
HCT VFR BLD AUTO: 45.1 % (ref 36.5–49.3)
HGB BLD-MCNC: 15.2 G/DL (ref 12–17)
INR PPP: 1.04 (ref 0.86–1.17)
MCH RBC QN AUTO: 31.1 PG (ref 26.8–34.3)
MCHC RBC AUTO-ENTMCNC: 33.7 G/DL (ref 31.4–37.4)
MCV RBC AUTO: 92 FL (ref 82–98)
PLATELET # BLD AUTO: 182 THOUSANDS/UL (ref 149–390)
PMV BLD AUTO: 11.2 FL (ref 8.9–12.7)
PROTHROMBIN TIME: 13.7 SECONDS (ref 11.8–14.2)
RBC # BLD AUTO: 4.88 MILLION/UL (ref 3.88–5.62)
WBC # BLD AUTO: 5.87 THOUSAND/UL (ref 4.31–10.16)

## 2018-10-24 PROCEDURE — C1894 INTRO/SHEATH, NON-LASER: HCPCS

## 2018-10-24 PROCEDURE — C1885 CATH, TRANSLUMIN ANGIO LASER: HCPCS

## 2018-10-24 PROCEDURE — C1769 GUIDE WIRE: HCPCS

## 2018-10-24 PROCEDURE — C2623 CATH, TRANSLUMIN, DRUG-COAT: HCPCS

## 2018-10-24 PROCEDURE — C1884 EMBOLIZATION PROTECT SYST: HCPCS

## 2018-10-24 PROCEDURE — C1760 CLOSURE DEV, VASC: HCPCS

## 2018-10-24 PROCEDURE — 99152 MOD SED SAME PHYS/QHP 5/>YRS: CPT | Performed by: RADIOLOGY

## 2018-10-24 PROCEDURE — 85027 COMPLETE CBC AUTOMATED: CPT | Performed by: RADIOLOGY

## 2018-10-24 PROCEDURE — 37225 HB FEM/POPL REVAS W/ATHER: CPT

## 2018-10-24 PROCEDURE — 37225 PR REVSC OPN/PRQ FEM/POP W/ATHRC/ANGIOP SM VSL: CPT | Performed by: RADIOLOGY

## 2018-10-24 PROCEDURE — 36140 INTRO NDL ICATH UPR/LXTR ART: CPT

## 2018-10-24 PROCEDURE — 85610 PROTHROMBIN TIME: CPT | Performed by: RADIOLOGY

## 2018-10-24 RX ORDER — FENTANYL CITRATE 50 UG/ML
INJECTION, SOLUTION INTRAMUSCULAR; INTRAVENOUS CODE/TRAUMA/SEDATION MEDICATION
Status: COMPLETED | OUTPATIENT
Start: 2018-10-24 | End: 2018-10-24

## 2018-10-24 RX ORDER — MIDAZOLAM HYDROCHLORIDE 1 MG/ML
INJECTION INTRAMUSCULAR; INTRAVENOUS CODE/TRAUMA/SEDATION MEDICATION
Status: COMPLETED | OUTPATIENT
Start: 2018-10-24 | End: 2018-10-24

## 2018-10-24 RX ORDER — HEPARIN SODIUM 1000 [USP'U]/ML
INJECTION, SOLUTION INTRAVENOUS; SUBCUTANEOUS CODE/TRAUMA/SEDATION MEDICATION
Status: COMPLETED | OUTPATIENT
Start: 2018-10-24 | End: 2018-10-24

## 2018-10-24 RX ORDER — DIPHENHYDRAMINE HYDROCHLORIDE 50 MG/ML
INJECTION INTRAMUSCULAR; INTRAVENOUS CODE/TRAUMA/SEDATION MEDICATION
Status: COMPLETED | OUTPATIENT
Start: 2018-10-24 | End: 2018-10-24

## 2018-10-24 RX ADMIN — MIDAZOLAM 1 MG: 1 INJECTION INTRAMUSCULAR; INTRAVENOUS at 09:13

## 2018-10-24 RX ADMIN — MIDAZOLAM 0.5 MG: 1 INJECTION INTRAMUSCULAR; INTRAVENOUS at 10:02

## 2018-10-24 RX ADMIN — IODIXANOL 66 ML: 320 INJECTION, SOLUTION INTRAVASCULAR at 11:02

## 2018-10-24 RX ADMIN — MIDAZOLAM 0.5 MG: 1 INJECTION INTRAMUSCULAR; INTRAVENOUS at 09:38

## 2018-10-24 RX ADMIN — FENTANYL CITRATE 25 MCG: 50 INJECTION, SOLUTION INTRAMUSCULAR; INTRAVENOUS at 09:49

## 2018-10-24 RX ADMIN — FENTANYL CITRATE 25 MCG: 50 INJECTION, SOLUTION INTRAMUSCULAR; INTRAVENOUS at 09:22

## 2018-10-24 RX ADMIN — FENTANYL CITRATE 25 MCG: 50 INJECTION, SOLUTION INTRAMUSCULAR; INTRAVENOUS at 09:38

## 2018-10-24 RX ADMIN — FENTANYL CITRATE 50 MCG: 50 INJECTION, SOLUTION INTRAMUSCULAR; INTRAVENOUS at 08:59

## 2018-10-24 RX ADMIN — MIDAZOLAM 1 MG: 1 INJECTION INTRAMUSCULAR; INTRAVENOUS at 08:59

## 2018-10-24 RX ADMIN — MIDAZOLAM 0.5 MG: 1 INJECTION INTRAMUSCULAR; INTRAVENOUS at 09:48

## 2018-10-24 RX ADMIN — MIDAZOLAM 0.5 MG: 1 INJECTION INTRAMUSCULAR; INTRAVENOUS at 09:22

## 2018-10-24 RX ADMIN — HEPARIN SODIUM 5000 UNITS: 1000 INJECTION INTRAVENOUS; SUBCUTANEOUS at 09:41

## 2018-10-24 RX ADMIN — DIPHENHYDRAMINE HYDROCHLORIDE 50 MG: 50 INJECTION, SOLUTION INTRAMUSCULAR; INTRAVENOUS at 10:16

## 2018-10-24 RX ADMIN — FENTANYL CITRATE 25 MCG: 50 INJECTION, SOLUTION INTRAMUSCULAR; INTRAVENOUS at 10:02

## 2018-10-24 RX ADMIN — FENTANYL CITRATE 50 MCG: 50 INJECTION, SOLUTION INTRAMUSCULAR; INTRAVENOUS at 09:13

## 2018-10-24 NOTE — BRIEF OP NOTE (RAD/CATH)
IR LOWER EXTREMITY / INTERVENTION  Procedure Note    PATIENT NAME: Abdoulaye Pichardo  : 1950  MRN: [id number]     Pre-op Diagnosis:   1  Claudication of right lower extremity (HCC)      Post-op Diagnosis:   1   Claudication of right lower extremity ([address] )        Surgeon:   Kevyn Atkinson MD    Estimated Blood Loss:  Minimal  Findings:  Severe recurrent stenosis right popliteal artery treated with laser atherectomy and 4 millimeter drug coated balloon with good result    Specimens:  None    Complications:  None    Anesthesia: Conscious sedation    Kevyn Atkinson MD     Date: 10/24/2018  Time: 5:10 PM

## 2018-10-24 NOTE — DISCHARGE INSTRUCTIONS
ARTERIOGRAM    WHAT YOU SHOULD KNOW:   An angiogram is a procedure to look at arteries in your body  Arteries are the blood vessels that carry blood from your heart to your body  AFTER YOU LEAVE:     Self-care:   · Limit activity: Rest for the remainder of the day of your procedure  Have some one with you until the next morning  Keep your arm or leg straight as much as possible  Rest as much as possible, sitting lying or reclining  Walk only to go to the bathroom, to bed or to eat  If the angiogram catheter was put in your leg, use the stairs as little as possible  No driving  · Keep your wound clean and dry  You may shower 24 hours after your procedure  The bandage you have on should fall off in 2-3 days  If there is any drainage from the puncture site, you should put on a clean bandage  · Watch for bleeding and bruising: It is normal to have a bruise and soreness where the angiogram catheter went in  · Diet:   · You may resume your regular diet, Sips of flat soda will help with mild nausea  · Drink more liquids than usual for the next 24 hours      · IMMEDIATELY Contact Interventional Radiology at [phone] Frank PATIENTS: Contact Interventional Radiology at [phone]) Luz Maria Almanza PATIENTS: Contact Interventional Radiology at [phone]) if any of the following occur:  · If your bruise gets larger or if you notice any active bleeding  APPLY DIRECT PRESSURE TO THE BLEEDING SITE  · If you notice increased swelling or have increased pain at the puncture site   · If you have any numbness or pain in the extremity of the puncture site   · If that extremity seems cold or pale      · You have fever greater than 101  · Persistent nausea or vomitting    Follow up with your primary healthcare provider  as directed: Write down your questions so you remember to ask them during your visits

## 2018-11-29 ENCOUNTER — OFFICE VISIT (OUTPATIENT)
Dept: VASCULAR SURGERY | Facility: CLINIC | Age: 68
End: 2018-11-29
Payer: COMMERCIAL

## 2018-11-29 VITALS
WEIGHT: 128 LBS | DIASTOLIC BLOOD PRESSURE: 70 MMHG | SYSTOLIC BLOOD PRESSURE: 124 MMHG | HEIGHT: 68 IN | BODY MASS INDEX: 19.4 KG/M2 | HEART RATE: 76 BPM | TEMPERATURE: 97.7 F

## 2018-11-29 DIAGNOSIS — I70.238 ATHEROSCLEROSIS OF NATIVE ARTERIES OF RIGHT LEG WITH ULCERATION OF OTHER PART OF LOWER RIGHT LEG: Primary | ICD-10-CM

## 2018-11-29 PROCEDURE — 99213 OFFICE O/P EST LOW 20 MIN: CPT | Performed by: SURGERY

## 2018-11-29 NOTE — PATIENT INSTRUCTIONS
Doing well status post endovascular intervention right popliteal artery  He is no longer complaining of claudication symptoms, he does complain of balance issues likely due to the transmetatarsal amputation     Also complaining of inability to gain weight likely a diabetic issue  Plan:  Lower extremity arterial study in 3 months status post endovascular intervention laser atherectomy

## 2018-11-29 NOTE — ASSESSMENT & PLAN NOTE
Doing well status post endovascular intervention right popliteal artery  He is no longer complaining of claudication symptoms, he does complain of balance issues likely due to the transmetatarsal amputation     Also complaining of inability to gain weight likely a diabetic issue      Plan:  Lower extremity arterial study in 6 months status post endovascular intervention laser atherectomy

## 2018-11-29 NOTE — PROGRESS NOTES
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 (HCC)        Subjective:      Patient ID: Lionel Elliott is a 70 y o  male  HPI transmetatarsal amputation status post ischemic forefoot after now a 2nd endovascular intervention he has excellent perfusion to his right foot  The following portions of the patient's history were reviewed and updated as appropriate: allergies, current medications, past family history, past medical history, past social history, past surgical history and problem list     Review of Systems  balance, all other systems negative    Objective:      /70 (BP Location: Right arm, Patient Position: Sitting, Cuff Size: Standard)   Pulse 76   Temp 97 7 °F (36 5 °C) (Tympanic)   Ht 5' 8" (1 727 m)   Wt 58 1 kg (128 lb)   BMI 19 46 kg/m²          Physical Exam      Excellent healing of the transmetatarsal amputation right foot he has easily palpable posterior tibial and dorsalis pedis pulses      Vitals:    11/29/18 1633   BP: 124/70   BP Location: Right arm   Patient Position: Sitting   Cuff Size: Standard   Pulse: 76   Temp: 97 7 °F (36 5 °C)   TempSrc: Tympanic   Weight: 58 1 kg (128 lb)   Height: 5' 8" (1 727 m)       Patient Active Problem List   Diagnosis    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 (HCC)    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 of foot ([facility]    PVD [facility]peripheral vascular disease) ([email or web address]    Hypertension    Gangrene of toe of right foot ([facility] )    Diabetic neuropathy associated with type 2 diabetes mellitus ([email or web address]    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)    Cellulitis of right foot    Abnormal CT of the head    Elevated brain natriuretic peptide (BNP) level    Acute respiratory failure (HCC)    Transaminitis    Hypophosphatemia    Popliteal artery stenosis, right (HCC)    Great toe amputation status, right (HCC)    Coagulopathy (HCC)    Meningioma (HCC)    Hypokalemia    Moderate mitral regurgitation    Gangrenous toe ([facility] )    Coronary artery disease involving native coronary artery    (HFpEF) heart failure with preserved ejection fraction (HCC)    Pain in right foot    Acute urinary retention    BPH with obstruction/lower urinary tract symptoms    Claudication of right lower extremity ([email or web address]       Past Surgical History:   Procedure Laterality Date    EYE SURGERY      cataract- bilateral    INCISION AND DRAINAGE OF WOUND Right 2/6/2018    Procedure: INCISION AND DRAINAGE (I&D) EXTREMITY, right great toe amputation;  Surgeon: Mor Gaviria DPM;  [facility];  Service: Podiatry    IR LOWER EXTREMITY / INTERVENTION  10/24/2018    NO PAST SURGERIES      OR AMPUTATION FOOT,TRANSMETATARSAL Right 2/12/2018    Procedure: AMPUTATION TRANSMETATARSAL (TMA);  Surgeon: Mor Gaviria DPM;  [facility];  Service: Podiatry    SPLIT THICKNESS SKIN GRAFT Right 2/15/2018    Procedure: SKIN GRAFT SPLIT THICKNESS (STSG)  EXTREMITY;  Surgeon: Mor Gaviria DPM;  [facility];  Service: Podiatry    VAC DRESSING APPLICATION Right 0/32/1692    Procedure: APPLICATION VAC DRESSING;  Surgeon: Mor Gaviria DPM;  [facility];  Service: Podiatry    WOUND DEBRIDEMENT Right 2/12/2018    Procedure: DEBRIDEMENT WOUND [facility] OUT); Surgeon: Riley Andrews DPM;  [facility];  Service: Podiatry       Family History   Problem Relation Age of Onset    Diabetes Mother     No Known Problems Father        Social History     Social History    Marital status: /Civil Union     Spouse name: N/A    Number of children: N/A    Years of education: N/A     Occupational History    Not on file  Social History Main Topics    Smoking status: Never Smoker    Smokeless tobacco: Never Used    Alcohol use No    Drug use: No    Sexual activity: Yes     Other Topics Concern    Not on file     Social History Narrative    No narrative on file       Allergies   Allergen Reactions    Penicillins      Pt stated that he had a reaction to it a long time ago    Childhood history         Current Outpatient Prescriptions:     acetaminophen (TYLENOL) 325 mg tablet, Take 2 tablets (650 mg total) by mouth 3 (three) times a day, Disp: 30 tablet, Rfl: 0    Alpha-Lipoic Acid 600 MG CAPS, Take by mouth, Disp: , Rfl:     aspirin 81 MG tablet, Take 81 mg by mouth, Disp: , Rfl:     Blood Glucose Monitoring Suppl (ONE TOUCH ULTRA MINI) w/Device KIT, by Does not apply route, Disp: , Rfl:     docusate sodium (COLACE) 100 mg capsule, Take 1 capsule (100 mg total) by mouth 2 (two) times a day, Disp: 10 capsule, Rfl: 0    glipiZIDE-metFORMIN (METAGLIP) 5-500 MG per tablet, , Disp: , Rfl:     losartan-hydrochlorothiazide (HYZAAR) 100-25 MG per tablet, Take 1 tablet by mouth, Disp: , Rfl:     melatonin 3 mg, Take 1 tablet (3 mg total) by mouth daily at bedtime, Disp: 10 tablet, Rfl: 0    metoprolol tartrate (LOPRESSOR) 25 mg tablet, Take 1 tablet (25 mg total) by mouth every 12 (twelve) hours, Disp: 30 tablet, Rfl: 0    ONETOUCH DELICA LANCETS FINE MISC, , Disp: , Rfl:     ONETOUCH VERIO test strip, , Disp: , Rfl:     pregabalin (LYRICA) 75 mg capsule, Take 75 mg by mouth daily at bedtime  , Disp: , Rfl:    rosuvastatin (CRESTOR) 20 MG tablet, Take 20 mg by mouth, Disp: , Rfl:     TRADJENTA 5 MG TABS, , Disp: , Rfl:

## 2019-02-13 ENCOUNTER — TELEPHONE (OUTPATIENT)
Dept: CARDIAC SURGERY | Facility: HOSPITAL | Age: 69
End: 2019-02-13

## 2019-02-13 NOTE — TELEPHONE ENCOUNTER
Called patient to identify reason for wanting to "postpone" procedure  He states "im not having any issues so why would I have the test?"  I explained the benefits of staying on the follow up schedule for routine assessment, but patient remained convinced to postpone  He will call back later to schedule a time in April

## 2020-01-12 NOTE — RESULT NOTES
Message   Recorded as Task   Date: 06/17/2016 12:18 PM, Created By: Ema Puente   Task Name: Follow Up   Assigned To: SPA henrik clinical,Team   Regarding Patient: Cristobal Sharma, Status: Active   Comment:    Ema Puente - 17 Jun 2016 12:18 PM     TASK CREATED  Caller: Self; General Medical Question; [phone] (Mobile Phone)  Tc from pt on friday at 11:58 that he sent a message to Dr Dmitry Patricio yest via the pt portal and hasn't heard back from him at all  Told pt Dr Dmitry Patricio is out of the office today and will return Monday  I asked what questions he had and I would forward to the doctor on Monday  Pt wanted copy of his summary from his ov  Pt asking about having an epidural block  Told pt I would put copy of his clinical summary from 6/3/16 in the mail today  Told pt I would inform Dr Dmitry Patricio Monday that pt sent him his questions via the pt portal -> pt asked if Dr Dmitry Patricio could call him Monday to discuss  Best CB #  cell [phone]  Nancy Perla - 20 Jun 2016 8:24 AM     TASK REPLIED TO: Previously Assigned To ALDO fischer,Team  spoke to pt extensively   he will forward EMG results and get updated blood work        Signatures   Electronically signed by :  Nicky Crabtree, ; Jun 20 2016  3:15PM EST                       (Author) Declines

## 2022-05-02 NOTE — CONSULTS
Consultation - Claudio Carcamo 75 y o  male MRN: [id number]    Unit/Bed#: [facility] 603-01 Encounter: [id number]      Assessment/Plan     Assessment: This is a 75y o -year-old male with type 2 diabetes with hyperglycemia, peripheral arterial disease, sepsis presented with right  1st toe gangrene, osteomyelitis and cellulitis underwent angiogram, PTA and 1st hallux amputation     Plan:  Started on IV insulin infusion  earlier today-agree with continuing IV insulin infusion  Fingersticks to be monitored q 2 hours while on IV insulin infusion  As he clinically improves will consider switching him to subcu insulin therapy  Sepsis/lactic acidosis-source likely right foot infection-on IV  antibiotics, ID on board, he is status post 1st toe amputation- he will likely need TMA  Peripheral arterial disease- status post successful revascularization- vascular surgery on board  Right 1st toe gangrene/osteomyelitis /cellulitis-status post 1st hallux  amputation- worsening leukocytosis and acidosis- on IV antibiotics, ID on board - may need TMA - podiatry on board   CC: Diabetes Consult    History of Present Illness     HPI: Claudio Carcamo is a 75y o  year old male with type 2 Diabetes , PAD who was initially admitted to the hospital with worsening redness, swelling and pain of right big toe-found to have wet gangrene, osteomyelitis and cellulitis and underwent right hallux  amputation and I&D yesterday -since admission his blood sugars have been between 200s to 300s- he was started on IV insulin infusion earlier today- endocrine consult is requested for management of type 2 diabetes  Patient states that he has had type 2 diabetes for more than 25 years, has been treated with oral hypoglycemics  States he checks his blood sugars at home however is not able to give me any ranges  Hemoglobin A1c 9 7 which is consistent with poor glycemic control  Patient currently denies any polyuria, polydipsia or blurry vision    Denies From: Kalia Miltonal  To: Jonas Lai  Sent: 4/30/2022 11:52 AM CDT  Subject: new insulin pen prescription    [facility] Gr[facility]orz [facility]ada, I am now using an insulin pen. It is a Toujeo Max Solostar 300U/ml pen 3 ml, and I am injecting 30 units at bedtime. My blood sugars were all good on 4/29/22. I took readings every 15 minutes for 8 hours, and all were under 200 mg/dL. I wanted to observe behavior after eating certain things. I now am taking readings before breakfast, midday, and just before bedtime.     Staci Mckoy any prior history of amputation  Denies any numbness or tingling in his feet  Denies any fever or chills  He is currently being treated for sepsis most likely secondary to right foot infection  Consults    Review of Systems   Constitutional: Positive for appetite change and fatigue  Negative for fever and unexpected weight change  Eyes: Negative for visual disturbance  Respiratory: Negative for cough and shortness of breath  Cardiovascular: Negative for chest pain, palpitations and leg swelling  Gastrointestinal: Negative for constipation, diarrhea, nausea and vomiting  Endocrine: Negative for polydipsia and polyuria  Skin: Positive for color change and wound  Psychiatric/Behavioral: Positive for agitation and confusion  All other systems reviewed and are negative        Historical Information   Past Medical History:   Diagnosis Date    Diabetes mellitus ([place] [address] )     Diabetic neuropathy associated with type 2 diabetes mellitus ([place] [address] )     DM (diabetes mellitus), type 2 with peripheral vascular complications (HCC)     [facility] right foot ([facility] )     Hypertension     PVD (peripheral vascular disease) ([place] [address] )      Past Surgical History:   Procedure Laterality Date    EYE SURGERY      cataract- bilateral    INCISION AND DRAINAGE OF WOUND Right 2/6/2018    Procedure: INCISION AND DRAINAGE (I&D) EXTREMITY, right great toe amputation;  Surgeon: Zo Samaniego DPM;  [facility];  Service: Podiatry    NO PAST SURGERIES       Social History   History   Alcohol Use No     History   Drug Use No     History   Smoking Status    Never Smoker   Smokeless Tobacco    Never Used     Family History:   Family History   Problem Relation Age of Onset    Diabetes Mother     No Known Problems Father        Meds/Allergies   Current Facility-Administered Medications   Medication Dose Route Frequency Provider Last Rate Last Dose    acetaminophen (TYLENOL) rectal suppository 325 mg  325 mg Rectal Q4H PRN Ana Espinoza PA-C        aspirin chewable tablet 81 mg  81 mg Oral Daily Kala Montgomery MD   81 mg at 02/07/18 0842    cefepime (MAXIPIME) 2,000 mg in dextrose 5 % 50 mL IVPB  2,000 mg Intravenous Q12H Arlyn Hatch  mL/hr at 02/07/18 1411 2,000 mg at 02/07/18 1411    dextrose 5 % and sodium chloride 0 9 % infusion  250 mL/hr Intravenous Continuous Luci Myles MD        gabapentin (NEURONTIN) capsule 600 mg  600 mg Oral HS Kala Montgomery MD        insulin regular (HumuLIN R,NovoLIN R) 1 Units/mL in sodium chloride 0 9 % 100 mL infusion  0 1-30 Units/hr Intravenous Continuous Luci Myles MD 5 mL/hr at 02/07/18 1351 5 Units/hr at 02/07/18 1351    metoprolol tartrate (LOPRESSOR) tablet 75 mg  75 mg Oral Q12H [facility] Kala Montgomery MD   75 mg at 02/07/18 0842    metroNIDAZOLE (FLAGYL) IVPB (premix) 500 mg  500 mg Intravenous Q8H Luci Myles  mL/hr at 02/07/18 1227 500 mg at 02/07/18 1227    pregabalin (LYRICA) capsule 75 mg  75 mg Oral HS Kala Montgomery MD        sodium chloride 0 9 % infusion  500 mL/hr Intravenous Continuous Luci Myles  mL/hr at 02/07/18 1411 500 mL/hr at 02/07/18 1411    Followed by   Robert Hays sodium chloride 0 9 % infusion  250 mL/hr Intravenous Continuous Luci Myles MD        vancomycin (VANCOCIN) IVPB (premix) 1,000 mg  15 mg/kg Intravenous Q12H Arlyn Hatch  mL/hr at 02/07/18 0151 1,000 mg at 02/07/18 0151     Allergies   Allergen Reactions    Penicillins      Pt stated that he had a reaction to it a long time ago  Childhood history       Objective   Vitals: Blood pressure 109/60, pulse 82, temperature 98 9 °F (37 2 °C), temperature source Oral, resp  rate 22, height 5' 8" (1 727 m), weight 60 5 kg (133 lb 4 8 oz), SpO2 95 %      Intake/Output Summary (Last 24 hours) at 02/07/18 1439  Last data filed at 02/07/18 1413   Gross per 24 hour   Intake          3322 91 ml   Output              700 ml   Net 2622 91 ml     Invasive Devices     Peripheral Intravenous Line            Peripheral IV 02/05/18 Right Antecubital 1 day    Peripheral IV 02/07/18 Left Antecubital less than 1 day                Physical Exam   Constitutional:   Frail ,Elderly gentleman in no apparent distress   HENT:   Head: Normocephalic and atraumatic  Eyes: Conjunctivae and EOM are normal  No scleral icterus  Neck: Normal range of motion  Neck supple  No thyromegaly present  Cardiovascular: Normal rate, regular rhythm and normal heart sounds  No murmur heard  Pulmonary/Chest: Effort normal and breath sounds normal  He has no wheezes  He has no rales  Abdominal: Soft  Bowel sounds are normal  He exhibits no distension  There is no tenderness  Musculoskeletal: He exhibits no edema  Lymphadenopathy:     He has no cervical adenopathy  Neurological: He is alert  Skin:   Right foot is bandaged       The history was obtained from the review of the chart, patient and family  Lab Results:     Results from last 7 days  Lab Units 02/05/18 2122   HEMOGLOBIN A1C % 9 7*     Lab Results   Component Value Date    WBC 27 38 (H) 02/07/2018    HGB 11 2 (L) 02/07/2018    HCT 34 2 (L) 02/07/2018    MCV 93 02/07/2018     02/07/2018     Lab Results   Component Value Date/Time    BUN 25 02/07/2018 10:02 AM     02/07/2018 10:02 AM    K 4 0 02/07/2018 10:02 AM     02/07/2018 10:02 AM    CO2 18 (L) 02/07/2018 10:02 AM    CREATININE 0 95 02/07/2018 10:02 AM    AST 79 (H) 02/07/2018 10:02 AM    ALT 26 02/07/2018 10:02 AM    ALB 2 2 (L) 02/07/2018 10:02 AM     No results for input(s): CHOL, HDL, LDL, TRIG, VLDL in the last 72 hours    No results found for: Kenya Doll  POC Glucose (mg/dl)   Date Value   02/07/2018 291 (H)   02/07/2018 354 (H)   02/07/2018 281 (H)   02/06/2018 196 (H)   02/06/2018 174 (H)   02/06/2018 204 (H)   02/06/2018 207 (H)   02/05/2018 252 (H)       Imaging Studies: I have personally reviewed pertinent reports  XR foot 3+ views RIGHT [[id number]] Collected: 02/05/18 2009   Order Status: Completed Updated: 02/05/18 2013   Narrative:     RIGHT FOOT    INDICATION:  Infection    COMPARISON: None    VIEWS:  3    IMAGES:  3    FINDINGS:    There is permeation noted within the 1st distal phalanx with lytic areas and foci of air    No degenerative changes  No lytic or blastic lesions are seen  Soft tissues are unremarkable  Mineralized vasculature seen   Impression:       There is permeation noted within the 1st distal phalanx with associated erosive changes and foci of air in the soft tissue compatible with osteomyelitis       Portions of the record may have been created with voice recognition software

## 2023-06-07 NOTE — ASSESSMENT & PLAN NOTE
Blood sugar still elevated more than 200 most of the times  Endocrine following  Appreciate recommendations  Called and spoke to mom, informed mom that pt had a new patient visit scheduled with Dr. Pugh but was previously seen by Dr. Coyle. Asked mom if she would like to transfer to Dr. Pugh mom informed that she would like to stay with Dr. Coyle. Attempted to find appt for pt within the same week, appt no available. Mom informed that she is okay if the appt has to be pushed back but would like appt in June. Is there anything pt can be offered in June? Please advise

## 2024-04-14 NOTE — PROGRESS NOTES
Spoke with Dr Eileen Gandara with [facility], plan for today is see what surgery says about VAC, continue to monitor pt no concerns

## (undated) DEVICE — VAC CANISTER 500ML

## (undated) DEVICE — CURITY NON-ADHERENT STRIPS: Brand: CURITY

## (undated) DEVICE — CULTURE TUBE AEROBIC

## (undated) DEVICE — CAUTERY TIP POLISHER: Brand: DEVON

## (undated) DEVICE — UNIVERSAL MAJOR EXTREMITY,KIT: Brand: CARDINAL HEALTH

## (undated) DEVICE — 3000CC GUARDIAN II: Brand: GUARDIAN

## (undated) DEVICE — CURITY STRETCH BANDAGE: Brand: CURITY

## (undated) DEVICE — INTENDED FOR TISSUE SEPARATION, AND OTHER PROCEDURES THAT REQUIRE A SHARP SURGICAL BLADE TO PUNCTURE OR CUT.: Brand: BARD-PARKER SAFETY BLADES SIZE 15, STERILE

## (undated) DEVICE — TONGUE DEPRESSOR STERILE

## (undated) DEVICE — CUFF TOURNIQUET 18 X 4 IN QUICK CONNECT DISP 1 BLADDER

## (undated) DEVICE — ABDOMINAL PAD: Brand: DERMACEA

## (undated) DEVICE — INTENDED FOR TISSUE SEPARATION, AND OTHER PROCEDURES THAT REQUIRE A SHARP SURGICAL BLADE TO PUNCTURE OR CUT.: Brand: BARD-PARKER SAFETY BLADES SIZE 10, STERILE

## (undated) DEVICE — DERMATOME BLADES: Brand: DERMATOME

## (undated) DEVICE — SUT ETHILON 3-0 FS-1 18 IN 663G

## (undated) DEVICE — BLADE SAGITTAL 25.6 X 9.5MM

## (undated) DEVICE — SPONGE STICK WITH PVP-I: Brand: KENDALL

## (undated) DEVICE — ACE WRAP 4 IN UNSTERILE

## (undated) DEVICE — GAUZE SPONGES,16 PLY: Brand: CURITY

## (undated) DEVICE — CULTURE TUBE ANAEROBIC

## (undated) DEVICE — GLOVE SRG BIOGEL 7.5

## (undated) DEVICE — CURITY PLAIN PACKING STRIP: Brand: CURITY

## (undated) DEVICE — KERLIX BANDAGE ROLL: Brand: KERLIX

## (undated) DEVICE — NEEDLE 25G X 1 1/2

## (undated) DEVICE — GLOVE INDICATOR PI UNDERGLOVE SZ 7.5 BLUE

## (undated) DEVICE — 3M™ TEGADERM™ TRANSPARENT FILM DRESSING FRAME STYLE, 1626W, 4 IN X 4-3/4 IN (10 CM X 12 CM), 50/CT 4CT/CASE: Brand: 3M™ TEGADERM™

## (undated) DEVICE — SPONGE PVP SCRUB WING STERILE

## (undated) DEVICE — DRESSING MAXORB EXTRA AG 4 IN X 4.75 IN

## (undated) DEVICE — VAC DRESSING SENSATRAC SMALL

## (undated) DEVICE — GLOVE SRG BIOGEL 7

## (undated) DEVICE — STOCKINETTE REGULAR

## (undated) DEVICE — THE SIMPULSE SOLO SYSTEM WITH ULTREX RETRACTABLE SPLASH SHIELD TIP: Brand: SIMPULSE SOLO

## (undated) DEVICE — BUTTON SWITCH PENCIL HOLSTER: Brand: VALLEYLAB